# Patient Record
Sex: FEMALE | Race: WHITE | NOT HISPANIC OR LATINO | Employment: OTHER | URBAN - METROPOLITAN AREA
[De-identification: names, ages, dates, MRNs, and addresses within clinical notes are randomized per-mention and may not be internally consistent; named-entity substitution may affect disease eponyms.]

---

## 2017-01-04 ENCOUNTER — ANTI-COAG VISIT (OUTPATIENT)
Dept: CARDIOLOGY | Facility: CLINIC | Age: 82
End: 2017-01-04

## 2017-01-04 DIAGNOSIS — Z79.01 CHRONIC ANTICOAGULATION: ICD-10-CM

## 2017-01-04 DIAGNOSIS — I48.20 CHRONIC ATRIAL FIBRILLATION: ICD-10-CM

## 2017-01-04 LAB — INR PPP: 1.9

## 2017-01-04 NOTE — PROGRESS NOTES
lvm for pts daughter gave dosing and inr check date, pts daughter was asked to return call to clinic to confirm .

## 2017-01-10 RX ORDER — SUCRALFATE 1 G/10ML
1 SUSPENSION ORAL EVERY 6 HOURS
Qty: 1 BOTTLE | Refills: 0 | Status: SHIPPED | OUTPATIENT
Start: 2017-01-10 | End: 2017-02-18 | Stop reason: SDUPTHER

## 2017-01-11 ENCOUNTER — TELEPHONE (OUTPATIENT)
Dept: CARDIOLOGY | Facility: CLINIC | Age: 82
End: 2017-01-11

## 2017-01-11 ENCOUNTER — ANTI-COAG VISIT (OUTPATIENT)
Dept: CARDIOLOGY | Facility: CLINIC | Age: 82
End: 2017-01-11

## 2017-01-11 DIAGNOSIS — I48.20 CHRONIC ATRIAL FIBRILLATION: ICD-10-CM

## 2017-01-11 DIAGNOSIS — Z79.01 CHRONIC ANTICOAGULATION: ICD-10-CM

## 2017-01-11 LAB — INR PPP: 1.3

## 2017-01-11 NOTE — TELEPHONE ENCOUNTER
----- Message from Khushbu Mendez sent at 1/11/2017 10:34 AM CST -----  Contact: [facility] / [facility] [phone]  [facility] wants to speak with a nurse regarding moving up the patient's appointment. Please call back at [phone]

## 2017-01-12 ENCOUNTER — OFFICE VISIT (OUTPATIENT)
Dept: CARDIOLOGY | Facility: CLINIC | Age: 82
End: 2017-01-12
Payer: MEDICARE

## 2017-01-12 VITALS
HEIGHT: 63 IN | WEIGHT: 140 LBS | DIASTOLIC BLOOD PRESSURE: 69 MMHG | SYSTOLIC BLOOD PRESSURE: 132 MMHG | BODY MASS INDEX: 24.8 KG/M2 | HEART RATE: 78 BPM

## 2017-01-12 DIAGNOSIS — I48.0 PAF (PAROXYSMAL ATRIAL FIBRILLATION): ICD-10-CM

## 2017-01-12 DIAGNOSIS — I50.9 CONGESTIVE HEART FAILURE, UNSPECIFIED CONGESTIVE HEART FAILURE CHRONICITY, UNSPECIFIED CONGESTIVE HEART FAILURE TYPE: Primary | ICD-10-CM

## 2017-01-12 DIAGNOSIS — N39.0 URINARY TRACT INFECTION, SITE NOT SPECIFIED: ICD-10-CM

## 2017-01-12 DIAGNOSIS — I20.0 UNSTABLE ANGINA: ICD-10-CM

## 2017-01-12 PROCEDURE — 99999 PR PBB SHADOW E&M-EST. PATIENT-LVL III: CPT | Mod: PBBFAC,,, | Performed by: INTERNAL MEDICINE

## 2017-01-12 PROCEDURE — 99214 OFFICE O/P EST MOD 30 MIN: CPT | Mod: S$PBB,,, | Performed by: INTERNAL MEDICINE

## 2017-01-12 PROCEDURE — 99213 OFFICE O/P EST LOW 20 MIN: CPT | Mod: PBBFAC,PO | Performed by: INTERNAL MEDICINE

## 2017-01-12 NOTE — PROGRESS NOTES
"Subjective:    Patient ID:  Lillian Stokes is a 83 y.o. female who presents for evaluation of Dizziness (established pt of Dr. sanchez); Shortness of Breath; and Edema      HPI 83 yo WF with severe ICM with CKD complains of weight gain and SOB and CP that has been going on since last night without relief.    Cath 5-2016 Patent stents in LAD,Diagonal and OM with known occluded RCA with food L to R collaterals. No change from previous cath     CONCLUSIONS     1 - Biatrial enlargement.     2 - Moderate left ventricular enlargement.     3 - Eccentric hypertrophy.     4 - Left ventricular diastolic dysfunction.     5 - Low normal right ventricular systolic function .     6 - The estimated PA systolic pressure is 23 mmHg.     7 - Severe mitral regurgitation.     8 - Mild to moderate tricuspid regurgitation.     9 - Severely depressed left ventricular systolic function (EF 15-20%).             This document has been electronically    SIGNED BY: Cesar Freire MD On: 04/30/2016 18:35    Review of Systems   Cardiovascular: Negative for chest pain, dyspnea on exertion and paroxysmal nocturnal dyspnea.   Respiratory: Positive for shortness of breath.         Objective:    Physical Exam   Constitutional: She is oriented to person, place, and time. She appears well-developed and well-nourished.   /69  Pulse 78  Ht 5' 3" (1.6 m)  Wt 63.5 kg (139 lb 15.9 oz)  LMP  (LMP Unknown)  BMI 24.8 kg/m2     HENT:   Head: Normocephalic and atraumatic.   Right Ear: External ear normal.   Left Ear: External ear normal.   Nose: Nose normal.   Mouth/Throat: Oropharynx is clear and moist.   Eyes: Conjunctivae, EOM and lids are normal. Pupils are equal, round, and reactive to light. Right eye exhibits no discharge. Left eye exhibits no discharge. Right conjunctiva has no hemorrhage. No scleral icterus.   Neck: Normal range of motion. Neck supple. No JVD present. No tracheal deviation present. No thyromegaly present.   Cardiovascular: " Normal rate, regular rhythm, normal heart sounds and intact distal pulses.  Exam reveals no gallop and no friction rub.    No murmur heard.  Pulmonary/Chest: Effort normal and breath sounds normal. No respiratory distress. She has no wheezes. She has no rales. She exhibits no tenderness. Breasts are symmetrical.   Sternal scar   Abdominal: Soft. Bowel sounds are normal. She exhibits no distension and no mass. There is no hepatosplenomegaly or hepatomegaly. There is no tenderness. There is no rebound and no guarding.   Musculoskeletal: Normal range of motion. She exhibits no edema or tenderness.   Lymphadenopathy:     She has no cervical adenopathy.   Neurological: She is alert and oriented to person, place, and time. She displays normal reflexes. No cranial nerve deficit. Coordination normal.   Skin: Skin is warm and dry. No rash noted. No erythema. No pallor.   Psychiatric: She has a normal mood and affect. Her behavior is normal. Judgment and thought content normal.   Nursing note and vitals reviewed.        Assessment:       1. Congestive heart failure, unspecified congestive heart failure chronicity, unspecified congestive heart failure type    2. PAF (paroxysmal atrial fibrillation)    3. Unstable angina    4. Urinary tract infection, site not specified         Plan:     Unrelieved CP in elderly lady with ICM and multiple stents.  Will send to ER for further evaluation

## 2017-01-16 ENCOUNTER — TELEPHONE (OUTPATIENT)
Dept: CARDIOLOGY | Facility: CLINIC | Age: 82
End: 2017-01-16

## 2017-01-16 ENCOUNTER — ANTI-COAG VISIT (OUTPATIENT)
Dept: CARDIOLOGY | Facility: CLINIC | Age: 82
End: 2017-01-16

## 2017-01-16 ENCOUNTER — TELEPHONE (OUTPATIENT)
Dept: FAMILY MEDICINE | Facility: CLINIC | Age: 82
End: 2017-01-16

## 2017-01-16 ENCOUNTER — TELEPHONE (OUTPATIENT)
Dept: UROLOGY | Facility: CLINIC | Age: 82
End: 2017-01-16

## 2017-01-16 DIAGNOSIS — Z79.01 CHRONIC ANTICOAGULATION: ICD-10-CM

## 2017-01-16 DIAGNOSIS — I48.20 CHRONIC ATRIAL FIBRILLATION: ICD-10-CM

## 2017-01-16 LAB — INR PPP: 2.4

## 2017-01-16 RX ORDER — CITALOPRAM 20 MG/1
40 TABLET, FILM COATED ORAL DAILY
Qty: 60 TABLET | Refills: 11 | Status: SHIPPED | OUTPATIENT
Start: 2017-01-16 | End: 2017-01-16 | Stop reason: SDUPTHER

## 2017-01-16 NOTE — TELEPHONE ENCOUNTER
Pt's daughter sharan asking if celexa can be increased as pt is experiencing severe depression. Is appt needed? Please advise.

## 2017-01-16 NOTE — TELEPHONE ENCOUNTER
HH SN reports increased edema, SOB, Dr Lomax instructed SN to have pt  take an extra Bumex today and tomorrow, SN verbalizes understanding

## 2017-01-16 NOTE — TELEPHONE ENCOUNTER
Spoke with home health nurse, patient started AZO today, urine does not have a bad odor and she has no fever.  Explained that this has been ongoing issue and Dr. Sylvester recommends the Estrace Vaginal Cream twice weekly.  If no improvement by Wednesday on AZO can order a culture.

## 2017-01-16 NOTE — TELEPHONE ENCOUNTER
----- Message from Kerrie Landa sent at 1/16/2017 11:40 AM CST -----  Contact: Daughter Marlene Stokes [phone]  She is encountering a lot of depression, so Marlene would like to know if her medication can be changed?  Please call her. Thank you!

## 2017-01-16 NOTE — TELEPHONE ENCOUNTER
----- Message from Jhoana Rees sent at 1/16/2017 10:17 AM CST -----  Contact: [facility]  Burning with urination, no fever.  Please call back at

## 2017-01-17 NOTE — PROGRESS NOTES
Pts daughter states pts citalopram was increased to 2 tablets one time daily any changes to coumadin ?

## 2017-01-18 RX ORDER — CITALOPRAM 20 MG/1
TABLET, FILM COATED ORAL
Qty: 180 TABLET | Refills: 11 | Status: ON HOLD | OUTPATIENT
Start: 2017-01-18 | End: 2017-05-25

## 2017-01-19 ENCOUNTER — ANTI-COAG VISIT (OUTPATIENT)
Dept: CARDIOLOGY | Facility: CLINIC | Age: 82
End: 2017-01-19

## 2017-01-19 DIAGNOSIS — I48.20 CHRONIC ATRIAL FIBRILLATION: ICD-10-CM

## 2017-01-19 DIAGNOSIS — Z79.01 CHRONIC ANTICOAGULATION: ICD-10-CM

## 2017-01-19 LAB — INR PPP: 2.3

## 2017-01-19 NOTE — PROGRESS NOTES
Spoke with pts daughter gave dosing and next inr check date, pts daughter voiced understanding. hh faxed.

## 2017-01-25 ENCOUNTER — ANTI-COAG VISIT (OUTPATIENT)
Dept: CARDIOLOGY | Facility: CLINIC | Age: 82
End: 2017-01-25

## 2017-01-25 DIAGNOSIS — I48.20 CHRONIC ATRIAL FIBRILLATION: ICD-10-CM

## 2017-01-25 DIAGNOSIS — Z79.01 CHRONIC ANTICOAGULATION: ICD-10-CM

## 2017-01-25 LAB — INR PPP: 2.7

## 2017-01-31 ENCOUNTER — TELEPHONE (OUTPATIENT)
Dept: FAMILY MEDICINE | Facility: CLINIC | Age: 82
End: 2017-01-31

## 2017-01-31 NOTE — TELEPHONE ENCOUNTER
----- Message from Thuy Cabezas RT sent at 1/31/2017  9:15 AM CST -----  Contact: Marlene (daughter) [phone]   Marlene (daughter) [phone], requesting medication refill on the pt: pantoprazole 40 mg tablets, thanks.

## 2017-02-01 ENCOUNTER — OFFICE VISIT (OUTPATIENT)
Dept: OPTOMETRY | Facility: CLINIC | Age: 82
End: 2017-02-01
Payer: MEDICARE

## 2017-02-01 ENCOUNTER — ANTI-COAG VISIT (OUTPATIENT)
Dept: CARDIOLOGY | Facility: CLINIC | Age: 82
End: 2017-02-01

## 2017-02-01 DIAGNOSIS — Z79.01 CHRONIC ANTICOAGULATION: ICD-10-CM

## 2017-02-01 DIAGNOSIS — H35.3130 BILATERAL NONEXUDATIVE AGE-RELATED MACULAR DEGENERATION: Primary | ICD-10-CM

## 2017-02-01 DIAGNOSIS — Z96.1 BILATERAL PSEUDOPHAKIA: ICD-10-CM

## 2017-02-01 DIAGNOSIS — I48.20 CHRONIC ATRIAL FIBRILLATION: ICD-10-CM

## 2017-02-01 DIAGNOSIS — H52.203 MYOPIA WITH ASTIGMATISM, BILATERAL: ICD-10-CM

## 2017-02-01 DIAGNOSIS — H52.13 MYOPIA WITH ASTIGMATISM, BILATERAL: ICD-10-CM

## 2017-02-01 LAB — INR PPP: 2.7

## 2017-02-01 PROCEDURE — 92015 DETERMINE REFRACTIVE STATE: CPT | Mod: ,,, | Performed by: OPTOMETRIST

## 2017-02-01 PROCEDURE — 99999 PR PBB SHADOW E&M-EST. PATIENT-LVL II: CPT | Mod: PBBFAC,,, | Performed by: OPTOMETRIST

## 2017-02-01 PROCEDURE — 92014 COMPRE OPH EXAM EST PT 1/>: CPT | Mod: S$PBB,,, | Performed by: OPTOMETRIST

## 2017-02-01 PROCEDURE — 99212 OFFICE O/P EST SF 10 MIN: CPT | Mod: PBBFAC,PO | Performed by: OPTOMETRIST

## 2017-02-01 NOTE — PROGRESS NOTES
HPI     Routine eye exam-dle4/9/15 [place]   Blur ou at near x mos, no assoc pain or red, constant, no relief over   time.  Pt states blurred vision. Near>distance. No eye pain. No flashes or   floaters. No gtts.  CE OD- doesn't remember if OS done       Last edited by Marvin Corcoran, OD on 2/1/2017 10:41 AM.     ROS     Negative for: Constitutional, Gastrointestinal, Neurological, Skin,   Genitourinary, Musculoskeletal, HENT, Endocrine, Cardiovascular, Eyes,   Respiratory, Psychiatric, Allergic/Imm, Heme/Lymph    Last edited by Marvin Corcoran, OD on 2/1/2017 10:27 AM. (History)        Assessment /Plan     For exam results, see Encounter Report.    Bilateral nonexudative age-related macular degeneration    Bilateral pseudophakia    Myopia with astigmatism, bilateral      1. Dry and geographic changes. ED pt to importance of sunglass, no smoke and diet/vitamins. RTC yearly with DFE.  2. Monitor condition. Patient to report any changes. RTC 1 year recheck.  3. Spec Rx given. Different lens options discussed with patient. RTC 1 year full exam.

## 2017-02-02 ENCOUNTER — OFFICE VISIT (OUTPATIENT)
Dept: CARDIOLOGY | Facility: CLINIC | Age: 82
End: 2017-02-02
Payer: MEDICARE

## 2017-02-02 VITALS
HEIGHT: 63 IN | WEIGHT: 141.13 LBS | BODY MASS INDEX: 25.01 KG/M2 | HEART RATE: 80 BPM | SYSTOLIC BLOOD PRESSURE: 116 MMHG | DIASTOLIC BLOOD PRESSURE: 66 MMHG

## 2017-02-02 DIAGNOSIS — I10 ESSENTIAL HYPERTENSION: ICD-10-CM

## 2017-02-02 DIAGNOSIS — I50.22 SYSTOLIC CHF, CHRONIC: ICD-10-CM

## 2017-02-02 DIAGNOSIS — I48.20 CHRONIC ATRIAL FIBRILLATION: ICD-10-CM

## 2017-02-02 DIAGNOSIS — I25.5 CARDIOMYOPATHY, ISCHEMIC: Primary | ICD-10-CM

## 2017-02-02 DIAGNOSIS — Z95.810 BIVENTRICULAR ICD (IMPLANTABLE CARDIOVERTER-DEFIBRILLATOR) IN PLACE: ICD-10-CM

## 2017-02-02 DIAGNOSIS — I25.10 CORONARY ARTERY DISEASE INVOLVING NATIVE CORONARY ARTERY OF NATIVE HEART WITHOUT ANGINA PECTORIS: ICD-10-CM

## 2017-02-02 PROCEDURE — 99212 OFFICE O/P EST SF 10 MIN: CPT | Mod: PBBFAC,PO | Performed by: INTERNAL MEDICINE

## 2017-02-02 PROCEDURE — 99214 OFFICE O/P EST MOD 30 MIN: CPT | Mod: S$PBB,,, | Performed by: INTERNAL MEDICINE

## 2017-02-02 PROCEDURE — 99999 PR PBB SHADOW E&M-EST. PATIENT-LVL II: CPT | Mod: PBBFAC,,, | Performed by: INTERNAL MEDICINE

## 2017-02-03 RX ORDER — DIGOXIN 125 UG/1
TABLET ORAL
Qty: 36 TABLET | Refills: 0 | Status: SHIPPED | OUTPATIENT
Start: 2017-02-03 | End: 2017-05-04 | Stop reason: SDUPTHER

## 2017-02-05 NOTE — PROGRESS NOTES
Subjective:    Patient ID:  Lillian Stokes is a 83 y.o. female who presents for follow-up of CHF    HPI  She comes with no major complaints, no chest pain, no shortness of breath  FC II-III    Review of Systems   Constitution: Negative for decreased appetite, weakness, malaise/fatigue, weight gain and weight loss.   Cardiovascular: Negative for chest pain, dyspnea on exertion, leg swelling, palpitations and syncope.   Respiratory: Negative for cough and shortness of breath.    Gastrointestinal: Negative.    All other systems reviewed and are negative.       Objective:    Physical Exam   Constitutional: She is oriented to person, place, and time. She appears well-developed and well-nourished.   HENT:   Head: Normocephalic.   Eyes: Pupils are equal, round, and reactive to light.   Neck: Normal range of motion. Neck supple. No JVD present. Carotid bruit is not present. No thyromegaly present.   Cardiovascular: Normal rate, regular rhythm, normal heart sounds, intact distal pulses and normal pulses.  PMI is not displaced.  Exam reveals no gallop.    No murmur heard.  Pulmonary/Chest: Effort normal and breath sounds normal.   Abdominal: Soft. Normal appearance. She exhibits no mass. There is no hepatosplenomegaly. There is no tenderness.   Musculoskeletal: Normal range of motion. She exhibits no edema.   Neurological: She is alert and oriented to person, place, and time. She has normal strength and normal reflexes. No sensory deficit.   Skin: Skin is warm and intact.   Psychiatric: She has a normal mood and affect.   Nursing note and vitals reviewed.        Assessment:       1. Cardiomyopathy, ischemic    2. Chronic atrial fibrillation    3. Coronary artery disease involving native coronary artery of native heart without angina pectoris    4. Essential hypertension    5. Systolic CHF, chronic    6. Biventricular ICD (implantable cardioverter-defibrillator) in place         Plan:     Continue all cardiac  medications  Will set up for cardiomems, once it becomes availabe  4 weeks f/u

## 2017-02-08 ENCOUNTER — ANTI-COAG VISIT (OUTPATIENT)
Dept: CARDIOLOGY | Facility: CLINIC | Age: 82
End: 2017-02-08

## 2017-02-08 DIAGNOSIS — I48.20 CHRONIC ATRIAL FIBRILLATION: ICD-10-CM

## 2017-02-08 DIAGNOSIS — Z79.01 CHRONIC ANTICOAGULATION: ICD-10-CM

## 2017-02-08 LAB — INR PPP: 2.6

## 2017-02-08 NOTE — PROGRESS NOTES
lvm for pts daughter with dosing and next inr check date, pts daughter was asked to return call to clinic to confirm message. hh faxed.

## 2017-02-11 ENCOUNTER — OFFICE VISIT (OUTPATIENT)
Dept: OPTOMETRY | Facility: CLINIC | Age: 82
End: 2017-02-11
Payer: MEDICARE

## 2017-02-11 DIAGNOSIS — H52.203 MYOPIA WITH ASTIGMATISM, BILATERAL: Primary | ICD-10-CM

## 2017-02-11 DIAGNOSIS — H52.13 MYOPIA WITH ASTIGMATISM, BILATERAL: Primary | ICD-10-CM

## 2017-02-11 PROCEDURE — 99499 UNLISTED E&M SERVICE: CPT | Mod: S$PBB,,, | Performed by: OPTOMETRIST

## 2017-02-11 PROCEDURE — 99212 OFFICE O/P EST SF 10 MIN: CPT | Mod: PBBFAC,PO | Performed by: OPTOMETRIST

## 2017-02-11 PROCEDURE — 99999 PR PBB SHADOW E&M-EST. PATIENT-LVL II: CPT | Mod: PBBFAC,,, | Performed by: OPTOMETRIST

## 2017-02-11 NOTE — PROGRESS NOTES
HPI     Prob with lined bifocals  Had for 2 weeks.   Unable to read       Last edited by Marvin Corcoran, OD on 2/11/2017  9:21 AM.     ROS     Negative for: Constitutional, Gastrointestinal, Neurological, Skin,   Genitourinary, Musculoskeletal, HENT, Endocrine, Cardiovascular, Eyes,   Respiratory, Psychiatric, Allergic/Imm, Heme/Lymph    Last edited by Marvin Corcoran, OD on 2/11/2017  9:17 AM. (History)        Assessment /Plan     For exam results, see Encounter Report.    Myopia with astigmatism, bilateral      1. Remake glasses to reading only. Cut plus so she does not have to hold so close. Also gave info on paperweight magnifier.

## 2017-02-12 RX ORDER — SPIRONOLACTONE 25 MG/1
TABLET ORAL
Qty: 30 TABLET | Refills: 5 | Status: ON HOLD | OUTPATIENT
Start: 2017-02-12 | End: 2017-04-10 | Stop reason: HOSPADM

## 2017-02-13 ENCOUNTER — TELEPHONE (OUTPATIENT)
Dept: CARDIOLOGY | Facility: CLINIC | Age: 82
End: 2017-02-13

## 2017-02-13 NOTE — TELEPHONE ENCOUNTER
----- Message from Wolf Milian sent at 2/13/2017  8:26 AM CST -----  Contact: Patient's daughter  Placed call to pod. Mrs. Stokes was returning a call regarding the patient. Please call her back at [phone]. Thanks.

## 2017-02-13 NOTE — TELEPHONE ENCOUNTER
----- Message from Halina Mello sent at 2/13/2017  7:49 AM CST -----  Contact: daughter rell delacruz   Thinks that today visit at 4, (suprised at the 9:30 arrival time)  Is to discuss with , cardiac devices.   Please advise   Call back

## 2017-02-14 ENCOUNTER — OFFICE VISIT (OUTPATIENT)
Dept: FAMILY MEDICINE | Facility: CLINIC | Age: 82
End: 2017-02-14
Payer: MEDICARE

## 2017-02-14 VITALS
HEIGHT: 63 IN | SYSTOLIC BLOOD PRESSURE: 118 MMHG | BODY MASS INDEX: 24.96 KG/M2 | OXYGEN SATURATION: 99 % | DIASTOLIC BLOOD PRESSURE: 68 MMHG | WEIGHT: 140.88 LBS | HEART RATE: 84 BPM

## 2017-02-14 DIAGNOSIS — E03.4 HYPOTHYROIDISM DUE TO ACQUIRED ATROPHY OF THYROID: ICD-10-CM

## 2017-02-14 DIAGNOSIS — I10 ESSENTIAL HYPERTENSION: ICD-10-CM

## 2017-02-14 DIAGNOSIS — I50.9 CONGESTIVE HEART FAILURE, UNSPECIFIED CONGESTIVE HEART FAILURE CHRONICITY, UNSPECIFIED CONGESTIVE HEART FAILURE TYPE: Primary | ICD-10-CM

## 2017-02-14 DIAGNOSIS — F03.90 DEMENTIA WITHOUT BEHAVIORAL DISTURBANCE, UNSPECIFIED DEMENTIA TYPE: ICD-10-CM

## 2017-02-14 DIAGNOSIS — F32.A DEPRESSION, UNSPECIFIED DEPRESSION TYPE: ICD-10-CM

## 2017-02-14 DIAGNOSIS — R53.1 GENERALIZED WEAKNESS: ICD-10-CM

## 2017-02-14 DIAGNOSIS — I48.20 CHRONIC ATRIAL FIBRILLATION: ICD-10-CM

## 2017-02-14 PROCEDURE — 99999 PR PBB SHADOW E&M-EST. PATIENT-LVL III: CPT | Mod: PBBFAC,,, | Performed by: FAMILY MEDICINE

## 2017-02-14 PROCEDURE — 99214 OFFICE O/P EST MOD 30 MIN: CPT | Mod: S$PBB,,, | Performed by: FAMILY MEDICINE

## 2017-02-14 PROCEDURE — 99213 OFFICE O/P EST LOW 20 MIN: CPT | Mod: PBBFAC,PO | Performed by: FAMILY MEDICINE

## 2017-02-14 NOTE — MR AVS SNAPSHOT
[facility]  [address] [facility] [address]  [place] [address]  Phone: [phone]  Fax: [phone]                  Lillian Stokes   2017 9:00 AM   Office Visit    Description:  Female : 1933   Provider:  Alex Capellan MD   Department:  [facility]           Reason for Visit     Mole                To Do List           Future Appointments        Provider Department Dept Phone    2017 9:15 AM James Ortiz MD [facility] [phone]    3/14/2017 9:30 AM PACEMAKER [facility] [phone]    5/15/2017 8:40 AM Alex Capellan MD [facility] [phone]      Goals (5 Years of Data)     None      Follow-Up and Disposition     Return in about 3 months (around 2017).      [facility]     [facility] - 24/7 Assistance  Registered nurses in the [facility] provide clinical advisement, health education, appointment booking, and other advisory services.  Call for this free service at [phone].             Medications           Message regarding Medications     Verify the changes and/or additions to your medication regime listed below are the same as discussed with your clinician today.  If any of these changes or additions are incorrect, please notify your healthcare provider.        STOP taking these medications     alprazolam (XANAX) 0.25 MG tablet Take 1 tablet (0.25 mg total) by mouth 2 (two) times daily as needed for Anxiety.           Verify that the below list of medications is an accurate representation of the medications you are currently taking.  If none reported, the list may be blank. If incorrect, please contact your healthcare provider. Carry this list with you in case of emergency.           Current Medications     acetaminophen (TYLENOL) 325 MG tablet Take 2 tablets (650 mg total) by mouth every 4 (four) hours as needed.    amiodarone (PACERONE) 200  MG Tab Take 1 tablet (200 mg total) by mouth once daily.    aspirin (ECOTRIN) 81 MG EC tablet Take 1 tablet (81 mg total) by mouth every Mon, Wed, Fri.    bumetanide (BUMEX) 2 MG tablet Bumex 2mg PO, take 2 tablets daily.    carvedilol (COREG) 3.125 MG tablet Take 1 tablet (3.125 mg total) by mouth 2 (two) times daily.    citalopram (CELEXA) 20 MG tablet TAKE 2 TABLETS BY MOUTH ONCE DAILY    cyanocobalamin (VITAMIN B-12) 1000 MCG tablet Take 1 tablet (1,000 mcg total) by mouth once daily.    DIGOX 125 mcg tablet TAKE 1 TABLET BY MOUTH EVERY MONDAY, WEDNESDAY, AND FRIDAY AS DIRECTED    dronabinol (MARINOL) 2.5 MG capsule Take 1 capsule (2.5 mg total) by mouth 2 (two) times daily before meals.    fluticasone (FLONASE) 50 mcg/actuation nasal spray 2 sprays by Each Nare route once daily.    Lactobacillus acidoph-L.bulgar (FLORANEX) 1 million cell Tab Take 1 tablet by mouth 3 (three) times daily with meals.    levothyroxine (SYNTHROID) 88 MCG tablet TAKE 1 TABLET BY MOUTH EVERY DAY    magnesium oxide (MAGOX) 400 mg tablet Take 2 tablets (800 mg total) by mouth 2 (two) times daily.    mirtazapine (REMERON) 15 MG tablet Take 1 tablet (15 mg total) by mouth every evening.    nitroGLYCERIN (NITROSTAT) 0.4 MG SL tablet Place 1 tablet (0.4 mg total) under the tongue every 5 (five) minutes as needed for Chest pain.    pantoprazole (PROTONIX) 40 MG tablet TAKE 1 TABLET BY MOUTH TWICE DAILY    potassium chloride 20 mEq TbSR Take 20 mEq by mouth every morning.    ranolazine (RANEXA) 500 MG Tb12 Take 2 tablets (1,000 mg total) by mouth 2 (two) times daily.    rosuvastatin (CRESTOR) 10 MG tablet Take 1 tablet (10 mg total) by mouth every evening.    spironolactone (ALDACTONE) 25 MG tablet TAKE 1 TABLET BY MOUTH EVERY DAY    sucralfate (CARAFATE) 100 mg/mL suspension Take 10 mLs (1 g total) by mouth every 6 (six) hours.    tamsulosin (FLOMAX) 0.4 mg Cp24 TAKE ONE CAPSULE BY MOUTH DAILY AFTER DINNER    warfarin (COUMADIN) 1 MG tablet  "Take 1 tablet (1 mg total) by mouth every Tues, Thurs, Sat.           Clinical Reference Information           Your Vitals Were     BP Pulse Height Weight Last Period SpO2    118/68 84 5' 3" (1.6 m) 63.9 kg (140 lb 14 oz) (LMP Unknown) 99%    BMI                24.95 kg/m2          Blood Pressure          Most Recent Value    BP  118/68      Allergies as of 2/14/2017     Codeine    Azithromycin      Immunizations Administered on Date of Encounter - 2/14/2017     None      Language Assistance Services     ATTENTION: Language assistance services are available, free of charge. Please call [phone].      ATENCIÓN: Si habla español, tiene a huizar disposición servicios gratuitos de asistencia lingüística. Llame al [phone].     CHÚ Ý: N?u b?n nói Ti?ng Vi?t, có các d?ch v? h? tr? ngôn ng? mi?n phí dành cho b?n. G?i s? [phone].         [facility] complies with applicable Federal civil rights laws and does not discriminate on the basis of race, color, national origin, age, disability, or sex.        "

## 2017-02-14 NOTE — PROGRESS NOTES
Subjective:       Patient ID: Lillian Stokes is a 83 y.o. female.    Chief Complaint: Lesion (chest )    HPI     Here with daughter.     chf stable. Sees cardiology.  Planning to get a cardiomems.      Has generalized weakness. Uses a walker or cane.      htn stable.      Hypothyroid stable.      Depression stable while on celexa.     Has mild dementia. Able to perform adls but not iadls.  Daughter lives with patient.           Review of Systems      Review of Systems   Constitutional: Negative for fever and chills.   HENT: Negative for hearing loss and neck stiffness.    Eyes: Negative for redness and itching.   Respiratory: Negative for cough and choking.    Cardiovascular: Negative for chest pain and leg swelling.  Abdomen: Negative for abdominal pain and blood in stool.   Genitourinary: Negative for dysuria and flank pain.   Musculoskeletal: Negative for back pain and gait problem.   Neurological: Negative for light-headedness and headaches.   Hematological: Negative for adenopathy.   Psychiatric/Behavioral: Negative for behavioral problems.     Objective:      Physical Exam   Constitutional: She appears well-developed.   HENT:   Head: Normocephalic and atraumatic.   Eyes: Conjunctivae are normal. Pupils are equal, round, and reactive to light.   Neck: Normal range of motion.   Cardiovascular: Normal rate and regular rhythm.    No murmur heard.  Pulmonary/Chest: Effort normal and breath sounds normal. She has no wheezes.   Lymphadenopathy:     She has no cervical adenopathy.       Assessment:       1. Congestive heart failure, unspecified congestive heart failure chronicity, unspecified congestive heart failure type    2. Chronic atrial fibrillation    3. Hypothyroidism due to acquired atrophy of thyroid    4. Essential hypertension    5. Depression, unspecified depression type    6. Generalized weakness    7. Dementia without behavioral disturbance, unspecified dementia type        Plan:        Congestive heart failure, unspecified congestive heart failure chronicity, unspecified congestive heart failure type    Chronic atrial fibrillation    Hypothyroidism due to acquired atrophy of thyroid    Essential hypertension    Depression, unspecified depression type    Generalized weakness    Dementia without behavioral disturbance, unspecified dementia type        Plan:  Cont current meds      Medication List with Changes/Refills   Current Medications    ACETAMINOPHEN (TYLENOL) 325 MG TABLET    Take 2 tablets (650 mg total) by mouth every 4 (four) hours as needed.    AMIODARONE (PACERONE) 200 MG TAB    Take 1 tablet (200 mg total) by mouth once daily.    ASPIRIN (ECOTRIN) 81 MG EC TABLET    Take 1 tablet (81 mg total) by mouth every Mon, Wed, Fri.    BUMETANIDE (BUMEX) 2 MG TABLET    Bumex 2mg PO, take 2 tablets daily.    CARVEDILOL (COREG) 3.125 MG TABLET    Take 1 tablet (3.125 mg total) by mouth 2 (two) times daily.    CITALOPRAM (CELEXA) 20 MG TABLET    TAKE 2 TABLETS BY MOUTH ONCE DAILY    CYANOCOBALAMIN (VITAMIN B-12) 1000 MCG TABLET    Take 1 tablet (1,000 mcg total) by mouth once daily.    DIGOX 125 MCG TABLET    TAKE 1 TABLET BY MOUTH EVERY MONDAY, WEDNESDAY, AND FRIDAY AS DIRECTED    DRONABINOL (MARINOL) 2.5 MG CAPSULE    Take 1 capsule (2.5 mg total) by mouth 2 (two) times daily before meals.    FLUTICASONE (FLONASE) 50 MCG/ACTUATION NASAL SPRAY    2 sprays by Each Nare route once daily.    LACTOBACILLUS ACIDOPH-L.BULGAR (FLORANEX) 1 MILLION CELL TAB    Take 1 tablet by mouth 3 (three) times daily with meals.    LEVOTHYROXINE (SYNTHROID) 88 MCG TABLET    TAKE 1 TABLET BY MOUTH EVERY DAY    MAGNESIUM OXIDE (MAGOX) 400 MG TABLET    Take 2 tablets (800 mg total) by mouth 2 (two) times daily.    MIRTAZAPINE (REMERON) 15 MG TABLET    Take 1 tablet (15 mg total) by mouth every evening.    NITROGLYCERIN (NITROSTAT) 0.4 MG SL TABLET    Place 1 tablet (0.4 mg total) under the tongue every 5 (five) minutes as  needed for Chest pain.    PANTOPRAZOLE (PROTONIX) 40 MG TABLET    TAKE 1 TABLET BY MOUTH TWICE DAILY    POTASSIUM CHLORIDE 20 MEQ TBSR    Take 20 mEq by mouth every morning.    RANOLAZINE (RANEXA) 500 MG TB12    Take 2 tablets (1,000 mg total) by mouth 2 (two) times daily.    ROSUVASTATIN (CRESTOR) 10 MG TABLET    Take 1 tablet (10 mg total) by mouth every evening.    SPIRONOLACTONE (ALDACTONE) 25 MG TABLET    TAKE 1 TABLET BY MOUTH EVERY DAY    SUCRALFATE (CARAFATE) 100 MG/ML SUSPENSION    Take 10 mLs (1 g total) by mouth every 6 (six) hours.    TAMSULOSIN (FLOMAX) 0.4 MG CP24    TAKE ONE CAPSULE BY MOUTH DAILY AFTER DINNER    WARFARIN (COUMADIN) 1 MG TABLET    Take 1 tablet (1 mg total) by mouth every Tues, Thurs, Sat.   Discontinued Medications    ALPRAZOLAM (XANAX) 0.25 MG TABLET    Take 1 tablet (0.25 mg total) by mouth 2 (two) times daily as needed for Anxiety.    WARFARIN (COUMADIN) 2 MG TABLET    Take 1 tablet (2 mg total) by mouth every Mon, Wed, Fri.

## 2017-02-15 ENCOUNTER — ANTI-COAG VISIT (OUTPATIENT)
Dept: CARDIOLOGY | Facility: CLINIC | Age: 82
End: 2017-02-15

## 2017-02-15 DIAGNOSIS — Z79.01 CHRONIC ANTICOAGULATION: ICD-10-CM

## 2017-02-15 DIAGNOSIS — I48.20 CHRONIC ATRIAL FIBRILLATION: ICD-10-CM

## 2017-02-15 LAB — INR PPP: 2.2

## 2017-02-20 PROBLEM — I25.110 CORONARY ARTERY DISEASE INVOLVING NATIVE CORONARY ARTERY WITH UNSTABLE ANGINA PECTORIS: Status: ACTIVE | Noted: 2017-02-20

## 2017-02-20 RX ORDER — SUCRALFATE 1 G/10ML
SUSPENSION ORAL
Qty: 420 ML | Refills: 0 | Status: SHIPPED | OUTPATIENT
Start: 2017-02-20 | End: 2017-03-02 | Stop reason: SDUPTHER

## 2017-02-21 PROBLEM — N18.4 CKD (CHRONIC KIDNEY DISEASE) STAGE 4, GFR 15-29 ML/MIN: Status: ACTIVE | Noted: 2017-02-21

## 2017-02-27 ENCOUNTER — OFFICE VISIT (OUTPATIENT)
Dept: CARDIOLOGY | Facility: CLINIC | Age: 82
End: 2017-02-27
Payer: MEDICARE

## 2017-02-27 ENCOUNTER — TELEPHONE (OUTPATIENT)
Dept: CARDIOLOGY | Facility: CLINIC | Age: 82
End: 2017-02-27

## 2017-02-27 VITALS
SYSTOLIC BLOOD PRESSURE: 120 MMHG | WEIGHT: 140.19 LBS | HEART RATE: 84 BPM | BODY MASS INDEX: 24.84 KG/M2 | HEIGHT: 63 IN | DIASTOLIC BLOOD PRESSURE: 62 MMHG

## 2017-02-27 DIAGNOSIS — I48.0 PAF (PAROXYSMAL ATRIAL FIBRILLATION): ICD-10-CM

## 2017-02-27 DIAGNOSIS — I25.10 CORONARY ARTERY DISEASE INVOLVING NATIVE CORONARY ARTERY OF NATIVE HEART WITHOUT ANGINA PECTORIS: ICD-10-CM

## 2017-02-27 DIAGNOSIS — I10 ESSENTIAL HYPERTENSION: ICD-10-CM

## 2017-02-27 DIAGNOSIS — N18.4 CKD (CHRONIC KIDNEY DISEASE) STAGE 4, GFR 15-29 ML/MIN: ICD-10-CM

## 2017-02-27 DIAGNOSIS — I25.10 ATHEROSCLEROSIS OF NATIVE CORONARY ARTERY WITHOUT ANGINA PECTORIS, UNSPECIFIED WHETHER NATIVE OR TRANSPLANTED HEART: ICD-10-CM

## 2017-02-27 DIAGNOSIS — I50.43 ACUTE ON CHRONIC COMBINED SYSTOLIC AND DIASTOLIC HEART FAILURE: Primary | ICD-10-CM

## 2017-02-27 DIAGNOSIS — I25.5 CARDIOMYOPATHY, ISCHEMIC: ICD-10-CM

## 2017-02-27 PROCEDURE — 99212 OFFICE O/P EST SF 10 MIN: CPT | Mod: PBBFAC,PO | Performed by: INTERNAL MEDICINE

## 2017-02-27 PROCEDURE — 99999 PR PBB SHADOW E&M-EST. PATIENT-LVL II: CPT | Mod: PBBFAC,,, | Performed by: INTERNAL MEDICINE

## 2017-02-27 PROCEDURE — 99214 OFFICE O/P EST MOD 30 MIN: CPT | Mod: S$PBB,,, | Performed by: INTERNAL MEDICINE

## 2017-02-27 RX ORDER — CLOPIDOGREL 300 MG/1
300 TABLET, FILM COATED ORAL ONCE
Status: CANCELLED | OUTPATIENT
Start: 2017-02-27 | End: 2017-02-27

## 2017-02-27 RX ORDER — CLOPIDOGREL BISULFATE 75 MG/1
75 TABLET ORAL ONCE
Status: CANCELLED | OUTPATIENT
Start: 2017-02-27 | End: 2017-02-27

## 2017-02-27 RX ORDER — SODIUM CHLORIDE 450 MG/100ML
INJECTION, SOLUTION INTRAVENOUS CONTINUOUS
Status: CANCELLED | OUTPATIENT
Start: 2017-02-27

## 2017-03-02 ENCOUNTER — TELEPHONE (OUTPATIENT)
Dept: CARDIOLOGY | Facility: CLINIC | Age: 82
End: 2017-03-02

## 2017-03-02 NOTE — TELEPHONE ENCOUNTER
----- Message from Xiomara Quintero sent at 3/2/2017 12:18 PM CST -----  Daughter, Marlene, states patient has had 3lb weight gain please call [phone]

## 2017-03-03 ENCOUNTER — TELEPHONE (OUTPATIENT)
Dept: CARDIOLOGY | Facility: CLINIC | Age: 82
End: 2017-03-03

## 2017-03-03 PROBLEM — I50.43 ACUTE ON CHRONIC COMBINED SYSTOLIC AND DIASTOLIC CONGESTIVE HEART FAILURE: Status: ACTIVE | Noted: 2017-03-03

## 2017-03-03 PROBLEM — I50.9 ACUTE ON CHRONIC CONGESTIVE HEART FAILURE: Status: ACTIVE | Noted: 2017-03-03

## 2017-03-03 RX ORDER — SUCRALFATE 1 G/10ML
SUSPENSION ORAL
Qty: 420 ML | Refills: 0 | Status: SHIPPED | OUTPATIENT
Start: 2017-03-03 | End: 2017-04-12 | Stop reason: SDUPTHER

## 2017-03-03 NOTE — TELEPHONE ENCOUNTER
HH RN reports additional 2# weight gain overnight, and difficulty breathing, RN is sending pt to ER at this time, will recert pt for HH, did not DC pt today as planned.

## 2017-03-06 ENCOUNTER — TELEPHONE (OUTPATIENT)
Dept: CARDIOLOGY | Facility: CLINIC | Age: 82
End: 2017-03-06

## 2017-03-06 NOTE — TELEPHONE ENCOUNTER
----- Message from Yoly Hong sent at 3/6/2017  8:23 AM CST -----  [facility] is requesting a 2 week follow up appt/ call pt at [phone] (home)

## 2017-03-07 ENCOUNTER — ANTI-COAG VISIT (OUTPATIENT)
Dept: CARDIOLOGY | Facility: CLINIC | Age: 82
End: 2017-03-07

## 2017-03-07 ENCOUNTER — NURSE TRIAGE (OUTPATIENT)
Dept: ADMINISTRATIVE | Facility: CLINIC | Age: 82
End: 2017-03-07

## 2017-03-07 ENCOUNTER — TELEPHONE (OUTPATIENT)
Dept: CARDIOLOGY | Facility: CLINIC | Age: 82
End: 2017-03-07

## 2017-03-07 DIAGNOSIS — Z79.01 CHRONIC ANTICOAGULATION: ICD-10-CM

## 2017-03-07 DIAGNOSIS — I48.20 CHRONIC ATRIAL FIBRILLATION: ICD-10-CM

## 2017-03-07 LAB — INR PPP: 2.7

## 2017-03-07 NOTE — TELEPHONE ENCOUNTER
----- Message from Bel Gagnon sent at 3/7/2017  1:25 PM CST -----  Contact: Daughter  Marlene, Daughter [phone], Patient is having extreme weakness and lower back pain. Just released from [facility] released 3/5/17, fluid retention and heart failure. Please advise. Thanks. Transfered call to [facility].

## 2017-03-07 NOTE — TELEPHONE ENCOUNTER
Advised CG to try to see NP for PCP ASAP, Dr Lomax will not be back in clinic until next week, CG verbalizes understanding

## 2017-03-07 NOTE — TELEPHONE ENCOUNTER
"  Family called for patient- states that the patient has been complaining of weakness and back pain all day that has progressively gotten worse. Pt states that she "feels like she is going to pass out"    Advised to go to ER for further eval  Reason for Disposition   Patient sounds very sick or weak to the triager    Protocols used: ST WEAKNESS (GENERALIZED) AND FATIGUE-A-OH      "

## 2017-03-07 NOTE — PROGRESS NOTES
ST 3/3-5 Patient was admitted to the hospital medicine service with acute on chronic combined systolic and diastolic dysfunction.no new meds

## 2017-03-07 NOTE — PROGRESS NOTES
Spoke with pts daughter gave dosing and next inr check date, pts daughter voiced understanding states she is bringing pt back to er due to sever weakness

## 2017-03-09 NOTE — TELEPHONE ENCOUNTER
Reason for Disposition   Unable to complete triage due to phone connection issues    Protocols used: ST NO CONTACT OR DUPLICATE CONTACT CALL-A-AH

## 2017-03-10 ENCOUNTER — ANTI-COAG VISIT (OUTPATIENT)
Dept: CARDIOLOGY | Facility: CLINIC | Age: 82
End: 2017-03-10

## 2017-03-10 DIAGNOSIS — I48.20 CHRONIC ATRIAL FIBRILLATION: ICD-10-CM

## 2017-03-10 DIAGNOSIS — Z79.01 CHRONIC ANTICOAGULATION: ICD-10-CM

## 2017-03-10 LAB — INR PPP: 2.2

## 2017-03-14 ENCOUNTER — CLINICAL SUPPORT (OUTPATIENT)
Dept: CARDIOLOGY | Facility: CLINIC | Age: 82
End: 2017-03-14
Payer: MEDICARE

## 2017-03-14 DIAGNOSIS — I50.40: ICD-10-CM

## 2017-03-14 DIAGNOSIS — I49.01 VENTRICULAR FIBRILLATION: ICD-10-CM

## 2017-03-14 DIAGNOSIS — Z95.810 BIVENTRICULAR ICD (IMPLANTABLE CARDIOVERTER-DEFIBRILLATOR) IN PLACE: ICD-10-CM

## 2017-03-14 PROCEDURE — 93284 PRGRMG EVAL IMPLANTABLE DFB: CPT | Mod: PBBFAC,PO | Performed by: INTERNAL MEDICINE

## 2017-03-15 ENCOUNTER — ANTI-COAG VISIT (OUTPATIENT)
Dept: CARDIOLOGY | Facility: CLINIC | Age: 82
End: 2017-03-15

## 2017-03-15 DIAGNOSIS — Z79.01 CHRONIC ANTICOAGULATION: ICD-10-CM

## 2017-03-15 DIAGNOSIS — I48.20 CHRONIC ATRIAL FIBRILLATION: ICD-10-CM

## 2017-03-15 LAB — INR PPP: 2.5

## 2017-03-26 RX ORDER — DRONABINOL 2.5 MG/1
CAPSULE ORAL
Qty: 60 CAPSULE | Refills: 0 | Status: SHIPPED | OUTPATIENT
Start: 2017-03-26 | End: 2017-05-04 | Stop reason: CLARIF

## 2017-03-27 ENCOUNTER — ANTI-COAG VISIT (OUTPATIENT)
Dept: CARDIOLOGY | Facility: CLINIC | Age: 82
End: 2017-03-27

## 2017-03-27 DIAGNOSIS — Z79.01 CHRONIC ANTICOAGULATION: ICD-10-CM

## 2017-03-27 DIAGNOSIS — I48.20 CHRONIC ATRIAL FIBRILLATION: ICD-10-CM

## 2017-03-27 LAB — INR PPP: 2.4

## 2017-03-28 ENCOUNTER — LAB VISIT (OUTPATIENT)
Dept: LAB | Facility: HOSPITAL | Age: 82
End: 2017-03-28
Attending: FAMILY MEDICINE
Payer: MEDICARE

## 2017-03-28 ENCOUNTER — OFFICE VISIT (OUTPATIENT)
Dept: FAMILY MEDICINE | Facility: CLINIC | Age: 82
End: 2017-03-28
Payer: MEDICARE

## 2017-03-28 VITALS
DIASTOLIC BLOOD PRESSURE: 76 MMHG | HEART RATE: 90 BPM | SYSTOLIC BLOOD PRESSURE: 118 MMHG | BODY MASS INDEX: 25.12 KG/M2 | HEIGHT: 63 IN | WEIGHT: 141.75 LBS | OXYGEN SATURATION: 96 %

## 2017-03-28 DIAGNOSIS — N18.30 CHRONIC KIDNEY DISEASE, STAGE 3 (MODERATE): ICD-10-CM

## 2017-03-28 DIAGNOSIS — F32.A DEPRESSION, UNSPECIFIED DEPRESSION TYPE: ICD-10-CM

## 2017-03-28 DIAGNOSIS — I50.9 CONGESTIVE HEART FAILURE, UNSPECIFIED CONGESTIVE HEART FAILURE CHRONICITY, UNSPECIFIED CONGESTIVE HEART FAILURE TYPE: Primary | ICD-10-CM

## 2017-03-28 DIAGNOSIS — E03.9 HYPOTHYROIDISM, UNSPECIFIED TYPE: ICD-10-CM

## 2017-03-28 DIAGNOSIS — I48.20 CHRONIC ATRIAL FIBRILLATION: ICD-10-CM

## 2017-03-28 LAB — TSH SERPL DL<=0.005 MIU/L-ACNC: 2.19 UIU/ML

## 2017-03-28 PROCEDURE — 99214 OFFICE O/P EST MOD 30 MIN: CPT | Mod: S$PBB,,, | Performed by: FAMILY MEDICINE

## 2017-03-28 PROCEDURE — 36415 COLL VENOUS BLD VENIPUNCTURE: CPT | Mod: PO

## 2017-03-28 PROCEDURE — 99999 PR PBB SHADOW E&M-EST. PATIENT-LVL III: CPT | Mod: PBBFAC,,, | Performed by: FAMILY MEDICINE

## 2017-03-28 PROCEDURE — 84443 ASSAY THYROID STIM HORMONE: CPT

## 2017-03-28 RX ORDER — BUPROPION HYDROCHLORIDE 100 MG/1
100 TABLET, EXTENDED RELEASE ORAL 2 TIMES DAILY
Qty: 60 TABLET | Refills: 11 | Status: SHIPPED | OUTPATIENT
Start: 2017-03-28 | End: 2017-03-28 | Stop reason: SDUPTHER

## 2017-03-28 NOTE — MR AVS SNAPSHOT
[facility]  [address]  [place] [address]  Phone: [phone]  Fax: [phone]                  Lillian Stokes   3/28/2017 8:20 AM   Office Visit    Description:  Female : 1933   Provider:  Alex Capellan MD   Department:  [facility]           Reason for Visit     Fatigue     Depression     Bleeding/Bruising           Diagnoses this Visit        Comments    Hypothyroidism, unspecified type    -  Primary            To Do List           Future Appointments        Provider Department Dept Phone    3/28/2017 11:40 AM LAB, [place] [facility] [phone]    3/31/2017 8:30 AM James Ortiz MD [facility] [phone]    2017 8:40 AM Alex Capellan MD [facility] [phone]    5/15/2017 8:40 AM Alex Capellan MD [facility] [phone]    6/15/2017 9:30 AM PACEMAKER [facility] [phone]      Goals (5 Years of Data)     None      Follow-Up and Disposition     Return in about 6 weeks (around 2017).       These Medications        Disp Refills Start End    buPROPion (WELLBUTRIN SR) 100 MG TBSR 12 hr tablet 60 tablet 11 3/28/2017 3/28/2018    Take 1 tablet (100 mg total) by mouth 2 (two) times daily. - Oral    Pharmacy: [facility] [address] [place] [address] AT [facility] [address] Ph #: [phone]         [facility] On Call     [facility] -  Assistance  Registered nurses in the [facility] provide clinical advisement, health education, appointment booking, and other advisory services.  Call for this free service at [phone].             Medications           Message regarding Medications     Verify the changes and/or additions to your medication regime listed below are the same as discussed with your clinician today.  If any of these changes or additions are incorrect,  please notify your healthcare provider.        START taking these NEW medications        Refills    buPROPion (WELLBUTRIN SR) 100 MG TBSR 12 hr tablet 11    Sig: Take 1 tablet (100 mg total) by mouth 2 (two) times daily.    Class: Normal    Route: Oral      STOP taking these medications     mirtazapine (REMERON) 15 MG tablet Take 1 tablet (15 mg total) by mouth every evening.           Verify that the below list of medications is an accurate representation of the medications you are currently taking.  If none reported, the list may be blank. If incorrect, please contact your healthcare provider. Carry this list with you in case of emergency.           Current Medications     acetaminophen (TYLENOL) 325 MG tablet Take 2 tablets (650 mg total) by mouth every 4 (four) hours as needed.    alprazolam (XANAX) 0.25 MG tablet Take 1 tablet (0.25 mg total) by mouth 3 (three) times daily as needed for Anxiety.    amiodarone (PACERONE) 200 MG Tab Take 1 tablet (200 mg total) by mouth once daily.    aspirin (ECOTRIN) 81 MG EC tablet Take 1 tablet (81 mg total) by mouth every Mon, Wed, Fri.    bumetanide (BUMEX) 2 MG tablet Bumex 2mg PO, take 2 tablets daily.    CARAFATE 100 mg/mL suspension TAKE 10 ML BY MOUTH EVERY 6 HOURS    carvedilol (COREG) 3.125 MG tablet Take 1 tablet (3.125 mg total) by mouth 2 (two) times daily.    citalopram (CELEXA) 20 MG tablet TAKE 2 TABLETS BY MOUTH ONCE DAILY    cyanocobalamin (VITAMIN B-12) 1000 MCG tablet Take 1 tablet (1,000 mcg total) by mouth once daily.    DIGOX 125 mcg tablet TAKE 1 TABLET BY MOUTH EVERY MONDAY, WEDNESDAY, AND FRIDAY AS DIRECTED    dronabinol (MARINOL) 2.5 MG capsule TAKE 1 CAPSULE BY MOUTH TWICE DAILY BEFORE MEALS    fluticasone (FLONASE) 50 mcg/actuation nasal spray 2 sprays by Each Nare route daily as needed for Rhinitis.    Lactobacillus acidoph-L.bulgar (FLORANEX) 1 million cell Tab Take 1 tablet by mouth 3 (three) times daily with meals.    levothyroxine (SYNTHROID)  "88 MCG tablet TAKE 1 TABLET BY MOUTH EVERY DAY    magnesium oxide (MAGOX) 400 mg tablet Take 2 tablets (800 mg total) by mouth 2 (two) times daily.    nitroGLYCERIN (NITROSTAT) 0.4 MG SL tablet Place 1 tablet (0.4 mg total) under the tongue every 5 (five) minutes as needed for Chest pain.    pantoprazole (PROTONIX) 40 MG tablet TAKE 1 TABLET BY MOUTH TWICE DAILY    potassium chloride 20 mEq TbSR Take 20 mEq by mouth every morning.    ranolazine (RANEXA) 500 MG Tb12 Take 2 tablets (1,000 mg total) by mouth 2 (two) times daily.    rosuvastatin (CRESTOR) 10 MG tablet Take 1 tablet (10 mg total) by mouth every evening.    spironolactone (ALDACTONE) 25 MG tablet TAKE 1 TABLET BY MOUTH EVERY DAY    tamsulosin (FLOMAX) 0.4 mg Cp24 TAKE ONE CAPSULE BY MOUTH DAILY AFTER DINNER    warfarin (COUMADIN) 1 MG tablet Take 1 tablet (1 mg total) by mouth Daily.    buPROPion (WELLBUTRIN SR) 100 MG TBSR 12 hr tablet Take 1 tablet (100 mg total) by mouth 2 (two) times daily.           Clinical Reference Information           Your Vitals Were     BP Pulse Height Weight Last Period SpO2    118/76 90 5' 3" (1.6 m) 64.3 kg (141 lb 12.1 oz) (LMP Unknown) 96%    BMI                25.11 kg/m2          Blood Pressure          Most Recent Value    BP  118/76      Allergies as of 3/28/2017     Codeine    Azithromycin      Immunizations Administered on Date of Encounter - 3/28/2017     None      Orders Placed During Today's Visit     Future Labs/Procedures Expected by Expires    TSH  3/28/2017 6/26/2017      Language Assistance Services     ATTENTION: Language assistance services are available, free of charge. Please call [phone].      ATENCIÓN: Si habla español, tiene a huizar disposición servicios gratuitos de asistencia lingüística. Llame al [phone].     GREGORIO Ý: N?u b?n nói Ti?ng Vi?t, có các d?ch v? h? tr? ngôn ng? mi?n phí dành cho b?n. G?i s? [phone].         [facility] complies with applicable Federal " civil rights laws and does not discriminate on the basis of race, color, national origin, age, disability, or sex.

## 2017-03-28 NOTE — PROGRESS NOTES
Subjective:       Patient ID: Lillian Stokes is a 83 y.o. female.    Chief Complaint: Fatigue; Depression; and Bleeding/Bruising (arms & legs )    HPI     Recall patient was hospitalized earlier this month for chf exacerbation at [facility].  Due to weight gain over the past 2 days, pt has been doubling up on her bumex 4 mg po bid x 1 day.  Reports some fatigue.      Per daughter, pt c/o feeling more depressed. No homicidal or suicidal thoughts.     On coumadin for atrial fib.  Reports easy bruising on bilat arms and legs.          Review of Systems      Review of Systems   Constitutional: Negative for fever and chills.   HENT: Negative for hearing loss and neck stiffness.    Eyes: Negative for redness and itching.   Respiratory: Negative for cough and choking.    Cardiovascular: Negative for chest pain   Abdomen: Negative for abdominal pain and blood in stool.   Genitourinary: Negative for dysuria and flank pain.   Musculoskeletal: Negative for back pain and gait problem.   Neurological: Negative for light-headedness and headaches.   Hematological: Negative for adenopathy.       Objective:      Physical Exam   Constitutional: She appears well-developed.   HENT:   Head: Normocephalic and atraumatic.   Eyes: Conjunctivae are normal. Pupils are equal, round, and reactive to light.   Neck: Normal range of motion.   Cardiovascular: Normal rate and regular rhythm.    No murmur heard.  Pulmonary/Chest: Effort normal and breath sounds normal. She has no wheezes.   Musculoskeletal: She exhibits edema.   bilat lower ext: 1 plus pitting edema to mid shin   Lymphadenopathy:     She has no cervical adenopathy.       Assessment:       1. Congestive heart failure, unspecified congestive heart failure chronicity, unspecified congestive heart failure type    2. Hypothyroidism, unspecified type    3. Depression, unspecified depression type    4. Chronic kidney disease, stage 3 (moderate)    5. Chronic atrial fibrillation         Plan:       Congestive heart failure, unspecified congestive heart failure chronicity, unspecified congestive heart failure type    Hypothyroidism, unspecified type  -     TSH; Future; Expected date: 3/28/17    Depression, unspecified depression type    Chronic kidney disease, stage 3 (moderate)    Chronic atrial fibrillation    Other orders  -     buPROPion (WELLBUTRIN SR) 100 MG TBSR 12 hr tablet; Take 1 tablet (100 mg total) by mouth 2 (two) times daily.  Dispense: 60 tablet; Refill: 11              Plan:  D/c remeron, start wellbutrin. Cont with celexa.  Check tsh  Double up on bumex for the next 2 days with daily weights. F/u with cardiology later this week  Cont all other meds      Medication List with Changes/Refills   New Medications    BUPROPION (WELLBUTRIN SR) 100 MG TBSR 12 HR TABLET    Take 1 tablet (100 mg total) by mouth 2 (two) times daily.   Current Medications    ACETAMINOPHEN (TYLENOL) 325 MG TABLET    Take 2 tablets (650 mg total) by mouth every 4 (four) hours as needed.    ALPRAZOLAM (XANAX) 0.25 MG TABLET    Take 1 tablet (0.25 mg total) by mouth 3 (three) times daily as needed for Anxiety.    AMIODARONE (PACERONE) 200 MG TAB    Take 1 tablet (200 mg total) by mouth once daily.    ASPIRIN (ECOTRIN) 81 MG EC TABLET    Take 1 tablet (81 mg total) by mouth every Mon, Wed, Fri.    BUMETANIDE (BUMEX) 2 MG TABLET    Bumex 2mg PO, take 2 tablets daily.    CARAFATE 100 MG/ML SUSPENSION    TAKE 10 ML BY MOUTH EVERY 6 HOURS    CARVEDILOL (COREG) 3.125 MG TABLET    Take 1 tablet (3.125 mg total) by mouth 2 (two) times daily.    CITALOPRAM (CELEXA) 20 MG TABLET    TAKE 2 TABLETS BY MOUTH ONCE DAILY    CYANOCOBALAMIN (VITAMIN B-12) 1000 MCG TABLET    Take 1 tablet (1,000 mcg total) by mouth once daily.    DIGOX 125 MCG TABLET    TAKE 1 TABLET BY MOUTH EVERY MONDAY, WEDNESDAY, AND FRIDAY AS DIRECTED    DRONABINOL (MARINOL) 2.5 MG CAPSULE    TAKE 1 CAPSULE BY MOUTH TWICE DAILY BEFORE MEALS    FLUTICASONE  (FLONASE) 50 MCG/ACTUATION NASAL SPRAY    2 sprays by Each Nare route daily as needed for Rhinitis.    LACTOBACILLUS ACIDOPH-L.BULGAR (FLORANEX) 1 MILLION CELL TAB    Take 1 tablet by mouth 3 (three) times daily with meals.    LEVOTHYROXINE (SYNTHROID) 88 MCG TABLET    TAKE 1 TABLET BY MOUTH EVERY DAY    MAGNESIUM OXIDE (MAGOX) 400 MG TABLET    Take 2 tablets (800 mg total) by mouth 2 (two) times daily.    NITROGLYCERIN (NITROSTAT) 0.4 MG SL TABLET    Place 1 tablet (0.4 mg total) under the tongue every 5 (five) minutes as needed for Chest pain.    PANTOPRAZOLE (PROTONIX) 40 MG TABLET    TAKE 1 TABLET BY MOUTH TWICE DAILY    POTASSIUM CHLORIDE 20 MEQ TBSR    Take 20 mEq by mouth every morning.    RANOLAZINE (RANEXA) 500 MG TB12    Take 2 tablets (1,000 mg total) by mouth 2 (two) times daily.    ROSUVASTATIN (CRESTOR) 10 MG TABLET    Take 1 tablet (10 mg total) by mouth every evening.    SPIRONOLACTONE (ALDACTONE) 25 MG TABLET    TAKE 1 TABLET BY MOUTH EVERY DAY    TAMSULOSIN (FLOMAX) 0.4 MG CP24    TAKE ONE CAPSULE BY MOUTH DAILY AFTER DINNER    WARFARIN (COUMADIN) 1 MG TABLET    Take 1 tablet (1 mg total) by mouth Daily.   Discontinued Medications    MIRTAZAPINE (REMERON) 15 MG TABLET    Take 1 tablet (15 mg total) by mouth every evening.

## 2017-03-29 ENCOUNTER — PATIENT MESSAGE (OUTPATIENT)
Dept: FAMILY MEDICINE | Facility: CLINIC | Age: 82
End: 2017-03-29

## 2017-03-29 RX ORDER — BUPROPION HYDROCHLORIDE 100 MG/1
TABLET, EXTENDED RELEASE ORAL
Qty: 180 TABLET | Refills: 11 | Status: SHIPPED | OUTPATIENT
Start: 2017-03-29 | End: 2017-03-30

## 2017-03-30 ENCOUNTER — TELEPHONE (OUTPATIENT)
Dept: FAMILY MEDICINE | Facility: CLINIC | Age: 82
End: 2017-03-30

## 2017-03-30 RX ORDER — MIRTAZAPINE 15 MG/1
15 TABLET, FILM COATED ORAL NIGHTLY
Qty: 30 TABLET | Refills: 5 | Status: ON HOLD
Start: 2017-03-30 | End: 2017-04-10 | Stop reason: HOSPADM

## 2017-03-30 NOTE — TELEPHONE ENCOUNTER
Patient took wellbutrin 1 in the morning and then 1 at night. Patient stayed up all night unable to sleep and very anxious without rest. Please advise ty

## 2017-03-31 ENCOUNTER — OFFICE VISIT (OUTPATIENT)
Dept: CARDIOLOGY | Facility: CLINIC | Age: 82
End: 2017-03-31
Payer: MEDICARE

## 2017-03-31 VITALS
SYSTOLIC BLOOD PRESSURE: 121 MMHG | HEIGHT: 63 IN | BODY MASS INDEX: 25.16 KG/M2 | WEIGHT: 142 LBS | DIASTOLIC BLOOD PRESSURE: 66 MMHG | HEART RATE: 79 BPM

## 2017-03-31 DIAGNOSIS — I50.43 ACUTE ON CHRONIC COMBINED SYSTOLIC AND DIASTOLIC CONGESTIVE HEART FAILURE: Primary | ICD-10-CM

## 2017-03-31 DIAGNOSIS — I48.19 PERSISTENT ATRIAL FIBRILLATION: ICD-10-CM

## 2017-03-31 DIAGNOSIS — I10 ESSENTIAL HYPERTENSION: ICD-10-CM

## 2017-03-31 DIAGNOSIS — Z95.810 BIVENTRICULAR ICD (IMPLANTABLE CARDIOVERTER-DEFIBRILLATOR) IN PLACE: ICD-10-CM

## 2017-03-31 DIAGNOSIS — N18.4 CKD (CHRONIC KIDNEY DISEASE) STAGE 4, GFR 15-29 ML/MIN: ICD-10-CM

## 2017-03-31 DIAGNOSIS — I50.23 ACUTE ON CHRONIC SYSTOLIC CHF (CONGESTIVE HEART FAILURE), NYHA CLASS 3: ICD-10-CM

## 2017-03-31 DIAGNOSIS — I25.10 ATHEROSCLEROSIS OF NATIVE CORONARY ARTERY WITHOUT ANGINA PECTORIS, UNSPECIFIED WHETHER NATIVE OR TRANSPLANTED HEART: ICD-10-CM

## 2017-03-31 PROCEDURE — 99214 OFFICE O/P EST MOD 30 MIN: CPT | Mod: S$PBB,,, | Performed by: INTERNAL MEDICINE

## 2017-03-31 PROCEDURE — 99999 PR PBB SHADOW E&M-EST. PATIENT-LVL II: CPT | Mod: PBBFAC,,, | Performed by: INTERNAL MEDICINE

## 2017-03-31 PROCEDURE — 99212 OFFICE O/P EST SF 10 MIN: CPT | Mod: PBBFAC,PO | Performed by: INTERNAL MEDICINE

## 2017-03-31 RX ORDER — METOLAZONE 5 MG/1
5 TABLET ORAL
Qty: 20 TABLET | Refills: 3 | Status: SHIPPED | OUTPATIENT
Start: 2017-03-31 | End: 2017-03-31 | Stop reason: SDUPTHER

## 2017-03-31 RX ORDER — METOLAZONE 5 MG/1
TABLET ORAL
Qty: 90 TABLET | Refills: 3 | Status: SHIPPED | OUTPATIENT
Start: 2017-03-31 | End: 2017-05-28

## 2017-03-31 NOTE — PROGRESS NOTES
Subjective:    Patient ID:  Lillian Stokes is a 83 y.o. female who presents for follow-up of CHF    HPI  She comes with some swelling left leg for the last 5-7 days, not better after increasing bumex to 8 mg/d x 2 days    Review of Systems   Constitution: Negative for decreased appetite, weakness, malaise/fatigue, weight gain and weight loss.   Cardiovascular: Negative for chest pain, dyspnea on exertion, leg swelling, palpitations and syncope.   Respiratory: Negative for cough and shortness of breath.    Gastrointestinal: Negative.    All other systems reviewed and are negative.       Objective:    Physical Exam   Constitutional: She is oriented to person, place, and time. She appears well-developed and well-nourished.   HENT:   Head: Normocephalic.   Eyes: Pupils are equal, round, and reactive to light.   Neck: Normal range of motion. Neck supple. No JVD present. Carotid bruit is not present. No thyromegaly present.   Cardiovascular: Normal rate, regular rhythm, normal heart sounds, intact distal pulses and normal pulses.  PMI is not displaced.  Exam reveals no gallop.    No murmur heard.  Pulmonary/Chest: Effort normal and breath sounds normal.   Abdominal: Soft. Normal appearance. She exhibits no mass. There is no hepatosplenomegaly. There is no tenderness.   Musculoskeletal: Normal range of motion. She exhibits no edema.   Neurological: She is alert and oriented to person, place, and time. She has normal strength and normal reflexes. No sensory deficit.   Skin: Skin is warm and intact.   Psychiatric: She has a normal mood and affect.   Nursing note and vitals reviewed.        Assessment:       1. Acute on chronic combined systolic and diastolic congestive heart failure    2. Acute on chronic systolic CHF (congestive heart failure), NYHA class 3    3. Atherosclerosis of native coronary artery without angina pectoris, unspecified whether native or transplanted heart    4. Essential hypertension    5.  Persistent atrial fibrillation    6. CKD (chronic kidney disease) stage 4, GFR 15-29 ml/min    7. Biventricular ICD (implantable cardioverter-defibrillator) in place         Plan:   Metolazone 5 mg qd prn  Continue all other cardiac medications  4 weeks f/u  She is to think about cardiomems

## 2017-04-03 PROBLEM — R07.9 CHEST PAIN WITH MODERATE RISK FOR CARDIAC ETIOLOGY: Status: ACTIVE | Noted: 2017-04-03

## 2017-04-08 PROBLEM — R53.1 WEAKNESS: Status: ACTIVE | Noted: 2017-04-08

## 2017-04-08 PROBLEM — D64.9 SYMPTOMATIC ANEMIA: Status: ACTIVE | Noted: 2017-04-08

## 2017-04-10 PROBLEM — R19.5 OCCULT BLOOD IN STOOLS: Status: ACTIVE | Noted: 2017-04-10

## 2017-04-11 ENCOUNTER — ANTI-COAG VISIT (OUTPATIENT)
Dept: CARDIOLOGY | Facility: CLINIC | Age: 82
End: 2017-04-11

## 2017-04-11 DIAGNOSIS — Z79.01 CHRONIC ANTICOAGULATION: ICD-10-CM

## 2017-04-11 DIAGNOSIS — I48.20 CHRONIC ATRIAL FIBRILLATION: ICD-10-CM

## 2017-04-11 LAB — INR PPP: 1.6

## 2017-04-13 ENCOUNTER — LAB VISIT (OUTPATIENT)
Dept: LAB | Facility: HOSPITAL | Age: 82
End: 2017-04-13
Attending: FAMILY MEDICINE
Payer: MEDICARE

## 2017-04-13 DIAGNOSIS — D64.9 CHRONIC ANEMIA: ICD-10-CM

## 2017-04-13 DIAGNOSIS — E87.6 HYPOKALEMIA: ICD-10-CM

## 2017-04-13 DIAGNOSIS — I48.0 PAF (PAROXYSMAL ATRIAL FIBRILLATION): ICD-10-CM

## 2017-04-13 LAB
ANION GAP SERPL CALC-SCNC: 15 MMOL/L
BASOPHILS # BLD AUTO: 0.01 K/UL
BASOPHILS NFR BLD: 0.1 %
BUN SERPL-MCNC: 26 MG/DL
CALCIUM SERPL-MCNC: 9 MG/DL
CHLORIDE SERPL-SCNC: 97 MMOL/L
CO2 SERPL-SCNC: 25 MMOL/L
CREAT SERPL-MCNC: 1.7 MG/DL
DIFFERENTIAL METHOD: ABNORMAL
EOSINOPHIL # BLD AUTO: 0 K/UL
EOSINOPHIL NFR BLD: 0.3 %
ERYTHROCYTE [DISTWIDTH] IN BLOOD BY AUTOMATED COUNT: 19 %
EST. GFR  (AFRICAN AMERICAN): 31.7 ML/MIN/1.73 M^2
EST. GFR  (NON AFRICAN AMERICAN): 27.5 ML/MIN/1.73 M^2
GLUCOSE SERPL-MCNC: 116 MG/DL
HCT VFR BLD AUTO: 37.1 %
HGB BLD-MCNC: 11.9 G/DL
INR PPP: 1.5
LYMPHOCYTES # BLD AUTO: 0.7 K/UL
LYMPHOCYTES NFR BLD: 7.9 %
MCH RBC QN AUTO: 27.5 PG
MCHC RBC AUTO-ENTMCNC: 32.1 %
MCV RBC AUTO: 86 FL
MONOCYTES # BLD AUTO: 0.8 K/UL
MONOCYTES NFR BLD: 8 %
NEUTROPHILS # BLD AUTO: 7.8 K/UL
NEUTROPHILS NFR BLD: 83.3 %
PLATELET # BLD AUTO: 296 K/UL
PMV BLD AUTO: 9.9 FL
POTASSIUM SERPL-SCNC: 3.4 MMOL/L
PROTHROMBIN TIME: 15.7 SEC
RBC # BLD AUTO: 4.33 M/UL
SODIUM SERPL-SCNC: 137 MMOL/L
WBC # BLD AUTO: 9.39 K/UL

## 2017-04-13 PROCEDURE — 80048 BASIC METABOLIC PNL TOTAL CA: CPT

## 2017-04-13 PROCEDURE — 85025 COMPLETE CBC W/AUTO DIFF WBC: CPT

## 2017-04-13 PROCEDURE — 36415 COLL VENOUS BLD VENIPUNCTURE: CPT | Mod: PO

## 2017-04-13 PROCEDURE — 85610 PROTHROMBIN TIME: CPT | Mod: PO

## 2017-04-16 RX ORDER — SUCRALFATE 1 G/10ML
SUSPENSION ORAL
Qty: 420 ML | Refills: 0 | Status: ON HOLD | OUTPATIENT
Start: 2017-04-16 | End: 2017-07-19 | Stop reason: HOSPADM

## 2017-04-17 ENCOUNTER — NURSE TRIAGE (OUTPATIENT)
Dept: ADMINISTRATIVE | Facility: CLINIC | Age: 82
End: 2017-04-17

## 2017-04-17 ENCOUNTER — TELEPHONE (OUTPATIENT)
Dept: FAMILY MEDICINE | Facility: CLINIC | Age: 82
End: 2017-04-17

## 2017-04-17 PROBLEM — E86.1 INTRAVASCULAR VOLUME DEPLETION: Status: ACTIVE | Noted: 2017-04-17

## 2017-04-17 PROBLEM — N30.00 ACUTE CYSTITIS WITHOUT HEMATURIA: Status: ACTIVE | Noted: 2017-04-17

## 2017-04-17 NOTE — TELEPHONE ENCOUNTER
----- Message from Monserrat Haro sent at 4/17/2017  8:38 AM CDT -----  Patient has an appointment today at 1:00pm. She is so weak she can hardly walk. She is not eating at all. Should they bring her in for the appointment or should they bring her to this appointment.

## 2017-04-19 PROBLEM — R53.81 PHYSICAL DECONDITIONING: Status: ACTIVE | Noted: 2017-04-19

## 2017-04-21 ENCOUNTER — ANTI-COAG VISIT (OUTPATIENT)
Dept: CARDIOLOGY | Facility: CLINIC | Age: 82
End: 2017-04-21

## 2017-04-21 DIAGNOSIS — Z79.01 CHRONIC ANTICOAGULATION: ICD-10-CM

## 2017-04-21 DIAGNOSIS — I48.20 CHRONIC ATRIAL FIBRILLATION: ICD-10-CM

## 2017-04-21 LAB — INR PPP: 3.6

## 2017-04-25 ENCOUNTER — TELEPHONE (OUTPATIENT)
Dept: FAMILY MEDICINE | Facility: CLINIC | Age: 82
End: 2017-04-25

## 2017-04-25 ENCOUNTER — NURSE TRIAGE (OUTPATIENT)
Dept: ADMINISTRATIVE | Facility: CLINIC | Age: 82
End: 2017-04-25

## 2017-04-25 ENCOUNTER — ANTI-COAG VISIT (OUTPATIENT)
Dept: CARDIOLOGY | Facility: CLINIC | Age: 82
End: 2017-04-25

## 2017-04-25 DIAGNOSIS — Z79.01 CHRONIC ANTICOAGULATION: ICD-10-CM

## 2017-04-25 DIAGNOSIS — I48.20 CHRONIC ATRIAL FIBRILLATION: ICD-10-CM

## 2017-04-25 LAB — INR PPP: 2.3

## 2017-04-25 NOTE — TELEPHONE ENCOUNTER
----- Message from RT Marcela sent at 4/25/2017  8:35 AM CDT -----  Contact: [place],[phone] [facility]  [place], [phone] [facility], requesting a call back to discuss the pt's antidepressant medication not working, thanks.

## 2017-04-25 NOTE — TELEPHONE ENCOUNTER
Reason for Disposition   MILD weakness (i.e., does not interfere with ability to work, go to school, normal activities) and persists > 1 week    Protocols used: ST WEAKNESS (GENERALIZED) AND FATIGUE-A-OH    Marlene is calling to report Lillian has been very weak since getting out of hospital.  She is able to walk but just feels weak.  Marlene states she is also not eating much and does not have an appetite.  States Marinol was changed to Megace.  Marlene is requesting an appointment with Dr. Lomax as soon as possible.  Please contact Marlene at [phone] to advise.

## 2017-04-26 NOTE — PROGRESS NOTES
Spoke with pts daughter gave dosing and next inr check date ps daughter  voiced understanding.   hh faxed.

## 2017-04-27 ENCOUNTER — NURSE TRIAGE (OUTPATIENT)
Dept: ADMINISTRATIVE | Facility: CLINIC | Age: 82
End: 2017-04-27

## 2017-04-27 NOTE — TELEPHONE ENCOUNTER
Reason for Disposition   Dizziness or lightheadedness    Protocols used: ST CHEST PAIN-A-OH    Received phone call from Shravan Wei OT with [facility].  Reporting that Lillian had chest pain and nausea this AM at 7:30 AM.  Took NTG.  He arrived at 9:30 AM patient still with burning pain in chest, nausea, unsteady gait and weak.  /70, 85 O2 sat 98% on RA.  At time of call still with burning in chest and nausea.  Shravan reports decreased po intake and skin tenting on back of hand.  Advised patient should go to ER.  Advised to call 911 if daughter unable to transport

## 2017-04-28 NOTE — TELEPHONE ENCOUNTER
----- Message from David Valladares sent at 4/28/2017  2:54 PM CDT -----  Contact: Marlene Stokes/Dtr  Marlene called in regarding the attached patient (mom-Lillian).  Marlene stated that patient needs her Rx for her Ranexa 500 mg tablet.  Patient needs new Rx called in because dosage was increased.  Patient has run out since dosage was increased.      [facility] [address] [place] [address] AT [facility] [address] [place] [id number]  [address] [place] [id number]  Phone: [phone] Fax: [phone]    Marlene's call back number is [phone]

## 2017-04-29 RX ORDER — RANOLAZINE 1000 MG/1
1000 TABLET, EXTENDED RELEASE ORAL 2 TIMES DAILY
Qty: 60 TABLET | Refills: 11 | Status: SHIPPED | OUTPATIENT
Start: 2017-04-29 | End: 2017-05-28 | Stop reason: SDUPTHER

## 2017-05-03 ENCOUNTER — ANTI-COAG VISIT (OUTPATIENT)
Dept: CARDIOLOGY | Facility: CLINIC | Age: 82
End: 2017-05-03

## 2017-05-03 DIAGNOSIS — Z79.01 CHRONIC ANTICOAGULATION: ICD-10-CM

## 2017-05-03 DIAGNOSIS — I48.20 CHRONIC ATRIAL FIBRILLATION: ICD-10-CM

## 2017-05-03 LAB — INR PPP: 3.6

## 2017-05-03 NOTE — PROGRESS NOTES
Spoke with pts daughter gave dosing and next inr check date pts daughter voiced understanding. hh faxed.

## 2017-05-04 ENCOUNTER — LAB VISIT (OUTPATIENT)
Dept: LAB | Facility: HOSPITAL | Age: 82
End: 2017-05-04
Attending: FAMILY MEDICINE
Payer: MEDICARE

## 2017-05-04 ENCOUNTER — OFFICE VISIT (OUTPATIENT)
Dept: FAMILY MEDICINE | Facility: CLINIC | Age: 82
End: 2017-05-04
Payer: MEDICARE

## 2017-05-04 VITALS
DIASTOLIC BLOOD PRESSURE: 68 MMHG | HEIGHT: 64 IN | RESPIRATION RATE: 16 BRPM | OXYGEN SATURATION: 95 % | TEMPERATURE: 98 F | SYSTOLIC BLOOD PRESSURE: 122 MMHG | BODY MASS INDEX: 21.98 KG/M2 | WEIGHT: 128.75 LBS | HEART RATE: 86 BPM

## 2017-05-04 DIAGNOSIS — I48.0 PAF (PAROXYSMAL ATRIAL FIBRILLATION): ICD-10-CM

## 2017-05-04 DIAGNOSIS — F03.90 DEMENTIA WITHOUT BEHAVIORAL DISTURBANCE, UNSPECIFIED DEMENTIA TYPE: ICD-10-CM

## 2017-05-04 DIAGNOSIS — N30.00 ACUTE CYSTITIS: Primary | ICD-10-CM

## 2017-05-04 DIAGNOSIS — R53.83 FATIGUE, UNSPECIFIED TYPE: ICD-10-CM

## 2017-05-04 DIAGNOSIS — D64.9 ANEMIA, UNSPECIFIED TYPE: Primary | ICD-10-CM

## 2017-05-04 DIAGNOSIS — E87.6 HYPOKALEMIA: ICD-10-CM

## 2017-05-04 DIAGNOSIS — R41.0 CONFUSION: ICD-10-CM

## 2017-05-04 DIAGNOSIS — I50.22 CHRONIC SYSTOLIC CONGESTIVE HEART FAILURE: ICD-10-CM

## 2017-05-04 LAB
BILIRUB UR QL STRIP: NEGATIVE
CLARITY UR: CLEAR
COLOR UR: YELLOW
GLUCOSE UR QL STRIP: NEGATIVE
HGB UR QL STRIP: NEGATIVE
KETONES UR QL STRIP: NEGATIVE
LEUKOCYTE ESTERASE UR QL STRIP: NEGATIVE
NITRITE UR QL STRIP: NEGATIVE
PH UR STRIP: 7 [PH] (ref 5–8)
PROT UR QL STRIP: NEGATIVE
SP GR UR STRIP: 1.01 (ref 1–1.03)
URN SPEC COLLECT METH UR: NORMAL

## 2017-05-04 PROCEDURE — 87186 SC STD MICRODIL/AGAR DIL: CPT

## 2017-05-04 PROCEDURE — 87077 CULTURE AEROBIC IDENTIFY: CPT

## 2017-05-04 PROCEDURE — 87086 URINE CULTURE/COLONY COUNT: CPT

## 2017-05-04 PROCEDURE — 87088 URINE BACTERIA CULTURE: CPT

## 2017-05-04 PROCEDURE — 99999 PR PBB SHADOW E&M-EST. PATIENT-LVL III: CPT | Mod: PBBFAC,,, | Performed by: FAMILY MEDICINE

## 2017-05-04 PROCEDURE — 99214 OFFICE O/P EST MOD 30 MIN: CPT | Mod: S$PBB,,, | Performed by: FAMILY MEDICINE

## 2017-05-04 PROCEDURE — 99213 OFFICE O/P EST LOW 20 MIN: CPT | Mod: PBBFAC,PO | Performed by: FAMILY MEDICINE

## 2017-05-04 NOTE — PROGRESS NOTES
"Subjective:       Patient ID: Lillian Stokes is a 83 y.o. female.    Chief Complaint: Anorexia; Fatigue (extreme fatigue, sleeps/rest <90% day ); Anemia; Urinary Tract Infection (home health nurse obtained specimen yesterday 5/3/17); Dysuria (x2 days ); Back Pain (x 2days ); Spasms (x1mo "jerking" movements recently worsening); Blurred Vision (bilateral ); and Fall (x3 since Sunday 4/30/17)    HPI     Was in st ER on 4/27/17 for chest pain and found to have low potassium. Taking kdur 20 meq daily    Per daughter, pt's condition declining. Not eating. More confusion. Does have underlying dementia.      Patient alert to person only but not time or place.     Review of Systems      Review of Systems   Constitutional: Negative for fever and chills.   HENT: Negative for hearing loss and neck stiffness.    Eyes: Negative for redness and itching.   Respiratory: Negative for cough and choking.    Cardiovascular: Negative for chest pain and leg swelling.  Abdomen: Negative for abdominal pain and blood in stool.   Genitourinary: Negative for dysuria and flank pain.   Musculoskeletal: Negative for back pain and gait problem.   Neurological: Negative for light-headedness and headaches.   Hematological: Negative for adenopathy.   Psychiatric/Behavioral: Negative for behavioral problems.     Objective:      Physical Exam   Constitutional: She appears well-developed.   HENT:   Head: Normocephalic and atraumatic.   Eyes: Conjunctivae are normal. Pupils are equal, round, and reactive to light.   Neck: Normal range of motion.   Cardiovascular: Normal rate and regular rhythm.    No murmur heard.  Pulmonary/Chest: Effort normal and breath sounds normal. She has no wheezes.   Lymphadenopathy:     She has no cervical adenopathy.       Assessment:       1. Anemia, unspecified type    2. Hypokalemia    3. Fatigue, unspecified type    4. Dementia without behavioral disturbance, unspecified dementia type    5. Confusion    6. " Chronic systolic congestive heart failure    7. PAF (paroxysmal atrial fibrillation)        Plan:       Anemia, unspecified type  -     CBC auto differential; Future; Expected date: 5/4/17    Hypokalemia  -     Basic metabolic panel; Future; Expected date: 5/4/17    Fatigue, unspecified type  -     Urine culture; Future; Expected date: 5/4/17    Dementia without behavioral disturbance, unspecified dementia type    Confusion    Chronic systolic congestive heart failure    PAF (paroxysmal atrial fibrillation)            Plan:  Patient declining. Talked to daughter about hospice. See orders

## 2017-05-05 ENCOUNTER — TELEPHONE (OUTPATIENT)
Dept: FAMILY MEDICINE | Facility: CLINIC | Age: 82
End: 2017-05-05

## 2017-05-05 DIAGNOSIS — E87.6 HYPOKALEMIA: Primary | ICD-10-CM

## 2017-05-05 RX ORDER — POTASSIUM CHLORIDE 20 MEQ/1
20 TABLET, EXTENDED RELEASE ORAL 2 TIMES DAILY
Qty: 60 TABLET | Refills: 5 | Status: ON HOLD | OUTPATIENT
Start: 2017-05-05 | End: 2017-05-31

## 2017-05-05 RX ORDER — DIGOXIN 125 UG/1
TABLET ORAL
Qty: 36 TABLET | Refills: 0 | Status: SHIPPED | OUTPATIENT
Start: 2017-05-05 | End: 2017-05-28 | Stop reason: SDUPTHER

## 2017-05-05 NOTE — TELEPHONE ENCOUNTER
Pt has dementia.    inform pt's daughter via phone:    Your cbc, including your white and red blood cell count, were all reviewed.  All results within acceptable range.     Your basic Metabolic Panel which includes Sodium, Potassium, Chloride, Calcium, Kidney Test, and blood sugar was reviewed. Low potassium.    Recommend she take kdur 20 meq po bid.  i e sent in the rx to pt's pharmacy.    Recheck bmp in 1 week. Please schedule test/tests

## 2017-05-08 ENCOUNTER — TELEPHONE (OUTPATIENT)
Dept: FAMILY MEDICINE | Facility: CLINIC | Age: 82
End: 2017-05-08

## 2017-05-08 LAB — BACTERIA UR CULT: NORMAL

## 2017-05-08 RX ORDER — L. ACIDOPHILUS/L.BULGARICUS 1MM CELL
TABLET ORAL
Qty: 100 TABLET | Refills: 0 | Status: ON HOLD | OUTPATIENT
Start: 2017-05-08 | End: 2017-07-19 | Stop reason: HOSPADM

## 2017-05-08 RX ORDER — CIPROFLOXACIN 250 MG/1
250 TABLET, FILM COATED ORAL 2 TIMES DAILY
Qty: 14 TABLET | Refills: 0 | Status: ON HOLD | OUTPATIENT
Start: 2017-05-08 | End: 2017-05-18 | Stop reason: HOSPADM

## 2017-05-08 NOTE — TELEPHONE ENCOUNTER
Pt with dementia.     inform pt's daughter via phone that I reviewed the test results and note the following:    Urine cx positive for bacteria.     E sent in cipro.

## 2017-05-09 NOTE — PROGRESS NOTES
Pts daughter states pt is no longer taking the megace 40mg tablets.  She started taking cipro 250mg yesterday 2xdaily for 7 days any changes to coumadin?

## 2017-05-10 ENCOUNTER — ANTI-COAG VISIT (OUTPATIENT)
Dept: CARDIOLOGY | Facility: CLINIC | Age: 82
End: 2017-05-10

## 2017-05-10 ENCOUNTER — TELEPHONE (OUTPATIENT)
Dept: FAMILY MEDICINE | Facility: CLINIC | Age: 82
End: 2017-05-10

## 2017-05-10 DIAGNOSIS — Z79.01 CHRONIC ANTICOAGULATION: ICD-10-CM

## 2017-05-10 DIAGNOSIS — I48.20 CHRONIC ATRIAL FIBRILLATION: ICD-10-CM

## 2017-05-10 LAB — INR PPP: 4.2

## 2017-05-10 RX ORDER — TRIAMCINOLONE ACETONIDE 1 MG/G
PASTE DENTAL 2 TIMES DAILY
Qty: 5 G | Refills: 12 | Status: SHIPPED | OUTPATIENT
Start: 2017-05-10 | End: 2017-05-13

## 2017-05-10 NOTE — TELEPHONE ENCOUNTER
----- Message from Hoa Wang sent at 5/10/2017 10:24 AM CDT -----  Lydia with [employer] is calling to inform a level 2 drug interaction with Cipro  Coumadin and  Amiodarone. Patient also has  canker sores in her mouth,  is requesting a prescription. Contact Lydia at [phone]          [facility] [address] [place] [address] AT [facility] [address] [place] [id number]  [address] [place] [address]  Phone: [phone] Fax: [phone]

## 2017-05-10 NOTE — TELEPHONE ENCOUNTER
HH wanted to alert you about pt ABTX with coumadin which is just protocol and also she wanted to ask if she can take OTC canker mouth pain relief, due to sores in her mouth. She stated she forgot to tell you about this and wants to know if this is ok or do you recommend something else, the daughter read the OTC med can raise the blood pressure.  please advise .

## 2017-05-10 NOTE — PROGRESS NOTES
pts daughter states pt is starting to take triamcin/orabas 0.1% 5gm she will start tonight applying to teeth 2xdaily any changes to coumadin?

## 2017-05-10 NOTE — TELEPHONE ENCOUNTER
Recommend her complete course of cipro.  Pt's daughter or home health needs to call her cardiologist regarding inr check.    rx kenalog dental paste esent for canker sore.

## 2017-05-11 ENCOUNTER — LAB VISIT (OUTPATIENT)
Dept: LAB | Facility: HOSPITAL | Age: 82
End: 2017-05-11
Attending: FAMILY MEDICINE
Payer: MEDICARE

## 2017-05-11 DIAGNOSIS — E87.6 HYPOKALEMIA: ICD-10-CM

## 2017-05-11 LAB
ANION GAP SERPL CALC-SCNC: 14 MMOL/L
BUN SERPL-MCNC: 22 MG/DL
CALCIUM SERPL-MCNC: 8.7 MG/DL
CHLORIDE SERPL-SCNC: 100 MMOL/L
CO2 SERPL-SCNC: 21 MMOL/L
CREAT SERPL-MCNC: 1.7 MG/DL
EST. GFR  (AFRICAN AMERICAN): 31.7 ML/MIN/1.73 M^2
EST. GFR  (NON AFRICAN AMERICAN): 27.5 ML/MIN/1.73 M^2
GLUCOSE SERPL-MCNC: 122 MG/DL
POTASSIUM SERPL-SCNC: 3.9 MMOL/L
SODIUM SERPL-SCNC: 135 MMOL/L

## 2017-05-11 PROCEDURE — 80048 BASIC METABOLIC PNL TOTAL CA: CPT

## 2017-05-11 PROCEDURE — 36415 COLL VENOUS BLD VENIPUNCTURE: CPT | Mod: PO

## 2017-05-11 RX ORDER — LANOLIN ALCOHOL/MO/W.PET/CERES
CREAM (GRAM) TOPICAL
Qty: 120 TABLET | Refills: 0 | Status: SHIPPED | OUTPATIENT
Start: 2017-05-11

## 2017-05-14 ENCOUNTER — PATIENT MESSAGE (OUTPATIENT)
Dept: FAMILY MEDICINE | Facility: CLINIC | Age: 82
End: 2017-05-14

## 2017-05-14 PROBLEM — R55 SYNCOPE: Status: ACTIVE | Noted: 2017-05-14

## 2017-05-14 PROBLEM — I27.20 PULMONARY HTN: Status: ACTIVE | Noted: 2017-05-14

## 2017-05-14 PROBLEM — I47.20 V TACH: Status: ACTIVE | Noted: 2017-05-14

## 2017-05-15 PROBLEM — I25.10 CORONARY ARTERY DISEASE: Status: ACTIVE | Noted: 2017-02-20

## 2017-05-23 ENCOUNTER — ANTI-COAG VISIT (OUTPATIENT)
Dept: CARDIOLOGY | Facility: CLINIC | Age: 82
End: 2017-05-23

## 2017-05-23 DIAGNOSIS — I48.20 CHRONIC ATRIAL FIBRILLATION: ICD-10-CM

## 2017-05-23 DIAGNOSIS — Z79.01 CHRONIC ANTICOAGULATION: ICD-10-CM

## 2017-05-23 NOTE — PROGRESS NOTES
Pts daughter states pt is in [facility] and is unsure when she will be coming home

## 2017-05-24 PROBLEM — I60.9 SAH (SUBARACHNOID HEMORRHAGE): Status: ACTIVE | Noted: 2017-05-24

## 2017-05-24 PROBLEM — S06.6XAA SUBARACHNOID HEMORRHAGE FOLLOWING INJURY, CONCUSSION: Status: ACTIVE | Noted: 2017-05-24

## 2017-05-25 PROBLEM — S81.811A SKIN TEAR OF RIGHT LOWER LEG WITHOUT COMPLICATION: Status: ACTIVE | Noted: 2017-05-25

## 2017-05-25 PROBLEM — S61.412A SKIN TEAR OF LEFT HAND WITHOUT COMPLICATION: Status: ACTIVE | Noted: 2017-05-25

## 2017-05-28 PROBLEM — F07.81 POST-CONCUSSION SYNDROME: Status: ACTIVE | Noted: 2017-05-28

## 2017-05-29 PROBLEM — J32.0 MAXILLARY SINUSITIS: Status: ACTIVE | Noted: 2017-05-29

## 2017-06-23 ENCOUNTER — OFFICE VISIT (OUTPATIENT)
Dept: OPTOMETRY | Facility: CLINIC | Age: 82
End: 2017-06-23
Payer: MEDICARE

## 2017-06-23 DIAGNOSIS — H01.139 ATOPIC DERMATITIS OF EYELID, UNSPECIFIED LATERALITY: Primary | ICD-10-CM

## 2017-06-23 DIAGNOSIS — H04.123 DRY EYES, BILATERAL: ICD-10-CM

## 2017-06-23 PROCEDURE — 99999 PR PBB SHADOW E&M-EST. PATIENT-LVL II: CPT | Mod: PBBFAC,,, | Performed by: OPTOMETRIST

## 2017-06-23 PROCEDURE — 99212 OFFICE O/P EST SF 10 MIN: CPT | Mod: PBBFAC,PO | Performed by: OPTOMETRIST

## 2017-06-23 PROCEDURE — 92012 INTRM OPH EXAM EST PATIENT: CPT | Mod: GW,S$PBB,, | Performed by: OPTOMETRIST

## 2017-06-23 NOTE — PROGRESS NOTES
HPI     Eye Problem    Additional comments: OU red, itching, burning, discharge x 2 weeks --   being treated through [facility], treated w/ antibiotic gtts   x 1 week w/ no improvement -- no using Systane gtts tid, no relief //   states symptoms just getting worse           Itchy Eye    Additional comments: severe itching, pt constantly scratching OU --- no   relief w/ Visine A gtts           Comments   Agree above  Notes redness and d/c OU 2 weeks ago  Has tried abx drops and visine A and ATs         Last edited by ALLISON Mccann, OD on 6/23/2017 10:34 AM. (History)        ROS     Positive for: Eyes    Negative for: Constitutional, Gastrointestinal, Neurological, Skin,   Genitourinary, Musculoskeletal, HENT, Endocrine, Cardiovascular,   Respiratory, Psychiatric, Allergic/Imm, Heme/Lymph    Last edited by ALLISON Mccann, OD on 6/23/2017 10:34 AM. (History)        Assessment /Plan     For exam results, see Encounter Report.    Atopic dermatitis of eyelid, unspecified laterality  -     fluorometholone 0.1% (FML) 0.1 % Oint; Apply sparingly to eyelid skin / margin OU tid x 3 days, then bid x 3 days then qhs x 3 days  Dispense: 3.5 g; Refill: 1    Dry eyes, bilateral      ? Etiology  Cool pack, avoid scratching eyelid tissue  fml clare as directed--taper over 9-10 days  ATs add q2-3h OU  Call if not effective, prn

## 2017-06-30 DIAGNOSIS — H01.139 ATOPIC DERMATITIS OF EYELID, UNSPECIFIED LATERALITY: Primary | ICD-10-CM

## 2017-07-11 ENCOUNTER — HOSPITAL ENCOUNTER (INPATIENT)
Facility: HOSPITAL | Age: 82
LOS: 13 days | Discharge: NURSING, REHAB OR LONG-TERM CARE | DRG: 469 | End: 2017-07-24
Attending: ORTHOPAEDIC SURGERY | Admitting: ORTHOPAEDIC SURGERY
Payer: MEDICARE

## 2017-07-11 DIAGNOSIS — I47.20 VENTRICULAR TACHYCARDIA: ICD-10-CM

## 2017-07-11 DIAGNOSIS — Z95.810 BIVENTRICULAR ICD (IMPLANTABLE CARDIOVERTER-DEFIBRILLATOR) IN PLACE: ICD-10-CM

## 2017-07-11 DIAGNOSIS — S72.001A CLOSED DISPLACED FRACTURE OF RIGHT FEMORAL NECK: ICD-10-CM

## 2017-07-11 DIAGNOSIS — Z95.810 PRESENCE OF AUTOMATIC IMPLANTABLE CARDIOVERTER-DEFIBRILLATOR: ICD-10-CM

## 2017-07-11 DIAGNOSIS — S72.001D CLOSED DISPLACED FRACTURE OF NECK OF RIGHT FEMUR WITH ROUTINE HEALING: ICD-10-CM

## 2017-07-11 DIAGNOSIS — S72.001A CLOSED DISPLACED FRACTURE OF RIGHT FEMORAL NECK, INITIAL ENCOUNTER: ICD-10-CM

## 2017-07-11 DIAGNOSIS — Z71.89 GOALS OF CARE, COUNSELING/DISCUSSION: ICD-10-CM

## 2017-07-11 DIAGNOSIS — S42.201A CLOSED FRACTURE OF PROXIMAL END OF RIGHT HUMERUS, UNSPECIFIED FRACTURE MORPHOLOGY, INITIAL ENCOUNTER: ICD-10-CM

## 2017-07-11 DIAGNOSIS — S42.201D CLOSED FRACTURE OF PROXIMAL END OF RIGHT HUMERUS WITH ROUTINE HEALING, UNSPECIFIED FRACTURE MORPHOLOGY, SUBSEQUENT ENCOUNTER: Primary | ICD-10-CM

## 2017-07-11 DIAGNOSIS — I10 ESSENTIAL HYPERTENSION: ICD-10-CM

## 2017-07-11 DIAGNOSIS — E83.42 HYPOMAGNESEMIA: ICD-10-CM

## 2017-07-11 DIAGNOSIS — E87.6 HYPOKALEMIA: ICD-10-CM

## 2017-07-11 DIAGNOSIS — I50.42 CHRONIC COMBINED SYSTOLIC AND DIASTOLIC HEART FAILURE: ICD-10-CM

## 2017-07-11 DIAGNOSIS — S72.009A HIP FRACTURE: ICD-10-CM

## 2017-07-11 DIAGNOSIS — S72.001D ENCOUNTER FOR AFTERCARE FOR HEALING CLOSED TRAUMATIC FRACTURE OF RIGHT HIP: ICD-10-CM

## 2017-07-11 DIAGNOSIS — I48.0 PAF (PAROXYSMAL ATRIAL FIBRILLATION): ICD-10-CM

## 2017-07-11 DIAGNOSIS — F03.90 DEMENTIA WITHOUT BEHAVIORAL DISTURBANCE, UNSPECIFIED DEMENTIA TYPE: ICD-10-CM

## 2017-07-11 DIAGNOSIS — D62 ACUTE BLOOD LOSS ANEMIA: ICD-10-CM

## 2017-07-11 DIAGNOSIS — J96.11 CHRONIC RESPIRATORY FAILURE WITH HYPOXIA: ICD-10-CM

## 2017-07-11 DIAGNOSIS — I25.10 CORONARY ARTERY DISEASE INVOLVING NATIVE CORONARY ARTERY OF NATIVE HEART WITHOUT ANGINA PECTORIS: ICD-10-CM

## 2017-07-11 DIAGNOSIS — I25.5 CARDIOMYOPATHY, ISCHEMIC: ICD-10-CM

## 2017-07-11 PROBLEM — S42.301A FRACTURE OF RIGHT HUMERUS: Status: ACTIVE | Noted: 2017-07-11

## 2017-07-11 PROCEDURE — 11000001 HC ACUTE MED/SURG PRIVATE ROOM

## 2017-07-11 RX ORDER — MORPHINE SULFATE 4 MG/ML
4 INJECTION, SOLUTION INTRAMUSCULAR; INTRAVENOUS EVERY 4 HOURS PRN
Status: DISCONTINUED | OUTPATIENT
Start: 2017-07-12 | End: 2017-07-12

## 2017-07-11 RX ORDER — MUPIROCIN 20 MG/G
1 OINTMENT TOPICAL
Status: DISCONTINUED | OUTPATIENT
Start: 2017-07-11 | End: 2017-07-12 | Stop reason: HOSPADM

## 2017-07-11 RX ORDER — ONDANSETRON 2 MG/ML
4 INJECTION INTRAMUSCULAR; INTRAVENOUS EVERY 12 HOURS PRN
Status: DISCONTINUED | OUTPATIENT
Start: 2017-07-12 | End: 2017-07-12

## 2017-07-11 RX ORDER — DIPHENHYDRAMINE HYDROCHLORIDE 50 MG/ML
25 INJECTION INTRAMUSCULAR; INTRAVENOUS EVERY 6 HOURS PRN
Status: DISCONTINUED | OUTPATIENT
Start: 2017-07-12 | End: 2017-07-16

## 2017-07-11 RX ORDER — HYDROCODONE BITARTRATE AND ACETAMINOPHEN 5; 325 MG/1; MG/1
1 TABLET ORAL EVERY 4 HOURS PRN
Status: DISCONTINUED | OUTPATIENT
Start: 2017-07-12 | End: 2017-07-12

## 2017-07-11 RX ORDER — MORPHINE SULFATE 2 MG/ML
3 INJECTION, SOLUTION INTRAMUSCULAR; INTRAVENOUS ONCE
Status: COMPLETED | OUTPATIENT
Start: 2017-07-12 | End: 2017-07-11

## 2017-07-11 RX ORDER — HYDROCODONE BITARTRATE AND ACETAMINOPHEN 10; 325 MG/1; MG/1
1 TABLET ORAL EVERY 4 HOURS PRN
Status: DISCONTINUED | OUTPATIENT
Start: 2017-07-12 | End: 2017-07-12

## 2017-07-11 RX ORDER — AMOXICILLIN 250 MG
1 CAPSULE ORAL 2 TIMES DAILY
Status: DISCONTINUED | OUTPATIENT
Start: 2017-07-12 | End: 2017-07-12 | Stop reason: HOSPADM

## 2017-07-11 RX ADMIN — MORPHINE SULFATE 3 MG: 2 INJECTION, SOLUTION INTRAMUSCULAR; INTRAVENOUS at 11:07

## 2017-07-12 ENCOUNTER — SURGERY (OUTPATIENT)
Age: 82
End: 2017-07-12

## 2017-07-12 ENCOUNTER — ANESTHESIA EVENT (OUTPATIENT)
Dept: SURGERY | Facility: HOSPITAL | Age: 82
DRG: 469 | End: 2017-07-12
Payer: MEDICARE

## 2017-07-12 ENCOUNTER — ANESTHESIA (OUTPATIENT)
Dept: SURGERY | Facility: HOSPITAL | Age: 82
DRG: 469 | End: 2017-07-12
Payer: MEDICARE

## 2017-07-12 PROBLEM — N18.30 CKD (CHRONIC KIDNEY DISEASE), STAGE III: Status: ACTIVE | Noted: 2017-07-12

## 2017-07-12 PROBLEM — R53.81 DEBILITY: Status: ACTIVE | Noted: 2017-07-12

## 2017-07-12 PROBLEM — S72.009A HIP FRACTURE: Status: ACTIVE | Noted: 2017-07-12

## 2017-07-12 PROBLEM — J96.11 CHRONIC RESPIRATORY FAILURE WITH HYPOXIA: Status: ACTIVE | Noted: 2017-07-12

## 2017-07-12 LAB
ABO + RH BLD: NORMAL
ALBUMIN SERPL BCP-MCNC: 2.5 G/DL
ALBUMIN SERPL BCP-MCNC: 2.7 G/DL
ALP SERPL-CCNC: 65 U/L
ALP SERPL-CCNC: 72 U/L
ALT SERPL W/O P-5'-P-CCNC: 18 U/L
ALT SERPL W/O P-5'-P-CCNC: 19 U/L
ANION GAP SERPL CALC-SCNC: 11 MMOL/L
ANION GAP SERPL CALC-SCNC: 12 MMOL/L
ANION GAP SERPL CALC-SCNC: 13 MMOL/L
ANISOCYTOSIS BLD QL SMEAR: SLIGHT
AST SERPL-CCNC: 22 U/L
AST SERPL-CCNC: 27 U/L
BACTERIA #/AREA URNS AUTO: ABNORMAL /HPF
BASOPHILS # BLD AUTO: 0.01 K/UL
BASOPHILS # BLD AUTO: 0.02 K/UL
BASOPHILS NFR BLD: 0.1 %
BASOPHILS NFR BLD: 0.2 %
BILIRUB SERPL-MCNC: 0.7 MG/DL
BILIRUB SERPL-MCNC: 1.2 MG/DL
BILIRUB UR QL STRIP: NEGATIVE
BLD GP AB SCN CELLS X3 SERPL QL: NORMAL
BUN SERPL-MCNC: 18 MG/DL
BUN SERPL-MCNC: 18 MG/DL
BUN SERPL-MCNC: 19 MG/DL
CALCIUM SERPL-MCNC: 7.8 MG/DL
CALCIUM SERPL-MCNC: 8.2 MG/DL
CALCIUM SERPL-MCNC: 8.4 MG/DL
CHLORIDE SERPL-SCNC: 92 MMOL/L
CHLORIDE SERPL-SCNC: 94 MMOL/L
CHLORIDE SERPL-SCNC: 98 MMOL/L
CLARITY UR REFRACT.AUTO: ABNORMAL
CO2 SERPL-SCNC: 26 MMOL/L
CO2 SERPL-SCNC: 27 MMOL/L
CO2 SERPL-SCNC: 31 MMOL/L
COLOR UR AUTO: YELLOW
CREAT SERPL-MCNC: 1.3 MG/DL
CREAT SERPL-MCNC: 1.3 MG/DL
CREAT SERPL-MCNC: 1.4 MG/DL
DIFFERENTIAL METHOD: ABNORMAL
DIFFERENTIAL METHOD: ABNORMAL
EOSINOPHIL # BLD AUTO: 0.2 K/UL
EOSINOPHIL # BLD AUTO: 0.5 K/UL
EOSINOPHIL NFR BLD: 2 %
EOSINOPHIL NFR BLD: 4.5 %
ERYTHROCYTE [DISTWIDTH] IN BLOOD BY AUTOMATED COUNT: 17.6 %
ERYTHROCYTE [DISTWIDTH] IN BLOOD BY AUTOMATED COUNT: 17.8 %
EST. GFR  (AFRICAN AMERICAN): 40.1 ML/MIN/1.73 M^2
EST. GFR  (AFRICAN AMERICAN): 43.8 ML/MIN/1.73 M^2
EST. GFR  (AFRICAN AMERICAN): 43.8 ML/MIN/1.73 M^2
EST. GFR  (NON AFRICAN AMERICAN): 34.8 ML/MIN/1.73 M^2
EST. GFR  (NON AFRICAN AMERICAN): 38 ML/MIN/1.73 M^2
EST. GFR  (NON AFRICAN AMERICAN): 38 ML/MIN/1.73 M^2
GLUCOSE SERPL-MCNC: 118 MG/DL (ref 70–110)
GLUCOSE SERPL-MCNC: 121 MG/DL
GLUCOSE SERPL-MCNC: 128 MG/DL
GLUCOSE SERPL-MCNC: 154 MG/DL
GLUCOSE UR QL STRIP: NEGATIVE
HCO3 UR-SCNC: 29.7 MMOL/L (ref 24–28)
HCT VFR BLD AUTO: 28.5 %
HCT VFR BLD AUTO: 30.9 %
HCT VFR BLD CALC: 29 %PCV (ref 36–54)
HGB BLD-MCNC: 9.2 G/DL
HGB BLD-MCNC: 9.9 G/DL
HGB UR QL STRIP: ABNORMAL
HYALINE CASTS UR QL AUTO: 14 /LPF
HYPOCHROMIA BLD QL SMEAR: ABNORMAL
INR PPP: 1.2
KETONES UR QL STRIP: NEGATIVE
LACTATE SERPL-SCNC: 1.8 MMOL/L
LEUKOCYTE ESTERASE UR QL STRIP: ABNORMAL
LYMPHOCYTES # BLD AUTO: 0.5 K/UL
LYMPHOCYTES # BLD AUTO: 0.5 K/UL
LYMPHOCYTES NFR BLD: 4.1 %
LYMPHOCYTES NFR BLD: 4.1 %
MAGNESIUM SERPL-MCNC: 1.7 MG/DL
MAGNESIUM SERPL-MCNC: 1.8 MG/DL
MCH RBC QN AUTO: 30.1 PG
MCH RBC QN AUTO: 30.3 PG
MCHC RBC AUTO-ENTMCNC: 32 %
MCHC RBC AUTO-ENTMCNC: 32.3 %
MCV RBC AUTO: 94 FL
MCV RBC AUTO: 94 FL
MICROSCOPIC COMMENT: ABNORMAL
MONOCYTES # BLD AUTO: 1.1 K/UL
MONOCYTES # BLD AUTO: 1.1 K/UL
MONOCYTES NFR BLD: 8.8 %
MONOCYTES NFR BLD: 9.3 %
NEUTROPHILS # BLD AUTO: 10.2 K/UL
NEUTROPHILS # BLD AUTO: 9.9 K/UL
NEUTROPHILS NFR BLD: 82.2 %
NEUTROPHILS NFR BLD: 84.4 %
NITRITE UR QL STRIP: NEGATIVE
OVALOCYTES BLD QL SMEAR: ABNORMAL
PCO2 BLDA: 39.7 MMHG (ref 35–45)
PH SMN: 7.48 [PH] (ref 7.35–7.45)
PH UR STRIP: 5 [PH] (ref 5–8)
PHOSPHATE SERPL-MCNC: 3.2 MG/DL
PHOSPHATE SERPL-MCNC: 4 MG/DL
PLATELET # BLD AUTO: 222 K/UL
PLATELET # BLD AUTO: 239 K/UL
PLATELET BLD QL SMEAR: ABNORMAL
PMV BLD AUTO: 10.1 FL
PMV BLD AUTO: 9.8 FL
PO2 BLDA: 85 MMHG (ref 80–100)
POC BE: 6 MMOL/L
POC IONIZED CALCIUM: 1.06 MMOL/L (ref 1.06–1.42)
POC SATURATED O2: 97 % (ref 95–100)
POC TCO2: 31 MMOL/L (ref 23–27)
POIKILOCYTOSIS BLD QL SMEAR: SLIGHT
POLYCHROMASIA BLD QL SMEAR: ABNORMAL
POTASSIUM BLD-SCNC: 3.9 MMOL/L (ref 3.5–5.1)
POTASSIUM SERPL-SCNC: 2.5 MMOL/L
POTASSIUM SERPL-SCNC: 2.8 MMOL/L
POTASSIUM SERPL-SCNC: 3 MMOL/L
PREALB SERPL-MCNC: 14 MG/DL
PROT SERPL-MCNC: 5.7 G/DL
PROT SERPL-MCNC: 6.2 G/DL
PROT UR QL STRIP: NEGATIVE
PROTHROMBIN TIME: 12.9 SEC
RBC # BLD AUTO: 3.04 M/UL
RBC # BLD AUTO: 3.29 M/UL
RBC #/AREA URNS AUTO: 5 /HPF (ref 0–4)
SAMPLE: ABNORMAL
SODIUM BLD-SCNC: 134 MMOL/L (ref 136–145)
SODIUM SERPL-SCNC: 132 MMOL/L
SODIUM SERPL-SCNC: 136 MMOL/L
SODIUM SERPL-SCNC: 136 MMOL/L
SP GR UR STRIP: 1.01 (ref 1–1.03)
URN SPEC COLLECT METH UR: ABNORMAL
UROBILINOGEN UR STRIP-ACNC: NEGATIVE EU/DL
WBC # BLD AUTO: 11.99 K/UL
WBC # BLD AUTO: 12.09 K/UL
WBC #/AREA URNS AUTO: 58 /HPF (ref 0–5)
WBC CLUMPS UR QL AUTO: ABNORMAL

## 2017-07-12 PROCEDURE — 25000003 PHARM REV CODE 250: Performed by: NURSE ANESTHETIST, CERTIFIED REGISTERED

## 2017-07-12 PROCEDURE — 25000003 PHARM REV CODE 250: Performed by: STUDENT IN AN ORGANIZED HEALTH CARE EDUCATION/TRAINING PROGRAM

## 2017-07-12 PROCEDURE — 84100 ASSAY OF PHOSPHORUS: CPT | Mod: 91

## 2017-07-12 PROCEDURE — 88311 DECALCIFY TISSUE: CPT | Performed by: PATHOLOGY

## 2017-07-12 PROCEDURE — 63600175 PHARM REV CODE 636 W HCPCS: Performed by: ORTHOPAEDIC SURGERY

## 2017-07-12 PROCEDURE — 27800517 HC TRAY,EPIDURAL-CONTINUOUS: Performed by: ANESTHESIOLOGY

## 2017-07-12 PROCEDURE — 85025 COMPLETE CBC W/AUTO DIFF WBC: CPT

## 2017-07-12 PROCEDURE — 63600175 PHARM REV CODE 636 W HCPCS: Performed by: ANESTHESIOLOGY

## 2017-07-12 PROCEDURE — 27100025 HC TUBING, SET FLUID WARMER: Performed by: NURSE ANESTHETIST, CERTIFIED REGISTERED

## 2017-07-12 PROCEDURE — 84134 ASSAY OF PREALBUMIN: CPT

## 2017-07-12 PROCEDURE — 0SRR019 REPLACEMENT OF RIGHT HIP JOINT, FEMORAL SURFACE WITH METAL SYNTHETIC SUBSTITUTE, CEMENTED, OPEN APPROACH: ICD-10-PCS | Performed by: ORTHOPAEDIC SURGERY

## 2017-07-12 PROCEDURE — 36415 COLL VENOUS BLD VENIPUNCTURE: CPT

## 2017-07-12 PROCEDURE — 63600175 PHARM REV CODE 636 W HCPCS: Performed by: NURSE ANESTHETIST, CERTIFIED REGISTERED

## 2017-07-12 PROCEDURE — 80053 COMPREHEN METABOLIC PANEL: CPT

## 2017-07-12 PROCEDURE — 76942 ECHO GUIDE FOR BIOPSY: CPT | Performed by: ANESTHESIOLOGY

## 2017-07-12 PROCEDURE — 88304 TISSUE EXAM BY PATHOLOGIST: CPT | Performed by: PATHOLOGY

## 2017-07-12 PROCEDURE — 88304 TISSUE EXAM BY PATHOLOGIST: CPT | Mod: 26,,, | Performed by: PATHOLOGY

## 2017-07-12 PROCEDURE — 80053 COMPREHEN METABOLIC PANEL: CPT | Mod: 91

## 2017-07-12 PROCEDURE — 83735 ASSAY OF MAGNESIUM: CPT

## 2017-07-12 PROCEDURE — 86920 COMPATIBILITY TEST SPIN: CPT

## 2017-07-12 PROCEDURE — C1776 JOINT DEVICE (IMPLANTABLE): HCPCS | Performed by: ORTHOPAEDIC SURGERY

## 2017-07-12 PROCEDURE — 36000710: Performed by: ORTHOPAEDIC SURGERY

## 2017-07-12 PROCEDURE — 80048 BASIC METABOLIC PNL TOTAL CA: CPT

## 2017-07-12 PROCEDURE — D9220A PRA ANESTHESIA: Mod: CRNA,,, | Performed by: NURSE ANESTHETIST, CERTIFIED REGISTERED

## 2017-07-12 PROCEDURE — 64999 UNLISTED PX NERVOUS SYSTEM: CPT | Mod: 59,RT,, | Performed by: ANESTHESIOLOGY

## 2017-07-12 PROCEDURE — 27000221 HC OXYGEN, UP TO 24 HOURS

## 2017-07-12 PROCEDURE — 37000009 HC ANESTHESIA EA ADD 15 MINS: Performed by: ORTHOPAEDIC SURGERY

## 2017-07-12 PROCEDURE — 63600175 PHARM REV CODE 636 W HCPCS: Performed by: STUDENT IN AN ORGANIZED HEALTH CARE EDUCATION/TRAINING PROGRAM

## 2017-07-12 PROCEDURE — 63600175 PHARM REV CODE 636 W HCPCS: Performed by: HOSPITALIST

## 2017-07-12 PROCEDURE — 85610 PROTHROMBIN TIME: CPT

## 2017-07-12 PROCEDURE — 80048 BASIC METABOLIC PNL TOTAL CA: CPT | Mod: 91

## 2017-07-12 PROCEDURE — 37000008 HC ANESTHESIA 1ST 15 MINUTES: Performed by: ORTHOPAEDIC SURGERY

## 2017-07-12 PROCEDURE — 84100 ASSAY OF PHOSPHORUS: CPT

## 2017-07-12 PROCEDURE — 25000003 PHARM REV CODE 250: Performed by: ORTHOPAEDIC SURGERY

## 2017-07-12 PROCEDURE — 86850 RBC ANTIBODY SCREEN: CPT

## 2017-07-12 PROCEDURE — 81001 URINALYSIS AUTO W/SCOPE: CPT

## 2017-07-12 PROCEDURE — 25000003 PHARM REV CODE 250: Performed by: ANESTHESIOLOGY

## 2017-07-12 PROCEDURE — 25000003 PHARM REV CODE 250: Performed by: HOSPITALIST

## 2017-07-12 PROCEDURE — 63600175 PHARM REV CODE 636 W HCPCS

## 2017-07-12 PROCEDURE — 27236 TREAT THIGH FRACTURE: CPT | Mod: GW,RT,GC, | Performed by: ORTHOPAEDIC SURGERY

## 2017-07-12 PROCEDURE — D9220A PRA ANESTHESIA: Mod: ANES,,, | Performed by: ANESTHESIOLOGY

## 2017-07-12 PROCEDURE — 27800903 OPTIME MED/SURG SUP & DEVICES OTHER IMPLANTS: Performed by: ORTHOPAEDIC SURGERY

## 2017-07-12 PROCEDURE — 83605 ASSAY OF LACTIC ACID: CPT

## 2017-07-12 PROCEDURE — 99223 1ST HOSP IP/OBS HIGH 75: CPT | Mod: ,,, | Performed by: HOSPITALIST

## 2017-07-12 PROCEDURE — 83735 ASSAY OF MAGNESIUM: CPT | Mod: 91

## 2017-07-12 PROCEDURE — 99223 1ST HOSP IP/OBS HIGH 75: CPT | Mod: GW,57,GC, | Performed by: ORTHOPAEDIC SURGERY

## 2017-07-12 PROCEDURE — 27201037 HC PRESSURE MONITORING SET UP

## 2017-07-12 PROCEDURE — 27800505 HC CATH, RADIAL ARTERY KIT: Performed by: NURSE ANESTHETIST, CERTIFIED REGISTERED

## 2017-07-12 PROCEDURE — 20000000 HC ICU ROOM

## 2017-07-12 PROCEDURE — 36000711: Performed by: ORTHOPAEDIC SURGERY

## 2017-07-12 PROCEDURE — 27201423 OPTIME MED/SURG SUP & DEVICES STERILE SUPPLY: Performed by: ORTHOPAEDIC SURGERY

## 2017-07-12 PROCEDURE — C1713 ANCHOR/SCREW BN/BN,TIS/BN: HCPCS | Performed by: ORTHOPAEDIC SURGERY

## 2017-07-12 PROCEDURE — 86900 BLOOD TYPING SEROLOGIC ABO: CPT

## 2017-07-12 PROCEDURE — 88311 DECALCIFY TISSUE: CPT | Mod: 26,,, | Performed by: PATHOLOGY

## 2017-07-12 PROCEDURE — 27200677 HC TRANSDUCER MONITOR KIT SINGLE: Performed by: NURSE ANESTHETIST, CERTIFIED REGISTERED

## 2017-07-12 DEVICE — CEMENT BONE SIMPLE P INDIVID: Type: IMPLANTABLE DEVICE | Site: HIP | Status: FUNCTIONAL

## 2017-07-12 DEVICE — SPACER CEMENT DISTAL 10MM: Type: IMPLANTABLE DEVICE | Site: HIP | Status: FUNCTIONAL

## 2017-07-12 DEVICE — STEM FEM OMNI HFX 132DEG SZ 6: Type: IMPLANTABLE DEVICE | Site: HIP | Status: FUNCTIONAL

## 2017-07-12 DEVICE — IMPLANTABLE DEVICE: Type: IMPLANTABLE DEVICE | Site: HIP | Status: FUNCTIONAL

## 2017-07-12 DEVICE — PLUG BONE: Type: IMPLANTABLE DEVICE | Site: HIP | Status: FUNCTIONAL

## 2017-07-12 RX ORDER — SODIUM CHLORIDE 0.9 % (FLUSH) 0.9 %
3 SYRINGE (ML) INJECTION EVERY 8 HOURS
Status: DISCONTINUED | OUTPATIENT
Start: 2017-07-12 | End: 2017-07-24 | Stop reason: HOSPADM

## 2017-07-12 RX ORDER — IPRATROPIUM BROMIDE AND ALBUTEROL SULFATE 2.5; .5 MG/3ML; MG/3ML
3 SOLUTION RESPIRATORY (INHALATION) EVERY 4 HOURS PRN
Status: DISCONTINUED | OUTPATIENT
Start: 2017-07-12 | End: 2017-07-24 | Stop reason: HOSPADM

## 2017-07-12 RX ORDER — OXYCODONE HYDROCHLORIDE 5 MG/1
10 TABLET ORAL EVERY 4 HOURS PRN
Status: DISCONTINUED | OUTPATIENT
Start: 2017-07-12 | End: 2017-07-16

## 2017-07-12 RX ORDER — TAMSULOSIN HYDROCHLORIDE 0.4 MG/1
0.4 CAPSULE ORAL DAILY
Status: DISCONTINUED | OUTPATIENT
Start: 2017-07-12 | End: 2017-07-24 | Stop reason: HOSPADM

## 2017-07-12 RX ORDER — ONDANSETRON 2 MG/ML
4 INJECTION INTRAMUSCULAR; INTRAVENOUS EVERY 8 HOURS PRN
Status: DISCONTINUED | OUTPATIENT
Start: 2017-07-12 | End: 2017-07-24 | Stop reason: HOSPADM

## 2017-07-12 RX ORDER — POTASSIUM CHLORIDE 29.8 MG/ML
40 INJECTION INTRAVENOUS
Status: DISCONTINUED | OUTPATIENT
Start: 2017-07-12 | End: 2017-07-16

## 2017-07-12 RX ORDER — OXYCODONE HYDROCHLORIDE 5 MG/1
5 TABLET ORAL EVERY 4 HOURS PRN
Status: DISCONTINUED | OUTPATIENT
Start: 2017-07-12 | End: 2017-07-16

## 2017-07-12 RX ORDER — CEFAZOLIN SODIUM 1 G/50ML
1 SOLUTION INTRAVENOUS
Status: COMPLETED | OUTPATIENT
Start: 2017-07-12 | End: 2017-07-13

## 2017-07-12 RX ORDER — POTASSIUM CHLORIDE 20 MEQ/15ML
40 SOLUTION ORAL ONCE
Status: COMPLETED | OUTPATIENT
Start: 2017-07-12 | End: 2017-07-12

## 2017-07-12 RX ORDER — POTASSIUM CHLORIDE 14.9 MG/ML
20 INJECTION INTRAVENOUS ONCE
Status: COMPLETED | OUTPATIENT
Start: 2017-07-12 | End: 2017-07-12

## 2017-07-12 RX ORDER — HYDROMORPHONE HYDROCHLORIDE 1 MG/ML
0.5 INJECTION, SOLUTION INTRAMUSCULAR; INTRAVENOUS; SUBCUTANEOUS EVERY 4 HOURS PRN
Status: DISCONTINUED | OUTPATIENT
Start: 2017-07-12 | End: 2017-07-14

## 2017-07-12 RX ORDER — POTASSIUM CHLORIDE 7.45 MG/ML
10 INJECTION INTRAVENOUS
Status: ACTIVE | OUTPATIENT
Start: 2017-07-12 | End: 2017-07-12

## 2017-07-12 RX ORDER — ENOXAPARIN SODIUM 100 MG/ML
30 INJECTION SUBCUTANEOUS EVERY 24 HOURS
Status: DISCONTINUED | OUTPATIENT
Start: 2017-07-12 | End: 2017-07-18

## 2017-07-12 RX ORDER — DIGOXIN 125 MCG
125 TABLET ORAL
Status: DISCONTINUED | OUTPATIENT
Start: 2017-07-14 | End: 2017-07-24 | Stop reason: HOSPADM

## 2017-07-12 RX ORDER — MORPHINE SULFATE 2 MG/ML
2 INJECTION, SOLUTION INTRAMUSCULAR; INTRAVENOUS EVERY 4 HOURS PRN
Status: DISCONTINUED | OUTPATIENT
Start: 2017-07-12 | End: 2017-07-12

## 2017-07-12 RX ORDER — POTASSIUM CHLORIDE 7.45 MG/ML
10 INJECTION INTRAVENOUS ONCE
Status: DISCONTINUED | OUTPATIENT
Start: 2017-07-12 | End: 2017-07-12

## 2017-07-12 RX ORDER — FENTANYL CITRATE 50 UG/ML
100 INJECTION, SOLUTION INTRAMUSCULAR; INTRAVENOUS EVERY 5 MIN PRN
Status: DISCONTINUED | OUTPATIENT
Start: 2017-07-12 | End: 2017-07-12 | Stop reason: HOSPADM

## 2017-07-12 RX ORDER — POTASSIUM CHLORIDE 20 MEQ/1
TABLET, EXTENDED RELEASE ORAL
Status: DISCONTINUED
Start: 2017-07-12 | End: 2017-07-12 | Stop reason: WASHOUT

## 2017-07-12 RX ORDER — DOCUSATE SODIUM 100 MG/1
100 CAPSULE, LIQUID FILLED ORAL 3 TIMES DAILY
Status: DISCONTINUED | OUTPATIENT
Start: 2017-07-12 | End: 2017-07-24 | Stop reason: HOSPADM

## 2017-07-12 RX ORDER — POTASSIUM CHLORIDE 7.45 MG/ML
10 INJECTION INTRAVENOUS ONCE
Status: COMPLETED | OUTPATIENT
Start: 2017-07-12 | End: 2017-07-12

## 2017-07-12 RX ORDER — POTASSIUM CHLORIDE 29.8 MG/ML
80 INJECTION INTRAVENOUS
Status: DISCONTINUED | OUTPATIENT
Start: 2017-07-12 | End: 2017-07-16

## 2017-07-12 RX ORDER — ETOMIDATE 2 MG/ML
INJECTION INTRAVENOUS
Status: DISCONTINUED | OUTPATIENT
Start: 2017-07-12 | End: 2017-07-12

## 2017-07-12 RX ORDER — POLYETHYLENE GLYCOL 3350 17 G/17G
17 POWDER, FOR SOLUTION ORAL DAILY
Status: DISCONTINUED | OUTPATIENT
Start: 2017-07-13 | End: 2017-07-24 | Stop reason: HOSPADM

## 2017-07-12 RX ORDER — LEVETIRACETAM 500 MG/1
500 TABLET ORAL 2 TIMES DAILY
Status: DISCONTINUED | OUTPATIENT
Start: 2017-07-12 | End: 2017-07-24 | Stop reason: HOSPADM

## 2017-07-12 RX ORDER — LEVOTHYROXINE SODIUM 88 UG/1
88 TABLET ORAL
Status: DISCONTINUED | OUTPATIENT
Start: 2017-07-12 | End: 2017-07-24 | Stop reason: HOSPADM

## 2017-07-12 RX ORDER — SODIUM CHLORIDE 9 MG/ML
INJECTION, SOLUTION INTRAVENOUS CONTINUOUS PRN
Status: DISCONTINUED | OUTPATIENT
Start: 2017-07-12 | End: 2017-07-12

## 2017-07-12 RX ORDER — POTASSIUM CHLORIDE 7.45 MG/ML
INJECTION INTRAVENOUS
Status: COMPLETED
Start: 2017-07-12 | End: 2017-07-12

## 2017-07-12 RX ORDER — ISOSORBIDE MONONITRATE 30 MG/1
30 TABLET, EXTENDED RELEASE ORAL DAILY
Status: DISCONTINUED | OUTPATIENT
Start: 2017-07-13 | End: 2017-07-24 | Stop reason: HOSPADM

## 2017-07-12 RX ORDER — NEOSTIGMINE METHYLSULFATE 1 MG/ML
INJECTION, SOLUTION INTRAVENOUS
Status: DISCONTINUED | OUTPATIENT
Start: 2017-07-12 | End: 2017-07-12

## 2017-07-12 RX ORDER — AMIODARONE HYDROCHLORIDE 200 MG/1
200 TABLET ORAL 2 TIMES DAILY
Status: DISCONTINUED | OUTPATIENT
Start: 2017-07-12 | End: 2017-07-24 | Stop reason: HOSPADM

## 2017-07-12 RX ORDER — SODIUM CHLORIDE 0.9 % (FLUSH) 0.9 %
3 SYRINGE (ML) INJECTION EVERY 8 HOURS
Status: DISCONTINUED | OUTPATIENT
Start: 2017-07-12 | End: 2017-07-16

## 2017-07-12 RX ORDER — ENALAPRIL MALEATE 2.5 MG/1
2.5 TABLET ORAL DAILY
Status: DISCONTINUED | OUTPATIENT
Start: 2017-07-13 | End: 2017-07-16

## 2017-07-12 RX ORDER — POTASSIUM CHLORIDE 14.9 MG/ML
60 INJECTION INTRAVENOUS
Status: DISCONTINUED | OUTPATIENT
Start: 2017-07-12 | End: 2017-07-16

## 2017-07-12 RX ORDER — PANTOPRAZOLE SODIUM 40 MG/1
40 FOR SUSPENSION ORAL DAILY
Status: DISCONTINUED | OUTPATIENT
Start: 2017-07-12 | End: 2017-07-24 | Stop reason: HOSPADM

## 2017-07-12 RX ORDER — HYDROMORPHONE HYDROCHLORIDE 1 MG/ML
0.25 INJECTION, SOLUTION INTRAMUSCULAR; INTRAVENOUS; SUBCUTANEOUS EVERY 4 HOURS PRN
Status: DISCONTINUED | OUTPATIENT
Start: 2017-07-12 | End: 2017-07-14

## 2017-07-12 RX ORDER — FENTANYL CITRATE 50 UG/ML
INJECTION, SOLUTION INTRAMUSCULAR; INTRAVENOUS
Status: DISCONTINUED | OUTPATIENT
Start: 2017-07-12 | End: 2017-07-12

## 2017-07-12 RX ORDER — ROPIVACAINE HYDROCHLORIDE 5 MG/ML
INJECTION, SOLUTION EPIDURAL; INFILTRATION; PERINEURAL
Status: DISPENSED
Start: 2017-07-12 | End: 2017-07-12

## 2017-07-12 RX ORDER — GLYCOPYRROLATE 0.2 MG/ML
INJECTION INTRAMUSCULAR; INTRAVENOUS
Status: DISCONTINUED | OUTPATIENT
Start: 2017-07-12 | End: 2017-07-12

## 2017-07-12 RX ORDER — POTASSIUM CHLORIDE 14.9 MG/ML
INJECTION INTRAVENOUS
Status: COMPLETED
Start: 2017-07-12 | End: 2017-07-12

## 2017-07-12 RX ORDER — MAGNESIUM SULFATE HEPTAHYDRATE 40 MG/ML
4 INJECTION, SOLUTION INTRAVENOUS
Status: DISCONTINUED | OUTPATIENT
Start: 2017-07-12 | End: 2017-07-16

## 2017-07-12 RX ORDER — CARVEDILOL 3.12 MG/1
3.12 TABLET ORAL NIGHTLY
Status: DISCONTINUED | OUTPATIENT
Start: 2017-07-12 | End: 2017-07-24 | Stop reason: HOSPADM

## 2017-07-12 RX ORDER — HYDROMORPHONE HYDROCHLORIDE 1 MG/ML
1 INJECTION, SOLUTION INTRAMUSCULAR; INTRAVENOUS; SUBCUTANEOUS EVERY 4 HOURS PRN
Status: DISCONTINUED | OUTPATIENT
Start: 2017-07-12 | End: 2017-07-12

## 2017-07-12 RX ORDER — MAGNESIUM SULFATE HEPTAHYDRATE 40 MG/ML
2 INJECTION, SOLUTION INTRAVENOUS
Status: DISCONTINUED | OUTPATIENT
Start: 2017-07-12 | End: 2017-07-16

## 2017-07-12 RX ORDER — ONDANSETRON 2 MG/ML
4 INJECTION INTRAMUSCULAR; INTRAVENOUS EVERY 6 HOURS PRN
Status: DISCONTINUED | OUTPATIENT
Start: 2017-07-12 | End: 2017-07-12

## 2017-07-12 RX ORDER — CEFAZOLIN SODIUM 1 G/3ML
INJECTION, POWDER, FOR SOLUTION INTRAMUSCULAR; INTRAVENOUS
Status: DISCONTINUED | OUTPATIENT
Start: 2017-07-12 | End: 2017-07-12

## 2017-07-12 RX ORDER — ONDANSETRON 2 MG/ML
4 INJECTION INTRAMUSCULAR; INTRAVENOUS EVERY 12 HOURS PRN
Status: DISCONTINUED | OUTPATIENT
Start: 2017-07-12 | End: 2017-07-12

## 2017-07-12 RX ORDER — MAGNESIUM SULFATE HEPTAHYDRATE 40 MG/ML
2 INJECTION, SOLUTION INTRAVENOUS ONCE
Status: COMPLETED | OUTPATIENT
Start: 2017-07-12 | End: 2017-07-12

## 2017-07-12 RX ORDER — ACETAMINOPHEN 10 MG/ML
1000 INJECTION, SOLUTION INTRAVENOUS ONCE
Status: ACTIVE | OUTPATIENT
Start: 2017-07-12 | End: 2017-07-13

## 2017-07-12 RX ORDER — BUMETANIDE 1 MG/1
1 TABLET ORAL DAILY
Status: DISCONTINUED | OUTPATIENT
Start: 2017-07-12 | End: 2017-07-17

## 2017-07-12 RX ORDER — ACETAMINOPHEN 500 MG
1000 TABLET ORAL EVERY 8 HOURS
Status: COMPLETED | OUTPATIENT
Start: 2017-07-12 | End: 2017-07-13

## 2017-07-12 RX ORDER — ROCURONIUM BROMIDE 10 MG/ML
INJECTION, SOLUTION INTRAVENOUS
Status: DISCONTINUED | OUTPATIENT
Start: 2017-07-12 | End: 2017-07-12

## 2017-07-12 RX ORDER — NITROGLYCERIN 0.4 MG/1
0.4 TABLET SUBLINGUAL EVERY 5 MIN PRN
Status: DISCONTINUED | OUTPATIENT
Start: 2017-07-12 | End: 2017-07-24 | Stop reason: HOSPADM

## 2017-07-12 RX ORDER — POTASSIUM CHLORIDE 7.45 MG/ML
10 INJECTION INTRAVENOUS
Status: DISCONTINUED | OUTPATIENT
Start: 2017-07-12 | End: 2017-07-16

## 2017-07-12 RX ORDER — ROPIVACAINE HYDROCHLORIDE 2 MG/ML
8 INJECTION, SOLUTION EPIDURAL; INFILTRATION; PERINEURAL CONTINUOUS
Status: DISCONTINUED | OUTPATIENT
Start: 2017-07-12 | End: 2017-07-18

## 2017-07-12 RX ORDER — LEVOTHYROXINE SODIUM 88 UG/1
88 TABLET ORAL DAILY
Status: DISCONTINUED | OUTPATIENT
Start: 2017-07-13 | End: 2017-07-13

## 2017-07-12 RX ORDER — ZOLPIDEM TARTRATE 5 MG/1
5 TABLET ORAL NIGHTLY PRN
Status: DISCONTINUED | OUTPATIENT
Start: 2017-07-12 | End: 2017-07-12

## 2017-07-12 RX ORDER — ONDANSETRON 2 MG/ML
INJECTION INTRAMUSCULAR; INTRAVENOUS
Status: DISCONTINUED | OUTPATIENT
Start: 2017-07-12 | End: 2017-07-12

## 2017-07-12 RX ORDER — SODIUM CHLORIDE 9 MG/ML
INJECTION, SOLUTION INTRAVENOUS CONTINUOUS
Status: DISCONTINUED | OUTPATIENT
Start: 2017-07-12 | End: 2017-07-12

## 2017-07-12 RX ADMIN — HYDROMORPHONE HYDROCHLORIDE 0.5 MG: 1 INJECTION, SOLUTION INTRAMUSCULAR; INTRAVENOUS; SUBCUTANEOUS at 11:07

## 2017-07-12 RX ADMIN — ROCURONIUM BROMIDE 20 MG: 10 INJECTION, SOLUTION INTRAVENOUS at 04:07

## 2017-07-12 RX ADMIN — AMIODARONE HYDROCHLORIDE 200 MG: 200 TABLET ORAL at 08:07

## 2017-07-12 RX ADMIN — CEFAZOLIN 2 G: 1 INJECTION, POWDER, FOR SOLUTION INTRAVENOUS at 04:07

## 2017-07-12 RX ADMIN — VANCOMYCIN HYDROCHLORIDE 1000 MG: 1 INJECTION, POWDER, LYOPHILIZED, FOR SOLUTION INTRAVENOUS at 03:07

## 2017-07-12 RX ADMIN — CARVEDILOL 3.12 MG: 3.12 TABLET, FILM COATED ORAL at 11:07

## 2017-07-12 RX ADMIN — ONDANSETRON 4 MG: 2 INJECTION INTRAMUSCULAR; INTRAVENOUS at 05:07

## 2017-07-12 RX ADMIN — TRANEXAMIC ACID 3000 MG: 100 INJECTION, SOLUTION INTRAVENOUS at 05:07

## 2017-07-12 RX ADMIN — NEOSTIGMINE METHYLSULFATE 3 MG: 1 INJECTION INTRAVENOUS at 06:07

## 2017-07-12 RX ADMIN — POTASSIUM CHLORIDE 20 MEQ: 200 INJECTION, SOLUTION INTRAVENOUS at 02:07

## 2017-07-12 RX ADMIN — TAMSULOSIN HYDROCHLORIDE 0.4 MG: 0.4 CAPSULE ORAL at 08:07

## 2017-07-12 RX ADMIN — OXYCODONE HYDROCHLORIDE 10 MG: 5 TABLET ORAL at 11:07

## 2017-07-12 RX ADMIN — BUMETANIDE 1 MG: 1 TABLET ORAL at 08:07

## 2017-07-12 RX ADMIN — SPIRONOLACTONE 12.5 MG: 25 TABLET, FILM COATED ORAL at 08:07

## 2017-07-12 RX ADMIN — POTASSIUM CHLORIDE 20 MEQ: 200 INJECTION, SOLUTION INTRAVENOUS at 12:07

## 2017-07-12 RX ADMIN — FENTANYL CITRATE 25 MCG: 50 INJECTION, SOLUTION INTRAMUSCULAR; INTRAVENOUS at 04:07

## 2017-07-12 RX ADMIN — DOCUSATE SODIUM 100 MG: 50 CAPSULE, LIQUID FILLED ORAL at 11:07

## 2017-07-12 RX ADMIN — CITALOPRAM HYDROBROMIDE 30 MG: 20 TABLET ORAL at 08:07

## 2017-07-12 RX ADMIN — FENTANYL CITRATE 50 MCG: 50 INJECTION INTRAMUSCULAR; INTRAVENOUS at 10:07

## 2017-07-12 RX ADMIN — POTASSIUM CHLORIDE 10 MEQ: 10 INJECTION, SOLUTION INTRAVENOUS at 11:07

## 2017-07-12 RX ADMIN — Medication 3 ML: at 10:07

## 2017-07-12 RX ADMIN — POTASSIUM CHLORIDE 20 MEQ: 14.9 INJECTION INTRAVENOUS at 12:07

## 2017-07-12 RX ADMIN — MAGNESIUM SULFATE IN WATER 2 G: 40 INJECTION, SOLUTION INTRAVENOUS at 10:07

## 2017-07-12 RX ADMIN — GLYCOPYRROLATE 0.4 MG: 0.2 INJECTION, SOLUTION INTRAMUSCULAR; INTRAVENOUS at 06:07

## 2017-07-12 RX ADMIN — ACETAMINOPHEN 1000 MG: 500 TABLET ORAL at 11:07

## 2017-07-12 RX ADMIN — POTASSIUM CHLORIDE 10 MEQ: 10 INJECTION, SOLUTION INTRAVENOUS at 08:07

## 2017-07-12 RX ADMIN — ETOMIDATE 12 MG: 2 INJECTION, SOLUTION INTRAVENOUS at 04:07

## 2017-07-12 RX ADMIN — CEFAZOLIN SODIUM 1 G: 1 SOLUTION INTRAVENOUS at 08:07

## 2017-07-12 RX ADMIN — HYDROMORPHONE HYDROCHLORIDE 0.25 MG: 1 INJECTION, SOLUTION INTRAMUSCULAR; INTRAVENOUS; SUBCUTANEOUS at 08:07

## 2017-07-12 RX ADMIN — SODIUM CHLORIDE: 0.9 INJECTION, SOLUTION INTRAVENOUS at 04:07

## 2017-07-12 RX ADMIN — SODIUM CHLORIDE, SODIUM GLUCONATE, SODIUM ACETATE, POTASSIUM CHLORIDE, MAGNESIUM CHLORIDE, SODIUM PHOSPHATE, DIBASIC, AND POTASSIUM PHOSPHATE: .53; .5; .37; .037; .03; .012; .00082 INJECTION, SOLUTION INTRAVENOUS at 04:07

## 2017-07-12 RX ADMIN — SODIUM CHLORIDE: 0.9 INJECTION, SOLUTION INTRAVENOUS at 02:07

## 2017-07-12 RX ADMIN — LEVETIRACETAM 500 MG: 500 TABLET, FILM COATED ORAL at 11:07

## 2017-07-12 RX ADMIN — POTASSIUM CHLORIDE 40 MEQ: 20 SOLUTION ORAL at 08:07

## 2017-07-12 RX ADMIN — POTASSIUM CHLORIDE 40 MEQ: 400 INJECTION, SOLUTION INTRAVENOUS at 08:07

## 2017-07-12 NOTE — PROGRESS NOTES
[facility]  Orthopedics  Progress Note    Patient Name: Laurita Stokes  MRN: [id number]  Admission Date: 7/11/2017  Hospital Length of Stay: 1 days  Attending Provider: Anshul Guerrero MD  Primary Care Provider: Alex Capellan MD  Follow-up For: Procedure(s) (LRB):  ARTHROPLASTY-HIP-OLAYINKA-right (Right)    Post-Operative Day:    Subjective:     Principal Problem:Closed displaced fracture of right femoral neck    Principal Orthopedic Problem: same    Interval History: NAEON. AFVSS. Sleeping comfortably in bed this morning.     Review of patient's allergies indicates:   Allergen Reactions    Codeine Itching    Azithromycin      History of VT       Current Facility-Administered Medications   Medication    albuterol-ipratropium 2.5mg-0.5mg/3mL nebulizer solution 3 mL    amiodarone tablet 200 mg    bumetanide tablet 1 mg    CEFAZOLIN 2G/20 ML SYRINGE (PYXIS) IV syringe 2 g 20 mL    citalopram tablet 30 mg    diphenhydrAMINE injection 25 mg    hydrocodone-acetaminophen 10-325mg per tablet 1 tablet    hydrocodone-acetaminophen 5-325mg per tablet 1 tablet    levothyroxine tablet 88 mcg    morphine injection 4 mg    mupirocin 2 % ointment 1 g    nitroGLYCERIN SL tablet 0.4 mg    ondansetron injection 4 mg    pantoprazole suspension 40 mg    senna-docusate 8.6-50 mg per tablet 1 tablet    sodium chloride 0.9% flush 3 mL    spironolactone split tablet 12.5 mg    tamsulosin 24 hr capsule 0.4 mg    vancomycin 1 g in dextrose 5 % 250 mL IVPB (ready to mix system)    zolpidem tablet 5 mg     Objective:     Vital Signs (Most Recent):  Temp: 98.1 °F (36.7 °C) (07/12/17 0400)  Pulse: 84 (07/12/17 0400)  Resp: 18 (07/12/17 0400)  BP: (!) 110/53 (07/12/17 0400)  SpO2: (!) 92 % (07/12/17 0405) Vital Signs (24h Range):  Temp:  [98.1 °F (36.7 °C)-98.9 °F (37.2 °C)] 98.1 °F (36.7 °C)  Pulse:  [80-88] 84  Resp:  [16-20] 18  SpO2:  [77 %-96 %] 92 %  BP: (100-132)/(49-74) 110/53     Weight:  "57.8 kg (127 lb 6.8 oz)  Height: 5' 3" (160 cm)  Body mass index is 22.57 kg/m².      Intake/Output Summary (Last 24 hours) at 07/12/17 0554  Last data filed at 07/11/17 2300   Gross per 24 hour   Intake                0 ml   Output              350 ml   Net             -350 ml       Ortho/SPM Exam   HEENT: normocephalic, atraumatic  Resp: no increased work of breathing  CV: regular rate and rhythm  RUE: in sling, NVI  RLE: NVI      Significant Labs: All pertinent labs within the past 24 hours have been reviewed.    Significant Imaging: None    Assessment/Plan:     Closed fracture of proximal end of right humerus    - Non-operative treatment in sling  - NWB RUE        * Closed displaced fracture of right femoral neck    83 y.o. female with closed right femoral neck fracture, closed right humeral neck fx  - To OR today for hemiarthroplasty right hip  - Antibiotics: pre-op ancef, vanc  - Weight bearing status: NWB RLE, NWB RUE  - Labs: reviewed  - DVT Prophylaxis: mechanical, post-op lovenox  - Lines/Drains: 18g PIV  - Pain control: PO/IV pain meds                Anai Shell MD  Orthopedics  [facility]  "

## 2017-07-12 NOTE — TRANSFER OF CARE
"Anesthesia Transfer of Care Note    Patient: Laurita Stokes    Procedure(s) Performed: Procedure(s) (LRB):  ARTHROPLASTY-HIP-OLAYINKA-right (Right)    Patient location: ICU    Anesthesia Type: general    Transport from OR: Transported from OR on 6-10 L/min O2 by face mask with adequate spontaneous ventilation. Continuous ECG monitoring in transport. Continuous SpO2 monitoring in transport. Continuos invasive BP monitoring in transport    Post pain: adequate analgesia    Post assessment: no apparent anesthetic complications and tolerated procedure well    Post vital signs: stable    Level of consciousness: awake and alert    Nausea/Vomiting: no nausea/vomiting    Complications: none    Transfer of care protocol was followed      Last vitals:   Visit Vitals  BP (!) 104/52 (BP Location: Left arm, Patient Position: Lying, BP Method: Automatic)   Pulse 80   Temp 36.2 °C (97.1 °F) (Temporal)   Resp 16   Ht 5' 3" (1.6 m)   Wt 57.8 kg (127 lb 6.8 oz)   LMP  (LMP Unknown)   SpO2 100%   Breastfeeding? No   BMI 22.57 kg/m²     "

## 2017-07-12 NOTE — ASSESSMENT & PLAN NOTE
83 y.o. female with closed right femoral neck fracture, closed right humeral neck fx  - Antibiotics: pre-op ancef, vanc  - Weight bearing status: NWB RLE, NWDENA RUE  - Labs: reviewed  - DVT Prophylaxis: mechanical, post-op lovenox  - Lines/Drains: 18g PIV  - Pain control: PO/IV pain meds

## 2017-07-12 NOTE — HPI
Pt is an 84 yo female with right shoulder and right hip pain after she fell while ambulating with a walker in the middle of the night, attempting to go to the bathroom.  She had immediate right hip and shoulder pain.  She denies headstrike or LOC.  Of note, she was recently admitted on 5/24-5/26 for subarachnoid hemorrhage, at which time she was discharged on hospice with plans to stop her anticoagulation for her atrial fibrillation.  Patient lives with her daughter and ambulates with a walker. She was diagnosed with a proximal humerus fracture and right femoral neck fracture.    Transferred to [facility] for definitve treatment after evaluation by cardiology and orthopedics at [facility].

## 2017-07-12 NOTE — ANESTHESIA PREPROCEDURE EVALUATION
07/12/2017  Pre-operative evaluation for Procedure(s) (LRB):  ARTHROPLASTY-HIP-OLAYINKA-right (Right)    Laurita Stokes is a 83 y.o. female PMH of a fib ( coumadin recently stopped due to SAH and frequent falls ) , ICM , HFrEF (EF=20-25%, s/p CRT-D) c NYHA IV sx  s/p AICD placement, HTN/HLPD, s/p 3v-CABG, hx of ventricular tachycardia on amiodarone, CKD III, GERD, dementia, under home hospice ( [facility] ) and on home oxygen for comfort ( 2 L )  presents as a transfer from [facility] for evaluation by orthopedic surgery following mechanical fall where she was subsequently found to have R femoral neck fracture.  She is now scheduled for the above procedure.     LDA:       Pacemaker/Defibrillator Site     Pacemaker/Defibrillator Site Properties Placement Date/Time: (c) (c)           Wound 05/25/17 0010 Abrasion(s) forehead   Wound Properties Date First Assessed/Time First Assessed: 05/25/17 0010 Pre-existing: Yes Wound Type: Abrasion(s) Side: Right Location: forehead          Wound 05/25/17 0010 Abrasion(s) knee   Wound Properties Date First Assessed/Time First Assessed: 05/25/17 0010 Pre-existing: Yes Wound Type: Abrasion(s) Side: Right Location: knee          Wound 05/25/17 0010 Skin tear hand   Wound Properties Date First Assessed/Time First Assessed: 05/25/17 0010 Pre-existing: Yes Wound Type: Skin tear Side: Left Location: hand          Port A Cath Single Lumen 07/11/17 0127 right subclavian   Central Line Insertion/Assessment - Properties Group Placement Date/Time: 07/11/17 0127 Present Prior to Hospital Arrival?: No Hand Hygiene: Performed Barrier Precautions: Performed Skin Antisepsis: ChloraPrep Location: right subclavian Pressure Injectable Catheter: Yes Insertion attempts (enter...          Urethral Catheter 07/11/17 0337 Latex 16 Fr.   Urethral Catheter Properties Placement Date/Time:  07/11/17 0337 Hand Hygiene: Performed Inserted by: RN  Name: INDIO Ramon Insertion attempts (enter comment if more than 2 attempts): 1 Catheter Type: Latex Tube Size (Fr.): 16 Fr. Catheter Balloon Inflation Volume...         Prev airway: None documented    Patient Active Problem List   Diagnosis    Cardiomyopathy, ischemic    Biventricular ICD (implantable cardioverter-defibrillator) in place    PAF (paroxysmal atrial fibrillation)    Chronic atrial fibrillation    Dizziness    Hypothyroid    Chronic anticoagulation    Iron deficiency    V tach    Chronic combined systolic and diastolic heart failure    Symptomatic anemia    Hx of Ischemic colitis s/p colectomy    Epigastric pain    Gastric ulcer    Altered mental status    Elevated INR (international normalized ratio) due to prior anticoagulant medication ingestion    Dementia without behavioral disturbance    Depression    Essential hypertension    Generalized weakness    Hypokalemia    Benzodiazepine-induced psychosis    Polypharmacy    Acute on chronic combined systolic and diastolic heart failure    Sinusitis    Pneumonia    Bilateral nonexudative age-related macular degeneration    Urinary tract infection, site not specified    DDD (degenerative disc disease), lumbar    Spondylosis of lumbar region without myelopathy or radiculopathy    Spondylolisthesis    UTI (lower urinary tract infection)    CHF NYHA class IV    CRF (chronic renal failure)    Lumbar radiculopathy    Atherosclerosis of native coronary artery of native heart with angina pectoris    Chronic headache    Persistent atrial fibrillation    Small bowel obstruction    Abdominal pain    Malnutrition of mild degree    Cervical radiculopathy    Facet hypertrophy of lumbar region    Hypothyroidism    Atypical chest pain possibly secondary to GERD    Hypothyroidism due to acquired atrophy of thyroid    Closed fracture of metatarsal bone of right  foot    Renal insufficiency    Heme positive stool    GI bleed    Lower GI bleeding    SOB (shortness of breath)    Ventricular fibrillation    Hyperuricemia    Chronic renal insufficiency    Chest pain    Congestive heart failure    Unstable angina    Coronary artery disease    CKD (chronic kidney disease) stage 4, GFR 15-29 ml/min    Acute on chronic combined systolic and diastolic congestive heart failure    Chest pain with moderate risk for cardiac etiology    Weakness    Chronic anemia    Chronic occult blood in stools    Acute cystitis without hematuria    Intravascular volume depletion    Physical deconditioning    Syncope    Pulmonary HTN    SAH (subarachnoid hemorrhage)    Subarachnoid hemorrhage following injury, concussion    Skin tear of left hand without complication    Skin tear of right lower leg without complication    Post-concussion syndrome    Maxillary sinusitis    Fracture of right humerus    Closed displaced fracture of right femoral neck    Closed fracture of proximal end of right humerus    Debility    CKD (chronic kidney disease), stage III    Chronic respiratory failure with hypoxia       Review of patient's allergies indicates:   Allergen Reactions    Codeine Itching    Azithromycin      History of VT        No current facility-administered medications on file prior to encounter.      Current Outpatient Prescriptions on File Prior to Encounter   Medication Sig Dispense Refill    acetaminophen (TYLENOL) 325 MG tablet Take 325 mg by mouth every 4 (four) hours as needed for Pain or Temperature greater than.      acetaminophen (TYLENOL) 500 MG tablet Take 1 tablet (500 mg total) by mouth every 8 (eight) hours.  0    acetaminophen (TYLENOL) 650 MG Supp Place 650 mg rectally every 4 (four) hours as needed.      amiodarone (PACERONE) 200 MG Tab Take 1 tablet (200 mg total) by mouth 2 (two) times daily.      atropine 1% (ISOPTO ATROPINE) 1 % Drop Take 2  drops by mouth every 2 (two) hours as needed (excess secretions).      bumetanide (BUMEX) 1 MG tablet Take 1 tablet (1 mg total) by mouth once daily. 14 tablet 0    bumetanide (BUMEX) 2 MG tablet Take 4 mg by mouth once daily at 6am.      CARAFATE 100 mg/mL suspension TAKE 10 ML BY MOUTH EVERY 6 HOURS 420 mL 0    carvedilol (COREG) 3.125 MG tablet Take 1 tablet (3.125 mg total) by mouth nightly. 14 tablet 0    cefUROXime (CEFTIN) 250 MG tablet Take 1 tablet (250 mg total) by mouth every 12 (twelve) hours. 10 tablet 0    citalopram (CELEXA) 20 MG tablet Take 1.5 tablets (30 mg total) by mouth once daily. 28 tablet 0    cyanocobalamin, vitamin B-12, 1,000 mcg TbSR Take 1 tablet by mouth once daily.      dexamethasone (DECADRON) 4 MG Tab Take 4 mg by mouth once daily.      digoxin (DIGOX) 125 mcg tablet Take 125 mcg by mouth every Mon, Wed, Fri.      enalapril (VASOTEC) 2.5 MG tablet Take 2.5 mg by mouth once daily.      FLORANEX 1 million cell Tab TAKE 1 TABLET BY MOUTH THREE TIMES DAILY WITH MEALS 100 tablet 0    isosorbide mononitrate (IMDUR) 30 MG 24 hr tablet Take 30 mg by mouth once daily.      lactulose 10 gram/15 ml (CHRONULAC) 10 gram/15 mL (15 mL) solution Take 10 g by mouth daily as needed (CONSTIPATION).      levetiracetam (KEPPRA) 500 MG Tab Take 1 tablet (500 mg total) by mouth 2 (two) times daily. 60 tablet 1    levothyroxine (SYNTHROID) 88 MCG tablet Take 88 mcg by mouth once daily.      lorazepam 2 mg/ml oral conc (ATIVAN) 2 mg/mL Conc Take 0.5 mg by mouth every 2 (two) hours as needed (Anxiety).      magnesium oxide (MAG-OX) 400 mg tablet TAKE 2 TABLETS BY MOUTH TWICE DAILY 120 tablet 0    meclizine (ANTIVERT) 12.5 mg tablet Take 12.5 mg by mouth every 6 (six) hours as needed for Dizziness or Nausea.      metOLazone (ZAROXOLYN) 5 MG tablet Take 5 mg by mouth daily as needed (Fluid overload).      morphine 100 mg/5 mL (20 mg/mL) concentrated solution Take 5 mg by mouth every 2 (two)  hours as needed for Pain (or shortness of breath). (Patient taking differently: Take by mouth every 2 (two) hours as needed for Pain (or shortness of breath). Give 0.25 ml SL EVERY 2 HOURS PRN FOR SHORTNESS OF BREATH)      MV-MN/FA/COQ10/LYCOPENE/LUTEIN (THERAGRAN-M PREMIER 50 PLUS ORAL) Take 1 tablet by mouth once daily.      nitroGLYCERIN (NITROSTAT) 0.4 MG SL tablet Place 1 tablet (0.4 mg total) under the tongue every 5 (five) minutes as needed for Chest pain. 25 tablet 6    nystatin (MYCOSTATIN) 100,000 unit/mL suspension Take 5 mLs by mouth after meals and at bedtime as needed (Thrush/Mouth Soreness).      omeprazole (PRILOSEC) 20 MG capsule Take 1 capsule (20 mg total) by mouth 2 (two) times daily with meals. 28 capsule 0    ondansetron (ZOFRAN-ODT) 4 MG TbDL Take 4 mg by mouth every 6 (six) hours.      pantoprazole (PROTONIX) 20 MG tablet Take 40 mg by mouth 2 (two) times daily.      potassium chloride SA (K-DUR,KLOR-CON) 10 MEQ tablet Take 1 tablet (10 mEq total) by mouth every other day. 14 tablet 0    rosuvastatin (CRESTOR) 10 MG tablet Take 1 tablet (10 mg total) by mouth every evening. 90 tablet 3    spironolactone (ALDACTONE) 25 MG tablet Take 0.5 tablets (12.5 mg total) by mouth 3 (three) times a week. (Patient taking differently: Take 12.5 mg by mouth 3 (three) times a week. Monday, Wednesday, Friday)      tamsulosin (FLOMAX) 0.4 mg Cp24 TAKE ONE CAPSULE BY MOUTH DAILY AFTER DINNER (Patient taking differently: TAKE ONE CAPSULE BY MOUTH NIGHTLY) 90 capsule 11    temazepam (RESTORIL) 15 mg Cap Take 15 mg by mouth nightly as needed (SLEEP).      tobramycin-dexamethasone 0.3-0.1% (TOBRADEX) 0.3-0.1 % Oint Apply sparingly to eyelid skin / margin OU tid x 3 days, then bid x 3 days then qhs x 3 days--can then be used prn to control inflammation. 3.5 g 1    ZOLPIDEM TARTRATE (AMBIEN ORAL) Take by mouth.         Past Surgical History:   Procedure Laterality Date    CARDIAC CATHETERIZATION  8/13     [facility], Dr Wallace; LIMA and SVG occluded. Proximal LAD 40%, ostial Diagonal 60%, LCX 30-40% in-stent restenosis, RCA totally occluded proximally    CATARACT EXTRACTION W/  INTRAOCULAR LENS IMPLANT Right 3/19/96    COLON SURGERY      ischemic colitis    COLONOSCOPY      COLONOSCOPY N/A 6/21/2016    Procedure: COLONOSCOPY;  Surgeon: Rob Cartagena MD;  Location: [facility] ENDO;  Service: Endoscopy;  Laterality: N/A;    COLONOSCOPY N/A 7/18/2016    Procedure: COLONOSCOPY;  Surgeon: Rob Cartagena MD;  Location: [facility] ENDO;  Service: Endoscopy;  Laterality: N/A;    CORONARY ARTERY BYPASS GRAFT  3/2001    3 vessel    CORONARY STENT PLACEMENT  12/2015    Dr. Freire    EYE SURGERY      HYSTERECTOMY      total    INSERT / REPLACE / REMOVE PACEMAKER  12/2009    BS CRT-D generator changeout    ORIF WRIST FRACTURE Right     TONSILLECTOMY         Social History     Social History    Marital status:      Spouse name: N/A    Number of children: N/A    Years of education: N/A     Occupational History    Not on file.     Social History Main Topics    Smoking status: Former Smoker     Packs/day: 1.00     Years: 40.00     Types: Cigarettes     Start date: 1/1/1953     Quit date: 1/1/2013    Smokeless tobacco: Never Used    Alcohol use No    Drug use: No    Sexual activity: No     Other Topics Concern    Are You Pregnant Or Think You May Be? No    Breast-Feeding No     Social History Narrative    No narrative on file         Vital Signs Range (Last 24H):  Temp:  [36.7 °C (98.1 °F)-37.2 °C (98.9 °F)]   Pulse:  [80-88]   Resp:  [16-20]   BP: (100-132)/(49-74)   SpO2:  [77 %-96 %]       CBC:   Recent Labs      07/11/17   0122  07/11/17 0617   WBC  11.49  13.36*   RBC  3.05*  3.28*   HGB  9.5*  10.0*   HCT  29.4*  31.6*   PLT  207  230   MCV  96  96   MCH  31.1*  30.5   MCHC  32.3  31.6*       CMP:   Recent Labs      07/11/17   0122  07/11/17 0617   NA  132*  132*   K  3.0*   3.5   CL  96  95   CO2  29  30   BUN  18  18   CREATININE  1.16  1.19   GLU  110  138*   MG  2.0   --    CALCIUM  8.3*  8.5   ALBUMIN  3.3*  3.6   PROT  6.1  6.6   ALKPHOS  69  74   ALT  39  34   AST  23  27   BILITOT  1.3  1.3       INR  Recent Labs      07/11/17   0122   INR  1.3   APTT  34.6           Diagnostic Studies:      EKG:                           AV dual-paced rhythm  Abnormal ECG  Compared to ECG 05/18/2017 18:59:27  No significant changes      2D Echo:    CONCLUSIONS     1 - Severely depressed left ventricular systolic function (EF 20-25%).     2 - Impaired LV relaxation, normal LAP (grade 1 diastolic dysfunction).     3 - Eccentric hypertrophy.     4 - Severe left atrial enlargement.     5 - Moderate mitral regurgitation.       Anesthesia Evaluation    I have reviewed the Patient Summary Reports.     I have reviewed the Medications.     Review of Systems  Anesthesia Hx:  History of prior surgery of interest to airway management or planning: Denies Family Hx of Anesthesia complications.   Denies Personal Hx of Anesthesia complications.   Hematology/Oncology:  Hematology Normal   Oncology Normal     EENT/Dental:EENT/Dental Normal   Cardiovascular:   Pacemaker Hypertension Past MI CAD   Angina CHF    Renal/:   Chronic Renal Disease (CKD IV)    Hepatic/GI:   PUD, GERD    Musculoskeletal:   Arthritis  Radiculopathy, R femoral neck fracture Spine Disorders: cervical and lumbar    Neurological:   Headaches    Endocrine:   Hypothyroidism    Psych:   Psychiatric History depression          Physical Exam  General:  Well nourished Stable coronary stents    Airway/Jaw/Neck:  Airway Findings: Mouth Opening: Normal Tongue: Normal  Mallampati: II  TM Distance: Normal, at least 6 cm  Jaw/Neck Findings:  Neck ROM: Normal ROM  Neck Findings:     Eyes/Ears/Nose:  EYES/EARS/NOSE FINDINGS: Normal   Dental:  Dental Findings: upper partial dentures   Chest/Lungs:  Chest/Lungs Findings: Clear to auscultation, Normal  Respiratory Rate     Heart/Vascular:  Heart Findings: Rate: Normal        Mental Status:  Mental Status Findings:  Cooperative         Anesthesia Plan  Type of Anesthesia, risks & benefits discussed:  Anesthesia Type:  general  Patient's Preference:   Intra-op Monitoring Plan: arterial line and standard ASA monitors  Intra-op Monitoring Plan Comments:   Post Op Pain Control Plan: per primary service following discharge from PACU  Post Op Pain Control Plan Comments:   Induction:   IV  Beta Blocker:  Patient is not currently on a Beta-Blocker (No further documentation required).       Informed Consent: Patient representative understands risks and agrees with Anesthesia plan.  Questions answered. Anesthesia consent signed with patient representative.  ASA Score: 4  emergent   Day of Surgery Review of History & Physical: I have interviewed and examined the patient. I have reviewed the patient's H&P dated:    H&P update referred to the surgeon.         Ready For Surgery From Anesthesia Perspective.

## 2017-07-12 NOTE — ASSESSMENT & PLAN NOTE
83 y.o. female with closed right femoral neck fracture, closed right humeral neck fx  --Admit to medicine hip fracture service for pre-operative clearance and medical evaluation  --Cardiology consult for pre-op cardiac evaluation  --To OR tomorrow for operative fixation of left femoral neck fracture with jamil arthroplasty  --Pt marked and consented, case booked  --Pre-operative labs and imaging obtained (UA, Type and Cross, PT-INR, CBC, BMP, PreAlbumin, CXR, EKG)   --Bed rest, adkins, NPO at midnight     Risks and complications were discussed including but not limited to the risks of anesthetic complications, infection, wound healing complications, non-union, mal-union, hardware failure, pain, stiffness, DVT, pulmonary embolism, perioperative medical risks (cardiac, pulmonary, renal, neurologic), and death among others were discussed. No guarantees were made and the patient and family elect to proceed. They fully understand the reported 30% mortality risk within the first year of surgery.

## 2017-07-12 NOTE — H&P
[facility] Medicine  History & Physical    Patient Name: Laurita Stokes  MRN: [id number]  Admission Date: 7/11/2017  Attending Physician: Anshul Guerrero MD   Primary Care Provider: Alex Capellan MD    [facility] Medicine Team: [facility] Dallas Soliz DO     Patient information was obtained from patient, relative(s) and caregiver / friend.     Subjective:     Principal Problem:Closed displaced fracture of right femoral neck    Chief Complaint: No chief complaint on file.       HPI: Patient is 83 year old female with PMH of a fib ( coumadin recently stopped due to SAH and frequent falls ) , ICM , HFrEF (EF=20-25%, s/p CRT-D) c NYHA IV sx  s/p AICD placement, HTN/HLPD, s/p 3v-CABG, hx of ventricular tachycardia on amiodarone, CKD III, GERD, dementia, under home hospice ( [facility] ) and on home oxygen for comfort ( 2 L )  presents as a transfer from [facility] for surgical fixation of right femoral neck fracture by orthopedic surgery. Patient is a poor informant with h/o dementia and daughter Brittany Stokes who is the caregiver at bedside provided HPI.     Patient was taken to OSH ED before midnight 07/10 with right shoulder and right hip pain after she fell while ambulating with a walker in the middle of the night while going to the bathroom. She had immediate right hip and shoulder pain.  She denies hitting her head or LOC.  Of note, she was recently admitted on 5/24-5/26 for subarachnoid hemorrhage, at which time she was discharged on hospice with plans to stop her anticoagulation for her atrial fibrillation.  Patient lives with her daughter and ambulates short distance inside the house with a walker. Imaging at OSH showed  proximal right  humerus fracture and right femoral neck fracture. Patient was evaluated by orthopedic surgery and cardiology at [facility].     Currently patient awake, alert but confused. Oriented to self only ( baseline mental status per  daughter ). Daughter states her pain in currently controlled with morphine 3 mg IVP x 1 given about half an hour ago.     Daughter states most of her cardiac medications were discontinued recently by hospice. Currently she is taking the following meds : amiodarone, bumex, aldactone, celexa, synthroid, prilosec, flomax and ambien.         Past Medical History:   Diagnosis Date    *Atrial fibrillation     on warfarin    Allergy     seasonal    Anticoagulant long-term use     ASA 81 mg, Coumadin    Blood clotting tendency     Blood transfusion     Cataract     CHF (congestive heart failure)     CKD (chronic kidney disease) stage 4, GFR 15-29 ml/min 2/21/2017    Coronary artery disease     PET stress in 1/14: lesions not amenable to PCI.    DDD (degenerative disc disease), lumbar 8/20/2015    GERD (gastroesophageal reflux disease)     HEARING LOSS     wears Hearing aide    Hyperlipidemia     Hypertension     Hypothyroid     MCI (mild cognitive impairment)     Myocardial infarct 1999    Pacemaker 2000    S/P CABG x 3 2000    Ulcerative colitis     Ventricular tachycardia 1/14    VT storm 1/14, mexilitine added       Past Surgical History:   Procedure Laterality Date    CARDIAC CATHETERIZATION  8/13    Heart care center [facility], Dr Wallace; LIMA and SVG occluded. Proximal LAD 40%, ostial Diagonal 60%, LCX 30-40% in-stent restenosis, RCA totally occluded proximally    CATARACT EXTRACTION W/  INTRAOCULAR LENS IMPLANT Right 3/19/96    COLON SURGERY      ischemic colitis    COLONOSCOPY      COLONOSCOPY N/A 6/21/2016    Procedure: COLONOSCOPY;  Surgeon: Rob Cartagena MD;  Location: [facility];  Service: Endoscopy;  Laterality: N/A;    COLONOSCOPY N/A 7/18/2016    Procedure: COLONOSCOPY;  Surgeon: Rob Cartagena MD;  Location: [facility];  Service: Endoscopy;  Laterality: N/A;    CORONARY ARTERY BYPASS GRAFT  3/2001    3 vessel    CORONARY STENT PLACEMENT  12/2015    Dr. Freire    EYE  SURGERY      HYSTERECTOMY      total    INSERT / REPLACE / REMOVE PACEMAKER  12/2009    [facility] CRT-D generator changeout    ORIF WRIST FRACTURE Right     TONSILLECTOMY         Review of patient's allergies indicates:   Allergen Reactions    Codeine Itching    Azithromycin      History of VT       Current Facility-Administered Medications on File Prior to Encounter   Medication    [COMPLETED] furosemide injection 20 mg    [COMPLETED] potassium chloride 20 mEq in 100 mL IVPB (FOR CENTRAL LINE ADMINISTRATION ONLY)    [DISCONTINUED] acetaminophen tablet 650 mg    [DISCONTINUED] amiodarone tablet 200 mg    [DISCONTINUED] bumetanide tablet 1 mg    [DISCONTINUED] cefTRIAXone (ROCEPHIN) 1 g in dextrose 5 % 50 mL IVPB    [DISCONTINUED] citalopram tablet 30 mg    [DISCONTINUED] L.acidophil,parac-S.therm-Bif. (Risaquad) Cap 1 capsule    [DISCONTINUED] Lactobacillus acidoph-L.bulgar 1 million cell tablet 1 tablet    [DISCONTINUED] levothyroxine tablet 88 mcg    [DISCONTINUED] lorazepam 2 mg/ml oral conc concentrated solution 0.25 mg    [DISCONTINUED] morphine injection 2 mg    [DISCONTINUED] nitroGLYCERIN SL tablet 0.4 mg    [DISCONTINUED] ondansetron injection 4 mg    [DISCONTINUED] pantoprazole EC tablet 40 mg    [COMPLETED] sodium chloride 0.9% bolus 500 mL    [DISCONTINUED] spironolactone split tablet 12.5 mg    [DISCONTINUED] sucralfate 100 mg/mL suspension 1 g    [DISCONTINUED] tamsulosin 24 hr capsule 0.4 mg     Current Outpatient Prescriptions on File Prior to Encounter   Medication Sig    acetaminophen (TYLENOL) 325 MG tablet Take 325 mg by mouth every 4 (four) hours as needed for Pain or Temperature greater than.    acetaminophen (TYLENOL) 500 MG tablet Take 1 tablet (500 mg total) by mouth every 8 (eight) hours.    acetaminophen (TYLENOL) 650 MG Supp Place 650 mg rectally every 4 (four) hours as needed.    amiodarone (PACERONE) 200 MG Tab Take 1 tablet (200 mg total) by mouth 2 (two) times  daily.    atropine 1% (ISOPTO ATROPINE) 1 % Drop Take 2 drops by mouth every 2 (two) hours as needed (excess secretions).    bumetanide (BUMEX) 1 MG tablet Take 1 tablet (1 mg total) by mouth once daily.    bumetanide (BUMEX) 2 MG tablet Take 4 mg by mouth once daily at 6am.    CARAFATE 100 mg/mL suspension TAKE 10 ML BY MOUTH EVERY 6 HOURS    carvedilol (COREG) 3.125 MG tablet Take 1 tablet (3.125 mg total) by mouth nightly.    cefUROXime (CEFTIN) 250 MG tablet Take 1 tablet (250 mg total) by mouth every 12 (twelve) hours.    citalopram (CELEXA) 20 MG tablet Take 1.5 tablets (30 mg total) by mouth once daily.    cyanocobalamin, vitamin B-12, 1,000 mcg TbSR Take 1 tablet by mouth once daily.    dexamethasone (DECADRON) 4 MG Tab Take 4 mg by mouth once daily.    digoxin (DIGOX) 125 mcg tablet Take 125 mcg by mouth every Mon, Wed, Fri.    enalapril (VASOTEC) 2.5 MG tablet Take 2.5 mg by mouth once daily.    FLORANEX 1 million cell Tab TAKE 1 TABLET BY MOUTH THREE TIMES DAILY WITH MEALS    isosorbide mononitrate (IMDUR) 30 MG 24 hr tablet Take 30 mg by mouth once daily.    lactulose 10 gram/15 ml (CHRONULAC) 10 gram/15 mL (15 mL) solution Take 10 g by mouth daily as needed (CONSTIPATION).    levetiracetam (KEPPRA) 500 MG Tab Take 1 tablet (500 mg total) by mouth 2 (two) times daily.    levothyroxine (SYNTHROID) 88 MCG tablet Take 88 mcg by mouth once daily.    lorazepam 2 mg/ml oral conc (ATIVAN) 2 mg/mL Conc Take 0.5 mg by mouth every 2 (two) hours as needed (Anxiety).    magnesium oxide (MAG-OX) 400 mg tablet TAKE 2 TABLETS BY MOUTH TWICE DAILY    meclizine (ANTIVERT) 12.5 mg tablet Take 12.5 mg by mouth every 6 (six) hours as needed for Dizziness or Nausea.    metOLazone (ZAROXOLYN) 5 MG tablet Take 5 mg by mouth daily as needed (Fluid overload).    morphine 100 mg/5 mL (20 mg/mL) concentrated solution Take 5 mg by mouth every 2 (two) hours as needed for Pain (or shortness of breath). (Patient  taking differently: Take by mouth every 2 (two) hours as needed for Pain (or shortness of breath). Give 0.25 ml SL EVERY 2 HOURS PRN FOR SHORTNESS OF BREATH)    MV-MN/FA/COQ10/LYCOPENE/LUTEIN (THERAGRAN-M PREMIER 50 PLUS ORAL) Take 1 tablet by mouth once daily.    nitroGLYCERIN (NITROSTAT) 0.4 MG SL tablet Place 1 tablet (0.4 mg total) under the tongue every 5 (five) minutes as needed for Chest pain.    nystatin (MYCOSTATIN) 100,000 unit/mL suspension Take 5 mLs by mouth after meals and at bedtime as needed (Thrush/Mouth Soreness).    omeprazole (PRILOSEC) 20 MG capsule Take 1 capsule (20 mg total) by mouth 2 (two) times daily with meals.    ondansetron (ZOFRAN-ODT) 4 MG TbDL Take 4 mg by mouth every 6 (six) hours.    pantoprazole (PROTONIX) 20 MG tablet Take 40 mg by mouth 2 (two) times daily.    potassium chloride SA (K-DUR,KLOR-CON) 10 MEQ tablet Take 1 tablet (10 mEq total) by mouth every other day.    rosuvastatin (CRESTOR) 10 MG tablet Take 1 tablet (10 mg total) by mouth every evening.    spironolactone (ALDACTONE) 25 MG tablet Take 0.5 tablets (12.5 mg total) by mouth 3 (three) times a week. (Patient taking differently: Take 12.5 mg by mouth 3 (three) times a week. Monday, Wednesday, Friday)    tamsulosin (FLOMAX) 0.4 mg Cp24 TAKE ONE CAPSULE BY MOUTH DAILY AFTER DINNER (Patient taking differently: TAKE ONE CAPSULE BY MOUTH NIGHTLY)    temazepam (RESTORIL) 15 mg Cap Take 15 mg by mouth nightly as needed (SLEEP).    tobramycin-dexamethasone 0.3-0.1% (TOBRADEX) 0.3-0.1 % Oint Apply sparingly to eyelid skin / margin OU tid x 3 days, then bid x 3 days then qhs x 3 days--can then be used prn to control inflammation.    ZOLPIDEM TARTRATE (AMBIEN ORAL) Take by mouth.     Family History     Problem Relation (Age of Onset)    Cancer Sister    Heart disease Father (65), Sister (55)        Social History Main Topics    Smoking status: Former Smoker     Packs/day: 1.00     Years: 40.00     Types:  Cigarettes     Start date: 1/1/1953     Quit date: 1/1/2013    Smokeless tobacco: Never Used    Alcohol use No    Drug use: No    Sexual activity: No     Review of Systems   Reason unable to perform ROS: Due to patient factor : Dementia      Objective:     Vital Signs (Most Recent):  Temp: 98.9 °F (37.2 °C) (07/11/17 2300)  Pulse: 81 (07/11/17 2300)  Resp: 18 (07/11/17 2300)  BP: 127/60 (07/11/17 2300)  SpO2: (!) 92 % (07/12/17 0359) Vital Signs (24h Range):  Temp:  [98.1 °F (36.7 °C)-98.9 °F (37.2 °C)] 98.9 °F (37.2 °C)  Pulse:  [80-88] 81  Resp:  [16-20] 18  SpO2:  [77 %-96 %] 92 %  BP: (100-132)/(49-74) 127/60     Weight: 57.8 kg (127 lb 6.8 oz)  Body mass index is 22.57 kg/m².    Physical Exam   Constitutional: No distress.   Elderly frail female not in distress    HENT:   Head: Normocephalic and atraumatic.   Right Ear: External ear normal.   Left Ear: External ear normal.   Nose: Nose normal.   Mouth/Throat: Oropharynx is clear and moist. No oropharyngeal exudate.   Eyes: Conjunctivae and EOM are normal. Pupils are equal, round, and reactive to light. Right eye exhibits no discharge. Left eye exhibits no discharge. No scleral icterus.   Neck: Normal range of motion. Neck supple. No JVD present. No thyromegaly present.   Cardiovascular: Normal rate, regular rhythm, normal heart sounds and intact distal pulses.  Exam reveals no gallop and no friction rub.    No murmur heard.  Pulses:       Radial pulses are 2+ on the right side, and 2+ on the left side.        Dorsalis pedis pulses are 2+ on the right side, and 2+ on the left side.        Posterior tibial pulses are 2+ on the right side, and 2+ on the left side.   Pulmonary/Chest: Effort normal and breath sounds normal. No stridor. No respiratory distress. She has no wheezes. She has no rales. She exhibits no tenderness.   Abdominal: Soft. Bowel sounds are normal. She exhibits no distension and no mass. There is no tenderness. There is no rebound and no  guarding. No hernia.   Genitourinary: Rectal exam shows guaiac negative stool. No vaginal discharge found.   Musculoskeletal: She exhibits tenderness (Positive tenderness on palpation over right proximal arm and thigh, no obvious hematoma. ). She exhibits no edema or deformity.   Unable to move RUE and RLE due to pain from  humerus and hip fracture.    Lymphadenopathy:     She has no cervical adenopathy.   Neurological: She is alert. She has normal reflexes. She displays normal reflexes. No cranial nerve deficit. She exhibits normal muscle tone. Coordination normal.   Oriented to person only.   Limited neuro exam since patient unable to cooperate due to h/o dementia.    Skin: Skin is warm and dry. No rash noted. She is not diaphoretic. No erythema. No pallor.   Psychiatric: She has a normal mood and affect. Her behavior is normal.       Significant Labs:   No visits with results within 1 Day(s) from this visit.   Latest known visit with results is:   Admission on 07/11/2017, Discharged on 07/11/2017   Component Date Value Ref Range Status    WBC 07/11/2017 11.49  3.90 - 12.70 K/uL Final    RBC 07/11/2017 3.05* 4.00 - 5.40 M/uL Final    Hemoglobin 07/11/2017 9.5* 12.0 - 16.0 g/dL Final    Hematocrit 07/11/2017 29.4* 37.0 - 48.5 % Final    MCV 07/11/2017 96  82 - 98 fL Final    MCH 07/11/2017 31.1* 27.0 - 31.0 pg Final    MCHC 07/11/2017 32.3  32.0 - 36.0 % Final    RDW 07/11/2017 17.6* 11.5 - 14.5 % Final    Platelets 07/11/2017 207  150 - 350 K/uL Final    MPV 07/11/2017 10.5  9.2 - 12.9 fL Final    Gran # 07/11/2017 10.1* 1.8 - 7.7 K/uL Final    Lymph # 07/11/2017 0.6* 1.0 - 4.8 K/uL Final    Mono # 07/11/2017 0.7  0.3 - 1.0 K/uL Final    Eos # 07/11/2017 0.1  0.0 - 0.5 K/uL Final    Baso # 07/11/2017 0.03  0.00 - 0.20 K/uL Final    nRBC 07/11/2017 0  0 /100 WBC Final    Gran% 07/11/2017 88.0* 38.0 - 73.0 % Final    Lymph% 07/11/2017 4.8* 18.0 - 48.0 % Final    Mono% 07/11/2017 5.7  4.0 - 15.0 %  Final    Eosinophil% 07/11/2017 1.2  0.0 - 8.0 % Final    Basophil% 07/11/2017 0.3  0.0 - 1.9 % Final    Differential Method 07/11/2017 Automated   Final    Sodium 07/11/2017 132* 136 - 145 mmol/L Final    Potassium 07/11/2017 3.0* 3.5 - 5.1 mmol/L Final    Chloride 07/11/2017 96  95 - 110 mmol/L Final    CO2 07/11/2017 29  22 - 31 mmol/L Final    Glucose 07/11/2017 110  70 - 110 mg/dL Final    BUN, Bld 07/11/2017 18  7 - 18 mg/dL Final    Creatinine 07/11/2017 1.16  0.50 - 1.40 mg/dL Final    Calcium 07/11/2017 8.3* 8.4 - 10.2 mg/dL Final    Total Protein 07/11/2017 6.1  6.0 - 8.4 g/dL Final    Albumin 07/11/2017 3.3* 3.5 - 5.2 g/dL Final    Total Bilirubin 07/11/2017 1.3  0.2 - 1.3 mg/dL Final    Alkaline Phosphatase 07/11/2017 69  38 - 145 U/L Final    AST 07/11/2017 23  14 - 36 U/L Final    ALT 07/11/2017 39  10 - 44 U/L Final    Anion Gap 07/11/2017 7* 8 - 16 mmol/L Final    eGFR if  07/11/2017 50* >60 mL/min/1.73 m^2 Final    eGFR if non African American 07/11/2017 44* >60 mL/min/1.73 m^2 Final    aPTT 07/11/2017 34.6  24.6 - 36.7 sec Final    PT 07/11/2017 15.6* 11.8 - 14.7 sec Final    INR 07/11/2017 1.3   Final    Group & Rh 07/11/2017 A POS   Final    Indirect Andie GEL 07/11/2017 NEG   Final    TSH 07/11/2017 3.430  0.400 - 4.000 uIU/mL Final    Sodium 07/11/2017 132* 136 - 145 mmol/L Final    Potassium 07/11/2017 3.5  3.5 - 5.1 mmol/L Final    Chloride 07/11/2017 95  95 - 110 mmol/L Final    CO2 07/11/2017 30  22 - 31 mmol/L Final    Glucose 07/11/2017 138* 70 - 110 mg/dL Final    BUN, Bld 07/11/2017 18  7 - 18 mg/dL Final    Creatinine 07/11/2017 1.19  0.50 - 1.40 mg/dL Final    Calcium 07/11/2017 8.5  8.4 - 10.2 mg/dL Final    Total Protein 07/11/2017 6.6  6.0 - 8.4 g/dL Final    Albumin 07/11/2017 3.6  3.5 - 5.2 g/dL Final    Total Bilirubin 07/11/2017 1.3  0.2 - 1.3 mg/dL Final    Alkaline Phosphatase 07/11/2017 74  38 - 145 U/L Final    AST  07/11/2017 27  14 - 36 U/L Final    ALT 07/11/2017 34  10 - 44 U/L Final    Anion Gap 07/11/2017 7* 8 - 16 mmol/L Final    eGFR if  07/11/2017 49* >60 mL/min/1.73 m^2 Final    eGFR if non African American 07/11/2017 42* >60 mL/min/1.73 m^2 Final    WBC 07/11/2017 13.36* 3.90 - 12.70 K/uL Final    RBC 07/11/2017 3.28* 4.00 - 5.40 M/uL Final    Hemoglobin 07/11/2017 10.0* 12.0 - 16.0 g/dL Final    Hematocrit 07/11/2017 31.6* 37.0 - 48.5 % Final    MCV 07/11/2017 96  82 - 98 fL Final    MCH 07/11/2017 30.5  27.0 - 31.0 pg Final    MCHC 07/11/2017 31.6* 32.0 - 36.0 % Final    RDW 07/11/2017 17.3* 11.5 - 14.5 % Final    Platelets 07/11/2017 230  150 - 350 K/uL Final    MPV 07/11/2017 10.2  9.2 - 12.9 fL Final    Gran # 07/11/2017 11.9* 1.8 - 7.7 K/uL Final    Lymph # 07/11/2017 0.5* 1.0 - 4.8 K/uL Final    Mono # 07/11/2017 0.9  0.3 - 1.0 K/uL Final    Eos # 07/11/2017 0.0  0.0 - 0.5 K/uL Final    Baso # 07/11/2017 0.02  0.00 - 0.20 K/uL Final    nRBC 07/11/2017 0  0 /100 WBC Final    Gran% 07/11/2017 89.3* 38.0 - 73.0 % Final    Lymph% 07/11/2017 3.8* 18.0 - 48.0 % Final    Mono% 07/11/2017 6.6  4.0 - 15.0 % Final    Eosinophil% 07/11/2017 0.2  0.0 - 8.0 % Final    Basophil% 07/11/2017 0.1  0.0 - 1.9 % Final    Differential Method 07/11/2017 Automated   Final    Troponin I 07/11/2017 0.037* 0.012 - 0.034 ng/mL Final    Cholesterol 07/11/2017 133  120 - 199 mg/dL Final    Triglycerides 07/11/2017 73  30 - 150 mg/dL Final    HDL 07/11/2017 50  40 - 75 mg/dL Final    LDL Cholesterol 07/11/2017 68.4  63.0 - 159.0 mg/dL Final    HDL/Chol Ratio 07/11/2017 37.6  20.0 - 50.0 % Final    Total Cholesterol/HDL Ratio 07/11/2017 2.7  2.0 - 5.0 Final    Non-HDL Cholesterol 07/11/2017 83  mg/dL Final    Hemoglobin A1C 07/11/2017 5.2  0.0 - 5.6 % Final    Estimated Avg Glucose 07/11/2017 103  68 - 131 mg/dL Final    Magnesium 07/11/2017 2.0  1.6 - 2.6 mg/dL Final         Significant  Imaging: I have reviewed and interpreted all pertinent imaging results/findings within the past 24 hours.     XRAY PELVIS, R FEMUR : Right subcapital femoral neck fracture appears stable.     XRAY SHOULDER RIGHT : Grossly stable positioning of surgical neck humeral fracture with impaction.     CT HEAD ( 07/11/17 ) : No acute intracranial abnormality is identified.    CT C-SPINE ( 07/11/17 ) : No evidence of acute bone injury the cervical spine is visualized. Multilevel advanced cervical spondylosis is seen as detailed above. There is reversal of the normal cervical lordosis which may be related to muscular spasm and/or positioning.    2D ECHO ( 05/15/17 ) :     1 - Severely depressed left ventricular systolic function (EF 25-30%).     2 - Left ventricular diastolic dysfunction.     3 - Eccentric hypertrophy.     4 - Low normal right ventricular systolic function .     5 - Severe left atrial enlargement.     6 - Severe mitral regurgitation.     7 - Pulmonary hypertension. The estimated PA systolic pressure is 53 mmHg.     Stress Test 1/13/17:  1. The perfusion scan is free of evidence for myocardial ischemia.   2. There is severe intensity fixed defects in the apical, anteroapical, inferior and inferolateral walls of the left ventricle, consistent with myocardial injury.   3. There is abnormal wall motion at rest showing severe hypokinesis of the  wall of the left ventricle.   4. There is resting LV dysfunction with a reduced ejection fraction of 24 %.   5. The left ventricular volume is  increased at rest.  6. Impression: ABNORMAL MYOCARDIAL PERFUSION          Assessment/Plan:     Surgical Risk Assessment   Active cardiac issues:  Active decompensated heart failure? No   Unstable angina?  No   Significant uncontrolled arrhythmias? No   Severe valvular heart disease-Aortic or Mitral Stenosis? No   Recent MI or coronary revascularization < 30 days? No      Cardiac Risk Factors  History of CAD/ischemic heart disease?  Yes   History of cerebrovascular disease? Yes   History of compensated heart failure? Yes   Type 2 diabetes requiring insulin? No   Serum Creatinine > 2? No   Total cardiac risk factors 3      Functional mets <4 mets     < 4* METs -unable to walk > 2 blocks on level ground without stopping due to symptoms  - eating, dressing, toileting, walking indoors, light housework. POOR   > 4* METs -climbing > 1 flight of stairs without stopping  -walking up hill > 1-2 blocks  -scrubbing floors  -moving furniture  - golf, bowling, dancing or tennis  -running short distance MODERATE to EXCELLENT   * performance of any one of the activities      Assessment/Plan:   Cardiovascular Risk Assessment:  Non-emergent surgery.  No active cardiac problems (such as unstable angina, decompensated heart failure, significant uncontrolled arrhythmias or severe valvular disease).  Intermediate risk surgery.  Functional Status: Not able to climb a flight of stairs (> 4 METS)  Her revised cardiac risk index is 3.     1 pt Each: Ischemic Heart Disease, Cerebrovascular Disease,                     CHF, DM, Creatinine > 2       Active Diagnoses:    Diagnosis Date Noted POA    PRINCIPAL PROBLEM:  Closed displaced fracture of right femoral neck [S72.001A] 07/11/2017 Yes    Debility [R53.81] 07/12/2017 Yes    CKD (chronic kidney disease), stage III [N18.3] 07/12/2017 Yes    Chronic respiratory failure with hypoxia [J96.11] 07/12/2017 Yes    Closed fracture of proximal end of right humerus [S42.201A]  Yes    Coronary artery disease [I25.10] 02/20/2017 Yes    Hypothyroidism [E03.9] 04/29/2016 Yes    CHF NYHA class IV [I50.9] 12/03/2015 Yes    DDD (degenerative disc disease), lumbar [M51.36] 08/20/2015 Yes    Essential hypertension [I10] 04/21/2014 Yes    Depression [F32.9] 04/16/2014 Yes    Dementia without behavioral disturbance [F03.90] 03/18/2014 Yes    Chronic combined systolic and diastolic heart failure [I50.42] 01/08/2014 Yes     Biventricular ICD (implantable cardioverter-defibrillator) in place [Z95.810] 10/02/2013 Yes    Cardiomyopathy, ischemic [I25.5] 10/02/2013 Yes      Problems Resolved During this Admission:    Diagnosis Date Noted Date Resolved POA     # Closed displaced fracture of right femoral neck    - S/P mechanical fall at home    - No focal neuro vascular deficit on exam    - Orthopedic and cardiology consult noted and appreciated   - Scheduled for right hip hemiarthroplasty in morning by orthopedic surgery    - Patient with significant cardiac history but no active cardiac issue at current time    - No further cardiac work up warranted prior to surgery since it would not      Perioperative Risk Assessment: Patient is at high risk for perioperative cardiopulmonary complications due to underlying cardiopulmonary conditions. Daughter understands that patient is at high risk for perioperative MACE and elected to proceed with surgery.  The benefit of surgery outweighs the risk of surgery.  Proceed to surgery as planned.     Perioperative Management Recommendations : NPO, pain control prn                                                                               - Monitor volume status closely during perioperative period                                                                               - SCD for DVT PPX                                                                               - Post op incentive spirometry                                                                                        # Closed fracture of proximal end of right humerus     - Non-operative treatment with sling in place     - Non weight bearing status of RUE       # CVS : h/o CAD, CABG, ICM ( EF 25 -30 % ), s/p AICD, on amiodarone for h/o VT, chronic afib              - Recently stopped coumadin after SAH and frequent falls              - Most of her cardiac medications except diuretics and amiodarone were recently stopped  under hospice              - continue amiodarone 200 mg bid              - Continue bumex daily and aldactone ( MWF )    # CKD III   - Creatinine stable at baseline of 1.2   - Monitor renal function closely  - Strict I/Os     # Dementia, depression :    - On celexa 30 mg daily     # Hypothyroidism :    - On synthroid     # GERD : On PPI     # Insomnia : takes  ambien 5 mg nightly     Code status : Daughter at bedside states her sister ( TOYA ), Kinza Stanley will be here in morning to discuss code status         VTE Risk Mitigation         Ordered     Medium Risk of VTE  Once      07/11/17 2319     Place JEREMY hose  Until discontinued      07/11/17 2319     Place sequential compression device  Until discontinued      07/11/17 2319        Dallas Soliz DO  Department of Hospital Medicine   [facility]

## 2017-07-12 NOTE — OR NURSING
1609   To OR 7 - adkins cath in place with clear,    yellow urine draining to urimeter bag.      External pacer /defib pads placed by anesthesia post induction.

## 2017-07-12 NOTE — CONSULTS
History & Physical-Preoperative Risk Assessment  Cardiology Consult Service       Admission Date: 7/11/2017  Referring physician: Anshul Guerrero MD     Reason for consult:   Preoperative cardiovascular risk assessment for R hemiarthoplasty    HPI:   Patient information was obtained from patient, relative(s) and past medical records. Primary care Physician: Alex Capellan MD    83 y.o.female with significant past medical history of CAD (s/p CABG x3 in 2000), hx of VT (On Amiodarone), HFrEF (EF=20-25%, s/p CRT-D) c NYHA IV sx, pAF (Not on Coumadin due to recent SAH) that presents as a transfer from OSH for hemiarthoplasty.     Patient was getting up in the middle of the night without assistance, lost her balance and fell onto her R side and brought to OSH ER where she was found to have a right femoral neck fracture. She was transferred from OSH for a R hemiarthroplasty for pain relief. She was discharged from [facility] on May 26th after being admitted for a SAH. She sustained trauma to her head after getting up from a chair and hitting her head. She then fell again while walking and sustained head trauma again and presented to the OSH ER where she had a small SAH and received IV Vitamin K and FFP. Coumadin has been held since that time and patient was discharged to home hospice following this admission. In the evening of 7/10-11, she was walking with her walker and fell on her R side where she was found to have a R femoral neck fx. She was admitted to hospital and had her hospice status revoked at that time.     She had left heart catheterization in December 2015, that revealed an atretic LIMA to LAD, occluded saphenous vein graft to the obtuse marginal branch, and occluded saphenous vein graft to the right coronary artery.  The mid LAD stent was patent. The proximal LAD has a significant lesion that   was treated with a drug-eluting stent. The diagonal stent was patent. The left  circumflex stent and the obtuse  marginal stents were also patent. The right   coronary artery was totally occluded with left to right collaterals. A repeat   left heart catheterization in 2016 revealed no change in anatomy.      Cardiology consulted by primary service for preoperative cardiac risk assessment for surgery.     Review of Systems:   General: No fevers, chills, nausea, vomiting, change in appetite, or change in weight  Neuro: No headache, tremors, weakness, or numbness  HEENT: no change in vision, no change in hearing, no dry eyes, no dry mouth  Cardiac: No chest pain, palpitations, orthopnea, or PND  Pulmonary: No dyspnea, cough, or wheezing  GI: No abdominal pain, dysphagia, odynophagia, constipation, or diarrhea  : No dysuria or change in urinary frequency  Hematologic: No night sweats, easy bruising, or enlarged lymph nodes  MSK: No arthralgias or myalgias  Derm: No rash  Past Medical and Surgical History:     Past Medical History:   Diagnosis Date    *Atrial fibrillation     on warfarin    Allergy     seasonal    Anticoagulant long-term use     ASA 81 mg, Coumadin    Blood clotting tendency     Blood transfusion     Cataract     CHF (congestive heart failure)     CKD (chronic kidney disease) stage 4, GFR 15-29 ml/min 2/21/2017    Coronary artery disease     PET stress in 1/14: lesions not amenable to PCI.    DDD (degenerative disc disease), lumbar 8/20/2015    GERD (gastroesophageal reflux disease)     HEARING LOSS     wears Hearing aide    Hyperlipidemia     Hypertension     Hypothyroid     MCI (mild cognitive impairment)     Myocardial infarct 1999    Pacemaker 2000    S/P CABG x 3 2000    Ulcerative colitis     Ventricular tachycardia 1/14    VT storm 1/14, mexilitine added     Past Surgical History:   Procedure Laterality Date    CARDIAC CATHETERIZATION  8/13    [facility], Dr Wallace; LIMA and SVG occluded. Proximal LAD 40%, ostial Diagonal 60%, LCX 30-40% in-stent restenosis, RCA  totally occluded proximally    CATARACT EXTRACTION W/  INTRAOCULAR LENS IMPLANT Right 3/19/96    COLON SURGERY      ischemic colitis    COLONOSCOPY      COLONOSCOPY N/A 6/21/2016    Procedure: COLONOSCOPY;  Surgeon: Rob Cartagena MD;  Location: [facility] ENDO;  Service: Endoscopy;  Laterality: N/A;    COLONOSCOPY N/A 7/18/2016    Procedure: COLONOSCOPY;  Surgeon: Rob Cartagena MD;  Location: [facility] ENDO;  Service: Endoscopy;  Laterality: N/A;    CORONARY ARTERY BYPASS GRAFT  3/2001    3 vessel    CORONARY STENT PLACEMENT  12/2015    Dr. Freire    EYE SURGERY      HYSTERECTOMY      total    INSERT / REPLACE / REMOVE PACEMAKER  12[place]    [facility] [place]    ORIF WRIST FRACTURE Right     TONSILLECTOMY        Home Medications:     Prior to Admission medications    Medication Sig Start Date End Date Taking? Authorizing Provider   acetaminophen (TYLENOL) 325 MG tablet Take 325 mg by mouth every 4 (four) hours as needed for Pain or Temperature greater than. 5/26/17   Heather Molina MD   acetaminophen (TYLENOL) 500 MG tablet Take 1 tablet (500 mg total) by mouth every 8 (eight) hours. 5/31/17   Heather Molina MD   acetaminophen (TYLENOL) 650 MG Supp Place 650 mg rectally every 4 (four) hours as needed.    Historical Provider, MD   amiodarone (PACERONE) 200 MG Tab Take 1 tablet (200 mg total) by mouth 2 (two) times daily. 5/18/17 5/18/18  Schuyler Martino MD   atropine 1% (ISOPTO ATROPINE) 1 % Drop Take 2 drops by mouth every 2 (two) hours as needed (excess secretions). 5/19/17   Historical Provider, MD   bumetanide (BUMEX) 1 MG tablet Take 1 tablet (1 mg total) by mouth once daily. 5/31/17 5/31/18  Heather Molina MD   bumetanide (BUMEX) 2 MG tablet Take 4 mg by mouth once daily at 6am. 5/26/17   Heather Molina MD   CARAFATE 100 mg/mL suspension TAKE 10 ML BY MOUTH EVERY 6 HOURS 4/16/17   Alex Capellan MD   carvedilol (COREG) 3.125 MG tablet Take 1 tablet (3.125 mg total) by mouth  nightly. 5/31/17   Heather Molina MD   cefUROXime (CEFTIN) 250 MG tablet Take 1 tablet (250 mg total) by mouth every 12 (twelve) hours. 5/31/17   Heather Molina MD   citalopram (CELEXA) 20 MG tablet Take 1.5 tablets (30 mg total) by mouth once daily. 5/31/17   Heather Molina MD   cyanocobalamin, vitamin B-12, 1,000 mcg TbSR Take 1 tablet by mouth once daily. 5/26/17   Heather Molina MD   dexamethasone (DECADRON) 4 MG Tab Take 4 mg by mouth once daily. 5/27/17   Heather Molina MD   digoxin (DIGOX) 125 mcg tablet Take 125 mcg by mouth every Mon, Wed, Fri. 5/26/17   Heather Molina MD   enalapril (VASOTEC) 2.5 MG tablet Take 2.5 mg by mouth once daily. 5/26/17   Heather Molina MD   FLORANEX 1 million cell Tab TAKE 1 TABLET BY MOUTH THREE TIMES DAILY WITH MEALS 5/8/17   Alex Capellan MD   isosorbide mononitrate (IMDUR) 30 MG 24 hr tablet Take 30 mg by mouth once daily. 5/26/17   Heather Molina MD   lactulose 10 gram/15 ml (CHRONULAC) 10 gram/15 mL (15 mL) solution Take 10 g by mouth daily as needed (CONSTIPATION). 5/19/17   Marlene Mayo MD   levetiracetam (KEPPRA) 500 MG Tab Take 1 tablet (500 mg total) by mouth 2 (two) times daily. 5/26/17 5/26/18  Rosalina Colón MD   levothyroxine (SYNTHROID) 88 MCG tablet Take 88 mcg by mouth once daily. 5/26/17   Heather Molina MD   lorazepam 2 mg/ml oral conc (ATIVAN) 2 mg/mL Conc Take 0.5 mg by mouth every 2 (two) hours as needed (Anxiety). 5/26/17   Heather Molina MD   magnesium oxide (MAG-OX) 400 mg tablet TAKE 2 TABLETS BY MOUTH TWICE DAILY 5/11/17   James A. Orejarena, MD   meclizine (ANTIVERT) 12.5 mg tablet Take 12.5 mg by mouth every 6 (six) hours as needed for Dizziness or Nausea. 5/27/17   Heather Molina MD   metOLazone (ZAROXOLYN) 5 MG tablet Take 5 mg by mouth daily as needed (Fluid overload). 5/26/17   Heather Molina MD   morphine 100 mg/5 mL (20 mg/mL) concentrated solution Take 5 mg by mouth every 2 (two) hours as needed  for Pain (or shortness of breath).  Patient taking differently: Take by mouth every 2 (two) hours as needed for Pain (or shortness of breath). Give 0.25 ml SL EVERY 2 HOURS PRN FOR SHORTNESS OF BREATH    Historical Provider, MD TAMAYO-MN/FA/COQ10/LYCOPENE/LUTEIN (THERAGRAN-M PREMIER 50 PLUS ORAL) Take 1 tablet by mouth once daily. 5/26/17   Heather Molina MD   nitroGLYCERIN (NITROSTAT) 0.4 MG SL tablet Place 1 tablet (0.4 mg total) under the tongue every 5 (five) minutes as needed for Chest pain. 12/12/16 12/12/17  James Ortiz MD   nystatin (MYCOSTATIN) 100,000 unit/mL suspension Take 5 mLs by mouth after meals and at bedtime as needed (Thrush/Mouth Soreness). 5/26/17   Heather Molina MD   omeprazole (PRILOSEC) 20 MG capsule Take 1 capsule (20 mg total) by mouth 2 (two) times daily with meals. 5/31/17   Heather Molina MD   ondansetron (ZOFRAN-ODT) 4 MG TbDL Take 4 mg by mouth every 6 (six) hours. 5/27/17   Heather Molina MD   pantoprazole (PROTONIX) 20 MG tablet Take 40 mg by mouth 2 (two) times daily. 5/26/17   Heather Molina MD   potassium chloride SA (K-DUR,KLOR-CON) 10 MEQ tablet Take 1 tablet (10 mEq total) by mouth every other day. 5/31/17   Heather Molina MD   rosuvastatin (CRESTOR) 10 MG tablet Take 1 tablet (10 mg total) by mouth every evening. 9/6/16   James Ortiz MD   spironolactone (ALDACTONE) 25 MG tablet Take 0.5 tablets (12.5 mg total) by mouth 3 (three) times a week.  Patient taking differently: Take 12.5 mg by mouth 3 (three) times a week. Monday, Wednesday, Friday 5/19/17 5/19/18  Schuyler Martino MD   tamsulosin (FLOMAX) 0.4 mg Cp24 TAKE ONE CAPSULE BY MOUTH DAILY AFTER DINNER  Patient taking differently: TAKE ONE CAPSULE BY MOUTH NIGHTLY 8/28/14   Ezra Lee MD   temazepam (RESTORIL) 15 mg Cap Take 15 mg by mouth nightly as needed (SLEEP). 5/19/17   Historical Provider, MD   tobramycin-dexamethasone 0.3-0.1% (TOBRADEX) 0.3-0.1 % Oint Apply sparingly to eyelid skin  "/ margin OU tid x 3 days, then bid x 3 days then qhs x 3 days--can then be used prn to control inflammation. 6/30/17 6/30/18  ALLISON Mccann, OD   ZOLPIDEM TARTRATE (AMBIEN ORAL) Take by mouth.    Historical Provider, MD     Allergies:     Review of patient's allergies indicates:   Allergen Reactions    Codeine Itching    Azithromycin      History of VT        Social and Family History:     Social History     Social History    Marital status:      Spouse name: N/A    Number of children: N/A    Years of education: N/A     Social History Main Topics    Smoking status: Former Smoker     Packs/day: 1.00     Years: 40.00     Types: Cigarettes     Start date: 1/1/1953     Quit date: 1/1/2013    Smokeless tobacco: Never Used    Alcohol use No    Drug use: No    Sexual activity: No     Other Topics Concern    Are You Pregnant Or Think You May Be? No    Breast-Feeding No     Social History Narrative    No narrative on file     Family History   Problem Relation Age of Onset    Heart disease Father 65    Heart disease Sister 55    Cancer Sister      thyroid     Physical Exam:   /60 (BP Location: Left arm, Patient Position: Lying, BP Method: Automatic)   Pulse 81   Temp 98.9 °F (37.2 °C) (Temporal)   Ht 5' 3" (1.6 m)   Wt 57.8 kg (127 lb 6.8 oz)   LMP  (LMP Unknown)   SpO2 95%   Breastfeeding? No   BMI 22.57 kg/m² Body mass index is 22.57 kg/m².   General: NAD  Neuro: Grossly intact  HEENT: Visual fields and EOM intact, VITA, no conjunctival injection, no pallor  Neck: No anterior or posterior lymphadenopathy. No JVD. No carotid bruits.   Cardiac: S1, S2, 3/6 pansystolic murmur in apex. Pulses 2+, regular.   Pulmonary: Clear to auscultation bilaterally.   Abdominal: Soft, non-tender. No mass or hepatosplenomegaly. No rebound or rigidity. Bowel sounds present.   Extremities: No clubbing, cyanosis, or edema. R LE shortened and externally rotated.  Skin: No bruising or rash    Labs and " Diagnostic Results:     Recent Labs      07/11/17 0617   WBC  13.36*   RBC  3.28*   HGB  10.0*   HCT  31.6*   PLT  230   MCV  96   MCH  30.5   MCHC  31.6*      Recent Labs      07/11/17 0122  07/11/17 0617   GLU  110  138*   NA  132*  132*   K  3.0*  3.5   CL  96  95   CO2  29  30   BUN  18  18   CREATININE  1.16  1.19   ANIONGAP  7*  7*   CALCIUM  8.3*  8.5   BILITOT  1.3  1.3   AST  23  27   ALT  39  34   ALKPHOS  69  74   ALBUMIN  3.3*  3.6   PROT  6.1  6.6   MG  2.0   --        Recent Labs      07/11/17 0122   INR  1.3   APTT  34.6       EKG: I have personally reviewed and shows AV paced rhythm    EF   Date Value Ref Range Status   05/15/2017 20 (A) 55 - 65    02/21/2017 25 (A) 55 - 65    Echocardiogram 5/15/17:    1 - Severely depressed left ventricular systolic function (EF 20-25%).     2 - Impaired LV relaxation, normal LAP (grade 1 diastolic dysfunction).     3 - Eccentric hypertrophy.     4 - Severe left atrial enlargement.     5 - Moderate mitral regurgitation.      Echo 2/21/17:    1 - Severely depressed left ventricular systolic function (EF 25-30%).     2 - Left ventricular diastolic dysfunction.     3 - Eccentric hypertrophy.     4 - Low normal right ventricular systolic function .     5 - Severe left atrial enlargement.     6 - Severe mitral regurgitation.     7 - Pulmonary hypertension. The estimated PA systolic pressure is 53 mmHg.     Stress Test 1/13/17:  1. The perfusion scan is free of evidence for myocardial ischemia.   2. There is severe intensity fixed defects in the apical, anteroapical, inferior and inferolateral walls of the left ventricle, consistent with myocardial injury.   3. There is abnormal wall motion at rest showing severe hypokinesis of the  wall of the left ventricle.   4. There is resting LV dysfunction with a reduced ejection fraction of 24 %.   5. The left ventricular volume is  increased at rest.  6. Impression: ABNORMAL MYOCARDIAL PERFUSION     [facility] 5/16:  1 --  LM:  normal sized; mild calcific disease.  2 --  LAD: normal sized, wraps around the apex; patent stent seen in its proximal to mid portion; moderately calcified vessel; remaining LAD has mild calcific disease, all less than 30%.                                              D1: Long, branching, small; patent stent seen in its proximal portion                                              D2: Small  3 --  LCx: Nondominant; patent stent seen in proximal to mid circumflex.                                              OM1: Small                                              OM2: Long, branching; Patent stent                                              OM3: Small,    4 --  RCA: occluded throughout its proximal portion; extensive collaterals coming from the distal LAD and a large collateral coming off the second obtuse marginal filling the right PDA     Last Stress Test 8/15:  1. Nondiagnostic ECG secondary to ventricularly paced rhythm.  2. No dysrhythmias.  3. No chest pain.  4. Normal hemodynamic response to stress.  5. Large scar involving the inferior and lateral wall and inferoapical wall with trivial tiana-infarct ischemia involving the apical lateral, markedly reduced systolic function of 22%.  6. No significant ischemia noted     Surgical Risk Assessment   Active cardiac issues:  Active decompensated heart failure? No   Unstable angina?  No   Significant uncontrolled arrhythmias? No   Severe valvular heart disease-Aortic or Mitral Stenosis? No   Recent MI or coronary revascularization < 30 days? No     Cardiac Risk Factors  History of CAD/ischemic heart disease? Yes   History of cerebrovascular disease? Yes   History of compensated heart failure? Yes   Type 2 diabetes requiring insulin? No   Serum Creatinine > 2? No   Total cardiac risk factors 3     Functional mets <4 mets    < 4* METs -unable to walk > 2 blocks on level ground without stopping due to symptoms  - eating, dressing, toileting, walking indoors, light  housework. POOR   > 4* METs -climbing > 1 flight of stairs without stopping  -walking up hill > 1-2 blocks  -scrubbing floors  -moving furniture  - golf, bowling, dancing or tennis  -running short distance MODERATE to EXCELLENT   * performance of any one of the activities     Assessment/Plan:   Cardiovascular Risk Assessment:  Non-emergent surgery.  No active cardiac problems (such as unstable angina, decompensated heart failure, significant uncontrolled arrhythmias or severe valvular disease).  Intermediate risk surgery.  Functional Status: Not able to climb a flight of stairs (> 4 METS)  Her revised cardiac risk index is 3.     1 pt Each: Ischemic Heart Disease, Cerebrovascular Disease,                     CHF, DM, Creatinine > 2     83 y.o.female with significant past medical history of CAD (s/p CABG x3 in 2000), hx of VT (On Amiodarone), HFrEF (EF=20-25%, s/p CRT-D) c NYHA IV sx, pAF (Not on Coumadin due to recent SAH) that presents as a transfer from OSH for hemiarthoplasty. Known occluded LIMA-LAD, SVG-OM and SVG-RCA with L to R collaterals to RCA. She is not currently having active chest pain although she suffers with angina chronically that has been well controlled recently with MS contin and Imdur. On examination today, she does not appear to have decompensated HF, daughter reports she was recently started on home O2 for comfort. Had multiple episodes of VT/NSVT in mid-May with multiple ICD discharges, none since that time. Echo from May from OSH showed EF of 20-25% c moderate MR.     Recommendation:  1. Proceed with surgery. No further cardiac work-up required as would not . High risk for MACE in the tiana-operative period RCRI of 3, Class IV risk, 11% chance of MACE in tiana-operative period. Recommend cardiac anesthesia due to significant cardiac hx. Monitor I/Os. Avoid liberal use of IVFs.     Kt Ruffin MD, MPH  PGY-V  Cardiovascular Disease Fellow

## 2017-07-12 NOTE — PROGRESS NOTES
Patients monitor was alarming, upon entering the room patients heart rhythm was in V Tach. Patients pacemaker fired and converted patients heart rhythm back to SR in the 80's. Patient was confused for approximately 30-45 seconds. Pt was placed on a non rebreather at 5L.  Dr Loyola and Dr Guerrero notified. Potassium orders were placed and labs to be repeated once potassium is finished.

## 2017-07-12 NOTE — PLAN OF CARE
10:39 AM  SW received notification from CM that met with pt and family to discuss SNF preferences. Printed SNF list for pt, CM dropped off. Pt and family's preferences are as follows: 1. [facility] 2. [facility] 3. [facility]. Faxed referrals to facilities. Will f/u as needed.     Lulú Ricardo LMSW   [facility]  Ext [phone]

## 2017-07-12 NOTE — PLAN OF CARE
Operative day for right hip jamil (hip fracture). Patient currently lives with her daughter Marlene Stokes. Patient's two daughters are present at the bedside. Patient has very good family support. CM completed discharge assessment and planning with patient and patient's family. Patient and family verbalized understanding. All questions and concerns addressed. SW and CM will continue to follow for any additional needs. Plan A to discharge to SNF as soon as medically stable. Plan B to discharge home with home health. Patient's daughters gave the follwing SNF choices: [facility], [facility], [facility], and [facility].    PCP: Alex Capellan MD    Pharmacy:   [facility] [address] [place] [address] AT [facility] [address] [facility] [address]  [address] [address] [address]  Phone: [phone] Fax: [phone]    [facility] [place] [address] [facility]  [address] [facility] [address]  Phone: [phone] Fax: [phone]    Payor: MEDICARE / Plan: MEDICARE PART A & B / Product Type: [facility] /      07/12/17 0805   Discharge Assessment   Assessment Type Discharge Planning Assessment   Confirmed/corrected address and phone number on facesheet? Yes   Assessment information obtained from? Patient;Caregiver;Medical Record   Expected Length of Stay (days) 6   Communicated expected length of stay with patient/caregiver yes   Prior to hospitilization cognitive status: Alert/Oriented   Prior to hospitalization functional status: Assistive Equipment   Current cognitive status: Alert/Oriented   Current Functional Status: Assistive Equipment   Arrived From home or self-care   Lives With child(mela), adult  (daughter)   Able to Return to Prior Arrangements unable to determine at this time (comments)   Is patient able to care for self after discharge? Unable to determine at this time (comments)   How many people do you have in your home that can help with your care after discharge? 1   Who are your  caregiver(s) and their phone number(s)? daughter- Marlene Stokes [phone]; daughter- Kinza Stanley [phone]   Patient's perception of discharge disposition skilled nursing facility   Readmission Within The Last 30 Days no previous admission in last 30 days   Patient currently being followed by outpatient case management? No   Patient currently receives home health services? No   Does the patient currently use HME? Yes   Patient currently receives private duty nursing? No   Patient currently receives any other outside agency services? No   Equipment Currently Used at Home oxygen;walker, rolling;bedside commode;shower chair;hospital bed;wheelchair;grab bar;raised toilet   Do you have any problems affording any of your prescribed medications? No   Is the patient taking medications as prescribed? yes   Do you have any financial concerns preventing you from receiving the healthcare you need? No   Does the patient have transportation to healthcare appointments? Yes   Transportation Available family or friend will provide   On Dialysis? No   Does the patient receive services at the Coumadin Clinic? No   Are there any open cases? No   Discharge Plan A Skilled Nursing Facility   Discharge Plan B Home Health;Home with family   Patient/Family In Agreement With Plan yes

## 2017-07-12 NOTE — OP NOTE
OP NOTE    Date of Sx: 07/12/2017    Preop Dx: Right femoral neck fracture    Postop Dx: Right femoral neck fracture    Procedure: Right hip hemiarthroplasty for femoral neck fracture - 13386    Surgeon: Anshul Guerrero M.D.    Asst:  Erasmo Baker M.D    Anesthesia: GETA    EBL:  150cc    IVF:  500cc crystalloid    Implants: Emily Omnifit HFX cement 6 stem, regular offset, 44mm Unitrax head, +10    Specimens: Femoral head    Findings: Stable ROM, good clinical leg lengths    Dispo:  To SICU extubated/stable      Indications for Procedure:    The patient is an 84 yo who sustained ground level fall resulting in right femoral neck fracture and right proximal humerus fracture.  She has significant cardiac issues with CHF, defibrillator.  She was hypokalemic upon arrival at 2.6.  The defibrillator fired this morning and she had several runs of V tach.  Her K+ was repleted and she's had a stable rhythm.  At that point, we proceeded to the OR for hemiarthoplasty.  R/B/A to surgery were discussed at length with her 2 daughters, who are aware of her high risk status, but wished to proceed given the non operative alternative would leave her in great pain and immobility.  The risks, benefits and alternatives to surgery were discussed with the patient and family at great length prior to going to the operating room.  All questions were answered and informed consent was obtained.    Procedure in Detail:    Patient was marked in the preoperative holding area and brought to the operating room.  General endotracheal anesthesia was inducted on the patient's hospital bed.  Preoperative antibiotics were administered.  The patient was moved to the operating table the placed in the lateral decubitus position with the right hip up and all bony prominences well padded.  The right hip and lower extremity were prepped and draped in sterile fashion.  A timeout was undertaken to confirm patient, side, site, procedure, and the administration of  preoperative antibiotics.  All agreed and we proceeded.    A standard posterior approach was made.  The gluteal fascia and iliotibial band were incised in line with the skin incision.  The gluteus jensen was split bluntly and the Charnley retractor placed.  The short external rotators were identified and the piriformis and conjoint tendons incised at their insertions and tagged with number 2 Ethibond suture.  A posterior capsulotomy was performed and the corners tagged with number 1 vicryl suture.  The femoral head was removed with a power corkscrew and measured.  A 44mm head was trialled and found to be the appropriate size.  Attention was then turned to the femur.    The femoral neck cut was made at the appropriate height.  The box osteotome was used, followed by the canal finder, then the lateralizer, then sequential canal reaming.  The femur was broached to size 6, then trial implants were placed and ranged with good leg lengths and stability.    The cement plug was placed, the canal brushed and irrigated and the final stem cemented in place.  Trialling was again undertaken and a 44mm head with +10 sleeve were placed.  The hip had good clinical leg lengths and stability with the final implants.      A 17/500cc betadine soak was undertaken.  The hip was again irrigated with normal saline via pulse lavage.  The posterior capsule and short external rotators were repaired to the greater trochanter through drill holes.  The gluteal fascia and iliotibial band were repaired with number 1 vicryl in interrupted fashion.  The next layer of fascia repaired with 0 vicryl suture, the subcutaneous tissue with 2-0 vicryl suture, and the skin with Stratafix and Dermabond.   An Aquacel dressing and knee immobilizer were placed.      Instrument and sponge counts were reported correct at the end of the case.  There were no complications.  The patient was returned to the supine position on the hospital bed, extubated and taken to  the SICU in stable condition.      Plan for the patient:  Immediate PT and OT with weight bearing as tolerated and posterior hip precautions.  Her proximal humerus fracture will make it difficult to mobilize her with a walker, but we certainly want to get her OOB to chair often.    Anshul Guerrero MD

## 2017-07-12 NOTE — ANESTHESIA PROCEDURE NOTES
Suprainguinal Fascia Iliaca Catheter    Patient location during procedure: pre-op   Block not for primary anesthetic.  Reason for block: at surgeon's request and post-op pain management   Post-op Pain Location: Right hip pain  Start time: 7/12/2017 10:14 AM  Timeout: 7/12/2017 10:13 AM   End time: 7/12/2017 10:32 AM  Staffing  Anesthesiologist: FUAD MICHAEL  Performed: anesthesiologist   Preanesthetic Checklist  Completed: patient identified, site marked, surgical consent, pre-op evaluation, timeout performed, IV checked, risks and benefits discussed and monitors and equipment checked  Peripheral Block  Patient position: supine  Prep: ChloraPrep and site prepped and draped  Patient monitoring: heart rate, cardiac monitor, continuous pulse ox, continuous capnometry and frequent blood pressure checks  Block type: fascia iliaca (Suprainguinal fascia iliaca)  Laterality: right  Injection technique: continuous  Needle  Needle type: Tuohy   Needle gauge: 17 G  Needle length: 3.5 in  Needle localization: anatomical landmarks and ultrasound guidance  Needle insertion depth: 15 cm  Catheter type: spring wound  Catheter size: 19 G  Test dose: lidocaine 1.5% with Epi 1-to-200,000 and negative   -ultrasound image captured on disc.  Assessment  Injection assessment: negative aspiration, negative parasthesia and local visualized surrounding nerve  Paresthesia pain: none  Heart rate change: no  Slow fractionated injection: yes  Medications:  Bolus administered: 40 mL of 0.25 ropivacaine  Epinephrine added: 3.75 mcg/mL (1/300,000)  Additional Notes  VSS.  [facility] RN monitoring vitals throughout procedure.  Patient tolerated procedure well.

## 2017-07-12 NOTE — SUBJECTIVE & OBJECTIVE
Past Medical History:   Diagnosis Date    *Atrial fibrillation     on warfarin    Allergy     seasonal    Anticoagulant long-term use     ASA 81 mg, Coumadin    Blood clotting tendency     Blood transfusion     Cataract     CHF (congestive heart failure)     CKD (chronic kidney disease) stage 4, GFR 15-29 ml/min 2/21/2017    Coronary artery disease     PET stress in 1/14: lesions not amenable to PCI.    DDD (degenerative disc disease), lumbar 8/20/2015    GERD (gastroesophageal reflux disease)     HEARING LOSS     wears Hearing aide    Hyperlipidemia     Hypertension     Hypothyroid     MCI (mild cognitive impairment)     Myocardial infarct 1999    Pacemaker 2000    S/P CABG x 3 2000    Ulcerative colitis     Ventricular tachycardia 1/14    VT storm 1/14, mexilitine added       Past Surgical History:   Procedure Laterality Date    CARDIAC CATHETERIZATION  8/13    [facility] care center [facility], Dr Wallace; LIMA and SVG occluded. Proximal LAD 40%, ostial Diagonal 60%, LCX 30-40% in-stent restenosis, RCA totally occluded proximally    CATARACT EXTRACTION W/  INTRAOCULAR LENS IMPLANT Right 3/19/96    COLON SURGERY      ischemic colitis    COLONOSCOPY      COLONOSCOPY N/A 6/21/2016    Procedure: COLONOSCOPY;  Surgeon: Rob Cartagena MD;  Location: [facility] ENDO;  Service: Endoscopy;  Laterality: N/A;    COLONOSCOPY N/A 7/18/2016    Procedure: COLONOSCOPY;  Surgeon: Rob Cartagena MD;  Location: [facility] ENDO;  Service: Endoscopy;  Laterality: N/A;    CORONARY ARTERY BYPASS GRAFT  3/2001    3 vessel    CORONARY STENT PLACEMENT  12/2015    Dr. Freire    EYE SURGERY      HYSTERECTOMY      total    [place] / REPLACE / REM[place]    [facility] [place]    ORIF WRIST FRACTURE Right     TONSILLECTOMY         Review of patient's allergies indicates:   Allergen Reactions    Codeine Itching    Azithromycin      History of VT       Current Facility-Administered Medications  "  Medication    CEFAZOLIN 2G/20 ML SYRINGE (PYXIS) IV syringe 2 g 20 mL    diphenhydrAMINE injection 25 mg    hydrocodone-acetaminophen 10-325mg per tablet 1 tablet    hydrocodone-acetaminophen 5-325mg per tablet 1 tablet    morphine injection 4 mg    mupirocin 2 % ointment 1 g    ondansetron injection 4 mg    senna-docusate 8.6-50 mg per tablet 1 tablet    vancomycin 1 g in dextrose 5 % 250 mL IVPB (ready to mix system)     Family History     Problem Relation (Age of Onset)    Cancer Sister    Heart disease Father (65), Sister (55)        Social History Main Topics    Smoking status: Former Smoker     Packs/day: 1.00     Years: 40.00     Types: Cigarettes     Start date: 1/1/1953     Quit date: 1/1/2013    Smokeless tobacco: Never Used    Alcohol use No    Drug use: No    Sexual activity: No     ROS   Constitution: Negative for chills and fever.   HENT: Negative for congestion and headaches.    Eyes: Negative for photophobia and redness.   Respiratory: Negative for cough and shortness of breath.    Skin: Negative for color change and rash.   Musculoskeletal: Positive for falls and joint pain.   Gastrointestinal: Negative for abdominal pain, nausea and vomiting.   Genitourinary: Negative for flank pain and frequency.   Neurological: Negative for dizziness and light-headedness.   Psychiatric/Behavioral: Negative for altered mental status and depression.     Objective:     Vital Signs (Most Recent):  Temp: 98.9 °F (37.2 °C) (07/11/17 2300)  Pulse: 81 (07/11/17 2300)  Resp: 18 (07/11/17 2300)  BP: 127/60 (07/11/17 2300)  SpO2: 95 % (07/11/17 2300) Vital Signs (24h Range):  Temp:  [98.1 °F (36.7 °C)-98.9 °F (37.2 °C)] 98.9 °F (37.2 °C)  Pulse:  [80-88] 81  Resp:  [16-20] 18  SpO2:  [77 %-96 %] 95 %  BP: (100-132)/(45-74) 127/60     Weight: 57.8 kg (127 lb 6.8 oz)  Height: 5' 3" (160 cm)  Body mass index is 22.57 kg/m².      Intake/Output Summary (Last 24 hours) at 07/12/17 0113  Last data filed at 07/11/17 " 2300   Gross per 24 hour   Intake                0 ml   Output              350 ml   Net             -350 ml       Ortho/SPM Exam   HEENT: normocephalic, atraumatic  Resp: no increased work of breathing  CV: regular rate and rhythm    RUE:  - Skin intact, arm in sling  - TTP around shoulder  - SILT M/U/R nerves  - Motor intact AIN/PIN/U/M nerves  - 2+ DR pulse    RLE:   - Skin intact, no swelling or ecchymoses  - TTP around hip  - SILT distally  - Motor intact EHL/FHL/gastroc/TA  - Palpable DP/PT pulses    Significant Labs: All pertinent labs within the past 24 hours have been reviewed.    Significant Imaging: I have reviewed all pertinent imaging results/findings. Displaced right femoral neck fracture. Displaced right humeral neck fracture.

## 2017-07-12 NOTE — NURSING
Received critical lab value of potassium 2.5 from lab. Notified Dr. Mccord. Verbalized order for 100 mEq of IVPB potassium chloride and 40 mEq potassium chloride oral solution. Administered medications before patient brought to tiana-op.

## 2017-07-12 NOTE — PROGRESS NOTES
ICD Clinic-  [employer] [facility], routinely followed at [facility].  Request to reprogram ICD for orthopedic surgery this morning.  Presenting rhythm c/w Sinus with appropriate BiV tracking.  Underlying NSR, no pacer dependency noted.  Interrogation reveals 2 appropriate ICD SHOCKS this morning for ventricular tachycardias.  Also noted are multiple device discharges for VT in May 2017.  Findings reported to anesthesia team,  Magnet will be used for surgery so that therapies can be resumed quickly if needed.  Confirmed with Pepito MATAMOROS CFE with [employer] that detections will resume immediately once magnet is removed.  NO REPROGRAMMING performed.

## 2017-07-12 NOTE — TRANSFER OF CARE
"Anesthesia Transfer of Care Note    Patient: Laurita Stokes    Procedure(s) Performed: Procedure(s) (LRB):  ARTHROPLASTY-HIP-OLAYINKA-right (Right)    Patient location: PACU    Anesthesia Type: general    Transport from OR: Transported from OR on 6-10 L/min O2 by face mask with adequate spontaneous ventilation    Post pain: adequate analgesia    Post assessment: no apparent anesthetic complications    Post vital signs: stable    Level of consciousness: sedated    Nausea/Vomiting: no nausea/vomiting    Complications: none    Transfer of care protocol was followed      Last vitals:   Visit Vitals  BP (!) 119/50 (BP Location: Left arm, Patient Position: Lying, BP Method: Automatic)   Pulse 80   Temp 36.2 °C (97.1 °F) (Temporal)   Resp 19   Ht 5' 3" (1.6 m)   Wt 57.8 kg (127 lb 6.8 oz)   LMP  (LMP Unknown)   SpO2 98%   Breastfeeding? No   BMI 22.57 kg/m²     "

## 2017-07-12 NOTE — SUBJECTIVE & OBJECTIVE
"Principal Problem:Closed displaced fracture of right femoral neck    Principal Orthopedic Problem: same    Interval History: NAEON. AFVSS. Sleeping comfortably in bed this morning.     Review of patient's allergies indicates:   Allergen Reactions    Codeine Itching    Azithromycin      History of VT       Current Facility-Administered Medications   Medication    albuterol-ipratropium 2.5mg-0.5mg/3mL nebulizer solution 3 mL    amiodarone tablet 200 mg    bumetanide tablet 1 mg    CEFAZOLIN 2G/20 ML SYRINGE (PYXIS) IV syringe 2 g 20 mL    citalopram tablet 30 mg    diphenhydrAMINE injection 25 mg    hydrocodone-acetaminophen 10-325mg per tablet 1 tablet    hydrocodone-acetaminophen 5-325mg per tablet 1 tablet    levothyroxine tablet 88 mcg    morphine injection 4 mg    mupirocin 2 % ointment 1 g    nitroGLYCERIN SL tablet 0.4 mg    ondansetron injection 4 mg    pantoprazole suspension 40 mg    senna-docusate 8.6-50 mg per tablet 1 tablet    sodium chloride 0.9% flush 3 mL    spironolactone split tablet 12.5 mg    tamsulosin 24 hr capsule 0.4 mg    vancomycin 1 g in dextrose 5 % 250 mL IVPB (ready to mix system)    zolpidem tablet 5 mg     Objective:     Vital Signs (Most Recent):  Temp: 98.1 °F (36.7 °C) (07/12/17 0400)  Pulse: 84 (07/12/17 0400)  Resp: 18 (07/12/17 0400)  BP: (!) 110/53 (07/12/17 0400)  SpO2: (!) 92 % (07/12/17 0405) Vital Signs (24h Range):  Temp:  [98.1 °F (36.7 °C)-98.9 °F (37.2 °C)] 98.1 °F (36.7 °C)  Pulse:  [80-88] 84  Resp:  [16-20] 18  SpO2:  [77 %-96 %] 92 %  BP: (100-132)/(49-74) 110/53     Weight: 57.8 kg (127 lb 6.8 oz)  Height: 5' 3" (160 cm)  Body mass index is 22.57 kg/m².      Intake/Output Summary (Last 24 hours) at 07/12/17 0557  Last data filed at 07/11/17 2300   Gross per 24 hour   Intake                0 ml   Output              350 ml   Net             -350 ml       Ortho/SPM Exam   HEENT: normocephalic, atraumatic  Resp: no increased work of breathing  CV: " regular rate and rhythm  RUE: in sling, NVI  RLE: NVI      Significant Labs: All pertinent labs within the past 24 hours have been reviewed.    Significant Imaging: None

## 2017-07-12 NOTE — H&P
History & Physical  Surgical Intensive Care    SUBJECTIVE:     Chief Complaint/Reason for Admission: closed displaced fracture of right femoral neck    History of Present Illness:  Patient is a 83 y.o. female with HFrEF (EF 20-25%) with NYHA IV sx s/p AICD placement, Afib (currently off coumadin 2/2 to frequent falls and SAH), ICM, HTN, s/p 3x CABG, hx of v tach on Amiodoarone, CKD III, and dementia, currently on home hospice on the [facility]. She had a fall at home and had immediate pain to her right shoulder and hip. She was transferred to [facility] for repair of right femoral neck fracture. Preoperatively she was found to be hypokalemic to 2.5 which became symptomatic as V-tach and 2 runs of v-fib. It was corrected and she was then stable to be taken to the OR for reduction of the fracture.     She presented to the SICU mildly sedated postop with facemask on 8L O2, not requiring pressors. VSS.        PTA Medications   Medication Sig    acetaminophen (TYLENOL) 325 MG tablet Take 325 mg by mouth every 4 (four) hours as needed for Pain or Temperature greater than.    acetaminophen (TYLENOL) 500 MG tablet Take 1 tablet (500 mg total) by mouth every 8 (eight) hours.    acetaminophen (TYLENOL) 650 MG Supp Place 650 mg rectally every 4 (four) hours as needed.    amiodarone (PACERONE) 200 MG Tab Take 1 tablet (200 mg total) by mouth 2 (two) times daily.    atropine 1% (ISOPTO ATROPINE) 1 % Drop Take 2 drops by mouth every 2 (two) hours as needed (excess secretions).    bumetanide (BUMEX) 1 MG tablet Take 1 tablet (1 mg total) by mouth once daily.    bumetanide (BUMEX) 2 MG tablet Take 4 mg by mouth once daily at 6am.    CARAFATE 100 mg/mL suspension TAKE 10 ML BY MOUTH EVERY 6 HOURS    carvedilol (COREG) 3.125 MG tablet Take 1 tablet (3.125 mg total) by mouth nightly.    cefUROXime (CEFTIN) 250 MG tablet Take 1 tablet (250 mg total) by mouth every 12 (twelve) hours.    citalopram (CELEXA) 20 MG tablet Take 1.5  tablets (30 mg total) by mouth once daily.    cyanocobalamin, vitamin B-12, 1,000 mcg TbSR Take 1 tablet by mouth once daily.    dexamethasone (DECADRON) 4 MG Tab Take 4 mg by mouth once daily.    digoxin (DIGOX) 125 mcg tablet Take 125 mcg by mouth every Mon, Wed, Fri.    enalapril (VASOTEC) 2.5 MG tablet Take 2.5 mg by mouth once daily.    FLORANEX 1 million cell Tab TAKE 1 TABLET BY MOUTH THREE TIMES DAILY WITH MEALS    isosorbide mononitrate (IMDUR) 30 MG 24 hr tablet Take 30 mg by mouth once daily.    lactulose 10 gram/15 ml (CHRONULAC) 10 gram/15 mL (15 mL) solution Take 10 g by mouth daily as needed (CONSTIPATION).    levetiracetam (KEPPRA) 500 MG Tab Take 1 tablet (500 mg total) by mouth 2 (two) times daily.    levothyroxine (SYNTHROID) 88 MCG tablet Take 88 mcg by mouth once daily.    lorazepam 2 mg/ml oral conc (ATIVAN) 2 mg/mL Conc Take 0.5 mg by mouth every 2 (two) hours as needed (Anxiety).    magnesium oxide (MAG-OX) 400 mg tablet TAKE 2 TABLETS BY MOUTH TWICE DAILY    meclizine (ANTIVERT) 12.5 mg tablet Take 12.5 mg by mouth every 6 (six) hours as needed for Dizziness or Nausea.    metOLazone (ZAROXOLYN) 5 MG tablet Take 5 mg by mouth daily as needed (Fluid overload).    morphine 100 mg/5 mL (20 mg/mL) concentrated solution Take 5 mg by mouth every 2 (two) hours as needed for Pain (or shortness of breath). (Patient taking differently: Take by mouth every 2 (two) hours as needed for Pain (or shortness of breath). Give 0.25 ml SL EVERY 2 HOURS PRN FOR SHORTNESS OF BREATH)    MV-MN/FA/COQ10/LYCOPENE/LUTEIN (THERAGRAN-M PREMIER 50 PLUS ORAL) Take 1 tablet by mouth once daily.    nitroGLYCERIN (NITROSTAT) 0.4 MG SL tablet Place 1 tablet (0.4 mg total) under the tongue every 5 (five) minutes as needed for Chest pain.    nystatin (MYCOSTATIN) 100,000 unit/mL suspension Take 5 mLs by mouth after meals and at bedtime as needed (Thrush/Mouth Soreness).    omeprazole (PRILOSEC) 20 MG capsule  Take 1 capsule (20 mg total) by mouth 2 (two) times daily with meals.    ondansetron (ZOFRAN-ODT) 4 MG TbDL Take 4 mg by mouth every 6 (six) hours.    pantoprazole (PROTONIX) 20 MG tablet Take 40 mg by mouth 2 (two) times daily.    potassium chloride SA (K-DUR,KLOR-CON) 10 MEQ tablet Take 1 tablet (10 mEq total) by mouth every other day.    rosuvastatin (CRESTOR) 10 MG tablet Take 1 tablet (10 mg total) by mouth every evening.    spironolactone (ALDACTONE) 25 MG tablet Take 0.5 tablets (12.5 mg total) by mouth 3 (three) times a week. (Patient taking differently: Take 12.5 mg by mouth 3 (three) times a week. Monday, Wednesday, Friday)    tamsulosin (FLOMAX) 0.4 mg Cp24 TAKE ONE CAPSULE BY MOUTH DAILY AFTER DINNER (Patient taking differently: TAKE ONE CAPSULE BY MOUTH NIGHTLY)    temazepam (RESTORIL) 15 mg Cap Take 15 mg by mouth nightly as needed (SLEEP).    tobramycin-dexamethasone 0.3-0.1% (TOBRADEX) 0.3-0.1 % Oint Apply sparingly to eyelid skin / margin OU tid x 3 days, then bid x 3 days then qhs x 3 days--can then be used prn to control inflammation.    ZOLPIDEM TARTRATE (AMBIEN ORAL) Take by mouth.       Review of patient's allergies indicates:   Allergen Reactions    Codeine Itching    Azithromycin      History of VT       Past Medical History:   Diagnosis Date    *Atrial fibrillation     on warfarin    Allergy     seasonal    Anticoagulant long-term use     ASA 81 mg, Coumadin    Blood clotting tendency     Blood transfusion     Cataract     CHF (congestive heart failure)     CKD (chronic kidney disease) stage 4, GFR 15-29 ml/min 2/21/2017    Coronary artery disease     PET stress in 1/14: lesions not amenable to PCI.    DDD (degenerative disc disease), lumbar 8/20/2015    GERD (gastroesophageal reflux disease)     HEARING LOSS     wears Hearing aide    Hyperlipidemia     Hypertension     Hypothyroid     MCI (mild cognitive impairment)     Myocardial infarct 1999    Pacemaker 2000     S/P CABG x 3 2000    Ulcerative colitis     Ventricular tachycardia 1/14    VT storm 1/14, mexilitine added     Past Surgical History:   Procedure Laterality Date    CARDIAC CATHETERIZATION  8/13    [facility], Dr Wallace; LIMA and SVG occluded. Proximal LAD 40%, ostial Diagonal 60%, LCX 30-40% in-stent restenosis, RCA totally occluded proximally    CATARACT EXTRACTION W/  INTRAOCULAR LENS IMPLANT Right 3/19/96    COLON SURGERY      ischemic colitis    COLONOSCOPY      COLONOSCOPY N/A 6/21/2016    Procedure: COLONOSCOPY;  Surgeon: Rob Cartagena MD;  [facility];  Service: Endoscopy;  Laterality: N/A;    COLONOSCOPY N/A 7/18/2016    Procedure: COLONOSCOPY;  Surgeon: Rob Cartagena MD;  Location[facility] END[facility];  Service: Endoscopy;  Laterality: N/A;    CORONARY ARTERY BYPASS GRAFT  3/2001    3 vessel    CORONARY STENT PLACEMENT  12/2015    Dr. Freire    EYE SURGERY      HYSTERECTOMY      total    INSERT / REPLACE / REMOVE PACEMAKER  12/2009    c CRT-D generator changeout    ORIF WRIST FRACTURE Right     TONSILLECTOMY       Family History   Problem Relation Age of Onset    Heart disease Father 65    Heart disease Sister 55    Cancer Sister      thyroid     Social History   Substance Use Topics    Smoking status: Former Smoker     Packs/day: 1.00     Years: 40.00     Types: Cigarettes     Start date: 1/1/1953     Quit date: 1/1/2013    Smokeless tobacco: Never Used    Alcohol use No        Review of Systems:  Review of systems not obtained due to patient factors sedated postop.    OBJECTIVE:     Vital Signs (Most Recent)  Temp: 97.1 °F (36.2 °C) (07/12/17 0725)  Pulse: 80 (07/12/17 1530)  Resp: 16 (07/12/17 1530)  BP: (!) 104/52 (07/12/17 1530)  SpO2: 100 % (07/12/17 1530)  Ventilator Data (Last 24H):        Physical Exam:  General: No acute distress, mildly sedated  HENT: Normocephalic, Atraumatic  Neck: supple, symmetrical, trachea midline  Lungs: normal  "respiratory effort, clear to auscultation bilaterally  Heart: regular rate rhythm, no murmurs appreciated  Abdomen: soft, non-tender, non-distended  Neurologic: no focal deficits, sensation intact    Lines/Drains:       Port A Cath Single Lumen 07/11/17 0127 right subclavian (Active)   Accessed by: INDIO Ramon 7/11/2017  1:27 AM   Dressing Type Transparent 7/12/2017  7:25 AM   Dressing Status Biopatch in place;Clean;Dry;Intact 7/12/2017  3:00 PM   Dressing Intervention New dressing 7/11/2017  1:27 AM   Dressing Change Due 07/17/17 7/12/2017  7:25 AM   Line Status Infusing 7/12/2017  3:00 PM   Flush Performed Yes 7/12/2017  7:25 AM   Daily Line Review Performed 7/12/2017  7:25 AM   Type of Needle Power Inject Delvalle 7/11/2017  1:27 AM   Gauge 20 7/11/2017  1:27 AM   Needle Length 1 in 7/11/2017  1:27 AM   Needle Insertion Date 07/11/17 7/11/2017  1:27 AM   Needle Insertion Time 0128 7/11/2017  1:27 AM   Number of days: 1            Arterial Line 07/12/17 1021 Left Radial (Active)   Site Assessment Clean;Dry;Intact 7/12/2017  3:00 PM   Line Status Pulsatile blood flow 7/12/2017  3:00 PM   Number of days: 0            Urethral Catheter 07/11/17 0337 Latex 16 Fr. (Active)   Site Assessment Intact;Clean 7/12/2017  3:00 PM   Collection Container Standard drainage bag 7/12/2017  3:00 PM   Securement Method secured to top of thigh w/ adhesive device 7/12/2017  3:00 PM   Catheter Care Performed yes 7/12/2017  3:00 PM   Reason for Continuing Urinary Catheterization Required immobilization 7/12/2017  7:25 AM   CAUTI Prevention Bundle StatLock in place w 1" slack;Intact seal between catheter & drainage tubing;Drainage bag off the floor;Green sheeting clip in use;No dependent loops or kinks;Drainage bag not overfilled (<2/3 full);Drainage bag below bladder 7/12/2017  7:25 AM   Output (mL) 500 mL 7/12/2017 12:59 PM   Number of days: 1       Laboratory  CBC:   Recent Labs  Lab 07/12/17  0453  07/12/17  1858   WBC 11.99  --   --  "   RBC 3.29*  --  3.04*   HGB 9.9*  --  9.2*   HCT 30.9*  < > 28.5*     --  222   MCV 94  --  94   MCH 30.1  --  30.3   MCHC 32.0  --  32.3   < > = values in this interval not displayed.  CMP:   Recent Labs  Lab 07/12/17  1858   *   CALCIUM 7.8*   ALBUMIN 2.5*   PROT 5.7*      K 3.0*   CO2 26   CL 98   BUN 18   CREATININE 1.3   ALKPHOS 65   ALT 19   AST 27   BILITOT 0.7     Coagulation:   Recent Labs  Lab 07/11/17  0122 07/12/17  0453   LABPROT  --  12.9*   INR 1.3 1.2   APTT 34.6  --        ASSESSMENT/PLAN:     Plan:    Neuro:   -dilaudid PRN for pain  -not on sedation    Pulmonary:   -extubated  -8L O2 facemask    Cardiac:  -PO Amio 200mg  -begin home meds once tolerating PO  -HDS not requiring pressors    Renal:   -Merritt in place  -Bun/Cr 18/1.3 up from 1.16 preop. Continue to monitor closely.  -UOP 1.4cc/kg/hr over the last shift    Fluids/Electrolytes/Nutrition/GI:   -Nutritional status: CLD, ADAT  -symptomatic hypokalemic with runs of V tach  -continue to replace PRN  -Recheck BMP at 2300    Hematology/Oncology:  -H/H 10.0/31.6 preop -> 9.2/28.5 stable  -INR/Plts 1.2/222  -DVT ppx: lovenox 30mg    Infectious Disease:   -Afebrile  -WBC 12.09 continue to trend    Endocrine:  -Glucose 110-154  -stable    Dispo:  -Continue care in the ICU setting  -PT in the AM, may stepdown tomorrow.    Tramaine Sosa PGY-1  [phone]  07/12/2017

## 2017-07-12 NOTE — ASSESSMENT & PLAN NOTE
83 y.o. female with closed right femoral neck fracture, closed right humeral neck fx     Patient with run of JANICE joseph

## 2017-07-12 NOTE — PROGRESS NOTES
[facility]  Orthopedics  Progress Note    Patient Name: Laurita Stokes  MRN: [id number]  Admission Date: 7/11/2017  Hospital Length of Stay: 1 days  Attending Provider: Niki Mccord MD  Primary Care Provider: Alex Capellan MD  Follow-up For: Procedure(s) (LRB):  ARTHROPLASTY-HIP-OLAYINKA-right (Right)    Post-Operative Day: Day of Surgery  Subjective:       Assessment/Plan:     Closed fracture of proximal end of right humerus    - Non-operative treatment in sling  - NWB RUE        * Closed displaced fracture of right femoral neck    83 y.o. female with closed right femoral neck fracture, closed right humeral neck fx     Patient with run of V tach earlier with hypokalemia to 2.6.  K+ has been repleted to 3.9.  Good rhythm.  She is a very high risk surgery, but not operating is higher risk.  No other optimization to do now.  Will proceed to OR for hemiarthroplaty with plan for SICU post op.  I've discussed this with the patient's daughters at length.  The understand and all questions answered.               Anshul Guerrero MD  Orthopedics  [facility]

## 2017-07-13 LAB
ALBUMIN SERPL BCP-MCNC: 2.3 G/DL
ALP SERPL-CCNC: 66 U/L
ALT SERPL W/O P-5'-P-CCNC: 17 U/L
ANION GAP SERPL CALC-SCNC: 10 MMOL/L
ANION GAP SERPL CALC-SCNC: 10 MMOL/L
ANION GAP SERPL CALC-SCNC: 11 MMOL/L
ANION GAP SERPL CALC-SCNC: 13 MMOL/L
ANISOCYTOSIS BLD QL SMEAR: SLIGHT
AST SERPL-CCNC: 26 U/L
BASOPHILS # BLD AUTO: 0.01 K/UL
BASOPHILS # BLD AUTO: 0.01 K/UL
BASOPHILS # BLD AUTO: 0.03 K/UL
BASOPHILS NFR BLD: 0.1 %
BASOPHILS NFR BLD: 0.1 %
BASOPHILS NFR BLD: 0.2 %
BILIRUB SERPL-MCNC: 0.7 MG/DL
BUN SERPL-MCNC: 19 MG/DL
BUN SERPL-MCNC: 20 MG/DL
CALCIUM SERPL-MCNC: 7.9 MG/DL
CALCIUM SERPL-MCNC: 7.9 MG/DL
CALCIUM SERPL-MCNC: 8 MG/DL
CALCIUM SERPL-MCNC: 8 MG/DL
CHLORIDE SERPL-SCNC: 96 MMOL/L
CHLORIDE SERPL-SCNC: 99 MMOL/L
CO2 SERPL-SCNC: 24 MMOL/L
CO2 SERPL-SCNC: 25 MMOL/L
CO2 SERPL-SCNC: 28 MMOL/L
CO2 SERPL-SCNC: 28 MMOL/L
CREAT SERPL-MCNC: 1.1 MG/DL
CREAT SERPL-MCNC: 1.2 MG/DL
CREAT SERPL-MCNC: 1.2 MG/DL
CREAT SERPL-MCNC: 1.3 MG/DL
DIFFERENTIAL METHOD: ABNORMAL
EOSINOPHIL # BLD AUTO: 0.5 K/UL
EOSINOPHIL # BLD AUTO: 0.5 K/UL
EOSINOPHIL # BLD AUTO: 0.7 K/UL
EOSINOPHIL NFR BLD: 4.6 %
EOSINOPHIL NFR BLD: 4.6 %
EOSINOPHIL NFR BLD: 5 %
ERYTHROCYTE [DISTWIDTH] IN BLOOD BY AUTOMATED COUNT: 17.7 %
EST. GFR  (AFRICAN AMERICAN): 43.8 ML/MIN/1.73 M^2
EST. GFR  (AFRICAN AMERICAN): 48.3 ML/MIN/1.73 M^2
EST. GFR  (AFRICAN AMERICAN): 48.3 ML/MIN/1.73 M^2
EST. GFR  (AFRICAN AMERICAN): 53.7 ML/MIN/1.73 M^2
EST. GFR  (NON AFRICAN AMERICAN): 38 ML/MIN/1.73 M^2
EST. GFR  (NON AFRICAN AMERICAN): 41.9 ML/MIN/1.73 M^2
EST. GFR  (NON AFRICAN AMERICAN): 41.9 ML/MIN/1.73 M^2
EST. GFR  (NON AFRICAN AMERICAN): 46.5 ML/MIN/1.73 M^2
GIANT PLATELETS BLD QL SMEAR: ABNORMAL
GLUCOSE SERPL-MCNC: 108 MG/DL
GLUCOSE SERPL-MCNC: 121 MG/DL
GLUCOSE SERPL-MCNC: 129 MG/DL (ref 70–110)
HCO3 UR-SCNC: 30.6 MMOL/L (ref 24–28)
HCT VFR BLD AUTO: 25.9 %
HCT VFR BLD AUTO: 27.3 %
HCT VFR BLD AUTO: 27.3 %
HCT VFR BLD CALC: 29 %PCV (ref 36–54)
HGB BLD-MCNC: 8.2 G/DL
HGB BLD-MCNC: 8.6 G/DL
HGB BLD-MCNC: 8.6 G/DL
HYPOCHROMIA BLD QL SMEAR: ABNORMAL
LYMPHOCYTES # BLD AUTO: 0.5 K/UL
LYMPHOCYTES NFR BLD: 3.8 %
LYMPHOCYTES NFR BLD: 4 %
LYMPHOCYTES NFR BLD: 4 %
MAGNESIUM SERPL-MCNC: 1.5 MG/DL
MAGNESIUM SERPL-MCNC: 1.9 MG/DL
MAGNESIUM SERPL-MCNC: 1.9 MG/DL
MCH RBC QN AUTO: 29.8 PG
MCH RBC QN AUTO: 29.9 PG
MCH RBC QN AUTO: 29.9 PG
MCHC RBC AUTO-ENTMCNC: 31.5 %
MCHC RBC AUTO-ENTMCNC: 31.5 %
MCHC RBC AUTO-ENTMCNC: 31.7 %
MCV RBC AUTO: 94 FL
MCV RBC AUTO: 95 FL
MCV RBC AUTO: 95 FL
MONOCYTES # BLD AUTO: 1.2 K/UL
MONOCYTES # BLD AUTO: 1.2 K/UL
MONOCYTES # BLD AUTO: 1.3 K/UL
MONOCYTES NFR BLD: 9.2 %
MONOCYTES NFR BLD: 9.9 %
MONOCYTES NFR BLD: 9.9 %
NEUTROPHILS # BLD AUTO: 11.3 K/UL
NEUTROPHILS # BLD AUTO: 9.4 K/UL
NEUTROPHILS # BLD AUTO: 9.4 K/UL
NEUTROPHILS NFR BLD: 81 %
NEUTROPHILS NFR BLD: 81 %
NEUTROPHILS NFR BLD: 81.5 %
OVALOCYTES BLD QL SMEAR: ABNORMAL
PCO2 BLDA: 42.5 MMHG (ref 35–45)
PH SMN: 7.46 [PH] (ref 7.35–7.45)
PHOSPHATE SERPL-MCNC: 2.6 MG/DL
PHOSPHATE SERPL-MCNC: 2.6 MG/DL
PHOSPHATE SERPL-MCNC: 3.5 MG/DL
PLATELET # BLD AUTO: 200 K/UL
PLATELET # BLD AUTO: 200 K/UL
PLATELET # BLD AUTO: 204 K/UL
PLATELET BLD QL SMEAR: ABNORMAL
PMV BLD AUTO: 10 FL
PMV BLD AUTO: 10 FL
PMV BLD AUTO: 10.1 FL
PO2 BLDA: 52 MMHG (ref 80–100)
POC BE: 7 MMOL/L
POC IONIZED CALCIUM: 1.06 MMOL/L (ref 1.06–1.42)
POC SATURATED O2: 88 % (ref 95–100)
POC TCO2: 32 MMOL/L (ref 23–27)
POIKILOCYTOSIS BLD QL SMEAR: SLIGHT
POLYCHROMASIA BLD QL SMEAR: ABNORMAL
POTASSIUM BLD-SCNC: 2.8 MMOL/L (ref 3.5–5.1)
POTASSIUM SERPL-SCNC: 3.2 MMOL/L
POTASSIUM SERPL-SCNC: 3.5 MMOL/L
POTASSIUM SERPL-SCNC: 3.5 MMOL/L
POTASSIUM SERPL-SCNC: 4.1 MMOL/L
PROT SERPL-MCNC: 5.5 G/DL
RBC # BLD AUTO: 2.75 M/UL
RBC # BLD AUTO: 2.88 M/UL
RBC # BLD AUTO: 2.88 M/UL
SAMPLE: ABNORMAL
SODIUM BLD-SCNC: 136 MMOL/L (ref 136–145)
SODIUM SERPL-SCNC: 132 MMOL/L
SODIUM SERPL-SCNC: 136 MMOL/L
SODIUM SERPL-SCNC: 137 MMOL/L
SODIUM SERPL-SCNC: 137 MMOL/L
WBC # BLD AUTO: 11.64 K/UL
WBC # BLD AUTO: 11.64 K/UL
WBC # BLD AUTO: 13.85 K/UL

## 2017-07-13 PROCEDURE — 84100 ASSAY OF PHOSPHORUS: CPT

## 2017-07-13 PROCEDURE — 25000003 PHARM REV CODE 250: Performed by: STUDENT IN AN ORGANIZED HEALTH CARE EDUCATION/TRAINING PROGRAM

## 2017-07-13 PROCEDURE — 25000003 PHARM REV CODE 250: Performed by: HOSPITALIST

## 2017-07-13 PROCEDURE — 83735 ASSAY OF MAGNESIUM: CPT

## 2017-07-13 PROCEDURE — 27000221 HC OXYGEN, UP TO 24 HOURS

## 2017-07-13 PROCEDURE — 63600175 PHARM REV CODE 636 W HCPCS: Performed by: STUDENT IN AN ORGANIZED HEALTH CARE EDUCATION/TRAINING PROGRAM

## 2017-07-13 PROCEDURE — 85025 COMPLETE CBC W/AUTO DIFF WBC: CPT | Mod: 91

## 2017-07-13 PROCEDURE — 63600175 PHARM REV CODE 636 W HCPCS: Performed by: ANESTHESIOLOGY

## 2017-07-13 PROCEDURE — 80053 COMPREHEN METABOLIC PANEL: CPT

## 2017-07-13 PROCEDURE — 84100 ASSAY OF PHOSPHORUS: CPT | Mod: 91

## 2017-07-13 PROCEDURE — 25000003 PHARM REV CODE 250: Performed by: ANESTHESIOLOGY

## 2017-07-13 PROCEDURE — 80048 BASIC METABOLIC PNL TOTAL CA: CPT

## 2017-07-13 PROCEDURE — 83735 ASSAY OF MAGNESIUM: CPT | Mod: 91

## 2017-07-13 PROCEDURE — 97162 PT EVAL MOD COMPLEX 30 MIN: CPT

## 2017-07-13 PROCEDURE — 99231 SBSQ HOSP IP/OBS SF/LOW 25: CPT | Mod: ,,, | Performed by: ANESTHESIOLOGY

## 2017-07-13 PROCEDURE — 20000000 HC ICU ROOM

## 2017-07-13 PROCEDURE — 97166 OT EVAL MOD COMPLEX 45 MIN: CPT

## 2017-07-13 PROCEDURE — 94761 N-INVAS EAR/PLS OXIMETRY MLT: CPT

## 2017-07-13 RX ADMIN — DOCUSATE SODIUM 100 MG: 50 CAPSULE, LIQUID FILLED ORAL at 02:07

## 2017-07-13 RX ADMIN — PANTOPRAZOLE SODIUM 40 MG: 40 GRANULE, DELAYED RELEASE ORAL at 09:07

## 2017-07-13 RX ADMIN — LEVETIRACETAM 500 MG: 500 TABLET, FILM COATED ORAL at 09:07

## 2017-07-13 RX ADMIN — Medication 3 ML: at 06:07

## 2017-07-13 RX ADMIN — TAMSULOSIN HYDROCHLORIDE 0.4 MG: 0.4 CAPSULE ORAL at 09:07

## 2017-07-13 RX ADMIN — POLYETHYLENE GLYCOL 3350 17 G: 17 POWDER, FOR SOLUTION ORAL at 09:07

## 2017-07-13 RX ADMIN — LEVOTHYROXINE SODIUM 88 MCG: 88 TABLET ORAL at 06:07

## 2017-07-13 RX ADMIN — ACETAMINOPHEN 1000 MG: 500 TABLET ORAL at 06:07

## 2017-07-13 RX ADMIN — Medication 3 ML: at 02:07

## 2017-07-13 RX ADMIN — CEFAZOLIN SODIUM 1 G: 1 SOLUTION INTRAVENOUS at 04:07

## 2017-07-13 RX ADMIN — SODIUM CHLORIDE, SODIUM LACTATE, POTASSIUM CHLORIDE, AND CALCIUM CHLORIDE 250 ML: .6; .31; .03; .02 INJECTION, SOLUTION INTRAVENOUS at 07:07

## 2017-07-13 RX ADMIN — ROPIVACAINE HYDROCHLORIDE 8 ML/HR: 2 INJECTION, SOLUTION EPIDURAL; INFILTRATION at 08:07

## 2017-07-13 RX ADMIN — CARVEDILOL 3.12 MG: 3.12 TABLET, FILM COATED ORAL at 09:07

## 2017-07-13 RX ADMIN — CITALOPRAM HYDROBROMIDE 30 MG: 20 TABLET ORAL at 09:07

## 2017-07-13 RX ADMIN — POTASSIUM CHLORIDE 10 MEQ: 10 INJECTION, SOLUTION INTRAVENOUS at 06:07

## 2017-07-13 RX ADMIN — BUMETANIDE 1 MG: 1 TABLET ORAL at 12:07

## 2017-07-13 RX ADMIN — ACETAMINOPHEN 1000 MG: 500 TABLET ORAL at 09:07

## 2017-07-13 RX ADMIN — SODIUM PHOSPHATE, MONOBASIC, MONOHYDRATE 15 MMOL: 276; 142 INJECTION, SOLUTION INTRAVENOUS at 07:07

## 2017-07-13 RX ADMIN — AMIODARONE HYDROCHLORIDE 200 MG: 200 TABLET ORAL at 09:07

## 2017-07-13 RX ADMIN — ACETAMINOPHEN 1000 MG: 500 TABLET ORAL at 02:07

## 2017-07-13 RX ADMIN — DOCUSATE SODIUM 100 MG: 50 CAPSULE, LIQUID FILLED ORAL at 06:07

## 2017-07-13 RX ADMIN — MAGNESIUM SULFATE IN WATER 2 G: 40 INJECTION, SOLUTION INTRAVENOUS at 04:07

## 2017-07-13 RX ADMIN — SODIUM CHLORIDE, SODIUM LACTATE, POTASSIUM CHLORIDE, AND CALCIUM CHLORIDE 250 ML: .6; .31; .03; .02 INJECTION, SOLUTION INTRAVENOUS at 02:07

## 2017-07-13 RX ADMIN — POTASSIUM CHLORIDE 60 MEQ: 200 INJECTION, SOLUTION INTRAVENOUS at 04:07

## 2017-07-13 RX ADMIN — HYDROMORPHONE HYDROCHLORIDE 0.25 MG: 1 INJECTION, SOLUTION INTRAMUSCULAR; INTRAVENOUS; SUBCUTANEOUS at 01:07

## 2017-07-13 RX ADMIN — ENOXAPARIN SODIUM 30 MG: 100 INJECTION SUBCUTANEOUS at 04:07

## 2017-07-13 NOTE — PLAN OF CARE
Problem: Physical Therapy Goal  Goal: Physical Therapy Goal  Goals to be met by: 17     Patient will increase functional independence with mobility by performin. Supine to sit with Moderate Assistance  2. Sit to supine with Moderate Assistance  3. Sit to stand transfer with Moderate Assistance  4. Bed to chair transfer with Maximum Assistance  5. Lower extremity exercise program x15 reps, with assistance as needed, in order to increase LE strength and (I) with functional mobility.     Further goals pending pt progress.    Outcome: Ongoing (interventions implemented as appropriate)  PT evaluation complete and appropriate goals established.    Eufemia Murillo, PT, DPT   2017  [phone]

## 2017-07-13 NOTE — PROGRESS NOTES
Progress Note  Surgical Intensive Care    Admit Date: 7/11/2017  Post-operative Day: 1 Day Post-Op  Hospital Day: 3    SUBJECTIVE:     Follow-up For:  Procedure(s) (LRB):  ARTHROPLASTY-HIP-OLAYINKA-right (Right)    HPI: Patient is a 83 y.o. female with HFrEF (EF 20-25%) with NYHA IV sx s/p AICD placement, Afib (currently off coumadin 2/2 to frequent falls and SAH), ICM, HTN, s/p 3x CABG, hx of v tach on Amiodoarone, CKD III, and dementia, currently on home hospice on the [facility]. She had a fall at home and had immediate pain to her right shoulder and hip. She was transferred to [facility] for repair of right femoral neck fracture. Preoperatively she was found to be hypokalemic to 2.5 which became symptomatic as V-tach and 2 runs of v-fib. It was corrected and she was then stable to be taken to the OR for reduction of the fracture.     Interval history: Pt was stable overnight. Refused PO meds but then agreed to take them. Mild confusion but more stable this AM. 250 LR bolus for MAP 50-60s this AM responded appropriately.    Continuous Infusions:   ropivacaine (PF) 2 mg/ml (0.2%)       Scheduled Meds:   acetaminophen  1,000 mg Intravenous Once    Followed by    acetaminophen  1,000 mg Oral Q8H    amiodarone  200 mg Oral BID    bumetanide  1 mg Oral Daily    carvedilol  3.125 mg Oral Nightly    citalopram  30 mg Oral Daily    [START ON 7/14/2017] digoxin  125 mcg Oral Every Mon, Wed, Fri    docusate sodium  100 mg Oral TID    enalapril  2.5 mg Oral Daily    enoxaparin  30 mg Subcutaneous Daily    isosorbide mononitrate  30 mg Oral Daily    lactated ringers  250 mL Intravenous Once    levetiracetam  500 mg Oral BID    levothyroxine  88 mcg Oral Before breakfast    levothyroxine  88 mcg Oral Daily    pantoprazole  40 mg Oral Daily    polyethylene glycol  17 g Oral Daily    sodium chloride 0.9%  3 mL Intravenous Q8H    sodium chloride 0.9%  3 mL Intravenous Q8H    spironolactone  12.5 mg Oral Every Mon, Wed,  Fri    tamsulosin  0.4 mg Oral Daily    tranexamic acid (CYKLOKAPRON) 3,000 mg in sodium chloride 0.9% 100 mL  3,000 mg Irrigation Once    tuberculin  5 Units Intradermal Once     PRN Meds:albuterol-ipratropium 2.5mg-0.5mg/3mL, calcium gluconate IVPB, calcium gluconate IVPB, calcium gluconate IVPB, diphenhydrAMINE, oxycodone **AND** oxycodone **AND** HYDROmorphone **AND** HYDROmorphone, magnesium sulfate IVPB, magnesium sulfate IVPB, magnesium sulfate IVPB, nitroGLYCERIN, ondansetron, potassium chloride, potassium chloride **AND** potassium chloride **AND** potassium chloride, sodium phosphate IVPB, sodium phosphate IVPB, sodium phosphate IVPB, sodium phosphate IVPB, sodium phosphate IVPB, sodium phosphate IVPB    Review of patient's allergies indicates:   Allergen Reactions    Codeine Itching    Azithromycin      History of VT       OBJECTIVE:     Vital Signs (Most Recent)  Temp: 98.7 °F (37.1 °C) (07/13/17 0700)  Pulse: 80 (07/13/17 0800)  Resp: 15 (07/13/17 0800)  BP: (!) 95/54 (07/13/17 0800)  SpO2: 95 % (07/13/17 0800)    Vital Signs Range (Last 24H):  Temp:  [97.8 °F (36.6 °C)-98.7 °F (37.1 °C)]   Pulse:  [80-82]   Resp:  [12-28]   BP: ()/(41-93)   SpO2:  [91 %-100 %]   Arterial Line BP: ()/(41-70)     I & O (Last 24H):  Intake/Output Summary (Last 24 hours) at 07/13/17 0827  Last data filed at 07/13/17 0700   Gross per 24 hour   Intake             1238 ml   Output             1730 ml   Net             -492 ml        Physical Exam:  General: No acute distress, mildly sedated  HENT: Normocephalic, Atraumatic  Neck: supple, symmetrical, trachea midline  Lungs: normal respiratory effort, clear to auscultation bilaterally  Heart: regular rate rhythm, no murmurs appreciated  Abdomen: soft, non-tender, non-distended  Neurologic: no focal deficits, sensation intact    Laboratory (Last 24H):  CBC:    Recent Labs  Lab 07/13/17  0317   WBC 11.64  11.64   HGB 8.6*  8.6*   HCT 27.3*  27.3*      200     CMP:    Recent Labs  Lab 07/13/17  0317   CALCIUM 8.0*  8.0*   ALBUMIN 2.3*   PROT 5.5*     137   K 3.5  3.5   CO2 28  28   CL 99  99   BUN 19  19   CREATININE 1.2  1.2   ALKPHOS 66   ALT 17   AST 26   BILITOT 0.7       ASSESSMENT/PLAN:       Plan:     Neuro:   -dilaudid PRN for pain  -Epidural catheter for pain   -not on sedation    Pulmonary:   -extubated  -stable on NC     Cardiac:  -PO Amio 200mg  -begin home meds once tolerating PO  -MAP in high 50s/low 60s this AM, 250 LR bolus, monitor pressures     Renal:   -Merritt in place  -Bun/Cr 18/1.3 up from 1.16 preop. Continue to monitor closely.  -UOP 1.4cc/kg/hr over the last shift     Fluids/Electrolytes/Nutrition/GI:   -Nutritional status: CLD, ADAT  -symptomatic hypokalemic with runs of V tach  -continue to replace PRN  -Recheck BMP at 2300     Hematology/Oncology:  -H/H  9.2/28.5 -> 8.6/27.3 stable  -INR/Plts 1.2/222  -DVT ppx: lovenox 30mg     Infectious Disease:   -Afebrile  -WBC 11.64 continue to trend     Endocrine:  -Glucose 110-154  -stable     Dispo:  -Continue care in the ICU setting  -PT in the AM, may stepdown today.    Tramaine Sosa PGY-1  [phone]  07/13/2017

## 2017-07-13 NOTE — ANESTHESIA POST-OP PAIN MANAGEMENT
Acute Pain Service Progress Note    Laurita Stokes is a 83 y.o., female, [id number].    Surgery:  ARTHROPLASTY-HIP-OLAYINKA-right (Right)    Post Op Day #: 1    Catheter type: perineural  SIFI    Infusion type: Ropivacaine 0.2%  8 basal    Problem List:    Active Hospital Problems    Diagnosis  POA    *Closed displaced fracture of right femoral neck [S72.001A]  Yes    Debility [R53.81]  Yes    CKD (chronic kidney disease), stage III [N18.3]  Yes    Chronic respiratory failure with hypoxia [J96.11]  Yes    Hip fracture [S72.009A]  Yes    Closed fracture of proximal end of right humerus [S42.201A]  Yes    Coronary artery disease [I25.10]  Yes    Hypothyroidism [E03.9]  Yes    CHF NYHA class IV [I50.9]  Yes    DDD (degenerative disc disease), lumbar [M51.36]  Yes    Essential hypertension [I10]  Yes    Depression [F32.9]  Yes    Dementia without behavioral disturbance [F03.90]  Yes    Chronic combined systolic and diastolic heart failure [I50.42]  Yes    Biventricular ICD (implantable cardioverter-defibrillator) in place [Z95.810]  Yes     [facility] CRT-D      Cardiomyopathy, ischemic [I25.5]  Yes      Resolved Hospital Problems    Diagnosis Date Resolved POA   No resolved problems to display.       Subjective:     General appearance of awake   Pain with rest: 2    Faces   Pain with movement: 4    Faces   Side Effects    1. Pruritis No    2. Nausea No    3. Motor Blockade No, 0=Ability to raise lower extremities off bed    4. Sedation No, 1=awake and alert    Objective:       Catheter site clean, dry, intact     Vitals   Vitals:    07/13/17 0800   BP: (!) 95/54   Pulse: 80   Resp: 15   Temp:         Labs    Admission on 07/11/2017   No results displayed because visit has over 200 results.           Meds   Current Facility-Administered Medications   Medication Dose Route Frequency Provider Last Rate Last Dose    acetaminophen (10 mg/mL) injection 1,000 mg  1,000 mg Intravenous Once Mohinder Azevedo MD         Followed by    acetaminophen tablet 1,000 mg  1,000 mg Oral Q8H Mohinder Azevedo MD   1,000 mg at 07/13/17 0633    albuterol-ipratropium 2.5mg-0.5mg/3mL nebulizer solution 3 mL  3 mL Nebulization Q4H PRN Dallas Soliz, DO        amiodarone tablet 200 mg  200 mg Oral BID Dallas Soliz DO   Stopped at 07/12/17 2100    bumetanide tablet 1 mg  1 mg Oral Daily Dallas Soliz DO   1 mg at 07/12/17 0826    calcium gluconate 1 g in dextrose 5 % 100 mL IVPB (premix)  1 g Intravenous PRN Tramaine Sosa MD        calcium gluconate 1 g in dextrose 5 % 100 mL IVPB (premix)  1 g Intravenous PRN Tramaine Sosa MD        calcium gluconate 1 g in dextrose 5 % 100 mL IVPB (premix)  1 g Intravenous PRN Tramaine Sosa MD        carvedilol tablet 3.125 mg  3.125 mg Oral Nightly Tramaine Sosa MD   3.125 mg at 07/12/17 2308    citalopram tablet 30 mg  30 mg Oral Daily Dallas Soliz DO   30 mg at 07/12/17 0826    [START ON 7/14/2017] digoxin tablet 125 mcg  125 mcg Oral Every Mon, Wed, Fri Tramaine Sosa MD        diphenhydrAMINE injection 25 mg  25 mg Intravenous Q6H PRN Anai Shell MD        docusate sodium capsule 100 mg  100 mg Oral TID Erasmo Koenig MD   100 mg at 07/13/17 0633    enalapril tablet 2.5 mg  2.5 mg Oral Daily Tramaine Sosa MD        enoxaparin injection 30 mg  30 mg Subcutaneous Daily Erasmo Koenig MD   Stopped at 07/12/17 1945    oxycodone immediate release tablet 5 mg  5 mg Oral Q4H PRN Mohinder Azevedo MD        And    oxycodone immediate release tablet 10 mg  10 mg Oral Q4H PRN Mohinder Azevedo MD   10 mg at 07/12/17 2307    And    HYDROmorphone injection 0.25 mg  0.25 mg Intravenous Q4H PRN Mohinder Azevedo MD   0.25 mg at 07/13/17 0100    And    HYDROmorphone injection 0.5 mg  0.5 mg Intravenous Q4H PRN Mohinder Azevedo MD   0.5 mg at 07/12/17 7213    isosorbide mononitrate 24 hr tablet 30 mg  30 mg Oral Daily Tramaine Sosa MD        lactated  ringers bolus 250 mL  250 mL Intravenous Once Tramaine Sosa  mL/hr at 07/13/17 0751 250 mL at 07/13/17 0751    levetiracetam tablet 500 mg  500 mg Oral BID Tramaine Sosa MD   500 mg at 07/12/17 2308    levothyroxine tablet 88 mcg  88 mcg Oral Before breakfast Dallas Soliz, DO   88 mcg at 07/13/17 0634    levothyroxine tablet 88 mcg  88 mcg Oral Daily Tramaine Sosa MD        magnesium sulfate 2g in water 50mL IVPB (premix)  4 g Intravenous PRN Tramaine Sosa MD        magnesium sulfate 2g in water 50mL IVPB (premix)  2 g Intravenous PRN Tramaine Sosa MD        magnesium sulfate 2g in water 50mL IVPB (premix)  4 g Intravenous PRN Tramaine Sosa MD        nitroGLYCERIN SL tablet 0.4 mg  0.4 mg Sublingual Q5 Min PRN Dallas Soliz, DO        ondansetron injection 4 mg  4 mg Intravenous Q8H PRN Sharon Zamudio MD        pantoprazole suspension 40 mg  40 mg Oral Daily Arup SCHUYLER Soliz, DO        polyethylene glycol packet 17 g  17 g Oral Daily Erasmo Keesha Koenig MD        potassium chloride 10 mEq in 100 mL IVPB  10 mEq Intravenous PRN Tramaine Sosa  mL/hr at 07/13/17 0601 10 mEq at 07/13/17 0601    potassium chloride 40 mEq in 100 mL IVPB (FOR CENTRAL LINE ADMINISTRATION ONLY)  40 mEq Intravenous PRN Tramaine Sosa MD 25 mL/hr at 07/12/17 2008 40 mEq at 07/12/17 2008    And    potassium chloride 20 mEq in 100 mL IVPB (FOR CENTRAL LINE ADMINISTRATION ONLY)  60 mEq Intravenous PRN Tramaine Sosa MD        And    potassium chloride 40 mEq in 100 mL IVPB (FOR CENTRAL LINE ADMINISTRATION ONLY)  80 mEq Intravenous PRN Tramaine Sosa MD        ropivacaine (PF) 2 mg/ml (0.2%) infusion  8 mL/hr Perineural Continuous Mohinder Azevedo MD        sodium chloride 0.9% flush 3 mL  3 mL Intravenous Q8H Dallas Soliz, DO   3 mL at 07/13/17 0600    sodium chloride 0.9% flush 3 mL  3 mL Intravenous Q8H Tramaine Sosa MD   3 mL at 07/13/17 0600    sodium phosphate 15 mmol in dextrose 5 % 250 mL IVPB  15 mmol  Intravenous PRN Tramaine Sosa MD 83.3 mL/hr at 07/13/17 0724 15 mmol at 07/13/17 0724    sodium phosphate 15 mmol in dextrose 5 % 250 mL IVPB  15 mmol Intravenous PRN Tramaine Sosa MD        sodium phosphate 20.01 mmol in dextrose 5 % 250 mL IVPB  20.01 mmol Intravenous PRN Tramaine Sosa MD        sodium phosphate 20.01 mmol in dextrose 5 % 250 mL IVPB  20.01 mmol Intravenous PRN Tramaine Sosa MD        sodium phosphate 30 mmol in dextrose 5 % 250 mL IVPB  30 mmol Intravenous PRN Tramaine Sosa MD        sodium phosphate 30 mmol in dextrose 5 % 250 mL IVPB  30 mmol Intravenous PRN Tramaine Sosa MD        spironolactone split tablet 12.5 mg  12.5 mg Oral Every Mon, Wed, Fri Arjarrell Soliz DO   12.5 mg at 07/12/17 0826    tamsulosin 24 hr capsule 0.4 mg  0.4 mg Oral Daily Dallas Soliz DO   0.4 mg at 07/12/17 0827    tranexamic acid (CYKLOKAPRON) 3,000 mg in sodium chloride 0.9% 100 mL  3,000 mg Irrigation Once Anshul Guerrero MD        tuberculin injection 5 Units  5 Units Intradermal Once Niki Mccord MD             Assessment:     Pain control adequate    Plan:     Patient doing well, continue present treatment. SIFI catheter in place with multimodal pain regimen.     Evaluator Sharon Zamudio    Pt seen and examined with Dr. Zamudio.  Dr. Zamudio's note reviewed.  Agree with assessment and plan.

## 2017-07-13 NOTE — PROGRESS NOTES
Patient transferred to unit from OR. Cardiac monitoring maintained. Patient still sedated, 's/60's. 96% on 8L SFM. LE pulses +2. Will continue to monitor.

## 2017-07-13 NOTE — ASSESSMENT & PLAN NOTE
83 y.o. female POD1 s/p R hip hemiarthroplasty    Pain control  PT/OT: WBAT RLE, posterior hip precautions  DVT PPx: lovenox daily, FCDs at all times when not ambulating  Abx: postop Ancef complete    Dispo: f/u PT recs; will discuss with staff

## 2017-07-13 NOTE — PLAN OF CARE
2:27 PM  SW called daughter to indicate if pt had been in a nursing home in May. Daughter stated pt has been in [facility] in [facility], however pt was discharged home with hospice. Daughter stated pt was current with hospice up until hospital admission.     Daughter stated she discussed with additional family members and they want pt to go to SNF. Called Shmuel with [facility]'s to indicate why they have denied pt. Nikos Baez and [facility] are reviewing referral.    Lulú Ricardo LMSW   [facility]  Ext [phone]

## 2017-07-13 NOTE — PLAN OF CARE
Problem: Occupational Therapy Goal  Goal: Occupational Therapy Goal  Patient will increase functional independence with ADLs by performing:    UE Dressing with Supervision.  LE Dressing with Moderate Assistance.  Grooming while seated with Supervision.  Toileting from bedside commode with Supervision for hygiene and clothing management.   Sitting at edge of bed x20 minutes with Supervision.  Supine to sit with Minimal Assistance.  Stand pivot transfers with Minimal Assistance.  Toilet transfer to bedside commode with Minimal Assistance.  Pt will verbalize hip precautions and incorporate them during ADL performance with Minimal verbal cues  Outcome: Ongoing (interventions implemented as appropriate)  OT evaluation and Plan of care established 7/13/17

## 2017-07-13 NOTE — PROGRESS NOTES
Pt was working with PT. Upon sitting up on side of bed patients BP dropped to 60/40's. Patient felt dizzy and weak. BP normalized once patient was laid down. Spoke with SICU team. Will give another 250 ml of LR. Will continue to monitor.

## 2017-07-13 NOTE — PT/OT/SLP EVAL
Physical Therapy  Evaluation    Laurita Stokes   MRN: [id number]   Admitting Diagnosis: Closed displaced fracture of right femoral neck    PT Received On: 07/13/17  PT Start Time: 0944     PT Stop Time: 1009    PT Total Time (min): 25 min       Billable Minutes:  Evaluation 25 (co-eval with OT)    Diagnosis: Closed displaced fracture of right femoral neck  R humeral fracture and R femoral neck fracture following fall; R jamil hip arthroplasty 7/12 - NWB RUE, WBAT RLE, posterior hip precautions RLE  H/o dementia  On home hospice PTA     Past Medical History:   Diagnosis Date    *Atrial fibrillation     on warfarin    Allergy     seasonal    Anticoagulant long-term use     ASA 81 mg, Coumadin    Blood clotting tendency     Blood transfusion     Cataract     CHF (congestive heart failure)     CKD (chronic kidney disease) stage 4, GFR 15-29 ml/min 2/21/2017    Coronary artery disease     PET stress in 1/14: lesions not amenable to PCI.    DDD (degenerative disc disease), lumbar 8/20/2015    GERD (gastroesophageal reflux disease)     HEARING LOSS     wears Hearing aide    Hyperlipidemia     Hypertension     Hypothyroid     MCI (mild cognitive impairment)     Myocardial infarct 1999    Pacemaker 2000    S/P CABG x 3 2000    Ulcerative colitis     Ventricular tachycardia 1/14    VT storm 1/14, mexilitine added      Past Surgical History:   Procedure Laterality Date    CARDIAC CATHETERIZATION  8/13    [facility], Dr Wallace; LIMA and SVG occluded. Proximal LAD 40%, ostial Diagonal 60%, LCX 30-40% in-stent restenosis, RCA totally occluded proximally    CATARACT EXTRACTION W/  INTRAOCULAR LENS IMPLANT Right 3/19/96    COLON SURGERY      ischemic colitis    COLONOSCOPY      COLONOSCOPY N/A 6/21/2016    Procedure: COLONOSCOPY;  Surgeon: Rob Cartagena MD;  Location: [facility];  Service: Endoscopy;  Laterality: N/A;    COLONOSCOPY N/A 7/18/2016    Procedure: COLONOSCOPY;   "Surgeon: Rob Cartagena MD;  Location[facility];  Service: Endoscopy;  Laterality: N/A;    CORONARY ARTERY BYPASS GRAFT  3/2001    3 vessel    CORONARY STENT PLACEMENT  12/2015    Dr. Freire    EYE SURGERY      HYSTERECTOMY      total    INSERT / REPLACE / REMOVE PACEMAKER  12/2009    [facility] CRT-D generator changeout    ORIF WRIST FRACTURE Right     TONSILLECTOMY         Referring physician: Erasmo Koenig MD  Date referred to PT: 7/12/17    General Precautions: Standard, fall  Orthopedic Precautions: RUE non weight bearing, RLE weight bearing as tolerated, RLE posterior precautions   Braces: UE Sling, Knee immobilizer (RUE; RLE)       Do you have any cultural, spiritual, Caodaism conflicts, given your current situation?: none noted     Patient History:  Lives With: child(mela), adult  Living Arrangements: house  Home Layout: Able to live on 1st floor  Stair Railings at Home: none  Transportation Available: family or friend will provide  Living Environment Comment: Pt lives with her daughter in [facility]SH with 0 MATTHEW. Pt was on home hospice PTA. Per pt's daughter pt was ambulating household distances with RW and was using transport w/c for community mobility. Pt's daughter reports that pt occasionally required assist with ADLs depending on the day. Pt's daughter reports that pt has fallen several times recently.   Equipment Currently Used at Home: bedside commode, wheelchair, walker, rolling, oxygen, hospital bed, shower chair, grab bar  DME owned (not currently used): none    Previous Level of Function:  Ambulation Skills: needs device  Transfer Skills: needs device  ADL Skills: needs assist    Subjective:  Communicated with RN prior to session.  "Oww!" re: hip pain with movement   Chief Complaint: hip pain with movement   Patient goals: return home     Pain/Comfort  Pain Rating 1: 6/10  Location - Side 1: Right  Location - Orientation 1: generalized  Location 1: hip  Pain Addressed 1: Reposition, Distraction, " Nurse notified      Objective:   Patient found with: knee immobilizer, telemetry, peripheral IV, SCD, oxygen, pulse ox (continuous), adkins catheter, arterial line, perineural catheter, blood pressure cuff, central line     Cognitive Exam:  Oriented to: Person    Follows Commands/attention: Inattentive, Easily distracted and Follows one-step commands  Communication: clear/fluent  Safety awareness/insight to disability: impaired    Physical Exam:  Postural examination/scapula alignment: Rounded shoulder    Skin integrity: Visible skin intact  Edema: None noted     Sensation:   Intact    Lower Extremity Range of Motion:  Right Lower Extremity: WFL at knee and ankle; not fully assessed at hip 2* orthopedic precautions  Left Lower Extremity: grossly WFL throughout     Lower Extremity Strength:  Right Lower Extremity: WFL at knee and ankle; NT at hip 2* orthopedic px  Left Lower Extremity: grossly 3+/5 throughout based on functional mobility    Functional Mobility:  Bed Mobility:  Scooting/Bridging: Total Assistance, With assist of 2 (drawsheet to HOB)  Supine to Sit: Total Assistance, With assist of 2  Sit to Supine: Total Assistance, With assist of  2    Transfers:  Sit <> Stand Assistance: Activity did not occur (2* decrease in BP )    Gait:   Gait Distance: did not occur    Balance:   Static Sit: modA  Dynamic Sit: maxA    Therapeutic Activities and Exercises:  Pt educated on role of PT and PT POC. Pt verbalized understanding.   Pt and pt's daughter educated on orthopedic precautions. Both verbalized understanding but pt needs further reinforcement.   Static sitting at EOB x~7 minutes with mod-maxA for sitting balance. Pt then with onset of dizziness and decrease in BP to 60s/40s. RN to bedside. Pt returned to supine. BP steadily increased and pt with improved symptoms.     AM-PAC 6 CLICK MOBILITY  How much help from another person does this patient currently need?   1 = Unable, Total/Dependent Assistance  2 = A lot,  Maximum/Moderate Assistance  3 = A little, Minimum/Contact Guard/Supervision  4 = None, Modified [place]/Independent    Turning over in bed (including adjusting bedclothes, sheets and blankets)?: 2  Sitting down on and standing up from a chair with arms (e.g., wheelchair, bedside commode, etc.): 1  Moving from lying on back to sitting on the side of the bed?: 2  Moving to and from a bed to a chair (including a wheelchair)?: 1  Need to walk in hospital room?: 1  Climbing 3-5 steps with a railing?: 1  Total Score: 8     AM-PAC Raw Score CMS G-Code Modifier Level of Impairment Assistance   6 % Total / Unable   7 - 9 CM 80 - 100% Maximal Assist   10 - 14 CL 60 - 80% Moderate Assist   15 - 19 CK 40 - 60% Moderate Assist   20 - 22 CJ 20 - 40% Minimal Assist   23 CI 1-20% SBA / CGA   24 CH 0% Independent/ Mod I     Patient left supine with all lines intact, call button in reach, RN notified and pt's daughter present.    Assessment:   Laurita Stokes is a 83 y.o. female with a medical diagnosis of Closed displaced fracture of right femoral neck, R humeral fracture, and s/p R jamil-arthroplasty - NWB RUE, WBAT RLE, R posterior hip precaution. Pt required increased assist with all functional mobility and maintaining sitting balance. Pt with increased RLE pain with movement and resistant to sitting EOB. Pt then with decrease in BP while sitting EOB, requiring return to supine. Pt would benefit from skilled acute PT in order to address current deficits and progress functional mobility.     Rehab identified problem list/impairments: Rehab identified problem list/impairments: weakness, impaired functional mobilty, impaired balance, impaired endurance, impaired self care skills, gait instability, decreased lower extremity function, decreased upper extremity function, decreased safety awareness, pain, impaired cognition, decreased coordination, impaired cardiopulmonary response to activity, orthopedic  precautions, impaired coordination    Rehab potential is good.    Activity tolerance: Fair    Discharge recommendations: Discharge Facility/Level Of Care Needs: home with hospice, nursing facility, skilled (SNF vs. return home with hospice. Pt functionally appropriate for SNF, but pending hospital course and family wishes)     Barriers to discharge: Barriers to Discharge: None    Equipment recommendations: Equipment Needed After Discharge: none (DME needs in place)     GOALS:    Physical Therapy Goals        Problem: Physical Therapy Goal    Goal Priority Disciplines Outcome Goal Variances Interventions   Physical Therapy Goal     PT/OT, PT Ongoing (interventions implemented as appropriate)     Description:  Goals to be met by: 17     Patient will increase functional independence with mobility by performin. Supine to sit with Moderate Assistance  2. Sit to supine with Moderate Assistance  3. Sit to stand transfer with Moderate Assistance  4. Bed to chair transfer with Maximum Assistance  5. Lower extremity exercise program x15 reps, with assistance as needed, in order to increase LE strength and (I) with functional mobility.     Further goals pending pt progress.                      PLAN:    Patient to be seen 5 x/week to address the above listed problems via gait training, therapeutic activities, therapeutic exercises  Plan of Care expires: 17  Plan of Care reviewed with: patient, daughter          Eufemia Murillo, PT, DPT   2017  [phone]

## 2017-07-13 NOTE — ADDENDUM NOTE
Addendum  created 07/13/17 0834 by Sharon Zamudio MD    Order list changed, Sign clinical note

## 2017-07-13 NOTE — SUBJECTIVE & OBJECTIVE
Principal Problem:Closed displaced fracture of right femoral neck    Principal Orthopedic Problem: s/p R hip hemiarthroplasty    Interval History: Patient seen and examined at bedside.  No acute events overnight.  Pain controlled.    Review of patient's allergies indicates:   Allergen Reactions    Codeine Itching    Azithromycin      History of VT       Current Facility-Administered Medications   Medication    acetaminophen (10 mg/mL) injection 1,000 mg    Followed by    acetaminophen tablet 1,000 mg    albuterol-ipratropium 2.5mg-0.5mg/3mL nebulizer solution 3 mL    amiodarone tablet 200 mg    bumetanide tablet 1 mg    calcium gluconate 1 g in dextrose 5 % 100 mL IVPB (premix)    calcium gluconate 1 g in dextrose 5 % 100 mL IVPB (premix)    calcium gluconate 1 g in dextrose 5 % 100 mL IVPB (premix)    carvedilol tablet 3.125 mg    citalopram tablet 30 mg    [START ON 7/14/2017] digoxin tablet 125 mcg    diphenhydrAMINE injection 25 mg    docusate sodium capsule 100 mg    enalapril tablet 2.5 mg    enoxaparin injection 30 mg    oxycodone immediate release tablet 5 mg    And    oxycodone immediate release tablet 10 mg    And    HYDROmorphone injection 0.25 mg    And    HYDROmorphone injection 0.5 mg    isosorbide mononitrate 24 hr tablet 30 mg    levetiracetam tablet 500 mg    levothyroxine tablet 88 mcg    levothyroxine tablet 88 mcg    magnesium sulfate 2g in water 50mL IVPB (premix)    magnesium sulfate 2g in water 50mL IVPB (premix)    magnesium sulfate 2g in water 50mL IVPB (premix)    nitroGLYCERIN SL tablet 0.4 mg    ondansetron injection 4 mg    pantoprazole suspension 40 mg    polyethylene glycol packet 17 g    potassium chloride 10 mEq in 100 mL IVPB    potassium chloride 40 mEq in 100 mL IVPB (FOR CENTRAL LINE ADMINISTRATION ONLY)    And    potassium chloride 20 mEq in 100 mL IVPB (FOR CENTRAL LINE ADMINISTRATION ONLY)    And    potassium chloride 40 mEq in 100 mL IVPB  "(FOR CENTRAL LINE ADMINISTRATION ONLY)    ropivacaine (PF) 2 mg/ml (0.2%) infusion    sodium chloride 0.9% flush 3 mL    sodium chloride 0.9% flush 3 mL    sodium phosphate 15 mmol in dextrose 5 % 250 mL IVPB    sodium phosphate 15 mmol in dextrose 5 % 250 mL IVPB    sodium phosphate 20.01 mmol in dextrose 5 % 250 mL IVPB    sodium phosphate 20.01 mmol in dextrose 5 % 250 mL IVPB    sodium phosphate 30 mmol in dextrose 5 % 250 mL IVPB    sodium phosphate 30 mmol in dextrose 5 % 250 mL IVPB    spironolactone split tablet 12.5 mg    tamsulosin 24 hr capsule 0.4 mg    tranexamic acid (CYKLOKAPRON) 3,000 mg in sodium chloride 0.9% 100 mL    tuberculin injection 5 Units     Objective:     Vital Signs (Most Recent):  Temp: 98.1 °F (36.7 °C) (07/13/17 0300)  Pulse: 80 (07/13/17 0500)  Resp: 14 (07/13/17 0500)  BP: (!) 102/54 (07/13/17 0500)  SpO2: 100 % (07/13/17 0500) Vital Signs (24h Range):  Temp:  [97.1 °F (36.2 °C)-98.1 °F (36.7 °C)] 98.1 °F (36.7 °C)  Pulse:  [80-82] 80  Resp:  [12-28] 14  SpO2:  [91 %-100 %] 100 %  BP: ()/(41-93) 102/54  Arterial Line BP: (108-129)/(51-65) 129/64     Weight: 57.8 kg (127 lb 6.8 oz)  Height: 5' 3" (160 cm)  Body mass index is 22.57 kg/m².      Intake/Output Summary (Last 24 hours) at 07/13/17 0544  Last data filed at 07/13/17 0500   Gross per 24 hour   Intake             1238 ml   Output             1690 ml   Net             -452 ml       Ortho/SPM Exam   AAOx4  NAD  RRR  No increased WOB  Aquacel c/d/i  KI in place  SILT and motor intact T/SP/DP  WWP extremities  FCDs in place and functioning    Significant Labs: All pertinent labs within the past 24 hours have been reviewed.    Significant Imaging: I have reviewed all pertinent imaging results/findings.  "

## 2017-07-13 NOTE — PLAN OF CARE
10:35 AM  SW called in LOCET to state. Faxed [facility] to [facility]. Will await 142.   Received notification from [facility]'s home that they have denied pt. Will call to f/u with [facility], [facility] and [facility].   12:39 PM  SW received notification from [facility] stating pt has recently been in a nursing facility from 5/19/17-5/31/17. Called daughter and left voicemail regarding this break in care.     Lulú Ricardo, CLAUDE   [facility]  Ext [phone]

## 2017-07-13 NOTE — ADDENDUM NOTE
Addendum  created 07/13/17 1406 by Lino Burch MD    Charge Capture section accepted, Sign clinical note

## 2017-07-13 NOTE — PROGRESS NOTES
[facility]  Orthopedics  Progress Note    Patient Name: Laurita Stokes  MRN: [id number]  Admission Date: 7/11/2017  Hospital Length of Stay: 2 days  Attending Provider: Niki Mccord MD  Primary Care Provider: Alex Capellan MD  Follow-up For: Procedure(s) (LRB):  ARTHROPLASTY-HIP-OLAYINKA-right (Right)    Post-Operative Day: 1 Day Post-Op  Subjective:     Principal Problem:Closed displaced fracture of right femoral neck    Principal Orthopedic Problem: s/p R hip hemiarthroplasty    Interval History: Patient seen and examined at bedside.  No acute events overnight.  Pain controlled.    Review of patient's allergies indicates:   Allergen Reactions    Codeine Itching    Azithromycin      History of VT       Current Facility-Administered Medications   Medication    acetaminophen (10 mg/mL) injection 1,000 mg    Followed by    acetaminophen tablet 1,000 mg    albuterol-ipratropium 2.5mg-0.5mg/3mL nebulizer solution 3 mL    amiodarone tablet 200 mg    bumetanide tablet 1 mg    calcium gluconate 1 g in dextrose 5 % 100 mL IVPB (premix)    calcium gluconate 1 g in dextrose 5 % 100 mL IVPB (premix)    calcium gluconate 1 g in dextrose 5 % 100 mL IVPB (premix)    carvedilol tablet 3.125 mg    citalopram tablet 30 mg    [START ON 7/14/2017] digoxin tablet 125 mcg    diphenhydrAMINE injection 25 mg    docusate sodium capsule 100 mg    enalapril tablet 2.5 mg    enoxaparin injection 30 mg    oxycodone immediate release tablet 5 mg    And    oxycodone immediate release tablet 10 mg    And    HYDROmorphone injection 0.25 mg    And    HYDROmorphone injection 0.5 mg    isosorbide mononitrate 24 hr tablet 30 mg    levetiracetam tablet 500 mg    levothyroxine tablet 88 mcg    levothyroxine tablet 88 mcg    magnesium sulfate 2g in water 50mL IVPB (premix)    magnesium sulfate 2g in water 50mL IVPB (premix)    magnesium sulfate 2g in water 50mL IVPB (premix)    nitroGLYCERIN SL  "tablet 0.4 mg    ondansetron injection 4 mg    pantoprazole suspension 40 mg    polyethylene glycol packet 17 g    potassium chloride 10 mEq in 100 mL IVPB    potassium chloride 40 mEq in 100 mL IVPB (FOR CENTRAL LINE ADMINISTRATION ONLY)    And    potassium chloride 20 mEq in 100 mL IVPB (FOR CENTRAL LINE ADMINISTRATION ONLY)    And    potassium chloride 40 mEq in 100 mL IVPB (FOR CENTRAL LINE ADMINISTRATION ONLY)    ropivacaine (PF) 2 mg/ml (0.2%) infusion    sodium chloride 0.9% flush 3 mL    sodium chloride 0.9% flush 3 mL    sodium phosphate 15 mmol in dextrose 5 % 250 mL IVPB    sodium phosphate 15 mmol in dextrose 5 % 250 mL IVPB    sodium phosphate 20.01 mmol in dextrose 5 % 250 mL IVPB    sodium phosphate 20.01 mmol in dextrose 5 % 250 mL IVPB    sodium phosphate 30 mmol in dextrose 5 % 250 mL IVPB    sodium phosphate 30 mmol in dextrose 5 % 250 mL IVPB    spironolactone split tablet 12.5 mg    tamsulosin 24 hr capsule 0.4 mg    tranexamic acid (CYKLOKAPRON) 3,000 mg in sodium chloride 0.9% 100 mL    tuberculin injection 5 Units     Objective:     Vital Signs (Most Recent):  Temp: 98.1 °F (36.7 °C) (07/13/17 0300)  Pulse: 80 (07/13/17 0500)  Resp: 14 (07/13/17 0500)  BP: (!) 102/54 (07/13/17 0500)  SpO2: 100 % (07/13/17 0500) Vital Signs (24h Range):  Temp:  [97.1 °F (36.2 °C)-98.1 °F (36.7 °C)] 98.1 °F (36.7 °C)  Pulse:  [80-82] 80  Resp:  [12-28] 14  SpO2:  [91 %-100 %] 100 %  BP: ()/(41-93) 102/54  Arterial Line BP: (108-129)/(51-65) 129/64     Weight: 57.8 kg (127 lb 6.8 oz)  Height: 5' 3" (160 cm)  Body mass index is 22.57 kg/m².      Intake/Output Summary (Last 24 hours) at 07/13/17 0544  Last data filed at 07/13/17 0500   Gross per 24 hour   Intake             1238 ml   Output             1690 ml   Net             -452 ml       Ortho/SPM Exam   AAOx4  NAD  RRR  No increased WOB  Aquacel c/d/i  KI in place  SILT and motor intact T/SP/DP  WWP extremities  FCDs in place and " functioning    Significant Labs: All pertinent labs within the past 24 hours have been reviewed.    Significant Imaging: I have reviewed all pertinent imaging results/findings.    Assessment/Plan:     Closed fracture of proximal end of right humerus    - Non-operative treatment in sling  - NWB RUE        * Closed displaced fracture of right femoral neck    83 y.o. female POD1 s/p R hip hemiarthroplasty    Pain control  PT/OT: WBAT RLE, posterior hip precautions  DVT PPx: lovenox daily, FCDs at all times when not ambulating  Abx: postop Ancef complete    Dispo: f/u PT recs; will discuss with staff              Erasmo Koenig MD  Orthopedics  [facility]    Attg Note:  I agree with the above assessment and plan.  Some mild left calf pain, likely due to siobhan hose, but will check DVT US to be sure.  Doing very well.    Anshul Guerrero MD

## 2017-07-13 NOTE — PT/OT/SLP EVAL
Occupational Therapy  Evaluation    Laurita Stokes   MRN: [id number]   Admitting Diagnosis: Closed displaced fracture of right femoral neck    OT Date of Treatment: 07/13/17   OT Start Time: 0948  OT Stop Time: 1010  OT Total Time (min): 22 min    Billable Minutes:  Evaluation 22    Diagnosis:   83 y.o. female with closed right femoral neck fracture, closed right humeral neck fx. S/p right hip hemiarthroplasty on 7/12       Past Medical History:   Diagnosis Date    *Atrial fibrillation     on warfarin    Allergy     seasonal    Anticoagulant long-term use     ASA 81 mg, Coumadin    Blood clotting tendency     Blood transfusion     Cataract     CHF (congestive heart failure)     CKD (chronic kidney disease) stage 4, GFR 15-29 ml/min 2/21/2017    Coronary artery disease     PET stress in 1/14: lesions not amenable to PCI.    DDD (degenerative disc disease), lumbar 8/20/2015    GERD (gastroesophageal reflux disease)     HEARING LOSS     wears Hearing aide    Hyperlipidemia     Hypertension     Hypothyroid     MCI (mild cognitive impairment)     Myocardial infarct 1999    Pacemaker 2000    S/P CABG x 3 2000    Ulcerative colitis     Ventricular tachycardia 1/14    VT storm 1/14, mexilitine added      Past Surgical History:   Procedure Laterality Date    CARDIAC CATHETERIZATION  8/13    [facility] care center [facility], Dr Wallace; LIMA and SVG occluded. Proximal LAD 40%, ostial Diagonal 60%, LCX 30-40% in-stent restenosis, RCA totally occluded proximally    CATARACT EXTRACTION W/  INTRAOCULAR LENS IMPLANT Right 3/19/96    COLON SURGERY      ischemic colitis    COLONOSCOPY      COLONOSCOPY N/A 6/21/2016    Procedure: COLONOSCOPY;  Surgeon: Rob Cartagena MD;  Location: [facility];  Service: Endoscopy;  Laterality: N/A;    COLONOSCOPY N/A 7/18/2016    Procedure: COLONOSCOPY;  Surgeon: Rob Cartagena MD;  Location: [facility];  Service: Endoscopy;  Laterality: N/A;    CORONARY ARTERY  "BYPASS GRAFT  3/2001    3 vessel    CORONARY STENT PLACEMENT  12/2015    Dr. Freire    EYE SURGERY      HYSTERECTOMY      total    INSERT / REPLACE / REMOVE PACEMAKER  12/2009    [facility] CRT-D generator changeout    ORIF WRIST FRACTURE Right     TONSILLECTOMY         Referring physician: Arya MIN)  Date referred to OT: 7/12/17    General Precautions: Standard, fall (RLE posterior hip precautions)  Orthopedic Precautions: RLE weight bearing as tolerated, RUE non weight bearing (RUE sling donned )  Braces: Knee immobilizer (Left knee immobilizer)    Patient History:  Living Environment  Lives With: child(mela), adult (daughter )  Living Arrangements: house  Home Layout: Able to live on 1st floor  Transportation Available: family or friend will provide  Living Environment Comment: Pt lives with daughter in a single story home, Pt is home on hospice care. Pt has the following DME: hospital bed, RW, transport W/C, shower chair, grab bars by toilet, bedside commode. As per daughter patient had several falls within the last month     Prior level of function: As per daughter patient's level of assistance for ADL/ Self care varies day to day. Pt's daughter reports that some days she is weaker than others, but she is mostly independent  Bed Mobility/Transfers: independent  Grooming: independent  Bathing: needs assist  Upper Body Dressing: independent  Lower Body Dressing: independent  Toileting: independent  Homemaking Responsibilities: No  Driving License: No     Dominant hand: right    Subjective:  Communicated with RN prior to session, post session handoff discussed progress and plan of care   Chief Complaint: " I hurt"- Pt agreeable to OT evaluation   Patient/Family stated goals: To get stronger and go home     Pain/Comfort  Pain Rating 1: 6/10  Location - Side 1: Right  Location 1: hip  Pain Addressed 1: Reposition, Nurse notified    Objective:  Patient found reclined in bed, daughter at bedside, RUE sling donned with " arterial line, blood pressure cuff, adkins catheter, perineural catheter, knee immobilizer, peripheral IV, pulse ox (continuous), SCD, telemetry (knee immobilizer, oxygen, SCD on LLE)    Cognitive Exam: Pt has dementia at baseline   Oriented to: Person  Follows Commands/attention: Follows one-step commands  Communication: clear/fluent  Memory:  Impaired STM and Impaired LTM  Safety awareness/insight to disability: impaired  Coping skills/emotional control: Appropriate to situation    Visual/perceptual:  Intact    Physical Exam:  Postural examination/scapula alignment: thoracic kyphosis increased  Skin integrity: Visible skin intact  Edema: RLE edema noted     Sensation:   Intact  light/touch with localization    Upper Extremity Range of Motion:  Right Upper Extremity: not assessed  Left Upper Extremity: grossly WFL throughout     Upper Extremity Strength:  Right Upper Extremity: not assessed   Left Upper Extremity: 3+/5 throughout   Strength: Left  3+/5    Fine motor coordination:   Intact  Left hand, finger to nose, Left hand thumb/finger opposition skills and Left hand, manipulation of objects  RUE currently in sling with RUE NWB restrictions   Gross motor coordination: WFL    Functional Mobility:  Bed Mobility:  Supine to Sit: Total Assistance, With assist of 2  Sit to Supine: Total Assistance, With assist of 2    Activities of Daily Living:    Balance:   Static Sit: FAIR: Maintains without assist, but unable to take any challenges   Dynamic Sit: FAIR: Cannot move trunk without losing balance  Standing not assessed this visit     Therapeutic Activities and Exercises:  Pt tolerated supported sitting at edge of bed for 7 minutes. Pt then with c/o dizziness. Pt and symptomatic. BP dropped to 60's/40's. Pt returned supine in bed. BP went back up to 104/76.     AM-PAC 6 CLICK ADL  How much help from another person does this patient currently need?  1 = Unable, Total/Dependent Assistance  2 = A lot,  "Maximum/Moderate Assistance  3 = A little, Minimum/Contact Guard/Supervision  4 = None, Modified [place]/Independent    Putting on and taking off regular lower body clothing? : 1  Bathing (including washing, rinsing, drying)?: 1  Toileting, which includes using toilet, bedpan, or urinal? : 1  Putting on and taking off regular upper body clothing?: 1  Taking care of personal grooming such as brushing teeth?: 1  Eating meals?: 3  Total Score: 8    AM-PAC Raw Score CMS "G-Code Modifier Level of Impairment Assistance   6 % Total / Unable   7 - 9 CM 80 - 100% Maximal Assist   10-14 CL 60 - 80% Moderate Assist   15 - 19 CK 40 - 60% Moderate Assist   20 - 22 CJ 20 - 40% Minimal Assist   23 CI 1-20% SBA / CGA   24 CH 0% Independent/ Mod I       Patient left supine with all lines intact, call button in reach, RN  notified and daughrer present    Assessment:  Laurita Stokes is a 83 y.o. female with a medical diagnosis of Closed displaced fracture of right femoral neck and presents with decreased activity tolerance, and impaired functional mobility. Pt able to tolerate sitting at EOB for 7 minutes, she became symptomatic with c/o dizziness and drop in Bp. Pt returned supine with increase in Bp. Pt will benefit from skilled OT services to maximize independence and safety with ADL/Self care and functional mobility. Recommend Home with hospice and  HH OT/PT vs SNF pending medical progress.    Rehab identified problem list/impairments: Rehab identified problem list/impairments: impaired endurance, weakness, impaired self care skills, impaired functional mobilty, impaired balance, impaired cognition, decreased upper extremity function, decreased safety awareness, orthopedic precautions    Rehab potential is good.    Activity tolerance: Fair    Discharge recommendations: Discharge Facility/Level Of Care Needs:  (home with resumption of hospice care, HH OT and PT vs SNF pending medical progress )     Barriers to " discharge: none identified at this time   Equipment recommendations: none identified at this time. Will continue to assess closer to D/c date     GOALS:    Occupational Therapy Goals        Problem: Occupational Therapy Goal    Goal Priority Disciplines Outcome Interventions   Occupational Therapy Goal     OT, PT/OT Ongoing (interventions implemented as appropriate)    Description:  Patient will increase functional independence with ADLs by performing:    UE Dressing with Supervision.  LE Dressing with Moderate Assistance.  Grooming while seated with Supervision.  Toileting from bedside commode with Supervision for hygiene and clothing management.   Sitting at edge of bed x20 minutes with Supervision.  Supine to sit with Minimal Assistance.  Stand pivot transfers with Minimal Assistance.  Toilet transfer to bedside commode with Minimal Assistance.  Pt will verbalize hip precautions and incorporate them during ADL performance with Minimal verbal cues                    PLAN:  Patient to be seen 5 x/week to address the above listed problems via self-care/home management, therapeutic activities, therapeutic exercises  Plan of Care expires: 07/27/17  Plan of Care reviewed with: patient, daughter         Malika BURGER Kye OT  07/13/2017

## 2017-07-13 NOTE — PLAN OF CARE
Problem: Occupational Therapy Goal  Goal: Occupational Therapy Goal  Patient will increase functional independence with ADLs by performing:    UE Dressing with Supervision.  LE Dressing with Moderate Assistance.  Grooming while seated with Supervision.  Toileting from bedside commode with Supervision for hygiene and clothing management.   Sitting at edge of bed x20 minutes with Supervision.  Supine to sit with Minimal Assistance.  Stand pivot transfers with Minimal Assistance.  Toilet transfer to bedside commode with Minimal Assistance.    Outcome: Ongoing (interventions implemented as appropriate)  OT evaluation and Plan of care established 7/13/17

## 2017-07-13 NOTE — ANESTHESIA POSTPROCEDURE EVALUATION
"Anesthesia Post Evaluation    Patient: Laurita Stokes    Procedure(s) Performed: Procedure(s) (LRB):  ARTHROPLASTY-HIP-OLAYINKA-right (Right)    Final Anesthesia Type: general  Patient location during evaluation: ICU  Patient participation: Yes- Able to Participate  Level of consciousness: awake and alert  Post-procedure vital signs: reviewed and stable  Pain management: adequate  Airway patency: patent  PONV status at discharge: No PONV  Anesthetic complications: no      Cardiovascular status: blood pressure returned to baseline  Respiratory status: unassisted, room air and spontaneous ventilation  Hydration status: euvolemic  Follow-up not needed.        Visit Vitals  BP (!) 107/52   Pulse 80   Temp 36.7 °C (98.1 °F) (Oral)   Resp 18   Ht 5' 3" (1.6 m)   Wt 57.8 kg (127 lb 6.8 oz)   LMP  (LMP Unknown)   SpO2 98%   Breastfeeding? No   BMI 22.57 kg/m²       Pain/Sukhi Score: Pain Assessment Performed: Yes (7/12/2017 11:00 PM)  Presence of Pain: non-verbal indicators absent (7/13/2017  3:00 AM)  Pain Rating Prior to Med Admin: 7 (7/13/2017  6:33 AM)      "

## 2017-07-14 LAB
ALBUMIN SERPL BCP-MCNC: 2.1 G/DL
ALP SERPL-CCNC: 69 U/L
ALT SERPL W/O P-5'-P-CCNC: 8 U/L
ANION GAP SERPL CALC-SCNC: 10 MMOL/L
ANION GAP SERPL CALC-SCNC: 11 MMOL/L
AST SERPL-CCNC: 25 U/L
BASOPHILS # BLD AUTO: 0.01 K/UL
BASOPHILS NFR BLD: 0.1 %
BILIRUB SERPL-MCNC: 1 MG/DL
BUN SERPL-MCNC: 20 MG/DL
BUN SERPL-MCNC: 21 MG/DL
CALCIUM SERPL-MCNC: 8 MG/DL
CALCIUM SERPL-MCNC: 8.1 MG/DL
CHLORIDE SERPL-SCNC: 97 MMOL/L
CHLORIDE SERPL-SCNC: 98 MMOL/L
CO2 SERPL-SCNC: 24 MMOL/L
CO2 SERPL-SCNC: 25 MMOL/L
CREAT SERPL-MCNC: 1.1 MG/DL
CREAT SERPL-MCNC: 1.1 MG/DL
DIFFERENTIAL METHOD: ABNORMAL
EOSINOPHIL # BLD AUTO: 0.3 K/UL
EOSINOPHIL NFR BLD: 2.3 %
ERYTHROCYTE [DISTWIDTH] IN BLOOD BY AUTOMATED COUNT: 17.6 %
EST. GFR  (AFRICAN AMERICAN): 53.7 ML/MIN/1.73 M^2
EST. GFR  (AFRICAN AMERICAN): 53.7 ML/MIN/1.73 M^2
EST. GFR  (NON AFRICAN AMERICAN): 46.5 ML/MIN/1.73 M^2
EST. GFR  (NON AFRICAN AMERICAN): 46.5 ML/MIN/1.73 M^2
GLUCOSE SERPL-MCNC: 102 MG/DL
GLUCOSE SERPL-MCNC: 103 MG/DL
HCT VFR BLD AUTO: 25.6 %
HGB BLD-MCNC: 8 G/DL
LYMPHOCYTES # BLD AUTO: 0.5 K/UL
LYMPHOCYTES NFR BLD: 4.6 %
MAGNESIUM SERPL-MCNC: 2.1 MG/DL
MAGNESIUM SERPL-MCNC: 2.1 MG/DL
MCH RBC QN AUTO: 29.7 PG
MCHC RBC AUTO-ENTMCNC: 31.3 %
MCV RBC AUTO: 95 FL
MONOCYTES # BLD AUTO: 1.1 K/UL
MONOCYTES NFR BLD: 9.4 %
NEUTROPHILS # BLD AUTO: 9.6 K/UL
NEUTROPHILS NFR BLD: 83.3 %
PHOSPHATE SERPL-MCNC: 2.9 MG/DL
PHOSPHATE SERPL-MCNC: 3 MG/DL
PLATELET # BLD AUTO: 193 K/UL
PMV BLD AUTO: 10.1 FL
POTASSIUM SERPL-SCNC: 3.6 MMOL/L
POTASSIUM SERPL-SCNC: 3.9 MMOL/L
PROT SERPL-MCNC: 5.3 G/DL
RBC # BLD AUTO: 2.69 M/UL
SODIUM SERPL-SCNC: 132 MMOL/L
SODIUM SERPL-SCNC: 133 MMOL/L
WBC # BLD AUTO: 11.5 K/UL

## 2017-07-14 PROCEDURE — 25000003 PHARM REV CODE 250: Performed by: STUDENT IN AN ORGANIZED HEALTH CARE EDUCATION/TRAINING PROGRAM

## 2017-07-14 PROCEDURE — 83735 ASSAY OF MAGNESIUM: CPT

## 2017-07-14 PROCEDURE — 20000000 HC ICU ROOM

## 2017-07-14 PROCEDURE — 25000003 PHARM REV CODE 250: Performed by: HOSPITALIST

## 2017-07-14 PROCEDURE — 99231 SBSQ HOSP IP/OBS SF/LOW 25: CPT | Mod: ,,, | Performed by: ANESTHESIOLOGY

## 2017-07-14 PROCEDURE — 94799 UNLISTED PULMONARY SVC/PX: CPT

## 2017-07-14 PROCEDURE — 63600175 PHARM REV CODE 636 W HCPCS: Performed by: ANESTHESIOLOGY

## 2017-07-14 PROCEDURE — 80048 BASIC METABOLIC PNL TOTAL CA: CPT

## 2017-07-14 PROCEDURE — 63600175 PHARM REV CODE 636 W HCPCS: Performed by: STUDENT IN AN ORGANIZED HEALTH CARE EDUCATION/TRAINING PROGRAM

## 2017-07-14 PROCEDURE — 83735 ASSAY OF MAGNESIUM: CPT | Mod: 91

## 2017-07-14 PROCEDURE — 25000003 PHARM REV CODE 250: Performed by: ANESTHESIOLOGY

## 2017-07-14 PROCEDURE — 80053 COMPREHEN METABOLIC PANEL: CPT

## 2017-07-14 PROCEDURE — 94761 N-INVAS EAR/PLS OXIMETRY MLT: CPT

## 2017-07-14 PROCEDURE — 27000221 HC OXYGEN, UP TO 24 HOURS

## 2017-07-14 PROCEDURE — 36415 COLL VENOUS BLD VENIPUNCTURE: CPT

## 2017-07-14 PROCEDURE — 84100 ASSAY OF PHOSPHORUS: CPT

## 2017-07-14 PROCEDURE — 85025 COMPLETE CBC W/AUTO DIFF WBC: CPT

## 2017-07-14 PROCEDURE — 84100 ASSAY OF PHOSPHORUS: CPT | Mod: 91

## 2017-07-14 RX ORDER — ACETAMINOPHEN 500 MG
1000 TABLET ORAL EVERY 8 HOURS
Status: COMPLETED | OUTPATIENT
Start: 2017-07-14 | End: 2017-07-16

## 2017-07-14 RX ORDER — AMOXICILLIN AND CLAVULANATE POTASSIUM 500; 125 MG/1; MG/1
1 TABLET, FILM COATED ORAL 2 TIMES DAILY
Status: DISCONTINUED | OUTPATIENT
Start: 2017-07-14 | End: 2017-07-19

## 2017-07-14 RX ORDER — ACETAMINOPHEN 10 MG/ML
1000 INJECTION, SOLUTION INTRAVENOUS ONCE
Status: COMPLETED | OUTPATIENT
Start: 2017-07-14 | End: 2017-07-14

## 2017-07-14 RX ORDER — LIDOCAINE 50 MG/G
1 PATCH TOPICAL
Status: DISCONTINUED | OUTPATIENT
Start: 2017-07-14 | End: 2017-07-24 | Stop reason: HOSPADM

## 2017-07-14 RX ADMIN — OXYCODONE HYDROCHLORIDE 10 MG: 5 TABLET ORAL at 07:07

## 2017-07-14 RX ADMIN — AMIODARONE HYDROCHLORIDE 200 MG: 200 TABLET ORAL at 09:07

## 2017-07-14 RX ADMIN — ENOXAPARIN SODIUM 30 MG: 100 INJECTION SUBCUTANEOUS at 04:07

## 2017-07-14 RX ADMIN — DOCUSATE SODIUM 100 MG: 50 CAPSULE, LIQUID FILLED ORAL at 05:07

## 2017-07-14 RX ADMIN — AMOXICILLIN AND CLAVULANATE POTASSIUM 500 MG: 500; 125 TABLET, FILM COATED ORAL at 01:07

## 2017-07-14 RX ADMIN — DIGOXIN 125 MCG: 125 TABLET ORAL at 04:07

## 2017-07-14 RX ADMIN — ACETAMINOPHEN 1000 MG: 500 TABLET ORAL at 04:07

## 2017-07-14 RX ADMIN — TAMSULOSIN HYDROCHLORIDE 0.4 MG: 0.4 CAPSULE ORAL at 09:07

## 2017-07-14 RX ADMIN — POTASSIUM CHLORIDE 40 MEQ: 400 INJECTION, SOLUTION INTRAVENOUS at 05:07

## 2017-07-14 RX ADMIN — ACETAMINOPHEN 1000 MG: 10 INJECTION, SOLUTION INTRAVENOUS at 08:07

## 2017-07-14 RX ADMIN — LEVETIRACETAM 500 MG: 500 TABLET, FILM COATED ORAL at 09:07

## 2017-07-14 RX ADMIN — Medication 3 ML: at 01:07

## 2017-07-14 RX ADMIN — HYDROMORPHONE HYDROCHLORIDE 0.25 MG: 1 INJECTION, SOLUTION INTRAMUSCULAR; INTRAVENOUS; SUBCUTANEOUS at 05:07

## 2017-07-14 RX ADMIN — PANTOPRAZOLE SODIUM 40 MG: 40 GRANULE, DELAYED RELEASE ORAL at 09:07

## 2017-07-14 RX ADMIN — LIDOCAINE 1 PATCH: 50 PATCH CUTANEOUS at 09:07

## 2017-07-14 RX ADMIN — AMOXICILLIN AND CLAVULANATE POTASSIUM 500 MG: 500; 125 TABLET, FILM COATED ORAL at 09:07

## 2017-07-14 RX ADMIN — LEVOTHYROXINE SODIUM 88 MCG: 88 TABLET ORAL at 05:07

## 2017-07-14 RX ADMIN — ROPIVACAINE HYDROCHLORIDE 8 ML/HR: 2 INJECTION, SOLUTION EPIDURAL; INFILTRATION at 10:07

## 2017-07-14 RX ADMIN — CITALOPRAM HYDROBROMIDE 30 MG: 20 TABLET ORAL at 09:07

## 2017-07-14 RX ADMIN — DOCUSATE SODIUM 100 MG: 50 CAPSULE, LIQUID FILLED ORAL at 01:07

## 2017-07-14 RX ADMIN — ACETAMINOPHEN 1000 MG: 500 TABLET ORAL at 09:07

## 2017-07-14 RX ADMIN — OXYCODONE HYDROCHLORIDE 5 MG: 5 TABLET ORAL at 01:07

## 2017-07-14 RX ADMIN — Medication 3 ML: at 09:07

## 2017-07-14 RX ADMIN — POLYETHYLENE GLYCOL 3350 17 G: 17 POWDER, FOR SOLUTION ORAL at 09:07

## 2017-07-14 RX ADMIN — DOCUSATE SODIUM 100 MG: 50 CAPSULE, LIQUID FILLED ORAL at 09:07

## 2017-07-14 RX ADMIN — SPIRONOLACTONE 12.5 MG: 25 TABLET, FILM COATED ORAL at 09:07

## 2017-07-14 RX ADMIN — BUMETANIDE 1 MG: 1 TABLET ORAL at 09:07

## 2017-07-14 NOTE — ASSESSMENT & PLAN NOTE
83 y.o. female POD2 s/p R hip hemiarthroplasty    Pain control  PT/OT: WBAT RLE, posterior hip precautions  DVT PPx: lovenox daily, FCDs at all times when not ambulating  Abx: postop Ancef complete  DVT US negative    Dispo: step down likely today

## 2017-07-14 NOTE — ANESTHESIA POST-OP PAIN MANAGEMENT
Acute Pain Service Progress Note    Laurita Stokes is a 83 y.o., female, [id number].    Surgery:  ARTHROPLASTY-HIP-OLAYINKA-right (Right)    Post Op Day #: 2    Catheter type: perineural  SIFI    Infusion type: Ropivacaine 0.2%  8 basal    Problem List:    Active Hospital Problems    Diagnosis  POA    *Closed displaced fracture of right femoral neck [S72.001A]  Yes    Debility [R53.81]  Yes    CKD (chronic kidney disease), stage III [N18.3]  Yes    Chronic respiratory failure with hypoxia [J96.11]  Yes    Hip fracture [S72.009A]  Yes    Closed fracture of proximal end of right humerus [S42.201A]  Yes    Coronary artery disease [I25.10]  Yes    Hypothyroidism [E03.9]  Yes    CHF NYHA class IV [I50.9]  Yes    DDD (degenerative disc disease), lumbar [M51.36]  Yes    Essential hypertension [I10]  Yes    Depression [F32.9]  Yes    Dementia without behavioral disturbance [F03.90]  Yes    Chronic combined systolic and diastolic heart failure [I50.42]  Yes    Biventricular ICD (implantable cardioverter-defibrillator) in place [Z95.810]  Yes     [facility] CRT-D      Cardiomyopathy, ischemic [I25.5]  Yes      Resolved Hospital Problems    Diagnosis Date Resolved POA   No resolved problems to display.       Subjective:     General appearance of awake   Pain with rest: 2    Faces   Pain with movement: 4    Faces   Side Effects    1. Pruritis No    2. Nausea No    3. Motor Blockade No, 0=Ability to raise lower extremities off bed    4. Sedation No, 1=awake and alert    Objective:       Catheter site clean, dry, intact     Vitals   Vitals:    07/14/17 0700   BP: (!) 103/51   Pulse: 80   Resp: 20   Temp: 37.7 °C (99.8 °F)        Labs    Admission on 07/11/2017   No results displayed because visit has over 200 results.           Meds   Current Facility-Administered Medications   Medication Dose Route Frequency Provider Last Rate Last Dose    acetaminophen (10 mg/mL) injection 1,000 mg  1,000 mg Intravenous Once Tramaine C  MD Ian        albuterol-ipratropium 2.5mg-0.5mg/3mL nebulizer solution 3 mL  3 mL Nebulization Q4H PRN Dallas Soliz, DO        amiodarone tablet 200 mg  200 mg Oral BID Dallas Soliz DO   200 mg at 07/13/17 2113    bumetanide tablet 1 mg  1 mg Oral Daily Dallas Soliz, DO   1 mg at 07/13/17 1251    calcium gluconate 1 g in dextrose 5 % 100 mL IVPB (premix)  1 g Intravenous PRN Tramaine Sosa MD        calcium gluconate 1 g in dextrose 5 % 100 mL IVPB (premix)  1 g Intravenous PRN Tramaine Sosa MD        calcium gluconate 1 g in dextrose 5 % 100 mL IVPB (premix)  1 g Intravenous PRN Tramaine Sosa MD        carvedilol tablet 3.125 mg  3.125 mg Oral Nightly Tramaine Sosa MD   3.125 mg at 07/13/17 2113    citalopram tablet 30 mg  30 mg Oral Daily Dallas Soliz DO   30 mg at 07/13/17 0916    digoxin tablet 125 mcg  125 mcg Oral Every Mon, Wed, Fri Tramaine Sosa MD        diphenhydrAMINE injection 25 mg  25 mg Intravenous Q6H PRN Anai Shell MD        docusate sodium capsule 100 mg  100 mg Oral TID Erasmo Koenig MD   100 mg at 07/14/17 0519    enalapril tablet 2.5 mg  2.5 mg Oral Daily Tramaine Sosa MD   Stopped at 07/13/17 0900    enoxaparin injection 30 mg  30 mg Subcutaneous Daily Erasmo Koenig MD   30 mg at 07/13/17 1655    isosorbide mononitrate 24 hr tablet 30 mg  30 mg Oral Daily Tramaine Sosa MD   Stopped at 07/13/17 0900    levetiracetam tablet 500 mg  500 mg Oral BID Tramaine Sosa MD   500 mg at 07/13/17 2113    levothyroxine tablet 88 mcg  88 mcg Oral Before breakfast Dallas Soliz, DO   88 mcg at 07/14/17 0517    magnesium sulfate 2g in water 50mL IVPB (premix)  4 g Intravenous PRN Tramaine Sosa MD        magnesium sulfate 2g in water 50mL IVPB (premix)  2 g Intravenous PRN Tramaine Sosa MD   2 g at 07/13/17 1655    magnesium sulfate 2g in water 50mL IVPB (premix)  4 g Intravenous PRN Tramaine Sosa MD        nitroGLYCERIN SL tablet 0.4 mg  0.4 mg  Sublingual Q5 Min PRN Dallas Soliz,         ondansetron injection 4 mg  4 mg Intravenous Q8H PRN Sharon Zamudio MD        oxycodone immediate release tablet 5 mg  5 mg Oral Q4H PRN Mohinder Azevedo MD        And    oxycodone immediate release tablet 10 mg  10 mg Oral Q4H PRN Mohinder Azevedo MD   10 mg at 07/12/17 2307    pantoprazole suspension 40 mg  40 mg Oral Daily Dallas Soliz, DO   40 mg at 07/13/17 0917    polyethylene glycol packet 17 g  17 g Oral Daily Erasmo Koenig MD   17 g at 07/13/17 0918    potassium chloride 10 mEq in 100 mL IVPB  10 mEq Intravenous PRN Tramaine Sosa  mL/hr at 07/13/17 0601 10 mEq at 07/13/17 0601    potassium chloride 40 mEq in 100 mL IVPB (FOR CENTRAL LINE ADMINISTRATION ONLY)  40 mEq Intravenous PRN Tramaine Sosa MD 25 mL/hr at 07/14/17 0513 40 mEq at 07/14/17 0513    And    potassium chloride 20 mEq in 100 mL IVPB (FOR CENTRAL LINE ADMINISTRATION ONLY)  60 mEq Intravenous PRN Tramaine Sosa MD 50 mL/hr at 07/13/17 1653 60 mEq at 07/13/17 1653    And    potassium chloride 40 mEq in 100 mL IVPB (FOR CENTRAL LINE ADMINISTRATION ONLY)  80 mEq Intravenous PRN Tramaine Sosa MD        ropivacaine (PF) 2 mg/ml (0.2%) infusion  8 mL/hr Perineural Continuous Mohinder Azevedo MD 8 mL/hr at 07/13/17 0855 8 mL/hr at 07/13/17 0855    sodium chloride 0.9% flush 3 mL  3 mL Intravenous Q8H Dallas Soliz,    3 mL at 07/13/17 1440    sodium chloride 0.9% flush 3 mL  3 mL Intravenous Q8H Tramaine Sosa MD   3 mL at 07/13/17 1440    sodium phosphate 15 mmol in dextrose 5 % 250 mL IVPB  15 mmol Intravenous PRN Tramaine Sosa MD 83.3 mL/hr at 07/13/17 0724 15 mmol at 07/13/17 0724    sodium phosphate 15 mmol in dextrose 5 % 250 mL IVPB  15 mmol Intravenous PRN Tramaine Sosa MD        sodium phosphate 20.01 mmol in dextrose 5 % 250 mL IVPB  20.01 mmol Intravenous PRN Tramaine Sosa MD        sodium phosphate 20.01 mmol in dextrose 5 % 250 mL IVPB  20.01 mmol  Intravenous PRN Tramaine Sosa MD        sodium phosphate 30 mmol in dextrose 5 % 250 mL IVPB  30 mmol Intravenous PRN Tramaine Sosa MD        sodium phosphate 30 mmol in dextrose 5 % 250 mL IVPB  30 mmol Intravenous PRN Tramaine Sosa MD        spironolactone split tablet 12.5 mg  12.5 mg Oral Every Mon, Wed, Fri Arup K. Heydi, DO   12.5 mg at 07/12/17 0826    tamsulosin 24 hr capsule 0.4 mg  0.4 mg Oral Daily Arup KMalathi Heydi, DO   0.4 mg at 07/13/17 0916    tranexamic acid (CYKLOKAPRON) 3,000 mg in sodium chloride 0.9% 100 mL  3,000 mg Irrigation Once Anshul Guerrero MD        tuberculin injection 5 Units  5 Units Intradermal Once Niki Mccord MD             Assessment:     Pain control adequate    Plan:     Patient doing well, continue present treatment. SIFI catheter in place with multimodal pain regimen. Will discharge SIFI before discharge.     Evaluator Sharon Zamudio      Pt seen and examined.  Dr. Zamudio's note reviewed.  Agree with assessment and plan.

## 2017-07-14 NOTE — SUBJECTIVE & OBJECTIVE
Principal Problem:Closed displaced fracture of right femoral neck    Principal Orthopedic Problem: s/p R hip hemiarthroplasty    Interval History: Patient seen and examined at bedside.  No acute events overnight.  Pain controlled. Worked with PT yesterday.    Review of patient's allergies indicates:   Allergen Reactions    Codeine Itching    Azithromycin      History of VT       Current Facility-Administered Medications   Medication    albuterol-ipratropium 2.5mg-0.5mg/3mL nebulizer solution 3 mL    amiodarone tablet 200 mg    bumetanide tablet 1 mg    calcium gluconate 1 g in dextrose 5 % 100 mL IVPB (premix)    calcium gluconate 1 g in dextrose 5 % 100 mL IVPB (premix)    calcium gluconate 1 g in dextrose 5 % 100 mL IVPB (premix)    carvedilol tablet 3.125 mg    citalopram tablet 30 mg    digoxin tablet 125 mcg    diphenhydrAMINE injection 25 mg    docusate sodium capsule 100 mg    enalapril tablet 2.5 mg    enoxaparin injection 30 mg    oxycodone immediate release tablet 5 mg    And    oxycodone immediate release tablet 10 mg    And    HYDROmorphone injection 0.25 mg    And    HYDROmorphone injection 0.5 mg    isosorbide mononitrate 24 hr tablet 30 mg    levetiracetam tablet 500 mg    levothyroxine tablet 88 mcg    magnesium sulfate 2g in water 50mL IVPB (premix)    magnesium sulfate 2g in water 50mL IVPB (premix)    magnesium sulfate 2g in water 50mL IVPB (premix)    nitroGLYCERIN SL tablet 0.4 mg    ondansetron injection 4 mg    pantoprazole suspension 40 mg    polyethylene glycol packet 17 g    potassium chloride 10 mEq in 100 mL IVPB    potassium chloride 40 mEq in 100 mL IVPB (FOR CENTRAL LINE ADMINISTRATION ONLY)    And    potassium chloride 20 mEq in 100 mL IVPB (FOR CENTRAL LINE ADMINISTRATION ONLY)    And    potassium chloride 40 mEq in 100 mL IVPB (FOR CENTRAL LINE ADMINISTRATION ONLY)    ropivacaine (PF) 2 mg/ml (0.2%) infusion    sodium chloride 0.9% flush 3 mL     "sodium chloride 0.9% flush 3 mL    sodium phosphate 15 mmol in dextrose 5 % 250 mL IVPB    sodium phosphate 15 mmol in dextrose 5 % 250 mL IVPB    sodium phosphate 20.01 mmol in dextrose 5 % 250 mL IVPB    sodium phosphate 20.01 mmol in dextrose 5 % 250 mL IVPB    sodium phosphate 30 mmol in dextrose 5 % 250 mL IVPB    sodium phosphate 30 mmol in dextrose 5 % 250 mL IVPB    spironolactone split tablet 12.5 mg    tamsulosin 24 hr capsule 0.4 mg    tranexamic acid (CYKLOKAPRON) 3,000 mg in sodium chloride 0.9% 100 mL    tuberculin injection 5 Units     Objective:     Vital Signs (Most Recent):  Temp: 98.1 °F (36.7 °C) (07/13/17 2300)  Pulse: 80 (07/14/17 0215)  Resp: (!) 21 (07/14/17 0215)  BP: (!) 94/56 (07/14/17 0200)  SpO2: 100 % (07/14/17 0215) Vital Signs (24h Range):  Temp:  [97.6 °F (36.4 °C)-98.7 °F (37.1 °C)] 98.1 °F (36.7 °C)  Pulse:  [80-87] 80  Resp:  [12-25] 21  SpO2:  [74 %-100 %] 100 %  BP: ()/(48-60) 94/56  Arterial Line BP: ()/(40-70) 107/43     Weight: 57.8 kg (127 lb 6.8 oz)  Height: 5' 3" (160 cm)  Body mass index is 22.57 kg/m².      Intake/Output Summary (Last 24 hours) at 07/14/17 0557  Last data filed at 07/14/17 0300   Gross per 24 hour   Intake             2005 ml   Output              940 ml   Net             1065 ml       Ortho/SPM Exam     AAOx4  NAD  RRR  No increased WOB  Aquacel c/d/i  KI in place  SILT and motor intact T/SP/DP  WWP extremities  FCDs in place and functioning    Significant Labs: All pertinent labs within the past 24 hours have been reviewed.    Significant Imaging: I have reviewed all pertinent imaging results/findings.  "

## 2017-07-14 NOTE — PLAN OF CARE
Problem: Patient Care Overview  Goal: Plan of Care Review  Outcome: Ongoing (interventions implemented as appropriate)  No acute events overnight. VSS. Pt frequently taking oxygen off and needing to be reoriented. Pt remains on 4L nasal cannula satting %. Pt oriented to self only. UO adequate. Pain managed by perineural catheter. Electrolytes replaced. Labs trended. UO adequate. Plan of care reviewed with patient. Will continue to monitor.

## 2017-07-14 NOTE — PROGRESS NOTES
[facility]  Orthopedics  Progress Note    Patient Name: Laurita Stokes  MRN: [id number]  Admission Date: 7/11/2017  Hospital Length of Stay: 3 days  Attending Provider: Niki Mccord MD  Primary Care Provider: Alex Capellan MD  Follow-up For: Procedure(s) (LRB):  ARTHROPLASTY-HIP-OLAYINKA-right (Right)    Post-Operative Day: 2 Days Post-Op  Subjective:     Principal Problem:Closed displaced fracture of right femoral neck    Principal Orthopedic Problem: s/p R hip hemiarthroplasty    Interval History: Patient seen and examined at bedside.  No acute events overnight.  Pain controlled. Worked with PT yesterday.    Review of patient's allergies indicates:   Allergen Reactions    Codeine Itching    Azithromycin      History of VT       Current Facility-Administered Medications   Medication    albuterol-ipratropium 2.5mg-0.5mg/3mL nebulizer solution 3 mL    amiodarone tablet 200 mg    bumetanide tablet 1 mg    calcium gluconate 1 g in dextrose 5 % 100 mL IVPB (premix)    calcium gluconate 1 g in dextrose 5 % 100 mL IVPB (premix)    calcium gluconate 1 g in dextrose 5 % 100 mL IVPB (premix)    carvedilol tablet 3.125 mg    citalopram tablet 30 mg    digoxin tablet 125 mcg    diphenhydrAMINE injection 25 mg    docusate sodium capsule 100 mg    enalapril tablet 2.5 mg    enoxaparin injection 30 mg    oxycodone immediate release tablet 5 mg    And    oxycodone immediate release tablet 10 mg    And    HYDROmorphone injection 0.25 mg    And    HYDROmorphone injection 0.5 mg    isosorbide mononitrate 24 hr tablet 30 mg    levetiracetam tablet 500 mg    levothyroxine tablet 88 mcg    magnesium sulfate 2g in water 50mL IVPB (premix)    magnesium sulfate 2g in water 50mL IVPB (premix)    magnesium sulfate 2g in water 50mL IVPB (premix)    nitroGLYCERIN SL tablet 0.4 mg    ondansetron injection 4 mg    pantoprazole suspension 40 mg    polyethylene glycol packet 17 g     "potassium chloride 10 mEq in 100 mL IVPB    potassium chloride 40 mEq in 100 mL IVPB (FOR CENTRAL LINE ADMINISTRATION ONLY)    And    potassium chloride 20 mEq in 100 mL IVPB (FOR CENTRAL LINE ADMINISTRATION ONLY)    And    potassium chloride 40 mEq in 100 mL IVPB (FOR CENTRAL LINE ADMINISTRATION ONLY)    ropivacaine (PF) 2 mg/ml (0.2%) infusion    sodium chloride 0.9% flush 3 mL    sodium chloride 0.9% flush 3 mL    sodium phosphate 15 mmol in dextrose 5 % 250 mL IVPB    sodium phosphate 15 mmol in dextrose 5 % 250 mL IVPB    sodium phosphate 20.01 mmol in dextrose 5 % 250 mL IVPB    sodium phosphate 20.01 mmol in dextrose 5 % 250 mL IVPB    sodium phosphate 30 mmol in dextrose 5 % 250 mL IVPB    sodium phosphate 30 mmol in dextrose 5 % 250 mL IVPB    spironolactone split tablet 12.5 mg    tamsulosin 24 hr capsule 0.4 mg    tranexamic acid (CYKLOKAPRON) 3,000 mg in sodium chloride 0.9% 100 mL    tuberculin injection 5 Units     Objective:     Vital Signs (Most Recent):  Temp: 98.1 °F (36.7 °C) (07/13/17 2300)  Pulse: 80 (07/14/17 0215)  Resp: (!) 21 (07/14/17 0215)  BP: (!) 94/56 (07/14/17 0200)  SpO2: 100 % (07/14/17 0215) Vital Signs (24h Range):  Temp:  [97.6 °F (36.4 °C)-98.7 °F (37.1 °C)] 98.1 °F (36.7 °C)  Pulse:  [80-87] 80  Resp:  [12-25] 21  SpO2:  [74 %-100 %] 100 %  BP: ()/(48-60) 94/56  Arterial Line BP: ()/(40-70) 107/43     Weight: 57.8 kg (127 lb 6.8 oz)  Height: 5' 3" (160 cm)  Body mass index is 22.57 kg/m².      Intake/Output Summary (Last 24 hours) at 07/14/17 0557  Last data filed at 07/14/17 0300   Gross per 24 hour   Intake             2005 ml   Output              940 ml   Net             1065 ml       Ortho/SPM Exam     AAOx4  NAD  RRR  No increased WOB  Aquacel c/d/i  KI in place  SILT and motor intact T/SP/DP  WWP extremities  FCDs in place and functioning    Significant Labs: All pertinent labs within the past 24 hours have been reviewed.    Significant " Imaging: I have reviewed all pertinent imaging results/findings.    Assessment/Plan:     Closed fracture of proximal end of right humerus    - Non-operative treatment in sling  - NWB RUE        * Closed displaced fracture of right femoral neck    83 y.o. female POD2 s/p R hip hemiarthroplasty    Pain control  PT/OT: WBAT RLE, posterior hip precautions  DVT PPx: lovenox daily, FCDs at all times when not ambulating  Abx: postop Ancef complete  DVT US negative    Dispo: step down likely today              Erasmo Koenig MD  Orthopedics  [facility]    Attg Note:  I agree with the above assessment and plan.    Anshul Guerrero MD

## 2017-07-14 NOTE — ADDENDUM NOTE
Addendum  created 07/14/17 0712 by Sharon Zamudio MD    Actions taken from a BestPractice Advisory

## 2017-07-14 NOTE — ADDENDUM NOTE
Addendum  created 07/14/17 1334 by Lino Burch MD    Charge Capture section accepted, Sign clinical note

## 2017-07-14 NOTE — PROGRESS NOTES
Progress Note  Surgical Intensive Care    Admit Date: 7/11/2017  Post-operative Day: 2 Days Post-Op  Hospital Day: 4    SUBJECTIVE:     Follow-up For:  Procedure(s) (LRB):  ARTHROPLASTY-HIP-OLAYINKA-right (Right)    HPI: Patient is a 83 y.o. female with HFrEF (EF 20-25%) with NYHA IV sx s/p AICD placement, Afib (currently off coumadin 2/2 to frequent falls and SAH), ICM, HTN, s/p 3x CABG, hx of v tach on Amiodoarone, CKD III, and dementia, currently on home hospice on the [facility]. She had a fall at home and had immediate pain to her right shoulder and hip. She was transferred to [facility] for repair of right femoral neck fracture. Preoperatively she was found to be hypokalemic to 2.5 which became symptomatic as V-tach and 2 runs of v-fib. It was corrected and she was then stable to be taken to the OR for reduction of the fracture.     Interval history: Pt was stable overnight. Runs of V-tach again last night resolved with potassium replacement. Hip pain controlled better with epidural catheter with ropivacaine, but right shoulder pain moderate to severe this AM. Relieved with IV acetaminophen.     Continuous Infusions:   ropivacaine (PF) 2 mg/ml (0.2%) 8 mL/hr (07/13/17 0855)     Scheduled Meds:   acetaminophen  1,000 mg Oral Q8H    amiodarone  200 mg Oral BID    bumetanide  1 mg Oral Daily    carvedilol  3.125 mg Oral Nightly    citalopram  30 mg Oral Daily    digoxin  125 mcg Oral Every Mon, Wed, Fri    docusate sodium  100 mg Oral TID    enalapril  2.5 mg Oral Daily    enoxaparin  30 mg Subcutaneous Daily    isosorbide mononitrate  30 mg Oral Daily    levetiracetam  500 mg Oral BID    levothyroxine  88 mcg Oral Before breakfast    lidocaine  1 patch Transdermal Q24H    pantoprazole  40 mg Oral Daily    polyethylene glycol  17 g Oral Daily    sodium chloride 0.9%  3 mL Intravenous Q8H    sodium chloride 0.9%  3 mL Intravenous Q8H    spironolactone  12.5 mg Oral Every Mon, Wed, Fri    tamsulosin  0.4  mg Oral Daily    tranexamic acid (CYKLOKAPRON) 3,000 mg in sodium chloride 0.9% 100 mL  3,000 mg Irrigation Once    tuberculin  5 Units Intradermal Once     PRN Meds:albuterol-ipratropium 2.5mg-0.5mg/3mL, calcium gluconate IVPB, calcium gluconate IVPB, calcium gluconate IVPB, diphenhydrAMINE, magnesium sulfate IVPB, magnesium sulfate IVPB, magnesium sulfate IVPB, nitroGLYCERIN, ondansetron, oxycodone **AND** oxycodone **AND** [DISCONTINUED] HYDROmorphone **AND** [DISCONTINUED] HYDROmorphone, potassium chloride, potassium chloride **AND** potassium chloride **AND** potassium chloride, sodium phosphate IVPB, sodium phosphate IVPB, sodium phosphate IVPB, sodium phosphate IVPB, sodium phosphate IVPB, sodium phosphate IVPB    Review of patient's allergies indicates:   Allergen Reactions    Codeine Itching    Azithromycin      History of VT       OBJECTIVE:     Vital Signs (Most Recent)  Temp: 99.8 °F (37.7 °C) (07/14/17 0700)  Pulse: 80 (07/14/17 1000)  Resp: (!) 30 (07/14/17 1000)  BP: (!) 97/52 (07/14/17 1000)  SpO2: 100 % (07/14/17 1000)    Vital Signs Range (Last 24H):  Temp:  [97.6 °F (36.4 °C)-99.8 °F (37.7 °C)]   Pulse:  []   Resp:  [0-30]   BP: ()/(48-60)   SpO2:  [74 %-100 %]   Arterial Line BP: ()/(43-60)     I & O (Last 24H):    Intake/Output Summary (Last 24 hours) at 07/14/17 1048  Last data filed at 07/14/17 1000   Gross per 24 hour   Intake           2172.6 ml   Output             1110 ml   Net           1062.6 ml   Oxygen Concentration (%):  [40] 40    Physical Exam:  General: No acute distress, mildly sedated  HENT: Normocephalic, Atraumatic  Neck: supple, symmetrical, trachea midline  Lungs: normal respiratory effort  Heart: regular rate  Abdomen: soft, non-tender, non-distended  Neurologic: no focal deficits, sensation intact  Ext: right shoulder sling, tender, NVI distally    Laboratory (Last 24H):  CBC:    Recent Labs  Lab 07/14/17  0300   WBC 11.50   HGB 8.0*   HCT 25.6*   PLT  193     CMP:    Recent Labs  Lab 07/14/17  0300   CALCIUM 8.0*   ALBUMIN 2.1*   PROT 5.3*   *   K 3.6   CO2 25   CL 98   BUN 21   CREATININE 1.1   ALKPHOS 69   ALT 8*   AST 25   BILITOT 1.0       ASSESSMENT/PLAN:       Plan:     Neuro:   -dilaudid PRN for pain  -Epidural ropivacaine for pain   -Lidocaine patch for right shoulder pain  -not sedated    Pulmonary:   -Stable on RA     Cardiac:  -PO Amio 200mg  -now tolerated home meds PO  -MAPs have been  >65     Renal:   -Merritt in place  -Bun/Cr 21/1.1 stable  -UOP 1cc/kg/hr overnight shift     Fluids/Electrolytes/Nutrition/GI:   - regular diet  -symptomatic hypokalemic with runs of V tach  -continue to replace PRN     Hematology/Oncology:  -H/H  8.0/25.6, 10.0/31.6 preop, monitor CBCs closely  -INR 1.2  -DVT ppx: lovenox 30mg     Infectious Disease:   -Afebrile  -WBC 11.50 stable     Endocrine:  -Glucose 100s  -stable     Dispo:  -Stepdown today.    Tramaine Sosa PGY-1  [phone]  07/14/2017

## 2017-07-14 NOTE — PHYSICIAN QUERY
"PT Name: Laurita Stokes  MR #: [id number]     Physician Query Form - Documentation Clarification      CDS: Breana Reddy RN, CCDS         Contact information :[phone]     This form is a permanent document in the medical record.     Query Date: July 14, 2017    By submitting this query, we are merely seeking further clarification of documentation. Please utilize your independent clinical judgment when addressing the question(s) below.    The Medical record reflects the following:    Supporting Clinical Findings Location in Medical Record      "Preoperatively she was found to be hypokalemic to 2.5 which became symptomatic as V-tach and 2 runs of v-fib. It was corrected and she was then stable to be taken to the OR for reduction of the fracture."    "hx of ventricular tachycardia on amiodarone"  "Biventricular ICD (implantable cardioverter-defibrillator) in place"      "Patients monitor was alarming, upon entering the room patients heart rhythm was in V Tach. Patients pacemaker fired and converted patients heart rhythm back to SR in the 80's"     Critical Care H&P 7/12/17          Hospital med H&P 7/12/17        RN note 7/12/17                                                                                Doctor, Please specify diagnosis or diagnoses associated with above clinical findings.  Please clarify diagnoses:    Provider Use Only        ____V-tach      __xx__V-tack and V-fib      ____Other , please specify____                                                                                                               [  ] Clinically undetermined            "

## 2017-07-15 LAB
ANION GAP SERPL CALC-SCNC: 8 MMOL/L
BUN SERPL-MCNC: 27 MG/DL
CALCIUM SERPL-MCNC: 8.1 MG/DL
CHLORIDE SERPL-SCNC: 97 MMOL/L
CO2 SERPL-SCNC: 29 MMOL/L
CREAT SERPL-MCNC: 1.1 MG/DL
EST. GFR  (AFRICAN AMERICAN): 53.7 ML/MIN/1.73 M^2
EST. GFR  (NON AFRICAN AMERICAN): 46.5 ML/MIN/1.73 M^2
GLUCOSE SERPL-MCNC: 74 MG/DL
POTASSIUM SERPL-SCNC: 3.1 MMOL/L
SODIUM SERPL-SCNC: 134 MMOL/L

## 2017-07-15 PROCEDURE — 25000003 PHARM REV CODE 250: Performed by: STUDENT IN AN ORGANIZED HEALTH CARE EDUCATION/TRAINING PROGRAM

## 2017-07-15 PROCEDURE — 25000003 PHARM REV CODE 250: Performed by: ANESTHESIOLOGY

## 2017-07-15 PROCEDURE — 63600175 PHARM REV CODE 636 W HCPCS: Performed by: STUDENT IN AN ORGANIZED HEALTH CARE EDUCATION/TRAINING PROGRAM

## 2017-07-15 PROCEDURE — 99231 SBSQ HOSP IP/OBS SF/LOW 25: CPT | Mod: ,,, | Performed by: ANESTHESIOLOGY

## 2017-07-15 PROCEDURE — 25000003 PHARM REV CODE 250: Performed by: HOSPITALIST

## 2017-07-15 PROCEDURE — 99232 SBSQ HOSP IP/OBS MODERATE 35: CPT | Mod: GC,,, | Performed by: ANESTHESIOLOGY

## 2017-07-15 PROCEDURE — 11000001 HC ACUTE MED/SURG PRIVATE ROOM

## 2017-07-15 PROCEDURE — 80048 BASIC METABOLIC PNL TOTAL CA: CPT

## 2017-07-15 PROCEDURE — 63600175 PHARM REV CODE 636 W HCPCS: Performed by: ANESTHESIOLOGY

## 2017-07-15 PROCEDURE — 94799 UNLISTED PULMONARY SVC/PX: CPT

## 2017-07-15 PROCEDURE — 27000221 HC OXYGEN, UP TO 24 HOURS

## 2017-07-15 RX ADMIN — LEVETIRACETAM 500 MG: 500 TABLET, FILM COATED ORAL at 08:07

## 2017-07-15 RX ADMIN — ENOXAPARIN SODIUM 30 MG: 100 INJECTION SUBCUTANEOUS at 05:07

## 2017-07-15 RX ADMIN — Medication 3 ML: at 05:07

## 2017-07-15 RX ADMIN — Medication 3 ML: at 02:07

## 2017-07-15 RX ADMIN — LEVOTHYROXINE SODIUM 88 MCG: 88 TABLET ORAL at 05:07

## 2017-07-15 RX ADMIN — LIDOCAINE 1 PATCH: 50 PATCH CUTANEOUS at 09:07

## 2017-07-15 RX ADMIN — CITALOPRAM HYDROBROMIDE 30 MG: 20 TABLET ORAL at 09:07

## 2017-07-15 RX ADMIN — POLYETHYLENE GLYCOL 3350 17 G: 17 POWDER, FOR SOLUTION ORAL at 09:07

## 2017-07-15 RX ADMIN — ACETAMINOPHEN 1000 MG: 500 TABLET ORAL at 05:07

## 2017-07-15 RX ADMIN — AMIODARONE HYDROCHLORIDE 200 MG: 200 TABLET ORAL at 09:07

## 2017-07-15 RX ADMIN — POTASSIUM CHLORIDE 60 MEQ: 200 INJECTION, SOLUTION INTRAVENOUS at 11:07

## 2017-07-15 RX ADMIN — OXYCODONE HYDROCHLORIDE 10 MG: 5 TABLET ORAL at 05:07

## 2017-07-15 RX ADMIN — AMOXICILLIN AND CLAVULANATE POTASSIUM 500 MG: 500; 125 TABLET, FILM COATED ORAL at 08:07

## 2017-07-15 RX ADMIN — ACETAMINOPHEN 1000 MG: 500 TABLET ORAL at 01:07

## 2017-07-15 RX ADMIN — DOCUSATE SODIUM 100 MG: 50 CAPSULE, LIQUID FILLED ORAL at 09:07

## 2017-07-15 RX ADMIN — ACETAMINOPHEN 1000 MG: 500 TABLET ORAL at 09:07

## 2017-07-15 RX ADMIN — Medication 3 ML: at 09:07

## 2017-07-15 RX ADMIN — AMOXICILLIN AND CLAVULANATE POTASSIUM 500 MG: 500; 125 TABLET, FILM COATED ORAL at 09:07

## 2017-07-15 RX ADMIN — LEVETIRACETAM 500 MG: 500 TABLET, FILM COATED ORAL at 09:07

## 2017-07-15 RX ADMIN — PANTOPRAZOLE SODIUM 40 MG: 40 GRANULE, DELAYED RELEASE ORAL at 09:07

## 2017-07-15 RX ADMIN — BUMETANIDE 1 MG: 1 TABLET ORAL at 02:07

## 2017-07-15 RX ADMIN — OXYCODONE HYDROCHLORIDE 5 MG: 5 TABLET ORAL at 12:07

## 2017-07-15 RX ADMIN — DOCUSATE SODIUM 100 MG: 50 CAPSULE, LIQUID FILLED ORAL at 05:07

## 2017-07-15 RX ADMIN — AMIODARONE HYDROCHLORIDE 200 MG: 200 TABLET ORAL at 08:07

## 2017-07-15 RX ADMIN — Medication 3 ML: at 01:07

## 2017-07-15 RX ADMIN — ROPIVACAINE HYDROCHLORIDE 8 ML/HR: 2 INJECTION, SOLUTION EPIDURAL; INFILTRATION at 01:07

## 2017-07-15 RX ADMIN — TAMSULOSIN HYDROCHLORIDE 0.4 MG: 0.4 CAPSULE ORAL at 09:07

## 2017-07-15 RX ADMIN — CARVEDILOL 3.12 MG: 3.12 TABLET, FILM COATED ORAL at 08:07

## 2017-07-15 NOTE — NURSING TRANSFER
Nursing Transfer Note      7/15/2017     Transfer To: 511    Transfer via bed    Transfer with 2L to O2, cardiac monitoring    Transported by INDIO Mayorga/Taz RN    Medicines sent: yes    Chart send with patient: Yes    Notified: daughter    Patient reassessed at: 07/15/2017 1400   (date, time)    Upon arrival to floor: cardiac monitor applied, patient oriented to room, call bell in reach and bed in lowest position    Report given to INDIO Vyas prior to transfer.

## 2017-07-15 NOTE — PROGRESS NOTES
MD Sosa notified of low uop, 10 mL. Orders received to administer Bumex that was held this AM. MD notified of pt transferring to 511. MD stated pt could still transfer. Will continue to closely monitor.

## 2017-07-15 NOTE — SUBJECTIVE & OBJECTIVE
Principal Problem:Closed displaced fracture of right femoral neck    Principal Orthopedic Problem: s/p R hip hemiarthroplasty    Interval History: Patient seen and examined at bedside.  No acute events overnight.  Pain controlled. Stepping down to floor today    Review of patient's allergies indicates:   Allergen Reactions    Codeine Itching    Azithromycin      History of VT       Current Facility-Administered Medications   Medication    acetaminophen tablet 1,000 mg    albuterol-ipratropium 2.5mg-0.5mg/3mL nebulizer solution 3 mL    amiodarone tablet 200 mg    amoxicillin-clavulanate 500-125mg per tablet 500 mg    bumetanide tablet 1 mg    calcium gluconate 1 g in dextrose 5 % 100 mL IVPB (premix)    calcium gluconate 1 g in dextrose 5 % 100 mL IVPB (premix)    calcium gluconate 1 g in dextrose 5 % 100 mL IVPB (premix)    carvedilol tablet 3.125 mg    citalopram tablet 30 mg    digoxin tablet 125 mcg    diphenhydrAMINE injection 25 mg    docusate sodium capsule 100 mg    enalapril tablet 2.5 mg    enoxaparin injection 30 mg    isosorbide mononitrate 24 hr tablet 30 mg    levetiracetam tablet 500 mg    levothyroxine tablet 88 mcg    lidocaine 5 % patch 1 patch    magnesium sulfate 2g in water 50mL IVPB (premix)    magnesium sulfate 2g in water 50mL IVPB (premix)    magnesium sulfate 2g in water 50mL IVPB (premix)    nitroGLYCERIN SL tablet 0.4 mg    ondansetron injection 4 mg    oxycodone immediate release tablet 5 mg    And    oxycodone immediate release tablet 10 mg    pantoprazole suspension 40 mg    polyethylene glycol packet 17 g    potassium chloride 10 mEq in 100 mL IVPB    potassium chloride 40 mEq in 100 mL IVPB (FOR CENTRAL LINE ADMINISTRATION ONLY)    And    potassium chloride 20 mEq in 100 mL IVPB (FOR CENTRAL LINE ADMINISTRATION ONLY)    And    potassium chloride 40 mEq in 100 mL IVPB (FOR CENTRAL LINE ADMINISTRATION ONLY)    ropivacaine (PF) 2 mg/ml (0.2%) infusion  "   sodium chloride 0.9% flush 3 mL    sodium chloride 0.9% flush 3 mL    sodium phosphate 15 mmol in dextrose 5 % 250 mL IVPB    sodium phosphate 15 mmol in dextrose 5 % 250 mL IVPB    sodium phosphate 20.01 mmol in dextrose 5 % 250 mL IVPB    sodium phosphate 20.01 mmol in dextrose 5 % 250 mL IVPB    sodium phosphate 30 mmol in dextrose 5 % 250 mL IVPB    sodium phosphate 30 mmol in dextrose 5 % 250 mL IVPB    spironolactone split tablet 12.5 mg    tamsulosin 24 hr capsule 0.4 mg    tranexamic acid (CYKLOKAPRON) 3,000 mg in sodium chloride 0.9% 100 mL    tuberculin injection 5 Units     Objective:     Vital Signs (Most Recent):  Temp: 97.9 °F (36.6 °C) (07/15/17 0700)  Pulse: 80 (07/15/17 0745)  Resp: (!) 24 (07/15/17 0745)  BP: (!) 96/51 (07/15/17 0730)  SpO2: (!) 93 % (07/15/17 0745) Vital Signs (24h Range):  Temp:  [97.5 °F (36.4 °C)-98.8 °F (37.1 °C)] 97.9 °F (36.6 °C)  Pulse:  [79-80] 80  Resp:  [11-31] 24  SpO2:  [90 %-100 %] 93 %  BP: ()/(48-59) 96/51     Weight: 57.8 kg (127 lb 6.8 oz)  Height: 5' 3" (160 cm)  Body mass index is 22.57 kg/m².      Intake/Output Summary (Last 24 hours) at 07/15/17 0805  Last data filed at 07/15/17 0700   Gross per 24 hour   Intake             1334 ml   Output             1225 ml   Net              109 ml       Ortho/SPM Exam     AAOx4  NAD  RRR  No increased WOB  Aquacel c/d/i  KI in place  SILT and motor intact T/SP/DP  WWP extremities  FCDs in place and functioning    Significant Labs: All pertinent labs within the past 24 hours have been reviewed.    Significant Imaging: I have reviewed all pertinent imaging results/findings.  "

## 2017-07-15 NOTE — NURSING
Pt arrived to room via stretcher. Pt rates pain as tolerable. Pt denies nausea. Pt denies numbness and tingling. PNC intact. Pt oriented to self. Pt dressing clean dry and intact. Pt oriented to room and call system. Will continue to monitor.

## 2017-07-15 NOTE — PROGRESS NOTES
Progress Note  Surgical Intensive Care    Admit Date: 7/11/2017  Post-operative Day: 3 Days Post-Op  Hospital Day: 5    SUBJECTIVE:     Follow-up For:  Procedure(s) (LRB):  ARTHROPLASTY-HIP-OLAYINKA-right (Right)    HPI: Patient is a 83 y.o. female with HFrEF (EF 20-25%) with NYHA IV sx s/p AICD placement, Afib (currently off coumadin 2/2 to frequent falls and SAH), ICM, HTN, s/p 3x CABG, hx of v tach on Amiodoarone, CKD III, and dementia, currently on home hospice on the [facility]. She had a fall at home and had immediate pain to her right shoulder and hip. [place] [facility] [place]. Preoperatively she was found to be hypokalemic to 2.5 which became symptomatic as V-tach and 2 runs of v-fib. It was corrected and she was then stable to be taken to the OR for reduction of the fracture.     Interval history: Pt was stable overnight. Pain is more controlled this AM. Rhythms stable overnight. Stepping down today.      Continuous Infusions:   ropivacaine (PF) 2 mg/ml (0.2%) 8 mL/hr (07/15/17 0700)     Scheduled Meds:   acetaminophen  1,000 mg Oral Q8H    amiodarone  200 mg Oral BID    amoxicillin-clavulanate 500-125mg  1 tablet Oral BID    bumetanide  1 mg Oral Daily    carvedilol  3.125 mg Oral Nightly    citalopram  30 mg Oral Daily    digoxin  125 mcg Oral Every Mon, Wed, Fri    docusate sodium  100 mg Oral TID    enalapril  2.5 mg Oral Daily    enoxaparin  30 mg Subcutaneous Daily    isosorbide mononitrate  30 mg Oral Daily    levetiracetam  500 mg Oral BID    levothyroxine  88 mcg Oral Before breakfast    lidocaine  1 patch Transdermal Q24H    pantoprazole  40 mg Oral Daily    polyethylene glycol  17 g Oral Daily    sodium chloride 0.9%  3 mL Intravenous Q8H    sodium chloride 0.9%  3 mL Intravenous Q8H    spironolactone  12.5 mg Oral Every Mon, Wed, Fri    tamsulosin  0.4 mg Oral Daily    tranexamic acid (CYKLOKAPRON) 3,000 mg in sodium chloride 0.9% 100  mL  3,000 mg Irrigation Once    tuberculin  5 Units Intradermal Once     PRN Meds:albuterol-ipratropium 2.5mg-0.5mg/3mL, calcium gluconate IVPB, calcium gluconate IVPB, calcium gluconate IVPB, diphenhydrAMINE, magnesium sulfate IVPB, magnesium sulfate IVPB, magnesium sulfate IVPB, nitroGLYCERIN, ondansetron, oxycodone **AND** oxycodone **AND** [DISCONTINUED] HYDROmorphone **AND** [DISCONTINUED] HYDROmorphone, potassium chloride, potassium chloride **AND** potassium chloride **AND** potassium chloride, sodium phosphate IVPB, sodium phosphate IVPB, sodium phosphate IVPB, sodium phosphate IVPB, sodium phosphate IVPB, sodium phosphate IVPB    Review of patient's allergies indicates:   Allergen Reactions    Codeine Itching    Azithromycin      History of VT       OBJECTIVE:     Vital Signs (Most Recent)  Temp: 97.9 °F (36.6 °C) (07/15/17 0700)  Pulse: 80 (07/15/17 0800)  Resp: 14 (07/15/17 0800)  BP: (!) 96/51 (07/15/17 0730)  SpO2: 100 % (07/15/17 0800)    Vital Signs Range (Last 24H):  Temp:  [97.5 °F (36.4 °C)-98.8 °F (37.1 °C)]   Pulse:  [80]   Resp:  [11-31]   BP: ()/(50-57)   SpO2:  [90 %-100 %]     I & O (Last 24H):    Intake/Output Summary (Last 24 hours) at 07/15/17 0905  Last data filed at 07/15/17 0700   Gross per 24 hour   Intake             1334 ml   Output             1210 ml   Net              124 ml   Oxygen Concentration (%):  [36] 36    Physical Exam:  General: No acute distress, mildly sedated  HENT: Normocephalic, Atraumatic  Neck: supple, symmetrical, trachea midline  Lungs: normal respiratory effort  Heart: regular rate  Abdomen: soft, non-tender, non-distended  Neurologic: no focal deficits, sensation intact  Ext: right shoulder sling, tender, NVI distally    Laboratory (Last 24H):  CBC:  No results for input(s): WBC, HGB, HCT, PLT in the last 24 hours.  CMP:  No results for input(s): GLUCOSE, CALCIUM, ALBUMIN, PROT, NA, K, CO2, CL, BUN, CREATININE, ALKPHOS, ALT, AST, BILITOT in the last  24 hours.    ASSESSMENT/PLAN:       Plan:     Neuro:   -dilaudid PRN for pain  -Epidural ropivacaine for pain   -Lidocaine patch for right shoulder pain    Pulmonary:   -Stable on 4L NC      Cardiac:  -PO Amio 200mg  -now tolerated home meds PO  -MAPs have been  >65  -held bumex, imdur, and enalapril this am for MAP of 66.      Renal:   -Merritt in place  -Bun/Cr 21/1.1 stable  -UOP 1cc/kg/hr overnight shift     Fluids/Electrolytes/Nutrition/GI:   -regular diet  -symptomatic hypokalemic with runs of V tach  -monitor BMPs closely for low potassium  -continue to replace PRN     Hematology/Oncology:  -H/H  8.0/25.6, 10.0/31.6 preop, monitor CBCs closely  -INR 1.2  -DVT ppx: lovenox 30mg     Infectious Disease:   -Afebrile  -WBC 11.50 stable     Endocrine:  -Glucose 100s  -stable     Dispo:  -Stepdown today.    Tramaine Sosa PGY-1  [phone]  07/15/2017

## 2017-07-15 NOTE — ANESTHESIA POST-OP PAIN MANAGEMENT
Acute Pain Service Progress Note    Laurita Stokes is a 83 y.o., female, [id number].       Surgery:  ARTHROPLASTY-HIP-OLAYINKA-right (Right)    Post Op Day #: 4    Catheter type: perineural  SIFI     Infusion type: Ropivacaine 0.2%  8 basal    Problem List:    Active Hospital Problems    Diagnosis  POA    *Closed displaced fracture of right femoral neck [S72.001A]  Yes    Debility [R53.81]  Yes    CKD (chronic kidney disease), stage III [N18.3]  Yes    Chronic respiratory failure with hypoxia [J96.11]  Yes    Hip fracture [S72.009A]  Yes    Closed fracture of proximal end of right humerus [S42.201A]  Yes    Coronary artery disease [I25.10]  Yes    Hypothyroidism [E03.9]  Yes    CHF NYHA class IV [I50.9]  Yes    DDD (degenerative disc disease), lumbar [M51.36]  Yes    Essential hypertension [I10]  Yes    Depression [F32.9]  Yes    Dementia without behavioral disturbance [F03.90]  Yes    Chronic combined systolic and diastolic heart failure [I50.42]  Yes    Biventricular ICD (implantable cardioverter-defibrillator) in place [Z95.810]  Yes     [facility] [place]      Cardiomyopathy, ischemic [I25.5]  Yes      Resolved Hospital Problems    Diagnosis Date Resolved POA   No resolved problems to display.       Subjective:                           General appearance of awake                        Pain with rest: 1    Faces                        Pain with movement: Unable to assess. Patient has yet to participate in PT.                         Side Effects                                              1. Pruritis No                                              2. Nausea No                                              3. Motor Blockade No, 0=Ability to raise lower extremities off bed                                              4. Sedation No, 1=awake and alert     Objective:                              Catheter site clean, dry, intact         Vitals   Vitals:    07/16/17 1044   BP:    Pulse: 80   Resp:    Temp:          Labs    Admission on 07/11/2017   No results displayed because visit has over 200 results.           Meds   Current Facility-Administered Medications   Medication Dose Route Frequency Provider Last Rate Last Dose    acetaminophen tablet 1,000 mg  1,000 mg Oral Q8H Ancelmo Warren MD        acetaminophen tablet 650 mg  650 mg Oral Q6H PRN Serafin Zepeda NP   650 mg at 07/16/17 1005    albuterol-ipratropium 2.5mg-0.5mg/3mL nebulizer solution 3 mL  3 mL Nebulization Q4H PRN Arjarrell Soliz, DO        amiodarone tablet 200 mg  200 mg Oral BID Dallas Soliz, DO   200 mg at 07/16/17 0821    amoxicillin-clavulanate 500-125mg per tablet 500 mg  1 tablet Oral BID Tramaine Sosa MD   500 mg at 07/16/17 0822    bumetanide tablet 1 mg  1 mg Oral Daily Dallas Soliz, DO   1 mg at 07/16/17 0821    carvedilol tablet 3.125 mg  3.125 mg Oral Nightly Tramaine Sosa MD   3.125 mg at 07/15/17 2002    citalopram tablet 30 mg  30 mg Oral Daily Arjarrell Soliz, DO   30 mg at 07/16/17 0820    digoxin tablet 125 mcg  125 mcg Oral Every Mon, Wed, Fri Tramaine Sosa MD   125 mcg at 07/14/17 1627    docusate sodium capsule 100 mg  100 mg Oral TID Erasmo Koenig MD   100 mg at 07/16/17 0522    enoxaparin injection 30 mg  30 mg Subcutaneous Daily Erasmo Koenig MD   30 mg at 07/15/17 1735    isosorbide mononitrate 24 hr tablet 30 mg  30 mg Oral Daily Tramaine Sosa MD   Stopped at 07/13/17 0900    levetiracetam tablet 500 mg  500 mg Oral BID Tramaine Sosa MD   500 mg at 07/16/17 0822    levothyroxine tablet 88 mcg  88 mcg Oral Before breakfast Arjarrell Soliz, DO   88 mcg at 07/16/17 0521    lidocaine 5 % patch 1 patch  1 patch Transdermal Q24H Janay Parry MD   1 patch at 07/16/17 0935    magnesium sulfate 2g in water 50mL IVPB (premix)  2 g Intravenous Once Niki Mccord MD   2 g at 07/16/17 0973    nitroGLYCERIN SL tablet 0.4 mg  0.4 mg Sublingual Q5 Min PRN Dallas Soliz DO        ondansetron injection 4 mg   4 mg Intravenous Q8H PRN Sharon Zamudio MD        pantoprazole suspension 40 mg  40 mg Oral Daily Arup K. Heydi, DO   40 mg at 07/16/17 0821    polyethylene glycol packet 17 g  17 g Oral Daily Erasmo Koenig MD   Stopped at 07/16/17 0822    potassium chloride 10% solution 40 mEq  40 mEq Oral Q4H Niki Mccord MD   40 mEq at 07/16/17 0935    ropivacaine (PF) 2 mg/ml (0.2%) infusion  8 mL/hr Perineural Continuous Mohinder Azevedo MD 8 mL/hr at 07/16/17 1045 8 mL/hr at 07/16/17 1045    sodium chloride 0.9% flush 3 mL  3 mL Intravenous Q8H Arup K. Heydi, DO   3 mL at 07/16/17 0521    spironolactone split tablet 12.5 mg  12.5 mg Oral Every Mon, Wed, Fri Arup K. Heydi, DO   12.5 mg at 07/14/17 0941    tamsulosin 24 hr capsule 0.4 mg  0.4 mg Oral Daily Arup K. Heydi, DO   0.4 mg at 07/16/17 0821    tranexamic acid (CYKLOKAPRON) 3,000 mg in sodium chloride 0.9% 100 mL  3,000 mg Irrigation Once Anshul Guerrero MD        tuberculin injection 5 Units  5 Units Intradermal Once Niki Mccord MD           Assessment:                           Pain control adequate. Somnolent this AM.      Plan:                           Stop PRN Opiods. Continue Q8 Acetaminophen for additional day and SIFI catheter with infusion. Will plan to d/c SIFI at time of discharge from hospital.     Evaluator Ancelmo Warren      Patient is POD #4 following hemiarthroplasty.  Patient seen with resident and I agree with the plan as noted above.    Patient reportedly had rough night last night with increased confusion.  Sleeping today but opened eyes to voice.  Received oral oxy once yesterday, will take off MAR given somnolence.  Continue scheduled APAP, ok for single lidocaine patch to shoulder.    Lisa Cordoba MD

## 2017-07-15 NOTE — PROGRESS NOTES
MD Dawson notified of BP 89/51 MAP 66, orders received to hold Imdur, Enalapril and Bumex. Will continue to closely monitor.

## 2017-07-15 NOTE — ADDENDUM NOTE
Addendum  created 07/15/17 1053 by Lisa Cordoba MD    Charge Capture section accepted, Sign clinical note

## 2017-07-15 NOTE — ASSESSMENT & PLAN NOTE
83 y.o. female POD3 s/p R hip hemiarthroplasty    Pain control  PT/OT: WBAT RLE, posterior hip precautions  DVT PPx: lovenox daily, FCDs at all times when not ambulating  Abx: postop Ancef complete      Dispo: step down to medicine today

## 2017-07-15 NOTE — PLAN OF CARE
Problem: Patient Care Overview  Goal: Plan of Care Review  Outcome: Ongoing (interventions implemented as appropriate)  Plan of care reviewed and updated. Pt AA+O. Pt's pain is managed with the medication ordered at this time. Pt's VS are as charted.  No falls this shift. Pt is oriented to room and call system. Will continue to [facility].

## 2017-07-15 NOTE — PROGRESS NOTES
[facility]  Orthopedics  Progress Note    Patient Name: Laurita Stokes  MRN: [id number]  Admission Date: 7/11/2017  Hospital Length of Stay: 4 days  Attending Provider: Niki Mccord MD  Primary Care Provider: Alex Capellan MD  Follow-up For: Procedure(s) (LRB):  ARTHROPLASTY-HIP-OLAYINKA-right (Right)    Post-Operative Day: 3 Days Post-Op  Subjective:     Principal Problem:Closed displaced fracture of right femoral neck    Principal Orthopedic Problem: s/p R hip hemiarthroplasty    Interval History: Patient seen and examined at bedside.  No acute events overnight.  Pain controlled. Stepping down to floor today    Review of patient's allergies indicates:   Allergen Reactions    Codeine Itching    Azithromycin      History of VT       Current Facility-Administered Medications   Medication    acetaminophen tablet 1,000 mg    albuterol-ipratropium 2.5mg-0.5mg/3mL nebulizer solution 3 mL    amiodarone tablet 200 mg    amoxicillin-clavulanate 500-125mg per tablet 500 mg    bumetanide tablet 1 mg    calcium gluconate 1 g in dextrose 5 % 100 mL IVPB (premix)    calcium gluconate 1 g in dextrose 5 % 100 mL IVPB (premix)    calcium gluconate 1 g in dextrose 5 % 100 mL IVPB (premix)    carvedilol tablet 3.125 mg    citalopram tablet 30 mg    digoxin tablet 125 mcg    diphenhydrAMINE injection 25 mg    docusate sodium capsule 100 mg    enalapril tablet 2.5 mg    enoxaparin injection 30 mg    isosorbide mononitrate 24 hr tablet 30 mg    levetiracetam tablet 500 mg    levothyroxine tablet 88 mcg    lidocaine 5 % patch 1 patch    magnesium sulfate 2g in water 50mL IVPB (premix)    magnesium sulfate 2g in water 50mL IVPB (premix)    magnesium sulfate 2g in water 50mL IVPB (premix)    nitroGLYCERIN SL tablet 0.4 mg    ondansetron injection 4 mg    oxycodone immediate release tablet 5 mg    And    oxycodone immediate release tablet 10 mg    pantoprazole suspension 40 mg     "polyethylene glycol packet 17 g    potassium chloride 10 mEq in 100 mL IVPB    potassium chloride 40 mEq in 100 mL IVPB (FOR CENTRAL LINE ADMINISTRATION ONLY)    And    potassium chloride 20 mEq in 100 mL IVPB (FOR CENTRAL LINE ADMINISTRATION ONLY)    And    potassium chloride 40 mEq in 100 mL IVPB (FOR CENTRAL LINE ADMINISTRATION ONLY)    ropivacaine (PF) 2 mg/ml (0.2%) infusion    sodium chloride 0.9% flush 3 mL    sodium chloride 0.9% flush 3 mL    sodium phosphate 15 mmol in dextrose 5 % 250 mL IVPB    sodium phosphate 15 mmol in dextrose 5 % 250 mL IVPB    sodium phosphate 20.01 mmol in dextrose 5 % 250 mL IVPB    sodium phosphate 20.01 mmol in dextrose 5 % 250 mL IVPB    sodium phosphate 30 mmol in dextrose 5 % 250 mL IVPB    sodium phosphate 30 mmol in dextrose 5 % 250 mL IVPB    spironolactone split tablet 12.5 mg    tamsulosin 24 hr capsule 0.4 mg    tranexamic acid (CYKLOKAPRON) 3,000 mg in sodium chloride 0.9% 100 mL    tuberculin injection 5 Units     Objective:     Vital Signs (Most Recent):  Temp: 97.9 °F (36.6 °C) (07/15/17 0700)  Pulse: 80 (07/15/17 0745)  Resp: (!) 24 (07/15/17 0745)  BP: (!) 96/51 (07/15/17 0730)  SpO2: (!) 93 % (07/15/17 0745) Vital Signs (24h Range):  Temp:  [97.5 °F (36.4 °C)-98.8 °F (37.1 °C)] 97.9 °F (36.6 °C)  Pulse:  [79-80] 80  Resp:  [11-31] 24  SpO2:  [90 %-100 %] 93 %  BP: ()/(48-59) 96/51     Weight: 57.8 kg (127 lb 6.8 oz)  Height: 5' 3" (160 cm)  Body mass index is 22.57 kg/m².      Intake/Output Summary (Last 24 hours) at 07/15/17 0805  Last data filed at 07/15/17 0700   Gross per 24 hour   Intake             1334 ml   Output             1225 ml   Net              109 ml       Ortho/SPM Exam     AAOx4  NAD  RRR  No increased WOB  Aquacel c/d/i  KI in place  SILT and motor intact T/SP/DP  WWP extremities  FCDs in place and functioning    Significant Labs: All pertinent labs within the past 24 hours have been reviewed.    Significant Imaging: I " have reviewed all pertinent imaging results/findings.    Assessment/Plan:     Closed fracture of proximal end of right humerus    - Non-operative treatment in sling  - NWB RUE        * Closed displaced fracture of right femoral neck    83 y.o. female POD3 s/p R hip hemiarthroplasty    Pain control  PT/OT: WBAT RLE, posterior hip precautions  DVT PPx: lovenox daily, FCDs at all times when not ambulating  Abx: postop Ancef complete      Dispo: step down to medicine today              Marino Nuñez MD  Orthopedics  [facility]

## 2017-07-16 LAB
ANION GAP SERPL CALC-SCNC: 9 MMOL/L
BLD PROD TYP BPU: NORMAL
BLD PROD TYP BPU: NORMAL
BLOOD UNIT EXPIRATION DATE: NORMAL
BLOOD UNIT EXPIRATION DATE: NORMAL
BLOOD UNIT TYPE CODE: 6200
BLOOD UNIT TYPE CODE: 6200
BLOOD UNIT TYPE: NORMAL
BLOOD UNIT TYPE: NORMAL
BUN SERPL-MCNC: 27 MG/DL
CALCIUM SERPL-MCNC: 8 MG/DL
CHLORIDE SERPL-SCNC: 97 MMOL/L
CO2 SERPL-SCNC: 27 MMOL/L
CODING SYSTEM: NORMAL
CODING SYSTEM: NORMAL
CREAT SERPL-MCNC: 0.9 MG/DL
DISPENSE STATUS: NORMAL
DISPENSE STATUS: NORMAL
EST. GFR  (AFRICAN AMERICAN): >60 ML/MIN/1.73 M^2
EST. GFR  (NON AFRICAN AMERICAN): 59.3 ML/MIN/1.73 M^2
GLUCOSE SERPL-MCNC: 68 MG/DL
POTASSIUM SERPL-SCNC: 3.2 MMOL/L
SODIUM SERPL-SCNC: 133 MMOL/L
TRANS ERYTHROCYTES VOL PATIENT: NORMAL ML
TRANS ERYTHROCYTES VOL PATIENT: NORMAL ML

## 2017-07-16 PROCEDURE — 25000003 PHARM REV CODE 250: Performed by: HOSPITALIST

## 2017-07-16 PROCEDURE — 25000003 PHARM REV CODE 250: Performed by: NURSE PRACTITIONER

## 2017-07-16 PROCEDURE — 36415 COLL VENOUS BLD VENIPUNCTURE: CPT

## 2017-07-16 PROCEDURE — 80048 BASIC METABOLIC PNL TOTAL CA: CPT

## 2017-07-16 PROCEDURE — 11000001 HC ACUTE MED/SURG PRIVATE ROOM

## 2017-07-16 PROCEDURE — 99231 SBSQ HOSP IP/OBS SF/LOW 25: CPT | Mod: ,,, | Performed by: ANESTHESIOLOGY

## 2017-07-16 PROCEDURE — 63600175 PHARM REV CODE 636 W HCPCS: Performed by: STUDENT IN AN ORGANIZED HEALTH CARE EDUCATION/TRAINING PROGRAM

## 2017-07-16 PROCEDURE — 25000003 PHARM REV CODE 250: Performed by: STUDENT IN AN ORGANIZED HEALTH CARE EDUCATION/TRAINING PROGRAM

## 2017-07-16 PROCEDURE — 25000003 PHARM REV CODE 250: Performed by: ANESTHESIOLOGY

## 2017-07-16 PROCEDURE — 97535 SELF CARE MNGMENT TRAINING: CPT

## 2017-07-16 PROCEDURE — 97530 THERAPEUTIC ACTIVITIES: CPT

## 2017-07-16 PROCEDURE — 63600175 PHARM REV CODE 636 W HCPCS: Performed by: HOSPITALIST

## 2017-07-16 PROCEDURE — 99232 SBSQ HOSP IP/OBS MODERATE 35: CPT | Mod: ,,, | Performed by: HOSPITALIST

## 2017-07-16 PROCEDURE — 63600175 PHARM REV CODE 636 W HCPCS: Performed by: ANESTHESIOLOGY

## 2017-07-16 RX ORDER — POTASSIUM CHLORIDE 20 MEQ/15ML
40 SOLUTION ORAL EVERY 4 HOURS
Status: COMPLETED | OUTPATIENT
Start: 2017-07-16 | End: 2017-07-16

## 2017-07-16 RX ORDER — MAGNESIUM SULFATE HEPTAHYDRATE 40 MG/ML
2 INJECTION, SOLUTION INTRAVENOUS ONCE
Status: COMPLETED | OUTPATIENT
Start: 2017-07-16 | End: 2017-07-16

## 2017-07-16 RX ORDER — ACETAMINOPHEN 500 MG
1000 TABLET ORAL EVERY 8 HOURS
Status: DISPENSED | OUTPATIENT
Start: 2017-07-16 | End: 2017-07-21

## 2017-07-16 RX ORDER — ACETAMINOPHEN 325 MG/1
650 TABLET ORAL EVERY 6 HOURS PRN
Status: DISCONTINUED | OUTPATIENT
Start: 2017-07-16 | End: 2017-07-16

## 2017-07-16 RX ADMIN — AMOXICILLIN AND CLAVULANATE POTASSIUM 500 MG: 500; 125 TABLET, FILM COATED ORAL at 08:07

## 2017-07-16 RX ADMIN — POTASSIUM CHLORIDE 40 MEQ: 20 SOLUTION ORAL at 09:07

## 2017-07-16 RX ADMIN — LEVETIRACETAM 500 MG: 500 TABLET, FILM COATED ORAL at 08:07

## 2017-07-16 RX ADMIN — CARVEDILOL 3.12 MG: 3.12 TABLET, FILM COATED ORAL at 08:07

## 2017-07-16 RX ADMIN — AMIODARONE HYDROCHLORIDE 200 MG: 200 TABLET ORAL at 08:07

## 2017-07-16 RX ADMIN — TAMSULOSIN HYDROCHLORIDE 0.4 MG: 0.4 CAPSULE ORAL at 08:07

## 2017-07-16 RX ADMIN — LEVOTHYROXINE SODIUM 88 MCG: 88 TABLET ORAL at 05:07

## 2017-07-16 RX ADMIN — ACETAMINOPHEN 1000 MG: 500 TABLET ORAL at 04:07

## 2017-07-16 RX ADMIN — CITALOPRAM HYDROBROMIDE 30 MG: 20 TABLET ORAL at 08:07

## 2017-07-16 RX ADMIN — ENOXAPARIN SODIUM 30 MG: 100 INJECTION SUBCUTANEOUS at 04:07

## 2017-07-16 RX ADMIN — DOCUSATE SODIUM 100 MG: 50 CAPSULE, LIQUID FILLED ORAL at 05:07

## 2017-07-16 RX ADMIN — Medication 3 ML: at 09:07

## 2017-07-16 RX ADMIN — BUMETANIDE 1 MG: 1 TABLET ORAL at 08:07

## 2017-07-16 RX ADMIN — ACETAMINOPHEN 650 MG: 325 TABLET ORAL at 10:07

## 2017-07-16 RX ADMIN — Medication 3 ML: at 05:07

## 2017-07-16 RX ADMIN — PANTOPRAZOLE SODIUM 40 MG: 40 GRANULE, DELAYED RELEASE ORAL at 08:07

## 2017-07-16 RX ADMIN — Medication 3 ML: at 02:07

## 2017-07-16 RX ADMIN — POTASSIUM CHLORIDE 40 MEQ: 20 SOLUTION ORAL at 01:07

## 2017-07-16 RX ADMIN — LIDOCAINE 1 PATCH: 50 PATCH CUTANEOUS at 09:07

## 2017-07-16 RX ADMIN — ROPIVACAINE HYDROCHLORIDE 8 ML/HR: 2 INJECTION, SOLUTION EPIDURAL; INFILTRATION at 10:07

## 2017-07-16 RX ADMIN — DOCUSATE SODIUM 100 MG: 50 CAPSULE, LIQUID FILLED ORAL at 09:07

## 2017-07-16 RX ADMIN — ACETAMINOPHEN 1000 MG: 500 TABLET ORAL at 05:07

## 2017-07-16 RX ADMIN — MAGNESIUM SULFATE IN WATER 2 G: 40 INJECTION, SOLUTION INTRAVENOUS at 09:07

## 2017-07-16 NOTE — PROGRESS NOTES
[facility]  Orthopedics  Progress Note    Patient Name: Laurita Stokes  MRN: [id number]  Admission Date: 7/11/2017  Hospital Length of Stay: 5 days  Attending Provider: Niki Mccord MD  Primary Care Provider: Alex Capellan MD  Follow-up For: Procedure(s) (LRB):  ARTHROPLASTY-HIP-OLAYINKA-right (Right)    Post-Operative Day: 4 Days Post-Op  Subjective:     Principal Problem:Closed displaced fracture of right femoral neck    Principal Orthopedic Problem: s/p R hip hemiarthroplasty    Interval History: Patient seen and examined at bedside.  No acute events overnight.  Pain controlled. Stepped down to floor yesterday. Some hypotension noted. Hypokalemia noted.    Review of patient's allergies indicates:   Allergen Reactions    Codeine Itching    Azithromycin      History of VT       Current Facility-Administered Medications   Medication    acetaminophen tablet 650 mg    albuterol-ipratropium 2.5mg-0.5mg/3mL nebulizer solution 3 mL    amiodarone tablet 200 mg    amoxicillin-clavulanate 500-125mg per tablet 500 mg    bumetanide tablet 1 mg    calcium gluconate 1 g in dextrose 5 % 100 mL IVPB (premix)    calcium gluconate 1 g in dextrose 5 % 100 mL IVPB (premix)    calcium gluconate 1 g in dextrose 5 % 100 mL IVPB (premix)    carvedilol tablet 3.125 mg    citalopram tablet 30 mg    digoxin tablet 125 mcg    diphenhydrAMINE injection 25 mg    docusate sodium capsule 100 mg    enalapril tablet 2.5 mg    enoxaparin injection 30 mg    isosorbide mononitrate 24 hr tablet 30 mg    levetiracetam tablet 500 mg    levothyroxine tablet 88 mcg    lidocaine 5 % patch 1 patch    magnesium sulfate 2g in water 50mL IVPB (premix)    magnesium sulfate 2g in water 50mL IVPB (premix)    magnesium sulfate 2g in water 50mL IVPB (premix)    nitroGLYCERIN SL tablet 0.4 mg    ondansetron injection 4 mg    oxycodone immediate release tablet 5 mg    And    oxycodone immediate release tablet  "10 mg    pantoprazole suspension 40 mg    polyethylene glycol packet 17 g    potassium chloride 10 mEq in 100 mL IVPB    potassium chloride 40 mEq in 100 mL IVPB (FOR CENTRAL LINE ADMINISTRATION ONLY)    And    potassium chloride 20 mEq in 100 mL IVPB (FOR CENTRAL LINE ADMINISTRATION ONLY)    And    potassium chloride 40 mEq in 100 mL IVPB (FOR CENTRAL LINE ADMINISTRATION ONLY)    ropivacaine (PF) 2 mg/ml (0.2%) infusion    sodium chloride 0.9% flush 3 mL    sodium chloride 0.9% flush 3 mL    sodium phosphate 15 mmol in dextrose 5 % 250 mL IVPB    sodium phosphate 15 mmol in dextrose 5 % 250 mL IVPB    sodium phosphate 20.01 mmol in dextrose 5 % 250 mL IVPB    sodium phosphate 20.01 mmol in dextrose 5 % 250 mL IVPB    sodium phosphate 30 mmol in dextrose 5 % 250 mL IVPB    sodium phosphate 30 mmol in dextrose 5 % 250 mL IVPB    spironolactone split tablet 12.5 mg    tamsulosin 24 hr capsule 0.4 mg    tranexamic acid (CYKLOKAPRON) 3,000 mg in sodium chloride 0.9% 100 mL    tuberculin injection 5 Units     Objective:     Vital Signs (Most Recent):  Temp: 97.6 °F (36.4 °C) (07/16/17 0447)  Pulse: 80 (07/16/17 0700)  Resp: 18 (07/16/17 0447)  BP: (!) 100/55 (07/16/17 0448)  SpO2: 97 % (07/16/17 0447) Vital Signs (24h Range):  Temp:  [97.2 °F (36.2 °C)-98.1 °F (36.7 °C)] 97.6 °F (36.4 °C)  Pulse:  [79-85] 80  Resp:  [12-34] 18  SpO2:  [90 %-100 %] 97 %  BP: ()/(50-58) 100/55     Weight: 57.8 kg (127 lb 6.8 oz)  Height: 5' 3" (160 cm)  Body mass index is 22.57 kg/m².      Intake/Output Summary (Last 24 hours) at 07/16/17 0722  Last data filed at 07/16/17 0411   Gross per 24 hour   Intake          2487.46 ml   Output             1040 ml   Net          1447.46 ml       Ortho/SPM Exam     AAOx4  NAD  RRR  No increased WOB  Aquacel c/d/i  SILT and motor intact T/SP/DP  WWP extremities  FCDs in place and functioning    Significant Labs: All pertinent labs within the past 24 hours have been " reviewed.    Significant Imaging: I have reviewed all pertinent imaging results/findings.    Assessment/Plan:     Closed fracture of proximal end of right humerus    - Non-operative treatment in sling  - NWDENA RUJAIMIE        * Closed displaced fracture of right femoral neck    83 y.o. female POD4 s/p R hip hemiarthroplasty    Pain control  PT/OT: WBAT RLE, posterior hip precautions  DVT PPx: lovenox daily, FCDs at all times when not ambulating  Abx: postop Ancef complete      Dispo: continue to treat              Marino Nuñez MD  Orthopedics  [facility]

## 2017-07-16 NOTE — HOSPITAL COURSE
Pt was admitted to Hospital Medicine Team H. Preoperatively she was found to be hypokalemic to 2.5 which became symptomatic as V-tach and 2 runs of V-fib. It was corrected and she was then stable to be taken to the OR for reduction of the fracture. On 7/12, patient underwent right hip hemiarthroplasty for femoral neck fracture by Dr. Anshul Guerrero with no complications. Post op, patient went to SICU mildly sedated postop with facemask on 8L O2, not requiring pressors. VSS.  In ICU, pt had bouts of hypotension (responsive to fluids) and hypokalemia, but continued to improve and stepped back down to hospital medicine on 7/16. She is WBAT to RLE with posterior hip precautions. Patient is on Lovenox 30 mg daily for DVT prophylaxis. PT/OT consulted and recommending SNF versus home hospice pending her progress and family wishes. Referrals sent out to several SNF facilities. Patient turned down by [facility], [facility] and [facility] SN[facility] unit. Patient with good pain control on Tramadol for pain. Perineural pain cathter removed by Anesthesia pain service on 7/18. Non-operative management of right humerus fracture as per Ortho recs.  working on continued placement at SNF level but family also considering [facility] care at [facility] if turned down by SNFs but prefer SNF care.   Patient accepted at [facility] for admit and plan to discharge to [facility] on 7/24. [facility] SNF orders done and faxed to facility. Patient medically stable for discharge. Pain controlled to right hip and arm with Tramadol and discharged on Tramadol as needed for pain. Surgical bandage to remain in place to right hip until clinic follow-up on 7/26. Patient's right arm in sling for comfort but no operative repair needed for right humerus fracture and NWB to right arm on discharge.

## 2017-07-16 NOTE — PT/OT/SLP PROGRESS
Occupational Therapy  Treatment    Laurita Stokes   MRN: [id number]   Admitting Diagnosis: Closed displaced fracture of right femoral neck    OT Date of Treatment: 07/16/17   OT Start Time: 1048  OT Stop Time: 1111  OT Total Time (min): 23 min    Billable Minutes:  Self Care/Home Management 23    General Precautions: Standard, fall  Orthopedic Precautions: RUE non weight bearing, RLE posterior precautions, RLE weight bearing as tolerated  Braces: UE Sling    Subjective:  Communicated with RN prior to session.    Pt agreeable to treatment    Pain/Comfort  Pain Rating 1:  (did not rate)  Location - Side 1: Right  Location 1: hip  Pain Addressed 1: Reposition, Distraction, Cessation of Activity    Objective:  Patient found with: perineural catheter, SCD, FCD, oxygen     Functional Mobility:  Bed Mobility:  Rolling/Turning Right: Maximum assistance  Scooting/Bridging: Maximum Assistance    Transfers:   Bed <> Chair Technique: Squat Pivot  Bed <> Chair Transfer Assistance: Maximum Assistance    Activities of Daily Living:  UE Dressing Level of Assistance: Maximum assistance   Sascha gown around back and adjust UE sling    LE Dressing Level of Assistance: Total assistance   Pt educated on need for AD with LE dressing. Pt unable to recall any posterior hip precautions, handout provided    Grooming Position: Seated, EOB  Grooming Level of Assistance: Contact guard assistance    AM-PAC 6 CLICK ADL   How much help from another person does this patient currently need?   1 = Unable, Total/Dependent Assistance  2 = A lot, Maximum/Moderate Assistance  3 = A little, Minimum/Contact Guard/Supervision  4 = None, Modified [place]/Independent    Putting on and taking off regular lower body clothing? : 1  Bathing (including washing, rinsing, drying)?: 1  Toileting, which includes using toilet, bedpan, or urinal? : 1  Putting on and taking off regular upper body clothing?: 2  Taking care of personal grooming such as brushing  "teeth?: 3  Eating meals?: 3  Total Score: 11     AM-PAC Raw Score CMS "G-Code Modifier Level of Impairment Assistance   6 % Total / Unable   7 - 8 CM 80 - 100% Maximal Assist   9-13 CL 60 - 80% Moderate Assist   14 - 19 CK 40 - 60% Moderate Assist   20 - 22 CJ 20 - 40% Minimal Assist   23 CI 1-20% SBA / CGA   24 CH 0% Independent/ Mod I       Patient left up in chair with all lines intact, call button in reach and RN notified    ASSESSMENT:  Laurita Stokes is a 83 y.o. female with a medical diagnosis of Closed displaced fracture of right femoral neck and tolerated session well with good participation and would continue to benefit from OT services to maximize functional (I) and safety.    Rehab identified problem list/impairments: Rehab identified problem list/impairments: weakness, impaired endurance, impaired self care skills, impaired functional mobilty, gait instability, impaired balance, decreased lower extremity function, decreased upper extremity function, impaired cognition, orthopedic precautions, decreased ROM, impaired skin, edema    Rehab potential is good.    Activity tolerance: Good    Discharge recommendations: Discharge Facility/Level Of Care Needs: nursing facility, skilled     Barriers to discharge: Barriers to Discharge: None    Equipment recommendations: none     GOALS:    Occupational Therapy Goals        Problem: Occupational Therapy Goal    Goal Priority Disciplines Outcome Interventions   Occupational Therapy Goal     OT, PT/OT Ongoing (interventions implemented as appropriate)    Description:  Patient will increase functional independence with ADLs by performing:    UE Dressing with Supervision.  LE Dressing with Moderate Assistance.  Grooming while seated with Supervision.  Toileting from bedside commode with Supervision for hygiene and clothing management.   Sitting at edge of bed x20 minutes with Supervision.  Supine to sit with Minimal Assistance.  Stand pivot transfers " with Minimal Assistance.  Toilet transfer to bedside commode with Minimal Assistance.  Pt will verbalize hip precautions and incorporate them during ADL performance with Minimal verbal cues                    Plan:  Patient to be seen 6 x/week to address the above listed problems via self-care/home management, therapeutic activities, therapeutic exercises  Plan of Care expires: 07/27/17  Plan of Care reviewed with: patient, daughter         Areli CookSRAVANI vazquez  07/16/2017

## 2017-07-16 NOTE — ASSESSMENT & PLAN NOTE
Pt was admitted to Hospital medicine Team H. Preoperatively she was found to be hypokalemic to 2.5 which became symptomatic as V-tach and 2 runs of v-fib. It was corrected and she was then stable to be taken to the OR for reduction of the fracture. On 7/12, pt underwent Right hip hemiarthroplasty for femoral neck fracture by Dr Anshul Guerrero with no complications.  Post op, pt went to SICU mildly sedated postop with facemask on 8L O2, not requiring pressors. VSS.  In ICU, pt had bouts of hypotension (responsive to fluids) and hypokalemia, but continued to improve and stepped back down to hospital medicine on 7/16. She is WBAT to LLE. She is on Lovenox 30 mg daily for DVT ppx. PT/OT consulted and recommending SNF versus home hospice pending her progress and family wishes.

## 2017-07-16 NOTE — PROGRESS NOTES
[facility] Hospital Medicine  Progress Note    Patient Name: Laurita Stokes  MRN: [id number]  Patient Class: IP- Inpatient   Admission Date: 7/11/2017  Length of Stay: 5 days  Attending Physician: Niki Mccord MD  Primary Care Provider: Alex aCpellan MD    [place] [facility] Medicine Team: [facility] Niki Mccord MD    Subjective:     Principal Problem:Closed displaced fracture of right femoral neck    HPI:  83 year old female with PMH of a fib ( coumadin recently stopped due to SAH and frequent falls ) , ICM , HFrEF (EF=20-25%, s/p CRT-D) c NYHA IV sx  s/p AICD placement, HTN/HLPD, s/p 3v-CABG, hx of ventricular tachycardia on amiodarone, CKD III, GERD, dementia, under home hospice ( [facility] ) and on home oxygen for comfort ( 2 L )  presents as a transfer from [facility] for surgical fixation of right femoral neck fracture by orthopedic surgery. Patient is a poor informant with h/o dementia and daughter Brittany Stokes who is the caregiver at bedside provided HPI.      Patient was taken to OSH ED before midnight 07/10 with right shoulder and right hip pain after she fell while ambulating with a walker in the middle of the night while going to the bathroom. She had immediate right hip and shoulder pain.  She denies hitting her head or LOC.  Of note, she was recently admitted on 5/24-5/26 for subarachnoid hemorrhage, at which time she was discharged on hospice with plans to stop her anticoagulation for her atrial fibrillation.  Patient lives with her daughter and ambulates short distance inside the house with a walker. Imaging at OSH showed  proximal right  humerus fracture and right femoral neck fracture. Patient was evaluated by orthopedic surgery and cardiology at [facility].     Daughter states most of her cardiac medications were discontinued recently by hospice. Currently she is taking the following meds : amiodarone, bumex, aldactone, celexa, synthroid, prilosec,  flomax and ambien.     Hospital Course:  Pt was admitted to Hospital medicine Team H. Preoperatively she was found to be hypokalemic to 2.5 which became symptomatic as V-tach and 2 runs of v-fib. It was corrected and she was then stable to be taken to the OR for reduction of the fracture. On 7/12, pt underwent Right hip hemiarthroplasty for femoral neck fracture by Dr Anshul Guerrero with no complications.  Post op, pt went to SICU mildly sedated postop with facemask on 8L O2, not requiring pressors. VSS.  In ICU, pt had bouts of hypotension (responsive to fluids) and hypokalemia, but continued to improve and stepped back down to hospital medicine on 7/16. She is WBAT to LLE. She is on Lovenox 30 mg daily for DVT ppx. PT/OT consulted and recommending SNF versus home hospice pending her progress and family wishes.     Interval History: Pt complaining of right arm and right hip pain. Otherwise says her breathing is ok.  Denies chest pain.  Says she is eating well.     Review of Systems   Constitutional: Negative for chills and fever.   Respiratory: Negative for cough, shortness of breath and wheezing.    Cardiovascular: Negative for chest pain and palpitations.   Gastrointestinal: Negative for abdominal pain, constipation, diarrhea and nausea.   Genitourinary: Negative for difficulty urinating, dysuria and frequency.   Musculoskeletal: Positive for myalgias (to righht arm and right hip).     Objective:     Vital Signs (Most Recent):  Temp: 97.6 °F (36.4 °C) (07/16/17 0816)  Pulse: 80 (07/16/17 1044)  Resp: 18 (07/16/17 0816)  BP: (!) 102/53 (07/16/17 0816)  SpO2: 96 % (07/16/17 0816) Vital Signs (24h Range):  Temp:  [97.2 °F (36.2 °C)-98.1 °F (36.7 °C)] 97.6 °F (36.4 °C)  Pulse:  [79-95] 80  Resp:  [16-27] 18  SpO2:  [91 %-97 %] 96 %  BP: ()/(52-58) 102/53     Weight: 57.8 kg (127 lb 6.8 oz)  Body mass index is 22.57 kg/m².    Intake/Output Summary (Last 24 hours) at 07/16/17 1120  Last data filed at 07/16/17  0800   Gross per 24 hour   Intake          2487.46 ml   Output             1385 ml   Net          1102.46 ml      Physical Exam   Constitutional: She appears well-developed and well-nourished. No distress.   HENT:   Head: Normocephalic and atraumatic.   Cardiovascular: Normal rate and regular rhythm.    No murmur heard.  Pulmonary/Chest: Effort normal and breath sounds normal. She has no wheezes.   Abdominal: Soft. Bowel sounds are normal. She exhibits no distension. There is no tenderness.   Musculoskeletal: She exhibits tenderness (right hip).   Right arm in sling   Neurological: She is alert.   Skin: Skin is warm and dry. No rash noted.         Assessment/Plan:      * Closed displaced fracture of right femoral neck    Pt was admitted to Hospital medicine Team H. Preoperatively she was found to be hypokalemic to 2.5 which became symptomatic as V-tach and 2 runs of v-fib. It was corrected and she was then stable to be taken to the OR for reduction of the fracture. On 7/12, pt underwent Right hip hemiarthroplasty for femoral neck fracture by Dr Anshul Guerrero with no complications.  Post op, pt went to SICU mildly sedated postop with facemask on 8L O2, not requiring pressors. VSS.  In ICU, pt had bouts of hypotension (responsive to fluids) and hypokalemia, but continued to improve and stepped back down to hospital medicine on 7/16. She is WBAT to LLE. She is on Lovenox 30 mg daily for DVT ppx. PT/OT consulted and recommending SNF versus home hospice pending her progress and family wishes.           Closed fracture of proximal end of right humerus      - Non-operative treatment with sling in place     - Non weight bearing status of RUE           Chronic combined systolic and diastolic heart failure     - continue amiodarone 200 mg bid    - Continue bumex daily and aldactone ( MWF )          Ventricular tachycardia    And Ventricular Fibrillation  - due to hypokalemia, now improved          Chronic respiratory failure  with hypoxia    Continue oxygen          Essential hypertension    - Continue bumex daily and aldactone ( MWF )          PAF (paroxysmal atrial fibrillation)    - Recently stopped coumadin after SAH and frequent falls   - Most of her cardiac medications except diuretics and amiodarone were recently stopped under hospice - continue amiodarone 200 mg bid           Coronary artery disease    Continue Imdur           Hypokalemia    replaced          Dementia without behavioral disturbance    At baseline            VTE Risk Mitigation         Ordered     enoxaparin injection 30 mg  Daily     Route:  Subcutaneous        07/12/17 1832     Medium Risk of VTE  Once      07/11/17 2319     Place JEREMY hose  Until discontinued      07/11/17 2319     Place sequential compression device  Until discontinued      07/11/17 2319          Niki Mccord MD  Department of Hospital Medicine   [facility]

## 2017-07-16 NOTE — ADDENDUM NOTE
Addendum  created 07/16/17 1114 by Ancelmo Warren MD    Order list changed, Sign clinical note

## 2017-07-16 NOTE — ADDENDUM NOTE
Addendum  created 07/16/17 130 by Lisa Cordoba MD    Charge Capture section accepted, Order list changed, Sign clinical note

## 2017-07-16 NOTE — SUBJECTIVE & OBJECTIVE
Interval History: Pt complaining of right arm and right hip pain. Otherwise says her breathing is ok.  Denies chest pain.  Says she is eating well.     Review of Systems   Constitutional: Negative for chills and fever.   Respiratory: Negative for cough, shortness of breath and wheezing.    Cardiovascular: Negative for chest pain and palpitations.   Gastrointestinal: Negative for abdominal pain, constipation, diarrhea and nausea.   Genitourinary: Negative for difficulty urinating, dysuria and frequency.   Musculoskeletal: Positive for myalgias (to righht arm and right hip).     Objective:     Vital Signs (Most Recent):  Temp: 97.6 °F (36.4 °C) (07/16/17 0816)  Pulse: 80 (07/16/17 1044)  Resp: 18 (07/16/17 0816)  BP: (!) 102/53 (07/16/17 0816)  SpO2: 96 % (07/16/17 0816) Vital Signs (24h Range):  Temp:  [97.2 °F (36.2 °C)-98.1 °F (36.7 °C)] 97.6 °F (36.4 °C)  Pulse:  [79-95] 80  Resp:  [16-27] 18  SpO2:  [91 %-97 %] 96 %  BP: ()/(52-58) 102/53     Weight: 57.8 kg (127 lb 6.8 oz)  Body mass index is 22.57 kg/m².    Intake/Output Summary (Last 24 hours) at 07/16/17 1120  Last data filed at 07/16/17 0800   Gross per 24 hour   Intake          2487.46 ml   Output             1385 ml   Net          1102.46 ml      Physical Exam   Constitutional: She appears well-developed and well-nourished. No distress.   HENT:   Head: Normocephalic and atraumatic.   Cardiovascular: Normal rate and regular rhythm.    No murmur heard.  Pulmonary/Chest: Effort normal and breath sounds normal. She has no wheezes.   Abdominal: Soft. Bowel sounds are normal. She exhibits no distension. There is no tenderness.   Musculoskeletal: She exhibits tenderness (right hip).   Right arm in sling   Neurological: She is alert.   Skin: Skin is warm and dry. No rash noted.

## 2017-07-16 NOTE — PLAN OF CARE
Problem: Patient Care Overview  Goal: Plan of Care Review  Plan of care reviewed and updated. Pt AA+O. Pt's pain is managed with the medication ordered at this time. Pt's VS are as charted.  No falls this shift. Pt is oriented to room and call system. Will continue to [facility].

## 2017-07-16 NOTE — PLAN OF CARE
Problem: Patient Care Overview    Pt alert, disoriented to situation. VSS. Surgical site CDI. family at bedside. Pt is resting quietly now. Weight shift assistance provided as needed. Pain managed with PRN medications. IS education complete. SCD's on and functioning. Fall precautions maintained. Will resume care.     -Telemetry monitor on

## 2017-07-16 NOTE — ASSESSMENT & PLAN NOTE
- Recently stopped coumadin after SAH and frequent falls   - Most of her cardiac medications except diuretics and amiodarone were recently stopped under hospice - continue amiodarone 200 mg bid

## 2017-07-16 NOTE — PT/OT/SLP PROGRESS
"Physical Therapy  Treatment    Laurita Stokes   MRN: [id number]   Admitting Diagnosis: Closed displaced fracture of right femoral neck    PT Received On: 07/16/17  PT Start Time: 1048     PT Stop Time: 1111    PT Total Time (min): 23 min       Billable Minutes:  Therapeutic Activity  23 minutes    Treatment Type: Treatment  PT/PTA: PT     PTA Visit Number: 0       General Precautions: Standard, fall  Orthopedic Precautions: RUE non weight bearing, RLE weight bearing as tolerated, RLE posterior precautions   Braces: UE Sling (RUE sling;)    Do you have any cultural, spiritual, [employer]ism conflicts, given your current situation?: none noted     Subjective:  Communicated with RN prior to session.  Pt states "I don't need therapy. Leave me alone." PT/OT and pt's family providing encouragement for therapy participation     Pain/Comfort  Pain Rating 1:  (Did not provide numerical value )  Location - Side 1: Right  Location - Orientation 1: lower  Location 1: hip  Pain Addressed 1: Reposition, Distraction, Cessation of Activity  Pain Rating Post-Intervention 1:  (See comment above)    Objective:   Patient found with: perineural catheter, SCD, oxygen    Functional Mobility:  Bed Mobility:   Scooting/Bridging: Total Assistance (anterior scoot towards EOB)  Supine to Sit: Total Assistance  Sit to Supine: Total Assistance    Transfers:  Bed <> Chair Technique: Squat Pivot  Bed <> Chair Assistance: Maximum Assistance  Bed <> Chair Assistive Device:  (support of therapist)    Gait:   Gait Distance: Did not occur     Balance:   Static Sit: CGA  Dynamic Sit: CGA/Min A    Therapeutic Activities and Exercises:  · Therapeutic activities aimed to increase pt's independence, safety, and efficiency with bed mobility and functional transfers. See above for assistance levels.   · RN notified to order BS commode for patient   · Pt education on posterior hip precautions     AM-PAC 6 CLICK MOBILITY  How much help from another person does " this patient currently need?   1 = Unable, Total/Dependent Assistance  2 = A lot, Maximum/Moderate Assistance  3 = A little, Minimum/Contact Guard/Supervision  4 = None, Modified [place]/Independent    Turning over in bed (including adjusting bedclothes, sheets and blankets)?: 2  Sitting down on and standing up from a chair with arms (e.g., wheelchair, bedside commode, etc.): 2  Moving from lying on back to sitting on the side of the bed?: 2  Moving to and from a bed to a chair (including a wheelchair)?: 2  Need to walk in hospital room?: 1  Climbing 3-5 steps with a railing?: 1  Total Score: 10    AM-PAC Raw Score CMS G-Code Modifier Level of Impairment Assistance   6 % Total / Unable   7 - 9 CM 80 - 100% Maximal Assist   10 - 14 CL 60 - 80% Moderate Assist   15 - 19 CK 40 - 60% Moderate Assist   20 - 22 CJ 20 - 40% Minimal Assist   23 CI 1-20% SBA / CGA   24 CH 0% Independent/ Mod I     Patient left up in chair with all lines intact, call button in reach, RN notified and family present.    Assessment:  Laurita Stokes is a 83 y.o. female with a medical diagnosis of Closed displaced fracture of right femoral neck and presents with problems listed. Pt progressing towards goals, but not at PLOF. Pt tolerated session fair. Pt is improving with therapy evidenced by completing bed-> chair with max A via squat pivot. Pt would benefit from continued PT services to improve overall functional mobility. Recommend d/c to Skilled nursing facility to maximize functional independence.    Rehab identified problem list/impairments: Rehab identified problem list/impairments: weakness, impaired endurance, impaired self care skills, impaired functional mobilty, gait instability, impaired balance, impaired cognition, decreased coordination, decreased lower extremity function, decreased upper extremity function, decreased safety awareness, pain, decreased ROM, impaired cardiopulmonary response to activity,  orthopedic precautions    Rehab potential is fair.    Activity tolerance: Fair    Discharge recommendations: Discharge Facility/Level Of Care Needs: nursing facility, skilled     Barriers to discharge: Barriers to Discharge: None    Equipment recommendations: Equipment Needed After Discharge: none (Pt owns all necessary equipment )     GOALS:    Physical Therapy Goals        Problem: Physical Therapy Goal    Goal Priority Disciplines Outcome Goal Variances Interventions   Physical Therapy Goal     PT/OT, PT Ongoing (interventions implemented as appropriate)     Description:  Goals to be met by: 17     Patient will increase functional independence with mobility by performin. Supine to sit with Moderate Assistance  2. Sit to supine with Moderate Assistance  3. Sit to stand transfer with Moderate Assistance  4. Bed to chair transfer with Maximum Assistance- Met  Revised: mod A  5. Lower extremity exercise program x15 reps, with assistance as needed, in order to increase LE strength and (I) with functional mobility.   6. Pt ambulated 10' with max A using jamil walker    Further goals pending pt progress.                       PLAN:    Patient to be seen daily  to address the above listed problems via gait training, therapeutic activities, therapeutic exercises, neuromuscular re-education, wheelchair management/training  Plan of Care expires: 17  Plan of Care reviewed with: patient, daughter    Rafia Anderson, PT  2017

## 2017-07-16 NOTE — SUBJECTIVE & OBJECTIVE
Principal Problem:Closed displaced fracture of right femoral neck    Principal Orthopedic Problem: s/p R hip hemiarthroplasty    Interval History: Patient seen and examined at bedside.  No acute events overnight.  Pain controlled. Stepped down to floor yesterday. Some hypotension noted. Hypokalemia noted.    Review of patient's allergies indicates:   Allergen Reactions    Codeine Itching    Azithromycin      History of VT       Current Facility-Administered Medications   Medication    acetaminophen tablet 650 mg    albuterol-ipratropium 2.5mg-0.5mg/3mL nebulizer solution 3 mL    amiodarone tablet 200 mg    amoxicillin-clavulanate 500-125mg per tablet 500 mg    bumetanide tablet 1 mg    calcium gluconate 1 g in dextrose 5 % 100 mL IVPB (premix)    calcium gluconate 1 g in dextrose 5 % 100 mL IVPB (premix)    calcium gluconate 1 g in dextrose 5 % 100 mL IVPB (premix)    carvedilol tablet 3.125 mg    citalopram tablet 30 mg    digoxin tablet 125 mcg    diphenhydrAMINE injection 25 mg    docusate sodium capsule 100 mg    enalapril tablet 2.5 mg    enoxaparin injection 30 mg    isosorbide mononitrate 24 hr tablet 30 mg    levetiracetam tablet 500 mg    levothyroxine tablet 88 mcg    lidocaine 5 % patch 1 patch    magnesium sulfate 2g in water 50mL IVPB (premix)    magnesium sulfate 2g in water 50mL IVPB (premix)    magnesium sulfate 2g in water 50mL IVPB (premix)    nitroGLYCERIN SL tablet 0.4 mg    ondansetron injection 4 mg    oxycodone immediate release tablet 5 mg    And    oxycodone immediate release tablet 10 mg    pantoprazole suspension 40 mg    polyethylene glycol packet 17 g    potassium chloride 10 mEq in 100 mL IVPB    potassium chloride 40 mEq in 100 mL IVPB (FOR CENTRAL LINE ADMINISTRATION ONLY)    And    potassium chloride 20 mEq in 100 mL IVPB (FOR CENTRAL LINE ADMINISTRATION ONLY)    And    potassium chloride 40 mEq in 100 mL IVPB (FOR CENTRAL LINE ADMINISTRATION ONLY)  "   ropivacaine (PF) 2 mg/ml (0.2%) infusion    sodium chloride 0.9% flush 3 mL    sodium chloride 0.9% flush 3 mL    sodium phosphate 15 mmol in dextrose 5 % 250 mL IVPB    sodium phosphate 15 mmol in dextrose 5 % 250 mL IVPB    sodium phosphate 20.01 mmol in dextrose 5 % 250 mL IVPB    sodium phosphate 20.01 mmol in dextrose 5 % 250 mL IVPB    sodium phosphate 30 mmol in dextrose 5 % 250 mL IVPB    sodium phosphate 30 mmol in dextrose 5 % 250 mL IVPB    spironolactone split tablet 12.5 mg    tamsulosin 24 hr capsule 0.4 mg    tranexamic acid (CYKLOKAPRON) 3,000 mg in sodium chloride 0.9% 100 mL    tuberculin injection 5 Units     Objective:     Vital Signs (Most Recent):  Temp: 97.6 °F (36.4 °C) (07/16/17 0447)  Pulse: 80 (07/16/17 0700)  Resp: 18 (07/16/17 0447)  BP: (!) 100/55 (07/16/17 0448)  SpO2: 97 % (07/16/17 0447) Vital Signs (24h Range):  Temp:  [97.2 °F (36.2 °C)-98.1 °F (36.7 °C)] 97.6 °F (36.4 °C)  Pulse:  [79-85] 80  Resp:  [12-34] 18  SpO2:  [90 %-100 %] 97 %  BP: ()/(50-58) 100/55     Weight: 57.8 kg (127 lb 6.8 oz)  Height: 5' 3" (160 cm)  Body mass index is 22.57 kg/m².      Intake/Output Summary (Last 24 hours) at 07/16/17 0722  Last data filed at 07/16/17 0411   Gross per 24 hour   Intake          2487.46 ml   Output             1040 ml   Net          1447.46 ml       Ortho/SPM Exam     AAOx4  NAD  RRR  No increased WOB  Aquacel c/d/i  SILT and motor intact T/SP/DP  WWP extremities  FCDs in place and functioning    Significant Labs: All pertinent labs within the past 24 hours have been reviewed.    Significant Imaging: I have reviewed all pertinent imaging results/findings.  "

## 2017-07-16 NOTE — ASSESSMENT & PLAN NOTE
83 y.o. female POD4 s/p R hip hemiarthroplasty    Pain control  PT/OT: WBAT RLE, posterior hip precautions  DVT PPx: lovenox daily, FCDs at all times when not ambulating  Abx: postop Ancef complete      Dispo: continue to treat

## 2017-07-16 NOTE — HPI
83 year old female with PMH of atrial fibrillation ( Coumadin recently stopped due to SAH and frequent falls ) , ICM , HFrEF (EF=20-25%, s/p CRT-D) c NYHA IV sx  s/p AICD placement, HTN/HLPD, s/p 3v-CABG, hx of ventricular tachycardia on amiodarone, CKD III, GERD, dementia, under home hospice ( [facility] ) and on home oxygen for comfort ( 2 L )  presents as a transfer from [facility] for surgical fixation of right femoral neck fracture by orthopedic surgery. Patient is a poor informant with h/o dementia and daughter Brittany Stokes who is the caregiver at bedside provided HPI.      Patient was taken to OSH ED before midnight 07/10 with right shoulder and right hip pain after she fell while ambulating with a walker in the middle of the night while going to the bathroom. She had immediate right hip and shoulder pain.  She denies hitting her head or LOC.  Of note, she was recently admitted on 5/24-5/26 for subarachnoid hemorrhage, at which time she was discharged on hospice with plans to stop her anticoagulation for her atrial fibrillation.  Patient lives with her daughter and ambulates short distance inside the house with a walker. Imaging at OSH showed  proximal right  humerus fracture and right femoral neck fracture. Patient was evaluated by orthopedic surgery and cardiology at [facility].     Daughter states most of her cardiac medications were discontinued recently by hospice. Currently she is taking the following meds : amiodarone, bumex, aldactone, celexa, synthroid, prilosec, flomax and ambien.

## 2017-07-16 NOTE — PLAN OF CARE
Problem: Physical Therapy Goal  Goal: Physical Therapy Goal  Goals to be met by: 17     Patient will increase functional independence with mobility by performin. Supine to sit with Moderate Assistance  2. Sit to supine with Moderate Assistance  3. Sit to stand transfer with Moderate Assistance  4. Bed to chair transfer with Maximum Assistance- Met  Revised: mod A  5. Lower extremity exercise program x15 reps, with assistance as needed, in order to increase LE strength and (I) with functional mobility.   6. Pt ambulated 10' with max A using jamil walker    Further goals pending pt progress.     Outcome: Ongoing (interventions implemented as appropriate)  Pt met 1 goal on this today. Pt would benefit from continued PT services to improve overall functional mobility.     Rafia Anderson DPT, PT  2017

## 2017-07-17 PROBLEM — Z71.89 GOALS OF CARE, COUNSELING/DISCUSSION: Status: ACTIVE | Noted: 2017-07-17

## 2017-07-17 LAB
ANION GAP SERPL CALC-SCNC: 12 MMOL/L
BUN SERPL-MCNC: 23 MG/DL
CALCIUM SERPL-MCNC: 8.2 MG/DL
CHLORIDE SERPL-SCNC: 97 MMOL/L
CO2 SERPL-SCNC: 25 MMOL/L
CREAT SERPL-MCNC: 0.8 MG/DL
EST. GFR  (AFRICAN AMERICAN): >60 ML/MIN/1.73 M^2
EST. GFR  (NON AFRICAN AMERICAN): >60 ML/MIN/1.73 M^2
GLUCOSE SERPL-MCNC: 75 MG/DL
MAGNESIUM SERPL-MCNC: 1.7 MG/DL
PHOSPHATE SERPL-MCNC: 2.9 MG/DL
POTASSIUM SERPL-SCNC: 3.2 MMOL/L
SODIUM SERPL-SCNC: 134 MMOL/L

## 2017-07-17 PROCEDURE — 25000003 PHARM REV CODE 250: Performed by: STUDENT IN AN ORGANIZED HEALTH CARE EDUCATION/TRAINING PROGRAM

## 2017-07-17 PROCEDURE — 80048 BASIC METABOLIC PNL TOTAL CA: CPT

## 2017-07-17 PROCEDURE — 97535 SELF CARE MNGMENT TRAINING: CPT

## 2017-07-17 PROCEDURE — 63600175 PHARM REV CODE 636 W HCPCS: Performed by: STUDENT IN AN ORGANIZED HEALTH CARE EDUCATION/TRAINING PROGRAM

## 2017-07-17 PROCEDURE — 97530 THERAPEUTIC ACTIVITIES: CPT

## 2017-07-17 PROCEDURE — 99233 SBSQ HOSP IP/OBS HIGH 50: CPT | Mod: ,,, | Performed by: HOSPITALIST

## 2017-07-17 PROCEDURE — 11000001 HC ACUTE MED/SURG PRIVATE ROOM

## 2017-07-17 PROCEDURE — 99231 SBSQ HOSP IP/OBS SF/LOW 25: CPT | Mod: ,,, | Performed by: ANESTHESIOLOGY

## 2017-07-17 PROCEDURE — 63600175 PHARM REV CODE 636 W HCPCS: Performed by: ANESTHESIOLOGY

## 2017-07-17 PROCEDURE — 25000003 PHARM REV CODE 250: Performed by: NURSE PRACTITIONER

## 2017-07-17 PROCEDURE — 25000003 PHARM REV CODE 250: Performed by: INTERNAL MEDICINE

## 2017-07-17 PROCEDURE — 84100 ASSAY OF PHOSPHORUS: CPT

## 2017-07-17 PROCEDURE — 25000003 PHARM REV CODE 250: Performed by: HOSPITALIST

## 2017-07-17 PROCEDURE — 25000003 PHARM REV CODE 250: Performed by: ANESTHESIOLOGY

## 2017-07-17 PROCEDURE — 83735 ASSAY OF MAGNESIUM: CPT

## 2017-07-17 PROCEDURE — 97110 THERAPEUTIC EXERCISES: CPT

## 2017-07-17 RX ORDER — TRAMADOL HYDROCHLORIDE 50 MG/1
50 TABLET ORAL ONCE
Status: COMPLETED | OUTPATIENT
Start: 2017-07-17 | End: 2017-07-17

## 2017-07-17 RX ORDER — POTASSIUM CHLORIDE 20 MEQ/15ML
40 SOLUTION ORAL EVERY 4 HOURS
Status: COMPLETED | OUTPATIENT
Start: 2017-07-17 | End: 2017-07-17

## 2017-07-17 RX ORDER — RAMELTEON 8 MG/1
8 TABLET ORAL NIGHTLY PRN
Status: DISCONTINUED | OUTPATIENT
Start: 2017-07-17 | End: 2017-07-24 | Stop reason: HOSPADM

## 2017-07-17 RX ORDER — BUMETANIDE 1 MG/1
1 TABLET ORAL
Status: DISCONTINUED | OUTPATIENT
Start: 2017-07-19 | End: 2017-07-24 | Stop reason: HOSPADM

## 2017-07-17 RX ORDER — MECLIZINE HYDROCHLORIDE 25 MG/1
25 TABLET ORAL 2 TIMES DAILY PRN
Status: DISCONTINUED | OUTPATIENT
Start: 2017-07-17 | End: 2017-07-24 | Stop reason: HOSPADM

## 2017-07-17 RX ADMIN — DIGOXIN 125 MCG: 125 TABLET ORAL at 04:07

## 2017-07-17 RX ADMIN — POTASSIUM CHLORIDE 40 MEQ: 20 SOLUTION ORAL at 01:07

## 2017-07-17 RX ADMIN — TAMSULOSIN HYDROCHLORIDE 0.4 MG: 0.4 CAPSULE ORAL at 08:07

## 2017-07-17 RX ADMIN — Medication 3 ML: at 01:07

## 2017-07-17 RX ADMIN — ACETAMINOPHEN 1000 MG: 500 TABLET ORAL at 08:07

## 2017-07-17 RX ADMIN — Medication 10 ML: at 07:07

## 2017-07-17 RX ADMIN — LEVETIRACETAM 500 MG: 500 TABLET, FILM COATED ORAL at 09:07

## 2017-07-17 RX ADMIN — AMOXICILLIN AND CLAVULANATE POTASSIUM 500 MG: 500; 125 TABLET, FILM COATED ORAL at 08:07

## 2017-07-17 RX ADMIN — DOCUSATE SODIUM 100 MG: 50 CAPSULE, LIQUID FILLED ORAL at 09:07

## 2017-07-17 RX ADMIN — PANTOPRAZOLE SODIUM 40 MG: 40 GRANULE, DELAYED RELEASE ORAL at 08:07

## 2017-07-17 RX ADMIN — LIDOCAINE 1 PATCH: 50 PATCH CUTANEOUS at 10:07

## 2017-07-17 RX ADMIN — POLYETHYLENE GLYCOL 3350 17 G: 17 POWDER, FOR SOLUTION ORAL at 08:07

## 2017-07-17 RX ADMIN — DOCUSATE SODIUM 100 MG: 50 CAPSULE, LIQUID FILLED ORAL at 06:07

## 2017-07-17 RX ADMIN — SPIRONOLACTONE 12.5 MG: 25 TABLET, FILM COATED ORAL at 08:07

## 2017-07-17 RX ADMIN — Medication 5 ML: at 06:07

## 2017-07-17 RX ADMIN — Medication 10 ML: at 09:07

## 2017-07-17 RX ADMIN — MECLIZINE HYDROCHLORIDE 25 MG: 25 TABLET ORAL at 06:07

## 2017-07-17 RX ADMIN — ROPIVACAINE HYDROCHLORIDE 8 ML/HR: 2 INJECTION, SOLUTION EPIDURAL; INFILTRATION at 11:07

## 2017-07-17 RX ADMIN — Medication 3 ML: at 09:07

## 2017-07-17 RX ADMIN — CARVEDILOL 3.12 MG: 3.12 TABLET, FILM COATED ORAL at 09:07

## 2017-07-17 RX ADMIN — POTASSIUM CHLORIDE 40 MEQ: 20 SOLUTION ORAL at 10:07

## 2017-07-17 RX ADMIN — LEVETIRACETAM 500 MG: 500 TABLET, FILM COATED ORAL at 08:07

## 2017-07-17 RX ADMIN — ACETAMINOPHEN 1000 MG: 500 TABLET ORAL at 04:07

## 2017-07-17 RX ADMIN — AMIODARONE HYDROCHLORIDE 200 MG: 200 TABLET ORAL at 08:07

## 2017-07-17 RX ADMIN — MECLIZINE HYDROCHLORIDE 25 MG: 25 TABLET ORAL at 09:07

## 2017-07-17 RX ADMIN — CITALOPRAM HYDROBROMIDE 30 MG: 20 TABLET ORAL at 08:07

## 2017-07-17 RX ADMIN — DOCUSATE SODIUM 100 MG: 50 CAPSULE, LIQUID FILLED ORAL at 01:07

## 2017-07-17 RX ADMIN — ACETAMINOPHEN 1000 MG: 500 TABLET ORAL at 12:07

## 2017-07-17 RX ADMIN — TRAMADOL HYDROCHLORIDE 50 MG: 50 TABLET, FILM COATED ORAL at 06:07

## 2017-07-17 RX ADMIN — AMIODARONE HYDROCHLORIDE 200 MG: 200 TABLET ORAL at 09:07

## 2017-07-17 RX ADMIN — BUMETANIDE 1 MG: 1 TABLET ORAL at 08:07

## 2017-07-17 RX ADMIN — ENOXAPARIN SODIUM 30 MG: 100 INJECTION SUBCUTANEOUS at 04:07

## 2017-07-17 RX ADMIN — Medication 3 ML: at 06:07

## 2017-07-17 RX ADMIN — LEVOTHYROXINE SODIUM 88 MCG: 88 TABLET ORAL at 06:07

## 2017-07-17 RX ADMIN — RAMELTEON 8 MG: 8 TABLET, FILM COATED ORAL at 09:07

## 2017-07-17 RX ADMIN — AMOXICILLIN AND CLAVULANATE POTASSIUM 500 MG: 500; 125 TABLET, FILM COATED ORAL at 09:07

## 2017-07-17 RX ADMIN — POTASSIUM CHLORIDE 40 MEQ: 20 SOLUTION ORAL at 05:07

## 2017-07-17 NOTE — ASSESSMENT & PLAN NOTE
"Spoke with the patient's daughter at length today about goals of care and appropriate discharge planning. Prior to presentation, she was living at home with her daughter with home hospice (due to her advanced cardiac disease).  The daughter lives alone and really has no other support and feels this situation was too much for her and she can not take her mother home again. We discussed SNF versus NH placement with hospice consult as the 2 remaining options.  Ms Stokes was in SNF at [facility] previously and was "miserably" because she didn't want to work with therapy and cried most of the time she was there.  I explained that when someone is on hospice, the goal is typically to enhance quality of life for the time that person has left and to now extend life by causing pain. Her daughter understood and feels like NH with hospice is probably the better choice, but would like to discuss with family before making a final decision.     "

## 2017-07-17 NOTE — PT/OT/SLP PROGRESS
Occupational Therapy  Treatment    Laurita Stokes   MRN: [id number]   Admitting Diagnosis: Closed displaced fracture of right femoral neck    OT Date of Treatment: 07/17/17   OT Start Time: 1025  OT Stop Time: 1054  OT Total Time (min): 29 min    Billable Minutes:  Self Care/Home Management 29    General Precautions: Standard, fall  Orthopedic Precautions: RUE non weight bearing, RLE weight bearing as tolerated, RLE posterior precautions  Braces: UE Sling (Sling was not on upon arrival.)         Subjective:  Communicated with RN prior to session.    Pain/Comfort  Pain Rating 1: 0/10    Objective:  Patient found with: perineural catheter, FCD, SCD, oxygen     Functional Mobility:  Bed Mobility:  Supine to Sit: Total Assistance    Transfers:   Sit <> Stand Assistance: Total Assistance  Sit <> Stand Assistive Device: No Assistive Device  Bed <> Chair Technique: Stand Pivot  Bed <> Chair Transfer Assistance: Total Assistance  Bed <> Chair Assistive Device: No Assistive Device  Toilet Transfer Technique: Stand Pivot  Toilet Transfer Assistance: Total Assistance  Toilet Transfer Assistive Device: No Assistive Device, bedside commode         Activities of Daily Living:       UE Dressing Level of Assistance: Total assistance (To don sling.)          Toileting Where Assessed: Bedside commode  Toileting Level of Assistance: Total assistance         Balance:   Static Sit: GOOD: Takes MODERATE challenges from all directions  Dynamic Sit: FAIR: Cannot move trunk without losing balance  Static Stand: 0: Needs MAXIMAL assist to maintain   Dynamic stand: POOR: N/A        AM-PAC 6 CLICK ADL   How much help from another person does this patient currently need?   1 = Unable, Total/Dependent Assistance  2 = A lot, Maximum/Moderate Assistance  3 = A little, Minimum/Contact Guard/Supervision  4 = None, Modified [place]/Independent    Putting on and taking off regular lower body clothing? : 1  Bathing (including washing,  "rinsing, drying)?: 1  Toileting, which includes using toilet, bedpan, or urinal? : 1  Putting on and taking off regular upper body clothing?: 2  Taking care of personal grooming such as brushing teeth?: 3  Eating meals?: 3  Total Score: 11     AM-PAC Raw Score CMS "G-Code Modifier Level of Impairment Assistance   6 % Total / Unable   7 - 8 CM 80 - 100% Maximal Assist   9-13 CL 60 - 80% Moderate Assist   14 - 19 CK 40 - 60% Moderate Assist   20 - 22 CJ 20 - 40% Minimal Assist   23 CI 1-20% SBA / CGA   24 CH 0% Independent/ Mod I       Patient left up in chair with all lines intact, call button in reach, RN notified and family present    ASSESSMENT:  Laurita Stokes is a 83 y.o. female with a medical diagnosis of Closed displaced fracture of right femoral neck and presents with deficits in ADL's and functional T/F's.  Pt was able to perform supine/sit, sit/stand, BSC, and bed/chair T/F c total assist.  Pt has poor static/dynamic standing balance.  Able to perform UB dressing and toilet hygiene c total assist.  Educated family on posterior hip precautions and also sling wear/care maintenance schedule.    Rehab identified problem list/impairments: Rehab identified problem list/impairments: weakness, impaired self care skills, impaired functional mobilty, orthopedic precautions, impaired cognition, impaired balance, decreased ROM    Rehab potential is fair.    Activity tolerance: Fair    Discharge recommendations: Discharge Facility/Level Of Care Needs: nursing facility, skilled     Barriers to discharge: Barriers to Discharge: None    Equipment recommendations:  (TBD)     GOALS:    Occupational Therapy Goals        Problem: Occupational Therapy Goal    Goal Priority Disciplines Outcome Interventions   Occupational Therapy Goal     OT, PT/OT Ongoing (interventions implemented as appropriate)    Description:  Patient will increase functional independence with ADLs by performing:    UE Dressing with " Supervision.  LE Dressing with Moderate Assistance.  Grooming while seated with Supervision.  Toileting from bedside commode with Supervision for hygiene and clothing management.   Sitting at edge of bed x20 minutes with Supervision.  Supine to sit with Minimal Assistance.  Stand pivot transfers with Minimal Assistance.  Toilet transfer to bedside commode with Minimal Assistance.  Pt will verbalize hip precautions and incorporate them during ADL performance with Minimal verbal cues                    Plan:  Patient to be seen 6 x/week to address the above listed problems via self-care/home management, therapeutic activities, therapeutic exercises, cognitive retraining  Plan of Care expires: 07/27/17  Plan of Care reviewed with: patient, family         SRAVANI Norton  07/17/2017

## 2017-07-17 NOTE — PLAN OF CARE
11:37 AM  SW spoke with Bee from [facility] who stated pt will need long term. Spoke with physician who stated family may want hospice at [facility]. Family is discussing now. Contacted Ginna with [facility]. Informed Ginna that pt may want hospice at [facility]. Ginna gave sw contact info for hospice [phone]. Spoke with Kusum and informed her of referral. Will fax pt info to [phone]. Will also f/u with family regarding decision.     Lulú Ricardo, CLAUDE   [facility]  Ext [phone]

## 2017-07-17 NOTE — PROGRESS NOTES
[facility] Hospital Medicine  Progress Note    Patient Name: Laurita Stokes  MRN: [id number]  Patient Class: IP- Inpatient   Admission Date: 7/11/2017  Length of Stay: 6 days  Attending Physician: Niki Mccord MD  Primary Care Provider: Alex Capellan MD    [facility] Medicine Team: [facility] Niki Mccord MD    Subjective:     Principal Problem:Closed displaced fracture of right femoral neck    HPI:  83 year old female with PMH of a fib ( coumadin recently stopped due to SAH and frequent falls ) , ICM , HFrEF (EF=20-25%, s/p CRT-D) c NYHA IV sx  s/p AICD placement, HTN/HLPD, s/p 3v-CABG, hx of ventricular tachycardia on amiodarone, CKD III, GERD, dementia, under home hospice ( [facility] ) and on home oxygen for comfort ( 2 L )  presents as a transfer from [facility] for surgical fixation of right femoral neck fracture by orthopedic surgery. Patient is a poor informant with h/o dementia and daughter Brittany Stokes who is the caregiver at bedside provided HPI.      Patient was taken to OSH ED before midnight 07/10 with right shoulder and right hip pain after she fell while ambulating with a walker in the middle of the night while going to the bathroom. She had immediate right hip and shoulder pain.  She denies hitting her head or LOC.  Of note, she was recently admitted on 5/24-5/26 for subarachnoid hemorrhage, at which time she was discharged on hospice with plans to stop her anticoagulation for her atrial fibrillation.  Patient lives with her daughter and ambulates short distance inside the house with a walker. Imaging at OSH showed  proximal right  humerus fracture and right femoral neck fracture. Patient was evaluated by orthopedic surgery and cardiology at [facility].     Daughter states most of her cardiac medications were discontinued recently by hospice. Currently she is taking the following meds : amiodarone, bumex, aldactone, celexa, synthroid, prilosec,  flomax and ambien.     Hospital Course:  Pt was admitted to Hospital medicine Team H. Preoperatively she was found to be hypokalemic to 2.5 which became symptomatic as V-tach and 2 runs of v-fib. It was corrected and she was then stable to be taken to the OR for reduction of the fracture. On 7/12, pt underwent Right hip hemiarthroplasty for femoral neck fracture by Dr Anshul Guerrero with no complications.  Post op, pt went to SICU mildly sedated postop with facemask on 8L O2, not requiring pressors. VSS.  In ICU, pt had bouts of hypotension (responsive to fluids) and hypokalemia, but continued to improve and stepped back down to hospital medicine on 7/16. She is WBAT to E. She is on Lovenox 30 mg daily for DVT ppx. PT/OT consulted and recommending SNF versus home hospice pending her progress and family wishes.     Interval History: Pt complaining of right arm and right hip pain. Otherwise says her breathing is ok.  Denies chest pain.  Is sitting up in bedside chair, but says she feels a little dizzy.     Review of Systems   Constitutional: Negative for chills and fever.   Respiratory: Negative for cough, shortness of breath and wheezing.    Cardiovascular: Negative for chest pain and palpitations.   Gastrointestinal: Negative for abdominal pain, constipation, diarrhea and nausea.   Genitourinary: Negative for difficulty urinating, dysuria and frequency.   Musculoskeletal: Positive for myalgias (to righht arm and right hip).     Objective:     Vital Signs (Most Recent):  Temp: 97.8 °F (36.6 °C) (07/17/17 1217)  Pulse: 80 (07/17/17 1217)  Resp: 16 (07/17/17 1217)  BP: (!) 105/57 (07/17/17 1217)  SpO2: (!) 93 % (07/17/17 1217) Vital Signs (24h Range):  Temp:  [96.4 °F (35.8 °C)-97.8 °F (36.6 °C)] 97.8 °F (36.6 °C)  Pulse:  [79-81] 80  Resp:  [16-18] 16  SpO2:  [93 %-100 %] 93 %  BP: ()/(54-59) 105/57     Weight: 57.8 kg (127 lb 6.8 oz)  Body mass index is 22.57 kg/m².    Intake/Output Summary (Last 24 hours) at  07/17/17 1308  Last data filed at 07/17/17 0400   Gross per 24 hour   Intake          1136.53 ml   Output              450 ml   Net           686.53 ml      Physical Exam   Constitutional: She appears well-developed and well-nourished. No distress.   HENT:   Head: Normocephalic and atraumatic.   Cardiovascular: Normal rate and regular rhythm.    No murmur heard.  Pulmonary/Chest: Effort normal and breath sounds normal. She has no wheezes.   Abdominal: Soft. Bowel sounds are normal. She exhibits no distension. There is no tenderness.   Musculoskeletal: She exhibits tenderness (right hip).   Right arm in sling   Neurological: She is alert.   Skin: Skin is warm and dry. No rash noted.         Assessment/Plan:      * Closed displaced fracture of right femoral neck    Pt was admitted to Hospital medicine Team H. Preoperatively she was found to be hypokalemic to 2.5 which became symptomatic as V-tach and 2 runs of v-fib. It was corrected and she was then stable to be taken to the OR for reduction of the fracture. On 7/12, pt underwent Right hip hemiarthroplasty for femoral neck fracture by Dr Anshul Guerrero with no complications.  Post op, pt went to SICU mildly sedated postop with facemask on 8L O2, not requiring pressors. VSS.  In ICU, pt had bouts of hypotension (responsive to fluids) and hypokalemia, but continued to improve and stepped back down to hospital medicine on 7/16. She is WBAT to LLE. She is on Lovenox 30 mg daily for DVT ppx. PT/OT consulted and recommending SNF versus home hospice pending her progress and family wishes.           Closed fracture of proximal end of right humerus      - Non-operative treatment with sling in place     - Non weight bearing status of RUE           Chronic combined systolic and diastolic heart failure     - continue amiodarone 200 mg bid    - Continue bumex (will reduce dose to Q MWF from daily) and aldactone ( MWF )          Ventricular tachycardia    And Ventricular  "Fibrillation  - due to hypokalemia, now improved          Chronic respiratory failure with hypoxia    Continue oxygen          Essential hypertension    - Continue bumex and aldactone ( MWF )          PAF (paroxysmal atrial fibrillation)    - Recently stopped coumadin after SAH and frequent falls   - Most of her cardiac medications except diuretics and amiodarone were recently stopped under hospice - continue amiodarone 200 mg bid           Coronary artery disease    Continue Imdur           Hypokalemia    replaced          Dementia without behavioral disturbance    At baseline          Goals of care, counseling/discussion    Spoke with the patient's daughter at length today about goals of care and appropriate discharge planning. Prior to presentation, she was living at home with her daughter with home hospice (due to her advanced cardiac disease).  The daughter lives alone and really has no other support and feels this situation was too much for her and she can not take her mother home again. We discussed SNF versus NH placement with hospice consult as the 2 remaining options.  Ms Stokes was in SNF at [facility] previously and was "miserably" because she didn't want to work with therapy and cried most of the time she was there.  I explained that when someone is on hospice, the goal is typically to enhance quality of life for the time that person has left and to now extend life by causing pain. Her daughter understood and feels like NH with hospice is probably the better choice, but would like to discuss with family before making a final decision.             VTE Risk Mitigation         Ordered     enoxaparin injection 30 mg  Daily     Route:  Subcutaneous        07/12/17 1832     Medium Risk of VTE  Once      07/11/17 2319     Place JEREMY hose  Until discontinued      07/11/17 2319     Place sequential compression device  Until discontinued      07/11/17 2319          Niki Mccord MD  Department of Hospital " Medicine   [facility]

## 2017-07-17 NOTE — PLAN OF CARE
Problem: Physical Therapy Goal  Goal: Physical Therapy Goal  Goals to be met by: 17     Patient will increase functional independence with mobility by performin. Supine to sit with Moderate Assistance  2. Sit to supine with Moderate Assistance  3. Sit to stand transfer with Moderate Assistance  4. Bed to chair transfer with Maximum Assistance- Met  Revised: mod A  5. Lower extremity exercise program x15 reps, with assistance as needed, in order to increase LE strength and (I) with functional mobility.   6. Pt ambulated 10' with max A using jamil walker    Further goals pending pt progress.      Outcome: Ongoing (interventions implemented as appropriate)  Pt tolerated session well today. Pt able to perform sit<>stand x2 trials using straight cane. Pt also tolerated sitting exercises well but was unable to recall hip precautions. PT stressed the importance of follow hip precautions to prevent dislocation. PT corrected pt twice today as pt crossed her legs unknowingly. Pt will cont to benefit from skilled therapy services and remains appropriate for SNF placement upon d/c.

## 2017-07-17 NOTE — NURSING
Patient is complaining of pain but has no PRN pain medication ordered, only scheduled Tylenol Q8H (1,000 mg). Pain is located in both her right shoulder and her mouth (s/t two white ulcers inside pt's lower lip). She is also complaining of dizziness at this time.     Family is refusing to re-attempt oxycodone to manage pain.   MD paged, awaiting new orders

## 2017-07-17 NOTE — PLAN OF CARE
Problem: Patient Care Overview  Goal: Plan of Care Review  Outcome: Ongoing (interventions implemented as appropriate)  Pt is progressing with plan of care. Frequent rounds made to assess pain and safety. Pt's pain controlled with pain medication ordered at this time. pt OOB with pt. Bed locked and at lowest position. Side rails up x 2. Call light within reach. Will continue to monitor.

## 2017-07-17 NOTE — PROGRESS NOTES
ORTHO PROGRESS NOTE:    Laurita Stokes is a 83 y.o. female admitted on 7/11/2017  Hospital Day: 6      The patient was seen and examined this morning at the bedside. No acute issues overnight and adequate control of pain on current regimen.    Patient worked with physical therapy over the last 24 hours and was able to sit in the chair.    PHYSICAL EXAM:  Awake/alert/oriented x 3  No acute distress  AFVSS (mild hypotension)  Good inspiratory effort with unlabored breathing; stable on 2L oxygen via NC    RLE : dressings c/d/i. NVI distally.    Vitals:    07/17/17 0027 07/17/17 0048 07/17/17 0300 07/17/17 0439   BP: (!) 93/54 (!) 100/55  (!) 98/55   BP Location:    Left arm   Patient Position:    Lying   BP Method:  Manual  Automatic   Pulse: 81  80 80   Resp: 18   18   Temp: 97.6 °F (36.4 °C)   97.5 °F (36.4 °C)   TempSrc: Oral   Oral   SpO2: 100%   99%   Weight:       Height:         I/O last 3 completed shifts:  In: 2727.5 [P.O.:1098; I.V.:479.5; IV Piggyback:1150]  Out: 1540 [Urine:1540]    Recent Labs  Lab 07/15/17  0953 07/16/17  0534   CALCIUM 8.1* 8.0*   * 133*   K 3.1* 3.2*   CO2 29 27   CL 97 97   BUN 27* 27*   CREATININE 1.1 0.9     No results for input(s): WBC, RBC, HGB, HCT, PLT in the last 72 hours.  No results for input(s): INR, APTT in the last 72 hours.    Invalid input(s): PT    A/P: 83 y.o. female 5 Days Post-Op s/p right hemiarthroplasty for femoral neck fracture. Also with right proximal humerus fracture (non-op treatment)  -- Pain control  -- PT/OT: WBAT to RLE with posterior hip precautions. NWB to RUE; sling for comfort only  -- DVT prophylaxis: Lovenox    Mike Casale, M.D. PGY-4  Department of Orthopedic Surgery    Attg Note:  I agree with the above assessment and plan.    Anshul Guerrero MD

## 2017-07-17 NOTE — SUBJECTIVE & OBJECTIVE
Interval History: Pt complaining of right arm and right hip pain. Otherwise says her breathing is ok.  Denies chest pain.  Is sitting up in bedside chair, but says she feels a little dizzy.     Review of Systems   Constitutional: Negative for chills and fever.   Respiratory: Negative for cough, shortness of breath and wheezing.    Cardiovascular: Negative for chest pain and palpitations.   Gastrointestinal: Negative for abdominal pain, constipation, diarrhea and nausea.   Genitourinary: Negative for difficulty urinating, dysuria and frequency.   Musculoskeletal: Positive for myalgias (to righht arm and right hip).     Objective:     Vital Signs (Most Recent):  Temp: 97.8 °F (36.6 °C) (07/17/17 1217)  Pulse: 80 (07/17/17 1217)  Resp: 16 (07/17/17 1217)  BP: (!) 105/57 (07/17/17 1217)  SpO2: (!) 93 % (07/17/17 1217) Vital Signs (24h Range):  Temp:  [96.4 °F (35.8 °C)-97.8 °F (36.6 °C)] 97.8 °F (36.6 °C)  Pulse:  [79-81] 80  Resp:  [16-18] 16  SpO2:  [93 %-100 %] 93 %  BP: ()/(54-59) 105/57     Weight: 57.8 kg (127 lb 6.8 oz)  Body mass index is 22.57 kg/m².    Intake/Output Summary (Last 24 hours) at 07/17/17 1308  Last data filed at 07/17/17 0400   Gross per 24 hour   Intake          1136.53 ml   Output              450 ml   Net           686.53 ml      Physical Exam   Constitutional: She appears well-developed and well-nourished. No distress.   HENT:   Head: Normocephalic and atraumatic.   Cardiovascular: Normal rate and regular rhythm.    No murmur heard.  Pulmonary/Chest: Effort normal and breath sounds normal. She has no wheezes.   Abdominal: Soft. Bowel sounds are normal. She exhibits no distension. There is no tenderness.   Musculoskeletal: She exhibits tenderness (right hip).   Right arm in sling   Neurological: She is alert.   Skin: Skin is warm and dry. No rash noted.

## 2017-07-17 NOTE — PT/OT/SLP PROGRESS
Physical Therapy  Treatment    Laurtia Stokes   MRN: [id number]   Admitting Diagnosis: Closed displaced fracture of right femoral neck    PT Received On: 07/17/17  PT Start Time: 1328     PT Stop Time: 1400    PT Total Time (min): 32 min       Billable Minutes:  Therapeutic Activity 12 and Therapeutic Exercise 20    Treatment Type: Treatment  PT/PTA: PT     PTA Visit Number: 0       General Precautions: Standard, fall  Orthopedic Precautions: RUE non weight bearing, RLE posterior precautions, RLE weight bearing as tolerated   Braces: UE Sling    Do you have any cultural, spiritual, Church conflicts, given your current situation?: none noted     Subjective:  Communicated with nsg prior to session.  -Pt agreeable to PT session    Pain/Comfort  Pain Rating 1: 5/10  Location - Side 1: Right  Location - Orientation 1: generalized  Location 1: hip  Pain Addressed 1: Cessation of Activity, Reposition, Distraction  Pain Rating Post-Intervention 1: 6/10    Objective:   Patient found with: perineural catheter, FCD, SCD, oxygen    Functional Mobility:  Bed Mobility:   Scooting/Bridging: Maximum Assistance to scoot forward in chair and back onto EOB  Sit to Supine: Maximum Assistance    Transfers:  Sit <> Stand Assistance: Maximum Assistance x2 trials from chair  Sit <> Stand Assistive Device: Quad Cane  Bed <> Chair Technique: Stand Pivot  Bed <> Chair Assistance: Maximum Assistance  Bed <> Chair Assistive Device: No Assistive Device    -Pt stood for ~20 seconds on 1st trial and ~30 seconds on 2nd trial using straight cane. Pt given tactile cueing for postural correction in standing as pt demonstrated flexed posture with back of legs resting against chair for support.    Gait:   Gait Distance: unable to perform today    Balance:   Static Sit: FAIR-: Maintains without assist but inconsistent   Dynamic Sit: FAIR: Cannot move trunk without losing balance  Static Stand: POOR: Needs MODERATE assist to maintain  Dynamic  stand: POOR: N/A     Therapeutic Activities and Exercises:  -Pt performed THR protocol exercises x30 reps of:  A. AP  B. GS  C. QS    -Pt required frequent cueing for completion and attention to task during exercises    -Pt educated on:  A. PT POC and role of PT  B. Importance of OOB activity to improve functional outcomes after surgery  C. Posterior hip precautions  D. Protecting surgical incision during t/f's  E. DME mgmt and 3-point gait sequence  F. S/s associated with THR procedure      AM-PAC 6 CLICK MOBILITY  How much help from another person does this patient currently need?   1 = Unable, Total/Dependent Assistance  2 = A lot, Maximum/Moderate Assistance  3 = A little, Minimum/Contact Guard/Supervision  4 = None, Modified [place]/Independent    Turning over in bed (including adjusting bedclothes, sheets and blankets)?: 2  Sitting down on and standing up from a chair with arms (e.g., wheelchair, bedside commode, etc.): 2  Moving from lying on back to sitting on the side of the bed?: 2  Moving to and from a bed to a chair (including a wheelchair)?: 2  Need to walk in hospital room?: 1  Climbing 3-5 steps with a railing?: 1  Total Score: 10    AM-PAC Raw Score CMS G-Code Modifier Level of Impairment Assistance   6 % Total / Unable   7 - 9 CM 80 - 100% Maximal Assist   10 - 14 CL 60 - 80% Moderate Assist   15 - 19 CK 40 - 60% Moderate Assist   20 - 22 CJ 20 - 40% Minimal Assist   23 CI 1-20% SBA / CGA   24 CH 0% Independent/ Mod I     Patient left supine with all lines intact, call button in reach and nsg notified.    Assessment:  Laurita Stokes is a 83 y.o. female with a medical diagnosis of Closed displaced fracture of right femoral neck and presents with impaired functional mobility. Pt tolerated session well today. Pt able to perform sit<>stand x2 trials using straight cane. Pt also tolerated sitting exercises well but was unable to recall hip precautions. PT stressed the importance of  follow hip precautions to prevent dislocation. PT corrected pt twice today as pt crossed her legs unknowingly. Pt will cont to benefit from skilled therapy services and remains appropriate for SNF placement upon d/c.    Rehab identified problem list/impairments: Rehab identified problem list/impairments: weakness, impaired endurance, impaired self care skills, impaired functional mobilty, gait instability, impaired balance, decreased lower extremity function, decreased upper extremity function, pain, decreased safety awareness, impaired joint extensibility, impaired cognition, decreased ROM, orthopedic precautions    Rehab potential is good.    Activity tolerance: Good    Discharge recommendations: Discharge Facility/Level Of Care Needs: nursing facility, skilled     Barriers to discharge: Barriers to Discharge: None    Equipment recommendations: Equipment Needed After Discharge:  (TBD pending progress with mobility)     GOALS:    Physical Therapy Goals        Problem: Physical Therapy Goal    Goal Priority Disciplines Outcome Goal Variances Interventions   Physical Therapy Goal     PT/OT, PT Ongoing (interventions implemented as appropriate)     Description:  Goals to be met by: 17     Patient will increase functional independence with mobility by performin. Supine to sit with Moderate Assistance  2. Sit to supine with Moderate Assistance  3. Sit to stand transfer with Moderate Assistance  4. Bed to chair transfer with Maximum Assistance- Met  Revised: mod A  5. Lower extremity exercise program x15 reps, with assistance as needed, in order to increase LE strength and (I) with functional mobility.   6. Pt ambulated 10' with max A using jamil walker    Further goals pending pt progress.                       PLAN:    Patient to be seen daily  to address the above listed problems via gait training, therapeutic exercises, therapeutic activities, neuromuscular re-education  Plan of Care expires:  08/11/17  Plan of Care reviewed with: patient, daughter         Ronen Willard, PT  07/17/2017

## 2017-07-17 NOTE — PLAN OF CARE
3:16 PM  SW called daughterMarlene to f/u with decision regarding possible hospice care at nursing home. Informed daughter that [facility] is reviewing for long term and possible hospice at facility. Daughter stated she wanted to communicate with additional family members and call back.    Lulú Ricardo, CLAUDE   [facility]  Ext [phone]

## 2017-07-17 NOTE — ANESTHESIA POST-OP PAIN MANAGEMENT
Acute Pain Service Progress Note    Laurita Stokes is a 83 y.o., female, [id number].       Surgery:  ARTHROPLASTY-HIP-OLAYINKA-right (Right)    Post Op Day #: 5    Catheter type: perineural  SIFI     Infusion type: Ropivacaine 0.2%  8 basal    Problem List:    Active Hospital Problems    Diagnosis  POA    *Closed displaced fracture of right femoral neck [S72.001A]  Yes    Debility [R53.81]  Yes    CKD (chronic kidney disease), stage III [N18.3]  Yes    Chronic respiratory failure with hypoxia [J96.11]  Yes    Hip fracture [S72.009A]  Yes    Closed fracture of proximal end of right humerus [S42.201A]  Yes    Coronary artery disease [I25.10]  Yes    Hypothyroidism [E03.9]  Yes    CHF NYHA class IV [I50.9]  Yes    DDD (degenerative disc disease), lumbar [M51.36]  Yes    Essential hypertension [I10]  Yes    Hypokalemia [E87.6]  Yes    Depression [F32.9]  Yes    Dementia without behavioral disturbance [F03.90]  Yes    Chronic combined systolic and diastolic heart failure [I50.42]  Yes    Ventricular tachycardia [I47.2]  Yes    Biventricular ICD (implantable cardioverter-defibrillator) in place [Z95.810]  Yes     [facility] [place]      Cardiomyopathy, ischemic [I25.5]  Yes    PAF (paroxysmal atrial fibrillation) [I48.0]  Yes      Resolved Hospital Problems    Diagnosis Date Resolved POA   No resolved problems to display.       Subjective:                           General appearance of awake                        Pain with rest: 3    Faces                        Pain with movement: 3                         Side Effects                                              1. Pruritis No                                              2. Nausea No                                              3. Motor Blockade No, 0=Ability to raise lower extremities off bed                                              4. Sedation No, 1=awake and alert     Objective:                              Catheter site clean, dry,  intact         Vitals   Vitals:    07/17/17 0439   BP: (!) 98/55   Pulse: 80   Resp: 18   Temp: 36.4 °C (97.5 °F)        Labs    Admission on 07/11/2017   No results displayed because visit has over 200 results.           Meds   Current Facility-Administered Medications   Medication Dose Route Frequency Provider Last Rate Last Dose    (Magic mouthwash) 1:1:1 Benadryl 12.5mg/5ml liq, aluminum & magnesium hydroxide-simehticone (Maalox), lidocaine viscous 2%  5 mL Swish & Spit Q4H PRN Serafin Zepeda NP   5 mL at 07/17/17 0610    acetaminophen tablet 1,000 mg  1,000 mg Oral Q8H Lisa Cordoba MD   1,000 mg at 07/17/17 0000    albuterol-ipratropium 2.5mg-0.5mg/3mL nebulizer solution 3 mL  3 mL Nebulization Q4H PRN Arup KMalathi Heydi, DO        amiodarone tablet 200 mg  200 mg Oral BID Arup SCHUYLER Soliz, DO   200 mg at 07/16/17 2052    amoxicillin-clavulanate 500-125mg per tablet 500 mg  1 tablet Oral BID Tramaine Sosa MD   500 mg at 07/16/17 2052    bumetanide tablet 1 mg  1 mg Oral Daily Arup SCHUYLER Soliz, DO   1 mg at 07/16/17 0821    carvedilol tablet 3.125 mg  3.125 mg Oral Nightly Tramaine Sosa MD   3.125 mg at 07/16/17 2052    citalopram tablet 30 mg  30 mg Oral Daily Arup SCHUYLER Soliz, DO   30 mg at 07/16/17 0820    digoxin tablet 125 mcg  125 mcg Oral Every Mon, Wed, Fri Tramaine Sosa MD   125 mcg at 07/14/17 1627    docusate sodium capsule 100 mg  100 mg Oral TID Erasmo Koenig MD   100 mg at 07/17/17 0606    enoxaparin injection 30 mg  30 mg Subcutaneous Daily Erasmo Koenig MD   30 mg at 07/16/17 1634    isosorbide mononitrate 24 hr tablet 30 mg  30 mg Oral Daily Tramaine Sosa MD   Stopped at 07/13/17 0900    levetiracetam tablet 500 mg  500 mg Oral BID Tramaine Sosa MD   500 mg at 07/16/17 2052    levothyroxine tablet 88 mcg  88 mcg Oral Before breakfast Dallas Soliz DO   88 mcg at 07/17/17 0606    lidocaine 5 % patch 1 patch  1 patch Transdermal Q24H Janay Parry MD   1 patch at 07/16/17 0935     meclizine tablet 25 mg  25 mg Oral BID PRN Serafin Zepeda NP   25 mg at 07/17/17 0606    nitroGLYCERIN SL tablet 0.4 mg  0.4 mg Sublingual Q5 Min PRN Dallas Soliz, DO        ondansetron injection 4 mg  4 mg Intravenous Q8H PRN Sharon Zamudio MD        pantoprazole suspension 40 mg  40 mg Oral Daily Arup SCHUYLER Soliz, DO   40 mg at 07/16/17 0821    polyethylene glycol packet 17 g  17 g Oral Daily Erasmo Koenig MD   Stopped at 07/16/17 0822    ramelteon tablet 8 mg  8 mg Oral Nightly PRN Michelle Bruner MD        ropivacaine (PF) 2 mg/ml (0.2%) infusion  8 mL/hr Perineural Continuous Mohinder Azevedo MD 8 mL/hr at 07/16/17 1045 8 mL/hr at 07/16/17 1045    sodium chloride 0.9% flush 3 mL  3 mL Intravenous Q8H Dallas Soliz, DO   3 mL at 07/17/17 0614    spironolactone split tablet 12.5 mg  12.5 mg Oral Every Mon, Wed, Fri Arup SCHUYLER Soliz, DO   12.5 mg at 07/14/17 0941    tamsulosin 24 hr capsule 0.4 mg  0.4 mg Oral Daily Arup SCHUYLER Soliz, DO   0.4 mg at 07/16/17 0821    tranexamic acid (CYKLOKAPRON) 3,000 mg in sodium chloride 0.9% 100 mL  3,000 mg Irrigation Once Anshul Guerrero MD        tuberculin injection 5 Units  5 Units Intradermal Once Niki Mccord MD           Assessment:                           Pain control adequate. Patient oriented to person.      Plan:                           Stop PRN Opiods given hx of dementia and confusion while inpatient. Continue SIFI catheter with infusion. Will plan to d/c SIFI at time of discharge from hospital.     Evaluator Zuleyka Reynoso      Patient seen on rounds with Dr. Reynoso and notes reviewed.  Agree with his findings.

## 2017-07-17 NOTE — PLAN OF CARE
Problem: Patient Care Overview     Pt alert, disoriented to time, place, situation. VSS (BP low). Surgical site CDI - arm remains in sling. family at bedside. Weight shift assistance provided as needed. Pain managed with PRN medications. IS education complete. SCD's on and functioning. Fall precautions maintained. Will resume care.      -telemetry monitor on  -pt's daughter requested ambien tonight, as her mother was tossing/turning. ambien not ordered s/t low BP. rozerem ordered to administer nightly PRN

## 2017-07-17 NOTE — ADDENDUM NOTE
Addendum  created 07/17/17 0923 by Tapan Devine MD    Charge Capture section accepted, Sign clinical note

## 2017-07-17 NOTE — PLAN OF CARE
3:30 PM  SW spoke with daughter regarding nursing home facilities. Daughter stated she spoke with sister who wants [facility]. Called [facility] [phone] and left voicemail for admissions. Faxed referral. [facility] is a SNF facility that also has assisted living. Will update family.  3:59 PM  SW spoke with daughter again who stated they want pt to go to SNF and transition back home with hospice. Will update physician.    Lulú Ricardo LMSW   [facility]  Ext [phone]

## 2017-07-17 NOTE — PLAN OF CARE
Problem: Occupational Therapy Goal  Goal: Occupational Therapy Goal  Patient will increase functional independence with ADLs by performing:    UE Dressing with Supervision.  LE Dressing with Moderate Assistance.  Grooming while seated with Supervision.  Toileting from bedside commode with Supervision for hygiene and clothing management.   Sitting at edge of bed x20 minutes with Supervision.  Supine to sit with Minimal Assistance.  Stand pivot transfers with Minimal Assistance.  Toilet transfer to bedside commode with Minimal Assistance.  Pt will verbalize hip precautions and incorporate them during ADL performance with Minimal verbal cues   Cont. POC

## 2017-07-17 NOTE — ASSESSMENT & PLAN NOTE
- continue amiodarone 200 mg bid    - Continue bumex (will reduce dose to Q MWF from daily) and aldactone ( MWF )

## 2017-07-18 PROBLEM — I50.43 ACUTE ON CHRONIC COMBINED SYSTOLIC AND DIASTOLIC CONGESTIVE HEART FAILURE: Status: RESOLVED | Noted: 2017-03-03 | Resolved: 2017-07-18

## 2017-07-18 PROBLEM — S72.001D: Status: ACTIVE | Noted: 2017-07-11

## 2017-07-18 PROBLEM — E86.1 INTRAVASCULAR VOLUME DEPLETION: Status: RESOLVED | Noted: 2017-04-17 | Resolved: 2017-07-18

## 2017-07-18 PROBLEM — S61.412A SKIN TEAR OF LEFT HAND WITHOUT COMPLICATION: Status: RESOLVED | Noted: 2017-05-25 | Resolved: 2017-07-18

## 2017-07-18 PROBLEM — D62 ACUTE BLOOD LOSS ANEMIA: Status: ACTIVE | Noted: 2017-07-18

## 2017-07-18 PROBLEM — E83.42 HYPOMAGNESEMIA: Status: ACTIVE | Noted: 2017-07-18

## 2017-07-18 PROBLEM — I60.9 SAH (SUBARACHNOID HEMORRHAGE): Status: RESOLVED | Noted: 2017-05-24 | Resolved: 2017-07-18

## 2017-07-18 PROBLEM — J32.0 MAXILLARY SINUSITIS: Status: RESOLVED | Noted: 2017-05-29 | Resolved: 2017-07-18

## 2017-07-18 PROBLEM — S81.811A SKIN TEAR OF RIGHT LOWER LEG WITHOUT COMPLICATION: Status: RESOLVED | Noted: 2017-05-25 | Resolved: 2017-07-18

## 2017-07-18 PROBLEM — N30.00 ACUTE CYSTITIS WITHOUT HEMATURIA: Status: RESOLVED | Noted: 2017-04-17 | Resolved: 2017-07-18

## 2017-07-18 PROBLEM — R53.1 WEAKNESS: Status: RESOLVED | Noted: 2017-04-08 | Resolved: 2017-07-18

## 2017-07-18 PROBLEM — S06.6XAA SUBARACHNOID HEMORRHAGE FOLLOWING INJURY, CONCUSSION: Status: RESOLVED | Noted: 2017-05-24 | Resolved: 2017-07-18

## 2017-07-18 PROBLEM — S72.009A HIP FRACTURE: Status: RESOLVED | Noted: 2017-07-12 | Resolved: 2017-07-18

## 2017-07-18 PROBLEM — R07.9 CHEST PAIN WITH MODERATE RISK FOR CARDIAC ETIOLOGY: Status: RESOLVED | Noted: 2017-04-03 | Resolved: 2017-07-18

## 2017-07-18 PROBLEM — F07.81 POST-CONCUSSION SYNDROME: Status: RESOLVED | Noted: 2017-05-28 | Resolved: 2017-07-18

## 2017-07-18 PROBLEM — S72.001D ENCOUNTER FOR AFTERCARE FOR HEALING CLOSED TRAUMATIC FRACTURE OF RIGHT HIP: Status: ACTIVE | Noted: 2017-07-18

## 2017-07-18 PROBLEM — R55 SYNCOPE: Status: RESOLVED | Noted: 2017-05-14 | Resolved: 2017-07-18

## 2017-07-18 LAB
ANION GAP SERPL CALC-SCNC: 8 MMOL/L
BUN SERPL-MCNC: 18 MG/DL
CALCIUM SERPL-MCNC: 8.3 MG/DL
CHLORIDE SERPL-SCNC: 101 MMOL/L
CO2 SERPL-SCNC: 27 MMOL/L
CREAT SERPL-MCNC: 1 MG/DL
EST. GFR  (AFRICAN AMERICAN): >60 ML/MIN/1.73 M^2
EST. GFR  (NON AFRICAN AMERICAN): 52.2 ML/MIN/1.73 M^2
GLUCOSE SERPL-MCNC: 96 MG/DL
MAGNESIUM SERPL-MCNC: 1.5 MG/DL
PHOSPHATE SERPL-MCNC: 2.4 MG/DL
POTASSIUM SERPL-SCNC: 4.6 MMOL/L
SODIUM SERPL-SCNC: 136 MMOL/L

## 2017-07-18 PROCEDURE — 63600175 PHARM REV CODE 636 W HCPCS: Performed by: INTERNAL MEDICINE

## 2017-07-18 PROCEDURE — 99231 SBSQ HOSP IP/OBS SF/LOW 25: CPT | Mod: ,,, | Performed by: ANESTHESIOLOGY

## 2017-07-18 PROCEDURE — 36415 COLL VENOUS BLD VENIPUNCTURE: CPT

## 2017-07-18 PROCEDURE — 63600175 PHARM REV CODE 636 W HCPCS: Performed by: STUDENT IN AN ORGANIZED HEALTH CARE EDUCATION/TRAINING PROGRAM

## 2017-07-18 PROCEDURE — 80048 BASIC METABOLIC PNL TOTAL CA: CPT

## 2017-07-18 PROCEDURE — 11000001 HC ACUTE MED/SURG PRIVATE ROOM

## 2017-07-18 PROCEDURE — 97535 SELF CARE MNGMENT TRAINING: CPT

## 2017-07-18 PROCEDURE — 25000003 PHARM REV CODE 250: Performed by: HOSPITALIST

## 2017-07-18 PROCEDURE — 25000003 PHARM REV CODE 250: Performed by: ANESTHESIOLOGY

## 2017-07-18 PROCEDURE — 25000003 PHARM REV CODE 250: Performed by: INTERNAL MEDICINE

## 2017-07-18 PROCEDURE — 25000003 PHARM REV CODE 250: Performed by: STUDENT IN AN ORGANIZED HEALTH CARE EDUCATION/TRAINING PROGRAM

## 2017-07-18 PROCEDURE — 83735 ASSAY OF MAGNESIUM: CPT

## 2017-07-18 PROCEDURE — 84100 ASSAY OF PHOSPHORUS: CPT

## 2017-07-18 PROCEDURE — 97530 THERAPEUTIC ACTIVITIES: CPT

## 2017-07-18 PROCEDURE — 99232 SBSQ HOSP IP/OBS MODERATE 35: CPT | Mod: ,,, | Performed by: INTERNAL MEDICINE

## 2017-07-18 RX ORDER — TRAMADOL HYDROCHLORIDE 50 MG/1
50 TABLET ORAL EVERY 6 HOURS PRN
Status: DISCONTINUED | OUTPATIENT
Start: 2017-07-18 | End: 2017-07-24 | Stop reason: HOSPADM

## 2017-07-18 RX ORDER — ENOXAPARIN SODIUM 100 MG/ML
40 INJECTION SUBCUTANEOUS EVERY 24 HOURS
Status: DISCONTINUED | OUTPATIENT
Start: 2017-07-19 | End: 2017-07-24 | Stop reason: HOSPADM

## 2017-07-18 RX ORDER — MAGNESIUM SULFATE HEPTAHYDRATE 40 MG/ML
2 INJECTION, SOLUTION INTRAVENOUS ONCE
Status: COMPLETED | OUTPATIENT
Start: 2017-07-18 | End: 2017-07-18

## 2017-07-18 RX ADMIN — TRAMADOL HYDROCHLORIDE 50 MG: 50 TABLET, FILM COATED ORAL at 06:07

## 2017-07-18 RX ADMIN — AMIODARONE HYDROCHLORIDE 200 MG: 200 TABLET ORAL at 09:07

## 2017-07-18 RX ADMIN — ACETAMINOPHEN 1000 MG: 500 TABLET ORAL at 05:07

## 2017-07-18 RX ADMIN — LEVOTHYROXINE SODIUM 88 MCG: 88 TABLET ORAL at 05:07

## 2017-07-18 RX ADMIN — TRAMADOL HYDROCHLORIDE 50 MG: 50 TABLET, FILM COATED ORAL at 09:07

## 2017-07-18 RX ADMIN — LEVETIRACETAM 500 MG: 500 TABLET, FILM COATED ORAL at 09:07

## 2017-07-18 RX ADMIN — AMIODARONE HYDROCHLORIDE 200 MG: 200 TABLET ORAL at 08:07

## 2017-07-18 RX ADMIN — LIDOCAINE 1 PATCH: 50 PATCH CUTANEOUS at 10:07

## 2017-07-18 RX ADMIN — LEVETIRACETAM 500 MG: 500 TABLET, FILM COATED ORAL at 08:07

## 2017-07-18 RX ADMIN — ENOXAPARIN SODIUM 30 MG: 100 INJECTION SUBCUTANEOUS at 04:07

## 2017-07-18 RX ADMIN — PANTOPRAZOLE SODIUM 40 MG: 40 GRANULE, DELAYED RELEASE ORAL at 08:07

## 2017-07-18 RX ADMIN — RAMELTEON 8 MG: 8 TABLET, FILM COATED ORAL at 10:07

## 2017-07-18 RX ADMIN — ACETAMINOPHEN 1000 MG: 500 TABLET ORAL at 09:07

## 2017-07-18 RX ADMIN — TAMSULOSIN HYDROCHLORIDE 0.4 MG: 0.4 CAPSULE ORAL at 08:07

## 2017-07-18 RX ADMIN — CITALOPRAM HYDROBROMIDE 30 MG: 20 TABLET ORAL at 08:07

## 2017-07-18 RX ADMIN — Medication 3 ML: at 05:07

## 2017-07-18 RX ADMIN — Medication 3 ML: at 04:07

## 2017-07-18 RX ADMIN — CARVEDILOL 3.12 MG: 3.12 TABLET, FILM COATED ORAL at 09:07

## 2017-07-18 RX ADMIN — AMOXICILLIN AND CLAVULANATE POTASSIUM 500 MG: 500; 125 TABLET, FILM COATED ORAL at 08:07

## 2017-07-18 RX ADMIN — MAGNESIUM SULFATE IN WATER 2 G: 40 INJECTION, SOLUTION INTRAVENOUS at 10:07

## 2017-07-18 RX ADMIN — AMOXICILLIN AND CLAVULANATE POTASSIUM 500 MG: 500; 125 TABLET, FILM COATED ORAL at 09:07

## 2017-07-18 RX ADMIN — Medication 3 ML: at 09:07

## 2017-07-18 NOTE — ASSESSMENT & PLAN NOTE
Controlled. On admission to hospital, patient was noted to have Hgb of 10. After surgery patient's Hgb level has dropped to 8 and expected blood loss related to surgery and hip fracture. Patient has no signs of active bleeding and hemodynamically stable. Patient is asymptomatic. Goal is keep Hgb >7.

## 2017-07-18 NOTE — ADDENDUM NOTE
Addendum  created 07/18/17 1201 by Candace Gonzalez RN    [employer] properties accepted, Order list changed, Sign clinical note

## 2017-07-18 NOTE — ASSESSMENT & PLAN NOTE
Discussion made with patient's daughter about goals of care and appropriate discharge planning. Prior to presentation, she was living at home with her daughter with home hospice (due to her advanced cardiac disease). The daughter lives alone and really has no other support and feels this situation was too much for her. Discussion made for SNF versus NH placement with hospice consult as the 2 remaining options. After deliberation, daughter decided she wanted to try SNF at NH level and see if patient's mother improves functionally from recent hip surgery and can possibly return home on hospice but if does not improve with PT/OT then plan will be home hospice care transition. Patient is DNR.

## 2017-07-18 NOTE — PLAN OF CARE
9:03 AM  SW called Alisha with [facility] who stated they are denying pt as she is more hospice appropriate and not rehabable at this time. Will update CM, physician and family.   11:40 AM  SW called [facility] and left voicemail regarding SNF placement to indicate if she will accept for SNF.     Lulú Ricardo, CLAUDE   [facility]  Ext [phone]

## 2017-07-18 NOTE — ANESTHESIA POST-OP PAIN MANAGEMENT
Acute Pain Service Progress Note    Laurita Stokes is a 83 y.o., female, [id number].       Surgery:  ARTHROPLASTY-HIP-OLAYINKA-right (Right)    Post Op Day #: 6    Catheter type: perineural  SIFI     Infusion type: Ropivacaine 0.2%  8 basal    Problem List:    Active Hospital Problems    Diagnosis  POA    *Closed displaced fracture of right femoral neck [S72.001A]  Yes    Goals of care, counseling/discussion [Z71.89]  Not Applicable    Debility [R53.81]  Yes    CKD (chronic kidney disease), stage III [N18.3]  Yes    Chronic respiratory failure with hypoxia [J96.11]  Yes    Hip fracture [S72.009A]  Yes    Closed fracture of proximal end of right humerus [S42.201A]  Yes    Coronary artery disease [I25.10]  Yes    Hypothyroidism [E03.9]  Yes    CHF NYHA class IV [I50.9]  Yes    DDD (degenerative disc disease), lumbar [M51.36]  Yes    Essential hypertension [I10]  Yes    Hypokalemia [E87.6]  Yes    Depression [F32.9]  Yes    Dementia without behavioral disturbance [F03.90]  Yes    Chronic combined systolic and diastolic heart failure [I50.42]  Yes    Ventricular tachycardia [I47.2]  Yes    Biventricular ICD (implantable cardioverter-defibrillator) in place [Z95.810]  Yes     [facility] CRT-D      Cardiomyopathy, ischemic [I25.5]  Yes    PAF (paroxysmal atrial fibrillation) [I48.0]  Yes      Resolved Hospital Problems    Diagnosis Date Resolved POA   No resolved problems to display.       Subjective:                           General appearance of awake                        Pain with rest: 4    Faces                        Pain with movement: 4                         Side Effects                                              1. Pruritis No                                              2. Nausea No                                              3. Motor Blockade No, 0=Ability to raise lower extremities off bed                                              4. Sedation No, 1=awake and  alert     Objective:                              Catheter site clean, dry, intact         Vitals   Vitals:    07/18/17 0700   BP:    Pulse: 80   Resp:    Temp:         Labs    Admission on 07/11/2017   No results displayed because visit has over 200 results.           Meds   Current Facility-Administered Medications   Medication Dose Route Frequency Provider Last Rate Last Dose    (Magic mouthwash) 1:1:1 Benadryl 12.5mg/5ml liq, aluminum & magnesium hydroxide-simehticone (Maalox), lidocaine viscous 2%  5 mL Swish & Spit Q4H PRN Serafin Zepeda NP   5 mL at 07/17/17 0610    (Magic mouthwash) 1:1:1 Benadryl 12.5mg/5ml liq, aluminum & magnesium hydroxide-simehticone (Maalox), lidocaine viscous 2%  10 mL Swish & Spit Q4H PRN Niki Mccord MD   10 mL at 07/17/17 2133    acetaminophen tablet 1,000 mg  1,000 mg Oral Q8H Lisa Cordoba MD   1,000 mg at 07/18/17 0510    albuterol-ipratropium 2.5mg-0.5mg/3mL nebulizer solution 3 mL  3 mL Nebulization Q4H PRN Dallas Soliz DO        amiodarone tablet 200 mg  200 mg Oral BID Dallas Soliz DO   200 mg at 07/17/17 2133    amoxicillin-clavulanate 500-125mg per tablet 500 mg  1 tablet Oral BID Tramaine Sosa MD   500 mg at 07/17/17 2133    [START ON 7/19/2017] bumetanide tablet 1 mg  1 mg Oral Every Mon, Wed, Fri Niki Mccord MD        carvedilol tablet 3.125 mg  3.125 mg Oral Nightly Tramaine Sosa MD   3.125 mg at 07/17/17 2133    citalopram tablet 30 mg  30 mg Oral Daily Dallas Soliz DO   30 mg at 07/17/17 0841    digoxin tablet 125 mcg  125 mcg Oral Every Mon, Wed, Fri Tramaine Sosa MD   125 mcg at 07/17/17 1603    docusate sodium capsule 100 mg  100 mg Oral TID Erasmo Koenig MD   100 mg at 07/17/17 2132    enoxaparin injection 30 mg  30 mg Subcutaneous Daily Erasmo Koenig MD   30 mg at 07/17/17 1604    isosorbide mononitrate 24 hr tablet 30 mg  30 mg Oral Daily Tramaine Sosa MD   Stopped at 07/13/17 0900    levetiracetam tablet 500 mg  500  mg Oral BID Tramaine Sosa MD   500 mg at 07/17/17 2132    levothyroxine tablet 88 mcg  88 mcg Oral Before breakfast Arjarrell Soliz, DO   88 mcg at 07/18/17 0510    lidocaine 5 % patch 1 patch  1 patch Transdermal Q24H Janay Parry MD   1 patch at 07/17/17 1013    meclizine tablet 25 mg  25 mg Oral BID PRN Serafin Zepeda NP   25 mg at 07/17/17 2133    nitroGLYCERIN SL tablet 0.4 mg  0.4 mg Sublingual Q5 Min PRN Dallas Soliz, DO        ondansetron injection 4 mg  4 mg Intravenous Q8H PRN Sharon Zamudio MD        pantoprazole suspension 40 mg  40 mg Oral Daily Dallas Soliz, DO   40 mg at 07/17/17 0841    polyethylene glycol packet 17 g  17 g Oral Daily Erasmo Koenig MD   17 g at 07/17/17 0840    ramelteon tablet 8 mg  8 mg Oral Nightly PRN Michelle Bruner MD   8 mg at 07/17/17 2132    ropivacaine (PF) 2 mg/ml (0.2%) infusion  8 mL/hr Perineural Continuous Mohinder Azevedo MD 8 mL/hr at 07/17/17 1132 8 mL/hr at 07/17/17 1132    sodium chloride 0.9% flush 3 mL  3 mL Intravenous Q8H Dallas Soliz, DO   3 mL at 07/18/17 0510    spironolactone split tablet 12.5 mg  12.5 mg Oral Every Mon, Wed, Fri Dallas Soliz, DO   12.5 mg at 07/17/17 0841    tamsulosin 24 hr capsule 0.4 mg  0.4 mg Oral Daily Arup SCHUYLER Soliz, DO   0.4 mg at 07/17/17 0841    tranexamic acid (CYKLOKAPRON) 3,000 mg in sodium chloride 0.9% 100 mL  3,000 mg Irrigation Once Anshul Guerrero MD        tuberculin injection 5 Units  5 Units Intradermal Once Niki Mccord MD           Assessment:                           Pain control adequate. Patient resting comfortably in bed upon examination. Patient easily arousable with verbal stimulation.      Plan:                           Stop PRN Opiods given hx of dementia and confusion while inpatient. Paused SIFI PNC infusion this AM. Will reassess pain later this afternoon and possibly discontinue PNC catheter if patient remains comfortable. Continue scheduled PO acetaminophen.     Evaluator  Zuleyka Reynoso    Patient seen on rounds with Dr. Reynoso and notes reviewed.  Agree with his findings.

## 2017-07-18 NOTE — ASSESSMENT & PLAN NOTE
Orthopedics evaluated and recommends:  Non-operative treatment with sling in place and  non weight bearing status of RUE. Orthopedics to follow-up as outpatient in 1-2 weeks.

## 2017-07-18 NOTE — PLAN OF CARE
3:10 PM  REZA spoke with D.OARTI at [facility] regarding SNF referral. She stated Shirin in admissions spoke with daughter who wants pt to transition to [facility] care after SNF, however daughter who is POA stated she wants pt to go home with hospice after SNF. [facility] is reviewing referral again after REZA faxed latest progress notes and PT notes. [facility] can only possibly take pt for SNF and not for [facility]. Updated CM and physician. Will await final decision.     Lulú Ricardo, CLAUDE   [facility]  Ext [phone]

## 2017-07-18 NOTE — SUBJECTIVE & OBJECTIVE
Interval History: Patient reports minimal pain to right shoulder and right hip. Patient sitting up in bedside chair and daughter at bedside and working with SW and  on discharge planning for patient.     Review of Systems   Constitutional: Negative for chills and fever.   Respiratory: Negative for cough, shortness of breath and wheezing.    Cardiovascular: Negative for chest pain and palpitations.   Gastrointestinal: Negative for abdominal pain, constipation, diarrhea and nausea.   Genitourinary: Negative for difficulty urinating, dysuria and frequency.   Musculoskeletal: Positive for myalgias (to righht arm and right hip).     Objective:     Vital Signs (Most Recent):  Temp: 97.9 °F (36.6 °C) (07/18/17 1149)  Pulse: 80 (07/18/17 1149)  Resp: 16 (07/18/17 1149)  BP: (!) 108/59 (07/18/17 1149)  SpO2: 98 % (07/18/17 1149) Vital Signs (24h Range):  Temp:  [96.5 °F (35.8 °C)-98.2 °F (36.8 °C)] 97.9 °F (36.6 °C)  Pulse:  [80-85] 80  Resp:  [14-19] 16  SpO2:  [92 %-98 %] 98 %  BP: (103-119)/(51-60) 108/59     Weight: 57.8 kg (127 lb 6.8 oz)  Body mass index is 22.57 kg/m².    Intake/Output Summary (Last 24 hours) at 07/18/17 1414  Last data filed at 07/18/17 0400   Gross per 24 hour   Intake             1141 ml   Output                0 ml   Net             1141 ml      Physical Exam   Constitutional: She appears well-developed and well-nourished. No distress.   HENT:   Head: Normocephalic and atraumatic.   Cardiovascular: Normal rate and regular rhythm.    No murmur heard.  Pulmonary/Chest: Effort normal and breath sounds normal. She has no wheezes.   Abdominal: Soft. Bowel sounds are normal. She exhibits no distension. There is no tenderness.   Musculoskeletal: She exhibits tenderness (right hip).   Right arm in sling   Neurological: She is alert.   Skin: Skin is warm and dry. No rash noted.   Surgical bandage in place to right hip       Significant Labs:   CMP:   Recent Labs  Lab 07/17/17  0602  07/18/17  0607   * 136   K 3.2* 4.6   CL 97 101   CO2 25 27   GLU 75 96   BUN 23 18   CREATININE 0.8 1.0   CALCIUM 8.2* 8.3*   ANIONGAP 12 8   EGFRNONAA >60.0 52.2*     Magnesium level 1.5    Significant Imaging: I have reviewed all pertinent imaging results/findings within the past 24 hours.

## 2017-07-18 NOTE — ASSESSMENT & PLAN NOTE
Patient with episodes of v. Tach in hospital due to hypokalemia, now resolved. Continue Amiodarone for treatment.

## 2017-07-18 NOTE — ASSESSMENT & PLAN NOTE
Controlled. Patient currently in normal sinus rhythm. Patient recently on Coumadin but stopped after SAH and frequent falls and considered high fall risk so no plans to restart. Continue Amiodarone to treat.

## 2017-07-18 NOTE — PLAN OF CARE
POD 6 s/p right hip hemiarthroplasty. PT/OT ordered to eval and treat. PT/OT recommending SNF placement. Plans to transfer/discharge patient to [facility] SNF tomorrow. SW and CM will continue to follow for any additional needs. Plan A to discharge to SNF as soon as medically stable. Plan B to discharge home with home health.      07/18/17 1422   Right Care Assessment   Can the patient answer the patient profile reliably? No, cognitively impaired   How often would a person be available to care for the patient? Whenever needed   Describe the patient's ability to walk at the present time. Does not walk or unable to take any steps at all   How does the patient rate their overall health at the present time? Fair   Number of comorbid conditions (as recorded on the chart) Five or more   During the past month, has the patient often been bothered by feeling down, depressed or hopeless? No   Have you felt down, depressed, or hopeless? 0   During the past month, has the patient often been bothered by little interest or pleasure in doing things? No   Have you felt little interest or pleasure in doing things? 0

## 2017-07-18 NOTE — PROGRESS NOTES
[facility] Hospital Medicine  Progress Note    Patient Name: Laurita Stokes  MRN: [id number]  Hospital Medicine Service: H  Admission Date: 7/11/2017  Length of Stay: 7 days  Expected Discharge Date: 7/19/2017  Attending Physician: Ana Bergman MD  Primary Care Provider: Alex Capellan MD    Subjective:     6 Days Post-Op from right hip hemiarthroplasty for fracture     Orthopedic Surgeon: Dr. Anshul Guerrero  Weight Bearing Status: WBAT with posterior hip precautions right lower extremity    Follow-up Visit For: Closed displaced fracture of neck of right femur with routine healing    HPI:  83 year old female with PMH of a fib ( coumadin recently stopped due to SAH and frequent falls ) , ICM , HFrEF (EF=20-25%, s/p CRT-D) c NYHA IV sx  s/p AICD placement, HTN/HLPD, s/p 3v-CABG, hx of ventricular tachycardia on amiodarone, CKD III, GERD, dementia, under home hospice ( [facility] ) and on home oxygen for comfort ( 2 L )  presents as a transfer from [facility] for surgical fixation of right femoral neck fracture by orthopedic surgery. Patient is a poor informant with h/o dementia and daughter Brittany Stokes who is the caregiver at bedside provided HPI.      Patient was taken to OSH ED before midnight 07/10 with right shoulder and right hip pain after she fell while ambulating with a walker in the middle of the night while going to the bathroom. She had immediate right hip and shoulder pain.  She denies hitting her head or LOC.  Of note, she was recently admitted on 5/24-5/26 for subarachnoid hemorrhage, at which time she was discharged on hospice with plans to stop her anticoagulation for her atrial fibrillation.  Patient lives with her daughter and ambulates short distance inside the house with a walker. Imaging at OSH showed  proximal right  humerus fracture and right femoral neck fracture. Patient was evaluated by orthopedic surgery and cardiology at [facility].      Daughter states most of her cardiac medications were discontinued recently by hospice. Currently she is taking the following meds : amiodarone, bumex, aldactone, celexa, synthroid, prilosec, flomax and ambien.     Interval History: Patient reports minimal pain to right shoulder and right hip. Patient sitting up in bedside chair and daughter at bedside and working with SW and  on discharge planning for patient.     Review of Systems   Constitutional: Negative for chills and fever.   Respiratory: Negative for cough, shortness of breath and wheezing.    Cardiovascular: Negative for chest pain and palpitations.   Gastrointestinal: Negative for abdominal pain, constipation, diarrhea and nausea.   Genitourinary: Negative for difficulty urinating, dysuria and frequency.   Musculoskeletal: Positive for myalgias (to righht arm and right hip).     Objective:     Vital Signs (Most Recent):  Temp: 97.9 °F (36.6 °C) (07/18/17 1149)  Pulse: 80 (07/18/17 1149)  Resp: 16 (07/18/17 1149)  BP: (!) 108/59 (07/18/17 1149)  SpO2: 98 % (07/18/17 1149) Vital Signs (24h Range):  Temp:  [96.5 °F (35.8 °C)-98.2 °F (36.8 °C)] 97.9 °F (36.6 °C)  Pulse:  [80-85] 80  Resp:  [14-19] 16  SpO2:  [92 %-98 %] 98 %  BP: (103-119)/(51-60) 108/59     Weight: 57.8 kg (127 lb 6.8 oz)  Body mass index is 22.57 kg/m².    Intake/Output Summary (Last 24 hours) at 07/18/17 1414  Last data filed at 07/18/17 0400   Gross per 24 hour   Intake             1141 ml   Output                0 ml   Net             1141 ml      Physical Exam   Constitutional: She appears well-developed and well-nourished. No distress.   HENT:   Head: Normocephalic and atraumatic.   Cardiovascular: Normal rate and regular rhythm.    No murmur heard.  Pulmonary/Chest: Effort normal and breath sounds normal. She has no wheezes.   Abdominal: Soft. Bowel sounds are normal. She exhibits no distension. There is no tenderness.   Musculoskeletal: She exhibits tenderness (right  hip).   Right arm in sling   Neurological: She is alert.   Skin: Skin is warm and dry. No rash noted.   Surgical bandage in place to right hip       Significant Labs:   CMP:   Recent Labs  Lab 07/17/17  0602 07/18/17  0607   * 136   K 3.2* 4.6   CL 97 101   CO2 25 27   GLU 75 96   BUN 23 18   CREATININE 0.8 1.0   CALCIUM 8.2* 8.3*   ANIONGAP 12 8   EGFRNONAA >60.0 52.2*     Magnesium level 1.5    Significant Imaging: I have reviewed all pertinent imaging results/findings within the past 24 hours.    ASSESSMENT/PLAN:     Acute blood loss anemia    Controlled. On admission to hospital, patient was noted to have Hgb of 10. After surgery patient's Hgb level has dropped to 8 and expected blood loss related to surgery and hip fracture. Patient has no signs of active bleeding and hemodynamically stable. Patient is asymptomatic. Goal is keep Hgb >7.           Closed fracture of proximal end of right humerus with routine healing    Orthopedics evaluated and recommends:  Non-operative treatment with sling in place and  n on weight bearing status of RUE. Orthopedics to follow-up as outpatient in 1-2 weeks.           * Closed displaced fracture of neck of right femur with routine healing s/p hemiarthroplasty on 7/12/2017    Encounter for aftercare for healing closed traumatic fracture of right hip  Patient is POD # 6. Patient reports minimal pain in right hip. Plan is to continue PT/OT for gait training and strengthening and restoration of ADL's. Patient is FWB/WBAT: right lower extremity, posterior hip precautions as per Orthopedic recommendations. Plan to continue Lovenox 40 mg subcutaneous daily for total of 4 weeks post-op for DVT prophylaxis after hip fracture surgery. Orthopedics is following and managing surgical site. Pain is well controlled with Tramadol as needed. PT/OT recommending skilled nursing facility placement and  working on choice of skilled facility with patient and family and patient has  been accepted to [facility] for admit for tomorrow, 7/19.         Hypokalemia    Resolved with oral replacement on 7/17.           Hypomagnesemia    Will treat with Magnesium Sulfate 2 grams IV for 1 dose(s) today and recheck level in the am.           Dementia without behavioral disturbance    Patient at cognitive baseline. Continue delirium precautions.           Ventricular tachycardia    Patient with episodes of v. Tach in hospital due to hypokalemia, now resolved. Continue Amiodarone for treatment.          PAF (paroxysmal atrial fibrillation)    Controlled. Patient currently in normal sinus rhythm. Patient recently on Coumadin but stopped after SAH and frequent falls and considered high fall risk so no plans to restart. Continue Amiodarone to treat.          Chronic combined systolic and diastolic heart failure    Cardiomyopathy, ischemic  Biventricular ICD (implantable cardioverter-defibrillator) in place  Controlled. Patient with no signs to suggest acute heart failure and will continue Bumex (MWF) and Aldactone (MWF) and daily Coreg to treat.           Essential hypertension    Patient's blood pressure is controlled here in the hospital over past 24 hours. Goal for blood pressure is SBP < 150 and DBP < 90 as patient > or = 60 years of age with no diabetes or advanced kidney disease based on JNC 8 guidelines. Continue current treatment regimen with Coreg and Aldactone to treat. Plan to continue to monitor patient's blood pressure routinely while patient is hospitalized.           Coronary artery disease involving native coronary artery of native heart without angina pectoris    Controlled. Patient asymptomatic and will continue Imdur and Coreg to treat.          Goals of care, counseling/discussion    Discussion made with patient's daughter about goals of care and appropriate discharge planning. Prior to presentation, she was living at home with her daughter with home hospice (due to her  advanced cardiac disease). The daughter lives alone and really has no other support and feels this situation was too much for her. Discussion made for SNF versus NH placement with hospice consult as the 2 remaining options. After deliberation, daughter decided she wanted to try SNF at NH level and see if patient's mother improves functionally from recent hip surgery and can possibly return home on hospice but if does not improve with PT/OT then plan will be home hospice care transition. Patient is DNR.           Chronic respiratory failure with hypoxia    Controlled. Continue oxygen at 2 liters nasal cannula as needed.               Anticipated Disposition: Skilled Nursing Facility ([facility]) on 7/19    Time spent in care of patient (Greater than 1/2 spent in direct face-to-face contact): 24 minutes      Ana Bergman MD  Department of Hospital Medicine   [facility]

## 2017-07-18 NOTE — PROGRESS NOTES
Pt resting comfortably.  Left SIFI PNC has been paused. Dressing CDI.  Catheter discontinued, tip intact.  Pt tolerated well.  Educated regarding continued pain management.  Understanding verbalized.

## 2017-07-18 NOTE — ASSESSMENT & PLAN NOTE
Cardiomyopathy, ischemic  Biventricular ICD (implantable cardioverter-defibrillator) in place  Controlled. Patient with no signs to suggest acute heart failure and will continue Bumex (MWF) and Aldactone (MWF) and daily Coreg to treat.

## 2017-07-18 NOTE — PROGRESS NOTES
ORTHO PROGRESS NOTE:    Laurita Stokes is a 83 y.o. female admitted on 7/11/2017  Hospital Day: 7      The patient was seen and examined this morning at the bedside. Overnight, she was severely disoriented/confused.      Patient worked with physical therapy over the last 24 hours and was able to stand for a total of 50 seconds.    Dispo pending (SNF versus hospice)    PHYSICAL EXAM:  Sleeping at time of exam  No acute distress  AFVSS (mild hypotension)  Good inspiratory effort with unlabored breathing; stable on 2L oxygen via NC    RLE : dressings c/d/i. NVI distally.    Vitals:    07/17/17 2300 07/18/17 0300 07/18/17 0400 07/18/17 0700   BP:   (!) 103/51    BP Location:   Left arm    Patient Position:   Sitting    BP Method:   Automatic    Pulse: 80 80 81 80   Resp:   14    Temp:   98.2 °F (36.8 °C)    TempSrc:   Oral    SpO2:   (!) 92%    Weight:       Height:         I/O last 3 completed shifts:  In: 2037.5 [P.O.:1562; I.V.:475.5]  Out: 450 [Urine:450]    Recent Labs  Lab 07/16/17  0534 07/17/17  0602 07/18/17  0607   CALCIUM 8.0* 8.2* 8.3*   * 134* 136   K 3.2* 3.2* 4.6   CO2 27 25 27   CL 97 97 101   BUN 27* 23 18   CREATININE 0.9 0.8 1.0     No results for input(s): WBC, RBC, HGB, HCT, PLT in the last 72 hours.  No results for input(s): INR, APTT in the last 72 hours.    Invalid input(s): PT    A/P: 83 y.o. female 6 Days Post-Op s/p right hemiarthroplasty for femoral neck fracture. Also with right proximal humerus fracture (non-op treatment)  -- Pain control  -- PT/OT: WBAT to RLE with posterior hip precautions. NWB to RUE; sling for comfort only  -- DVT prophylaxis: Lovenox Mike Casale, M.D. PGY-4  Department of Orthopedic Surgery    Attg Note:  I agree with the above assessment and plan.  Continue to mobilize as much as possible.    Anshul Guerrero MD

## 2017-07-18 NOTE — ASSESSMENT & PLAN NOTE
Encounter for aftercare for healing closed traumatic fracture of right hip  Patient is POD # 6. Patient reports minimal pain in right hip. Plan is to continue PT/OT for gait training and strengthening and restoration of ADL's. Patient is FWB/WBAT: right lower extremity, posterior hip precautions as per Orthopedic recommendations. Plan to continue Lovenox 40 mg subcutaneous daily for total of 4 weeks post-op for DVT prophylaxis after hip fracture surgery. Orthopedics is following and managing surgical site. Pain is well controlled with Tramadol as needed. PT/OT recommending skilled nursing facility placement and  working on choice of skilled facility with patient and family and patient has been accepted to [facility] SNF for admit for tomorrow, 7/19.

## 2017-07-18 NOTE — PLAN OF CARE
Problem: Physical Therapy Goal  Goal: Physical Therapy Goal  Goals to be met by: 17     Patient will increase functional independence with mobility by performin. Supine to sit with Moderate Assistance  2. Sit to supine with Moderate Assistance  3. Sit to stand transfer with Moderate Assistance  4. Bed to chair transfer with Maximum Assistance- Met  Revised: mod A  5. Lower extremity exercise program x15 reps, with assistance as needed, in order to increase LE strength and (I) with functional mobility.   6. Pt ambulated 10' with max A using jamil walker    Further goals pending pt progress.      Outcome: Ongoing (interventions implemented as appropriate)  Pt tolerated session fairly today. Pt tolerated 2 standing trials using jamil-walker but required increased assistance for postural correction and to remain upright. Pt continues to require increased assistance for all functional tasks and was unable to recall posterior hip precautions. Pt will cont to benefit from skilled therapy services and remains appropriate for SNF placement upon d/c.

## 2017-07-18 NOTE — PT/OT/SLP PROGRESS
Physical Therapy  Treatment    Laurita Stokes   MRN: [id number]   Admitting Diagnosis: Closed displaced fracture of neck of right femur with routine healing    PT Received On: 07/18/17  PT Start Time: 1135     PT Stop Time: 1201    PT Total Time (min): 26 min       Billable Minutes:  Therapeutic Activity 26    Treatment Type: Treatment  PT/PTA: PT     PTA Visit Number: 0       General Precautions: Standard, fall  Orthopedic Precautions: RUE non weight bearing, RLE weight bearing as tolerated, RLE posterior precautions   Braces: UE Sling    Do you have any cultural, spiritual, Hoahaoism conflicts, given your current situation?: none noted     Subjective:  Communicated with nsg prior to session.  -Pt agreeable to PT session     Pain/Comfort  Pain Rating 1: other (see comments) (did not quantify)  Location - Side 1: Right  Location - Orientation 1: generalized  Location 1: hip  Pain Addressed 1: Reposition, Cessation of Activity  Pain Rating Post-Intervention 1: 6/10    Objective:   Patient found with: perineural catheter    Functional Mobility:  Bed Mobility:   Scooting/Bridging: Maximum Assistance to edge of chair and EOB  Supine to Sit: Total Assistance   -Pt given assistance to move (B) LE and to lift trunk.     Transfers:  Sit <> Stand Assistance: Maximum Assistance x2 trials from chair  Sit <> Stand Assistive Device: Hemiwalker  Bed <> Chair Technique: Stand Pivot  Bed <> Chair Assistance: Maximum Assistance  Bed <> Chair Assistive Device: No Assistive Device    -Pt performed 2 standing trials for ~30 seconds each to perform hygiene and tiana-care after pt had a BM. Pt required assistance for postural correction and balance.    Gait:   Gait Distance: uanble to perform  Balance:   Static Sit: FAIR+: Able to take MINIMAL challenges from all directions  Dynamic Sit: FAIR: Cannot move trunk without losing balance  Static Stand: 0: Needs MAXIMAL assist to maintain   Dynamic stand: 0: N/A     Therapeutic Activities  and Exercises:  -Pt educated on:  A. PT POC and role of PT  B. Importance of OOB activity to improve functional outcomes after surgery  C. Posterior hip precautions- pt recalled 0/3  D. Protecting surgical incision during t/f's  E. DME mgmt and t/f sequencing  F. Performing HEP to reduce the risk of developing a blood clot    AM-PAC 6 CLICK MOBILITY  How much help from another person does this patient currently need?   1 = Unable, Total/Dependent Assistance  2 = A lot, Maximum/Moderate Assistance  3 = A little, Minimum/Contact Guard/Supervision  4 = None, Modified [place]/Independent    Turning over in bed (including adjusting bedclothes, sheets and blankets)?: 2  Sitting down on and standing up from a chair with arms (e.g., wheelchair, bedside commode, etc.): 2  Moving from lying on back to sitting on the side of the bed?: 2  Moving to and from a bed to a chair (including a wheelchair)?: 2  Need to walk in hospital room?: 1  Climbing 3-5 steps with a railing?: 1  Total Score: 10    AM-PAC Raw Score CMS G-Code Modifier Level of Impairment Assistance   6 % Total / Unable   7 - 9 CM 80 - 100% Maximal Assist   10 - 14 CL 60 - 80% Moderate Assist   15 - 19 CK 40 - 60% Moderate Assist   20 - 22 CJ 20 - 40% Minimal Assist   23 CI 1-20% SBA / CGA   24 CH 0% Independent/ Mod I     Patient left up in chair with all lines intact, call button in reach and nsg notified.    Assessment:  Laurita Stokes is a 83 y.o. female with a medical diagnosis of Closed displaced fracture of neck of right femur with routine healing and presents with impaired functional mobility. Pt tolerated session fairly today. Pt tolerated 2 standing trials using jamil-walker but required increased assistance for postural correction and to remain upright. Pt continues to require increased assistance for all functional tasks and was unable to recall posterior hip precautions. Pt will cont to benefit from skilled therapy services and  remains appropriate for SNF placement upon d/c.    Rehab identified problem list/impairments: Rehab identified problem list/impairments: weakness, impaired self care skills, impaired endurance, gait instability, impaired balance, impaired functional mobilty, decreased lower extremity function, decreased upper extremity function, decreased ROM, orthopedic precautions, impaired joint extensibility, pain, decreased safety awareness    Rehab potential is good.    Activity tolerance: Good    Discharge recommendations: Discharge Facility/Level Of Care Needs: nursing facility, skilled     Barriers to discharge: Barriers to Discharge: None    Equipment recommendations: Equipment Needed After Discharge:  (TBD pending further progress with mobility)     GOALS:    Physical Therapy Goals        Problem: Physical Therapy Goal    Goal Priority Disciplines Outcome Goal Variances Interventions   Physical Therapy Goal     PT/OT, PT Ongoing (interventions implemented as appropriate)     Description:  Goals to be met by: 17     Patient will increase functional independence with mobility by performin. Supine to sit with Moderate Assistance  2. Sit to supine with Moderate Assistance  3. Sit to stand transfer with Moderate Assistance  4. Bed to chair transfer with Maximum Assistance- Met  Revised: mod A  5. Lower extremity exercise program x15 reps, with assistance as needed, in order to increase LE strength and (I) with functional mobility.   6. Pt ambulated 10' with max A using jamil walker    Further goals pending pt progress.                       PLAN:    Patient to be seen daily  to address the above listed problems via gait training, therapeutic activities, therapeutic exercises, neuromuscular re-education  Plan of Care expires: 17  Plan of Care reviewed with: patient, daughter         Ronen Willard, PT  2017

## 2017-07-18 NOTE — ADDENDUM NOTE
Addendum  created 07/18/17 1413 by Tapan Devine MD    Charge Capture section accepted, Sign clinical note

## 2017-07-18 NOTE — PT/OT/SLP PROGRESS
Occupational Therapy  Treatment    Laurita Stokes   MRN: [id number]   Admitting Diagnosis: Closed displaced fracture of neck of right femur with routine healing    OT Date of Treatment: 07/18/17   OT Start Time: 1135  OT Stop Time: 1201  OT Total Time (min): 26 min    Billable Minutes:  Self Care/Home Management 26    General Precautions: Standard, fall  Orthopedic Precautions: RUE non weight bearing, RLE weight bearing as tolerated, RLE posterior precautions  Braces: UE Sling    Subjective:  Communicated with RN prior to session.    Pt agreeable to treatment    Pain/Comfort  Pain Rating 1:  (did not rate)  Location - Side 1: Right  Location 1: hip  Pain Addressed 1: Reposition, Distraction, Cessation of Activity    Objective:  Patient found with: perineural catheter, peripheral IV, SCD     Functional Mobility:  Bed Mobility:  Scooting/Bridging: Maximum Assistance  Supine to Sit: Total Assistance    Transfers:   Sit <> Stand Assistance: Maximum Assistance 2 trials from bedside chair  Sit <> Stand Assistive Device: Hemiwalker    Bed <> Chair Technique: Stand Pivot  Bed <> Chair Transfer Assistance: Maximum Assistance  Bed <> Chair Assistive Device: No Assistive Device    Activities of Daily Living:  UE Dressing Level of Assistance: Total assistance   Sascha gown and adjust sling    LE Dressing Level of Assistance: Total assistance     Toileting Where Assessed:  (2 times following incontinent BM in bed and in chair )  Toileting Level of Assistance: Total assistance      Therapeutic Activities and Exercises:  Pt unable to recall any posterior hip precautions despite education throughout session    AM-PAC 6 CLICK ADL   How much help from another person does this patient currently need?   1 = Unable, Total/Dependent Assistance  2 = A lot, Maximum/Moderate Assistance  3 = A little, Minimum/Contact Guard/Supervision  4 = None, Modified [place]/Independent    Putting on and taking off regular lower body clothing? :  "1  Bathing (including washing, rinsing, drying)?: 1  Toileting, which includes using toilet, bedpan, or urinal? : 1  Putting on and taking off regular upper body clothing?: 2  Taking care of personal grooming such as brushing teeth?: 3  Eating meals?: 3  Total Score: 11     AM-PAC Raw Score CMS "G-Code Modifier Level of Impairment Assistance   6 % Total / Unable   7 - 8 CM 80 - 100% Maximal Assist   9-13 CL 60 - 80% Moderate Assist   14 - 19 CK 40 - 60% Moderate Assist   20 - 22 CJ 20 - 40% Minimal Assist   23 CI 1-20% SBA / CGA   24 CH 0% Independent/ Mod I       Patient left up in chair with all lines intact, call button in reach, RN notified and daughter present    ASSESSMENT:  Laurita Stokes is a 83 y.o. female with a medical diagnosis of Closed displaced fracture of neck of right femur with routine healing and tolerated session well with good participation and would continue to benefit from OT services to maximize functional (I) and safety.    Rehab identified problem list/impairments: Rehab identified problem list/impairments: weakness, impaired endurance, impaired self care skills, impaired functional mobilty, gait instability, impaired balance, decreased lower extremity function, decreased upper extremity function, impaired cognition, decreased safety awareness, decreased ROM, impaired skin, edema, pain, orthopedic precautions    Rehab potential is good.    Activity tolerance: Good    Discharge recommendations: Discharge Facility/Level Of Care Needs: nursing facility, skilled     Barriers to discharge: Barriers to Discharge: None    Equipment recommendations:  (TBD)     GOALS:    Occupational Therapy Goals        Problem: Occupational Therapy Goal    Goal Priority Disciplines Outcome Interventions   Occupational Therapy Goal     OT, PT/OT Ongoing (interventions implemented as appropriate)    Description:  Patient will increase functional independence with ADLs by performing:    UE Dressing " with Supervision.  LE Dressing with Moderate Assistance.  Grooming while seated with Supervision.  Toileting from bedside commode with Supervision for hygiene and clothing management.   Sitting at edge of bed x20 minutes with Supervision.  Supine to sit with Minimal Assistance.  Stand pivot transfers with Minimal Assistance.  Toilet transfer to bedside commode with Minimal Assistance.  Pt will verbalize hip precautions and incorporate them during ADL performance with Minimal verbal cues                    Plan:  Patient to be seen 6 x/week to address the above listed problems via self-care/home management, therapeutic activities, therapeutic exercises  Plan of Care expires: 07/27/17  Plan of Care reviewed with: patient, daughter         Areli HERNANDEZ SRAVANI Calloway  07/18/2017

## 2017-07-18 NOTE — PLAN OF CARE
Problem: Patient Care Overview  Goal: Plan of Care Review  Outcome: Ongoing (interventions implemented as appropriate)  Pt is progressing with plan of care. Frequent rounds made to assess pain and safety. Pt's pain controlled with pain medication ordered at this time. Bed locked and at lowest position. Side rails up x 2. Call light within reach. Will continue to monitor.

## 2017-07-18 NOTE — ASSESSMENT & PLAN NOTE
Patient's blood pressure is controlled here in the hospital over past 24 hours. Goal for blood pressure is SBP < 150 and DBP < 90 as patient > or = 60 years of age with no diabetes or advanced kidney disease based on JNC 8 guidelines. Continue current treatment regimen with Coreg and Aldactone to treat. Plan to continue to monitor patient's blood pressure routinely while patient is hospitalized.

## 2017-07-18 NOTE — PLAN OF CARE
Problem: Patient Care Overview     Pt alert, disoriented to time, place, situation. POC reviewed with patient and daughter. VSS. Surgical site CDI - R arm remains in sling. family at bedside. Weight shift assistance provided as needed. Pain managed with PRN medications. IS education complete. SCD's on and functioning. Fall precautions maintained. Will resume care.      -tele monitor on  -PNC infusing

## 2017-07-18 NOTE — PLAN OF CARE
Medicare discharge appeals right discussed with patient. IMM signed and placed in patient's chart. Patient verbalized understanding of IMM rights.     07/18/17 1415   Medicare Message   Important Message from Medicare regarding Discharge Appeal Rights Given to patient/caregiver;Explained to patient/caregiver;Signed/date by patient/caregiver   Date IMM was signed 07/18/17   Time IMM was signed [id number]

## 2017-07-19 LAB
ANION GAP SERPL CALC-SCNC: 9 MMOL/L
BUN SERPL-MCNC: 15 MG/DL
CALCIUM SERPL-MCNC: 8.1 MG/DL
CHLORIDE SERPL-SCNC: 99 MMOL/L
CO2 SERPL-SCNC: 24 MMOL/L
CREAT SERPL-MCNC: 0.8 MG/DL
EST. GFR  (AFRICAN AMERICAN): >60 ML/MIN/1.73 M^2
EST. GFR  (NON AFRICAN AMERICAN): >60 ML/MIN/1.73 M^2
GLUCOSE SERPL-MCNC: 84 MG/DL
MAGNESIUM SERPL-MCNC: 1.7 MG/DL
PHOSPHATE SERPL-MCNC: 2.5 MG/DL
POTASSIUM SERPL-SCNC: 3.9 MMOL/L
SODIUM SERPL-SCNC: 132 MMOL/L

## 2017-07-19 PROCEDURE — 25000003 PHARM REV CODE 250: Performed by: STUDENT IN AN ORGANIZED HEALTH CARE EDUCATION/TRAINING PROGRAM

## 2017-07-19 PROCEDURE — 25000003 PHARM REV CODE 250: Performed by: ANESTHESIOLOGY

## 2017-07-19 PROCEDURE — 99232 SBSQ HOSP IP/OBS MODERATE 35: CPT | Mod: ,,, | Performed by: INTERNAL MEDICINE

## 2017-07-19 PROCEDURE — 84100 ASSAY OF PHOSPHORUS: CPT

## 2017-07-19 PROCEDURE — 97535 SELF CARE MNGMENT TRAINING: CPT

## 2017-07-19 PROCEDURE — 25000003 PHARM REV CODE 250: Performed by: HOSPITALIST

## 2017-07-19 PROCEDURE — 25000003 PHARM REV CODE 250: Performed by: NURSE PRACTITIONER

## 2017-07-19 PROCEDURE — 80048 BASIC METABOLIC PNL TOTAL CA: CPT

## 2017-07-19 PROCEDURE — 11000001 HC ACUTE MED/SURG PRIVATE ROOM

## 2017-07-19 PROCEDURE — 63600175 PHARM REV CODE 636 W HCPCS: Performed by: INTERNAL MEDICINE

## 2017-07-19 PROCEDURE — 83735 ASSAY OF MAGNESIUM: CPT

## 2017-07-19 PROCEDURE — 25000003 PHARM REV CODE 250: Performed by: INTERNAL MEDICINE

## 2017-07-19 RX ORDER — SPIRONOLACTONE 25 MG/1
12.5 TABLET ORAL
Status: ON HOLD
Start: 2017-07-19 | End: 2018-07-06 | Stop reason: HOSPADM

## 2017-07-19 RX ORDER — LEVOTHYROXINE SODIUM 88 UG/1
88 TABLET ORAL
Start: 2017-07-19 | End: 2018-06-28

## 2017-07-19 RX ORDER — ENOXAPARIN SODIUM 100 MG/ML
40 INJECTION SUBCUTANEOUS DAILY
Qty: 8.4 ML | Refills: 0
Start: 2017-07-19 | End: 2017-08-09

## 2017-07-19 RX ORDER — TRAMADOL HYDROCHLORIDE 50 MG/1
50 TABLET ORAL EVERY 6 HOURS PRN
Start: 2017-07-19 | End: 2017-07-21

## 2017-07-19 RX ORDER — POTASSIUM CHLORIDE 750 MG/1
10 TABLET, EXTENDED RELEASE ORAL
Qty: 14 TABLET | Refills: 0
Start: 2017-07-19 | End: 2017-10-07

## 2017-07-19 RX ORDER — PANTOPRAZOLE SODIUM 20 MG/1
40 TABLET, DELAYED RELEASE ORAL DAILY
Start: 2017-07-19 | End: 2018-06-28

## 2017-07-19 RX ORDER — TAMSULOSIN HYDROCHLORIDE 0.4 MG/1
1 CAPSULE ORAL NIGHTLY
Start: 2017-07-19 | End: 2018-06-28

## 2017-07-19 RX ORDER — ACETAMINOPHEN 325 MG/1
650 TABLET ORAL EVERY 6 HOURS PRN
Refills: 0
Start: 2017-07-19 | End: 2017-08-13

## 2017-07-19 RX ADMIN — CITALOPRAM HYDROBROMIDE 30 MG: 20 TABLET ORAL at 09:07

## 2017-07-19 RX ADMIN — CARVEDILOL 3.12 MG: 3.12 TABLET, FILM COATED ORAL at 09:07

## 2017-07-19 RX ADMIN — ENOXAPARIN SODIUM 40 MG: 100 INJECTION SUBCUTANEOUS at 04:07

## 2017-07-19 RX ADMIN — Medication 3 ML: at 01:07

## 2017-07-19 RX ADMIN — TRAMADOL HYDROCHLORIDE 50 MG: 50 TABLET, FILM COATED ORAL at 12:07

## 2017-07-19 RX ADMIN — AMOXICILLIN AND CLAVULANATE POTASSIUM 500 MG: 500; 125 TABLET, FILM COATED ORAL at 09:07

## 2017-07-19 RX ADMIN — AMIODARONE HYDROCHLORIDE 200 MG: 200 TABLET ORAL at 09:07

## 2017-07-19 RX ADMIN — ACETAMINOPHEN 1000 MG: 500 TABLET ORAL at 06:07

## 2017-07-19 RX ADMIN — TAMSULOSIN HYDROCHLORIDE 0.4 MG: 0.4 CAPSULE ORAL at 09:07

## 2017-07-19 RX ADMIN — DIGOXIN 125 MCG: 125 TABLET ORAL at 04:07

## 2017-07-19 RX ADMIN — LIDOCAINE 1 PATCH: 50 PATCH CUTANEOUS at 10:07

## 2017-07-19 RX ADMIN — Medication 3 ML: at 06:07

## 2017-07-19 RX ADMIN — PANTOPRAZOLE SODIUM 40 MG: 40 GRANULE, DELAYED RELEASE ORAL at 09:07

## 2017-07-19 RX ADMIN — LEVETIRACETAM 500 MG: 500 TABLET, FILM COATED ORAL at 09:07

## 2017-07-19 RX ADMIN — BUMETANIDE 1 MG: 1 TABLET ORAL at 09:07

## 2017-07-19 RX ADMIN — DOCUSATE SODIUM 100 MG: 50 CAPSULE, LIQUID FILLED ORAL at 01:07

## 2017-07-19 RX ADMIN — TRAMADOL HYDROCHLORIDE 50 MG: 50 TABLET, FILM COATED ORAL at 09:07

## 2017-07-19 RX ADMIN — ACETAMINOPHEN 1000 MG: 500 TABLET ORAL at 01:07

## 2017-07-19 RX ADMIN — TRAMADOL HYDROCHLORIDE 50 MG: 50 TABLET, FILM COATED ORAL at 04:07

## 2017-07-19 RX ADMIN — ISOSORBIDE MONONITRATE 30 MG: 30 TABLET ORAL at 09:07

## 2017-07-19 RX ADMIN — MECLIZINE HYDROCHLORIDE 25 MG: 25 TABLET ORAL at 10:07

## 2017-07-19 RX ADMIN — LEVOTHYROXINE SODIUM 88 MCG: 88 TABLET ORAL at 06:07

## 2017-07-19 RX ADMIN — Medication 3 ML: at 10:07

## 2017-07-19 RX ADMIN — Medication 5 ML: at 10:07

## 2017-07-19 RX ADMIN — ACETAMINOPHEN 1000 MG: 500 TABLET ORAL at 09:07

## 2017-07-19 RX ADMIN — SPIRONOLACTONE 12.5 MG: 25 TABLET, FILM COATED ORAL at 09:07

## 2017-07-19 NOTE — PLAN OF CARE
Problem: Patient Care Overview     Pt alert, disoriented to situation. VSS. Surgical site CDI - R arm remains in sling. family at bedside. Weight shift assistance provided as needed. Pain managed with PRN medications. IS education complete. SCD's on and functioning. Fall precautions maintained. Will resume care.

## 2017-07-19 NOTE — PLAN OF CARE
[facility]     Department of Hospital Medicine     [address] [place] [address]     [phone] after hours  [phone] fax       NURSING HOME ORDERS    07/21/2017    Admit to [facility]: Skilled Bed                                            Diagnoses:  Active Hospital Problems    Diagnosis  POA    *Closed displaced fracture of neck of right femur with routine healing s/p hemiarthroplasty on 7/12/2017 [S72.001D]  Not Applicable     Priority: 2     Acute blood loss anemia [D62]  No     Priority: 1 - High    Closed fracture of proximal end of right humerus with routine healing [S42.201D]  Not Applicable     Priority: 2     Hypokalemia [E87.6]  Yes     Priority: 3     Hypomagnesemia [E83.42]  No     Priority: 4     Dementia without behavioral disturbance [F03.90]  Yes     Priority: 5     Ventricular tachycardia [I47.2]  Yes     Priority: 6     PAF (paroxysmal atrial fibrillation) [I48.0]  Yes     Priority: 7     Chronic combined systolic and diastolic heart failure [I50.42]  Yes     Priority: 8     Essential hypertension [I10]  Yes     Priority: 9     Coronary artery disease involving native coronary artery of native heart without angina pectoris [I25.10]  Yes     Priority: 10     Goals of care, counseling/discussion [Z71.89]  Not Applicable     Priority: 11     Chronic respiratory failure with hypoxia [J96.11]  Yes     Priority: 12     Encounter for aftercare for healing closed traumatic fracture of right hip [S72.001D]  Not Applicable    Debility [R53.81]  Yes    CKD (chronic kidney disease), stage III [N18.3]  Yes    Acquired hypothyroidism [E03.9]  Yes    CHF NYHA class IV [I50.9]  Yes    DDD (degenerative disc disease), lumbar [M51.36]  Yes    Depression [F32.9]  Yes    Biventricular ICD (implantable cardioverter-defibrillator) in place [Z95.810]  Yes     BSc CRT-D      Cardiomyopathy, ischemic [I25.5]  Yes       Allergies:  Review of  patient's allergies indicates:   Allergen Reactions    Codeine Itching    Azithromycin      History of VT       Vitals: Every shift (Skilled Nursing patients)        Code Status: DNR     Diet: Cardiac diet      Supplement: Ensure, one can every 8 hours with meals                           Activities:   - Up in a chair each morning as tolerated   - Ambulate with assistance to bathroom   - May use walker, cane, or self-propelled wheelchair   - Weight bearing: FWB/WBAT: right lower extremity, Posterior hip precautions    LABS:  Per facility protocol    Nursing Precautions:            - Fall precautions per nursing home protocol      CONSULTS:     Physical Therapy to evaluate and treat 5 times a week     Occupational Therapy to evaluate and treat 5 times a week      MISCELLANEOUS CARE:  Routine Skin Care: Apply moisture barrier cream to all skin folds and wet areas in perineal area daily and after baths and all bowel movements.    Oxygen therapy at 2 liters continuous with goal of oxygen sats > 90%    WOUND CARE ORDERS  Keep surgical bandage to right hip incision in place until clinic follow-up with KATERINA Walsh on 7/26/2017.       Medications:     acetaminophen (TYLENOL) 325 MG tablet  Take 2 tablets (650 mg total) by mouth every 6 (six) hours as needed (Mild pain 1-3/10 or temperature > 100.4 F).     amiodarone (PACERONE) 200 MG Tab  Take 1 tablet (200 mg total) by mouth 2 (two) times daily.     bumetanide (BUMEX) 1 MG tablet  Take 1 tablet (1 mg total) by mouth once daily.     carvedilol (COREG) 3.125 MG tablet  Take 1 tablet (3.125 mg total) by mouth nightly.     citalopram (CELEXA) 20 MG tablet  Take 1.5 tablets (30 mg total) by mouth once daily.     cyanocobalamin, vitamin B-12, 1,000 mcg TbSR  Take 1 tablet by mouth once daily.     digoxin (DIGOX) 125 mcg tablet  Take 125 mcg by mouth every Mon, Wed, Fri.     enalapril (VASOTEC) 2.5 MG tablet  Take 2.5 mg by mouth once daily. Hold for SBP < 110.       enoxaparin (LOVENOX) 40 mg/0.4 mL Syrg  Inject 0.4 mLs (40 mg total) into the skin once daily at 6am. DVT prophylaxis after hip fracture surgery. End date 8/9/2017.     isosorbide mononitrate (IMDUR) 30 MG 24 hr tablet  Take 30 mg by mouth once daily.     lactulose 10 gram/15 ml (CHRONULAC) 10 gram/15 mL (15 mL) solution  Take 10 g by mouth daily as needed (CONSTIPATION).     levetiracetam (KEPPRA) 500 MG Tab  Take 1 tablet (500 mg total) by mouth 2 (two) times daily.     levothyroxine (SYNTHROID) 88 MCG tablet  Take 1 tablet (88 mcg total) by mouth before breakfast.     magnesium oxide (MAG-OX) 400 mg tablet  TAKE 2 TABLETS BY MOUTH TWICE DAILY     nitroGLYCERIN (NITROSTAT) 0.4 MG SL tablet  Place 1 tablet (0.4 mg total) under the tongue every 5 (five) minutes as needed for Chest pain.     pantoprazole (PROTONIX) 20 MG tablet  Take 40 mg by mouth daily.     potassium chloride SA (K-DUR,KLOR-CON) 10 MEQ tablet  Take 1 tablet (10 mEq total) by mouth every Monday, Wednesday, Friday.     spironolactone (ALDACTONE) 25 MG tablet  Take 0.5 tablets (12.5 mg total) by mouth 3 (three) times a week. Monday, Wednesday, Friday     tamsulosin (FLOMAX) 0.4 mg Cp24  Take 1 capsule (0.4 mg total) by mouth nightly.     temazepam (RESTORIL) 15 mg Cap  Take 15 mg by mouth nightly as needed (SLEEP).     tramadol (ULTRAM) 50 mg tablet  Take 1 tablet (50 mg total) by mouth every 6 (six) hours as needed (Moderate to severe pain).            Follow-up: Future Appointments  Date Time Provider Department Center   7/26/2017 1:45 PM Whitney Isbell PA-C [facility]       _________________________________  Ana Bergman MD  07/21/2017

## 2017-07-19 NOTE — PROGRESS NOTES
[facility] Hospital Medicine  Progress Note    Patient Name: Laurita Stokes  MRN: [id number]  Hospital Medicine Service: H  Admission Date: 7/11/2017  Length of Stay: 8 days  Expected Discharge Date: 7/19/2017  Attending Physician: Ana Bergman MD  Primary Care Provider: lAex Capellan MD    Subjective:     7 Days Post-Op from right hip hemiarthroplasty for fracture     Orthopedic Surgeon: Dr. Anshul Guerrero  Weight Bearing Status: WBAT with posterior hip precautions right lower extremity    Follow-up Visit For: Closed displaced fracture of neck of right femur with routine healing    HPI:  83 year old female with PMH of a fib ( coumadin recently stopped due to SAH and frequent falls ) , ICM , HFrEF (EF=20-25%, s/p CRT-D) c NYHA IV sx  s/p AICD placement, HTN/HLPD, s/p 3v-CABG, hx of ventricular tachycardia on amiodarone, CKD III, GERD, dementia, under home hospice ( [facility] ) and on home oxygen for comfort ( 2 L )  presents as a transfer from [facility] for surgical fixation of right femoral neck fracture by orthopedic surgery. Patient is a poor informant with h/o dementia and daughter Brittany Stokes who is the caregiver at bedside provided HPI.      Patient was taken to OSH ED before midnight 07/10 with right shoulder and right hip pain after she fell while ambulating with a walker in the middle of the night while going to the bathroom. She had immediate right hip and shoulder pain.  She denies hitting her head or LOC.  Of note, she was recently admitted on 5/24-5/26 for subarachnoid hemorrhage, at which time she was discharged on hospice with plans to stop her anticoagulation for her atrial fibrillation.  Patient lives with her daughter and ambulates short distance inside the house with a walker. Imaging at OSH showed  proximal right  humerus fracture and right femoral neck fracture. Patient was evaluated by orthopedic surgery and cardiology at [facility].      Daughter states most of her cardiac medications were discontinued recently by hospice. Currently she is taking the following meds : amiodarone, bumex, aldactone, celexa, synthroid, prilosec, flomax and ambien.     Interval History: Having difficulty findings an accepting SNF facility for patient and today was turned down by [facility].    Review of Systems   Constitutional: Negative for chills and fever.   Respiratory: Negative for cough, shortness of breath and wheezing.    Cardiovascular: Negative for chest pain and palpitations.   Gastrointestinal: Negative for abdominal pain, constipation, diarrhea and nausea.   Genitourinary: Negative for difficulty urinating, dysuria and frequency.   Musculoskeletal: Positive for myalgias (to righht arm and right hip).     Objective:     Vital Signs (Most Recent):  Temp: 97.6 °F (36.4 °C) (07/19/17 1138)  Pulse: 100 (07/19/17 1134)  Resp: 16 (07/19/17 1134)  BP: (!) 101/54 (07/19/17 1134)  SpO2: 99 % (07/19/17 1134) Vital Signs (24h Range):  Temp:  [96.4 °F (35.8 °C)-97.6 °F (36.4 °C)] 97.6 °F (36.4 °C)  Pulse:  [] 100  Resp:  [16] 16  SpO2:  [97 %-99 %] 99 %  BP: (100-113)/(54-57) 101/54     Weight: 57.8 kg (127 lb 6.8 oz)  Body mass index is 22.57 kg/m².    Intake/Output Summary (Last 24 hours) at 07/19/17 1520  Last data filed at 07/19/17 1000   Gross per 24 hour   Intake             1146 ml   Output                0 ml   Net             1146 ml      Physical Exam   Constitutional: She appears well-developed and well-nourished. No distress.   HENT:   Head: Normocephalic and atraumatic.   Cardiovascular: Normal rate and regular rhythm.    No murmur heard.  Pulmonary/Chest: Effort normal and breath sounds normal. She has no wheezes.   Abdominal: Soft. Bowel sounds are normal. She exhibits no distension. There is no tenderness.   Musculoskeletal: She exhibits tenderness (right hip).   Right arm in sling   Neurological: She is alert.   Skin: Skin is  warm and dry. No rash noted.   Surgical bandage in place to right hip       Significant Labs:   CMP:   Recent Labs  Lab 07/18/17  0607 07/19/17  0432    132*   K 4.6 3.9    99   CO2 27 24   GLU 96 84   BUN 18 15   CREATININE 1.0 0.8   CALCIUM 8.3* 8.1*   ANIONGAP 8 9   EGFRNONAA 52.2* >60.0     Magnesium level 1.7     Significant Imaging: I have reviewed all pertinent imaging results/findings within the past 24 hours.    ASSESSMENT/PLAN:     Acute blood loss anemia    Controlled. On admission to hospital, patient was noted to have Hgb of 10. After surgery patient's Hgb level has dropped to 8 and expected blood loss related to surgery and hip fracture. Patient has no signs of active bleeding and hemodynamically stable. Patient is asymptomatic. Goal is keep Hgb >7.           * Closed displaced fracture of neck of right femur with routine healing s/p hemiarthroplasty on 7/12/2017    Encounter for aftercare for healing closed traumatic fracture of right hip  Patient is POD # 7. Patient reports minimal pain in right hip. Plan is to continue PT/OT for gait training and strengthening and restoration of ADL's. Patient is FWB/WBAT: right lower extremity, posterior hip precautions as per Orthopedic recommendations. Plan to continue Lovenox 40 mg subcutaneous daily for total of 4 weeks post-op for DVT prophylaxis after hip fracture surgery. Orthopedics is following and managing surgical site. Pain is well controlled with Tramadol as needed. PT/OT recommending skilled nursing facility placement and  working on choice of skilled facility with patient and family but so far unable to get an accepting facility for patient as has been turned down by multiple SNFs.         Closed fracture of proximal end of right humerus with routine healing    Orthopedics evaluated and recommends:  Non-operative treatment with sling in place and  non weight bearing status of RUE. Orthopedics to follow-up as outpatient in 1-2  weeks.           Hypomagnesemia    Resolved with Magnesium Sulfate 2 grams IV for 1 dose(s) on 7/18..           Dementia without behavioral disturbance    Patient at cognitive baseline. Continue delirium precautions.           Ventricular tachycardia    Patient with episodes of V. Tach in hospital due to hypokalemia, now resolved. Continue Amiodarone for treatment.          PAF (paroxysmal atrial fibrillation)    Controlled. Patient currently in normal sinus rhythm. Patient recently on Coumadin but stopped after SAH and frequent falls and considered high fall risk so no plans to restart. Continue Amiodarone to treat.          Chronic combined systolic and diastolic heart failure    Cardiomyopathy, ischemic  Biventricular ICD (implantable cardioverter-defibrillator) in place  Controlled. Patient with no signs to suggest acute heart failure and will continue Bumex (MWF) and Aldactone (MWF) and daily Coreg to treat.           Essential hypertension    Patient's blood pressure is controlled here in the hospital over past 24 hours. Goal for blood pressure is SBP < 150 and DBP < 90 as patient > or = 60 years of age with no diabetes or advanced kidney disease based on JNC 8 guidelines. Continue current treatment regimen with Coreg and Aldactone to treat. Plan to continue to monitor patient's blood pressure routinely while patient is hospitalized.           Coronary artery disease involving native coronary artery of native heart without angina pectoris    Controlled. Patient asymptomatic and will continue Imdur and Coreg to treat.          Goals of care, counseling/discussion    Discussion made with patient's daughter about goals of care and appropriate discharge planning. Prior to presentation, she was living at home with her daughter with home hospice (due to her advanced cardiac disease). The daughter lives alone and really has no other support and feels this situation was too much for her. Discussion made for SNF versus  NH placement with hospice consult as the 2 remaining options. After deliberation, daughter decided she wanted to try SNF at NH level and see if patient's mother improves functionally from recent hip surgery and can possibly return home on hospice but if does not improve with PT/OT then plan will be home hospice care transition. Patient is DNR.           Chronic respiratory failure with hypoxia    Controlled. Continue oxygen at 2 liters nasal cannula as needed.               Anticipated Disposition: Skilled Nursing Facility    Time spent in care of patient (Greater than 1/2 spent in direct face-to-face contact): 18 minutes    Ana Bergman MD  Department of Hospital Medicine   [facility]

## 2017-07-19 NOTE — PLAN OF CARE
10:01 AM  REZA called [facility] to indicate if they will accept pt today. Left voicemail for Shirin to return call. Called pt's daughter, Kinza to inform her that [facility] is still reviewing. Will also send nursing home orders.   11:58 AM  REZA received call from Shirin with [facility] who stated they will not be able to accept pt. Called TOYA, daughter, Kinza to inform her. She stated she will talk with her sister and get back. Will f/u as needed.   12:28 PM  SW received call from pt's granddaughter who is NP in [place], she inquired as to why pt is being denied SNF. Informed her that SNF facilities are stating that pt is not rehabable. She stated she was going to call [facility]. She will call back regarding conversation.     Lulú Ricardo, CLAUDE   [facility]  Ext [phone]

## 2017-07-19 NOTE — ASSESSMENT & PLAN NOTE
Encounter for aftercare for healing closed traumatic fracture of right hip  Patient is POD # 7. Patient reports minimal pain in right hip. Plan is to continue PT/OT for gait training and strengthening and restoration of ADL's. Patient is FWB/WBAT: right lower extremity, posterior hip precautions as per Orthopedic recommendations. Plan to continue Lovenox 40 mg subcutaneous daily for total of 4 weeks post-op for DVT prophylaxis after hip fracture surgery. Orthopedics is following and managing surgical site. Pain is well controlled with Tramadol as needed. PT/OT recommending skilled nursing facility placement and  working on choice of skilled facility with patient and family but so far unable to get an accepting facility for patient as has been turned down by multiple SNFs.

## 2017-07-19 NOTE — SUBJECTIVE & OBJECTIVE
Interval History: Having difficulty findings an accepting SNF facility for patient and today was turned down by [facility].    Review of Systems   Constitutional: Negative for chills and fever.   Respiratory: Negative for cough, shortness of breath and wheezing.    Cardiovascular: Negative for chest pain and palpitations.   Gastrointestinal: Negative for abdominal pain, constipation, diarrhea and nausea.   Genitourinary: Negative for difficulty urinating, dysuria and frequency.   Musculoskeletal: Positive for myalgias (to righht arm and right hip).     Objective:     Vital Signs (Most Recent):  Temp: 97.6 °F (36.4 °C) (07/19/17 1138)  Pulse: 100 (07/19/17 1134)  Resp: 16 (07/19/17 1134)  BP: (!) 101/54 (07/19/17 1134)  SpO2: 99 % (07/19/17 1134) Vital Signs (24h Range):  Temp:  [96.4 °F (35.8 °C)-97.6 °F (36.4 °C)] 97.6 °F (36.4 °C)  Pulse:  [] 100  Resp:  [16] 16  SpO2:  [97 %-99 %] 99 %  BP: (100-113)/(54-57) 101/54     Weight: 57.8 kg (127 lb 6.8 oz)  Body mass index is 22.57 kg/m².    Intake/Output Summary (Last 24 hours) at 07/19/17 1520  Last data filed at 07/19/17 1000   Gross per 24 hour   Intake             1146 ml   Output                0 ml   Net             1146 ml      Physical Exam   Constitutional: She appears well-developed and well-nourished. No distress.   HENT:   Head: Normocephalic and atraumatic.   Cardiovascular: Normal rate and regular rhythm.    No murmur heard.  Pulmonary/Chest: Effort normal and breath sounds normal. She has no wheezes.   Abdominal: Soft. Bowel sounds are normal. She exhibits no distension. There is no tenderness.   Musculoskeletal: She exhibits tenderness (right hip).   Right arm in sling   Neurological: She is alert.   Skin: Skin is warm and dry. No rash noted.   Surgical bandage in place to right hip       Significant Labs:   CMP:   Recent Labs  Lab 07/18/17  0607 07/19/17  0432    132*   K 4.6 3.9    99   CO2 27 24   GLU 96 84   BUN 18  15   CREATININE 1.0 0.8   CALCIUM 8.3* 8.1*   ANIONGAP 8 9   EGFRNONAA 52.2* >60.0     Magnesium level 1.7     Significant Imaging: I have reviewed all pertinent imaging results/findings within the past 24 hours.

## 2017-07-19 NOTE — PLAN OF CARE
3:16 PM  SW called TOYA Echols who stated she would like  to make referral to [facility]. Faxed referral to [facility] and will f/u with admissions coordinator. Called Lois with [facility] to inform her of referral. Will f/u in am.     Lulú Ricardo LMSW   [facility]  Ext [phone]

## 2017-07-19 NOTE — PT/OT/SLP PROGRESS
"Occupational Therapy  Treatment    Laurita Stokes   MRN: [id number]   Admitting Diagnosis: Closed displaced fracture of neck of right femur with routine healing    OT Date of Treatment: 07/19/17   OT Start Time: 1445  OT Stop Time: 1500  OT Total Time (min): 15 min    Billable Minutes:  Self Care/Home Management 15    General Precautions: Standard, fall  Orthopedic Precautions: RUE non weight bearing, RLE weight bearing as tolerated, RLE posterior precautions  Braces: UE Sling         Subjective:  Communicated with RN prior to session.    Pain/Comfort  Pain Rating 1: 0/10    Objective:  Patient found with: perineural catheter, peripheral IV, SCD     Functional Mobility:  Bed Mobility:  Supine to Sit: Total Assistance    Transfers:   Sit <> Stand Assistance: Maximum Assistance  Sit <> Stand Assistive Device: No Assistive Device  Bed <> Chair Technique: Stand Pivot  Bed <> Chair Transfer Assistance: Maximum Assistance  Bed <> Chair Assistive Device: No Assistive Device            Balance:   Static Sit: GOOD: Takes MODERATE challenges from all directions  Dynamic Sit: GOOD: Maintains balance through MODERATE excursions of active trunk movement  Static Stand: 0: Needs MAXIMAL assist to maintain   Dynamic stand: POOR: N/A        AM-PAC 6 CLICK ADL   How much help from another person does this patient currently need?   1 = Unable, Total/Dependent Assistance  2 = A lot, Maximum/Moderate Assistance  3 = A little, Minimum/Contact Guard/Supervision  4 = None, Modified [place]/Independent    Putting on and taking off regular lower body clothing? : 1  Bathing (including washing, rinsing, drying)?: 1  Toileting, which includes using toilet, bedpan, or urinal? : 1  Putting on and taking off regular upper body clothing?: 1  Taking care of personal grooming such as brushing teeth?: 2  Eating meals?: 3  Total Score: 9     AM-PAC Raw Score CMS "G-Code Modifier Level of Impairment Assistance   6 % Total / Unable   7 - " 8 CM 80 - 100% Maximal Assist   9-13 CL 60 - 80% Moderate Assist   14 - 19 CK 40 - 60% Moderate Assist   20 - 22 CJ 20 - 40% Minimal Assist   23 CI 1-20% SBA / CGA   24 CH 0% Independent/ Mod I       Patient left up in chair with all lines intact, call button in reach, RN notified and family present    ASSESSMENT:  Laurita Stokes is a 83 y.o. female with a medical diagnosis of Closed displaced fracture of neck of right femur with routine healing and presents with deficits in ADL's, functional T/F's, decreased strength, endurance, and cognition.  Pt was able to perform supine/sit, sit/stand, and bed/chair T/F c max A/total assist.  Educated pt's daughter on [place] precautions via h/o.    Rehab identified problem list/impairments: Rehab identified problem list/impairments: impaired self care skills, impaired functional mobilty, impaired cognition, impaired balance, impaired endurance, weakness, orthopedic precautions    Rehab potential is good.    Activity tolerance: Good    Discharge recommendations: Discharge Facility/Level Of Care Needs: nursing facility, skilled     Barriers to discharge: Barriers to Discharge: None    Equipment recommendations:  (TBD)     GOALS:    Occupational Therapy Goals        Problem: Occupational Therapy Goal    Goal Priority Disciplines Outcome Interventions   Occupational Therapy Goal     OT, PT/OT Ongoing (interventions implemented as appropriate)    Description:  Patient will increase functional independence with ADLs by performing:    UE Dressing with Supervision.  LE Dressing with Moderate Assistance.  Grooming while seated with Supervision.  Toileting from bedside commode with Supervision for hygiene and clothing management.   Sitting at edge of bed x20 minutes with Supervision.  Supine to sit with Minimal Assistance.  Stand pivot transfers with Minimal Assistance.  Toilet transfer to bedside commode with Minimal Assistance.  Pt will verbalize hip precautions and incorporate  them during ADL performance with Minimal verbal cues                    Plan:  Patient to be seen 6 x/week to address the above listed problems via self-care/home management, therapeutic exercises, therapeutic activities  Plan of Care expires: 07/27/17  Plan of Care reviewed with: patient, family         SRAVANI Norton  07/19/2017

## 2017-07-20 PROBLEM — E83.42 HYPOMAGNESEMIA: Status: RESOLVED | Noted: 2017-07-18 | Resolved: 2017-07-20

## 2017-07-20 PROCEDURE — 99232 SBSQ HOSP IP/OBS MODERATE 35: CPT | Mod: ,,, | Performed by: INTERNAL MEDICINE

## 2017-07-20 PROCEDURE — 25000003 PHARM REV CODE 250: Performed by: HOSPITALIST

## 2017-07-20 PROCEDURE — 25000003 PHARM REV CODE 250: Performed by: STUDENT IN AN ORGANIZED HEALTH CARE EDUCATION/TRAINING PROGRAM

## 2017-07-20 PROCEDURE — 63600175 PHARM REV CODE 636 W HCPCS: Performed by: INTERNAL MEDICINE

## 2017-07-20 PROCEDURE — 11000001 HC ACUTE MED/SURG PRIVATE ROOM

## 2017-07-20 PROCEDURE — 25000003 PHARM REV CODE 250: Performed by: INTERNAL MEDICINE

## 2017-07-20 PROCEDURE — 25000003 PHARM REV CODE 250: Performed by: ANESTHESIOLOGY

## 2017-07-20 RX ADMIN — DOCUSATE SODIUM 100 MG: 50 CAPSULE, LIQUID FILLED ORAL at 09:07

## 2017-07-20 RX ADMIN — Medication 3 ML: at 02:07

## 2017-07-20 RX ADMIN — AMIODARONE HYDROCHLORIDE 200 MG: 200 TABLET ORAL at 09:07

## 2017-07-20 RX ADMIN — ENOXAPARIN SODIUM 40 MG: 100 INJECTION SUBCUTANEOUS at 05:07

## 2017-07-20 RX ADMIN — LEVETIRACETAM 500 MG: 500 TABLET, FILM COATED ORAL at 08:07

## 2017-07-20 RX ADMIN — CITALOPRAM HYDROBROMIDE 30 MG: 20 TABLET ORAL at 08:07

## 2017-07-20 RX ADMIN — LIDOCAINE 1 PATCH: 50 PATCH CUTANEOUS at 10:07

## 2017-07-20 RX ADMIN — LEVETIRACETAM 500 MG: 500 TABLET, FILM COATED ORAL at 09:07

## 2017-07-20 RX ADMIN — TRAMADOL HYDROCHLORIDE 50 MG: 50 TABLET, FILM COATED ORAL at 07:07

## 2017-07-20 RX ADMIN — CARVEDILOL 3.12 MG: 3.12 TABLET, FILM COATED ORAL at 09:07

## 2017-07-20 RX ADMIN — Medication 3 ML: at 10:07

## 2017-07-20 RX ADMIN — AMIODARONE HYDROCHLORIDE 200 MG: 200 TABLET ORAL at 08:07

## 2017-07-20 RX ADMIN — ACETAMINOPHEN 1000 MG: 500 TABLET ORAL at 05:07

## 2017-07-20 RX ADMIN — ACETAMINOPHEN 1000 MG: 500 TABLET ORAL at 09:07

## 2017-07-20 RX ADMIN — TRAMADOL HYDROCHLORIDE 50 MG: 50 TABLET, FILM COATED ORAL at 05:07

## 2017-07-20 RX ADMIN — TAMSULOSIN HYDROCHLORIDE 0.4 MG: 0.4 CAPSULE ORAL at 08:07

## 2017-07-20 RX ADMIN — Medication 3 ML: at 06:07

## 2017-07-20 RX ADMIN — PANTOPRAZOLE SODIUM 40 MG: 40 GRANULE, DELAYED RELEASE ORAL at 08:07

## 2017-07-20 RX ADMIN — LEVOTHYROXINE SODIUM 88 MCG: 88 TABLET ORAL at 05:07

## 2017-07-20 RX ADMIN — DOCUSATE SODIUM 100 MG: 50 CAPSULE, LIQUID FILLED ORAL at 05:07

## 2017-07-20 NOTE — PROGRESS NOTES
[facility] Hospital Medicine  Progress Note    Patient Name: Laurita Stokes  MRN: [id number]  Hospital Medicine Service: H  Admission Date: 7/11/2017  Length of Stay: 9 days  Expected Discharge Date: 7/21/2017  Attending Physician: Ana Bergman MD  Primary Care Provider: Aelx Capellan MD    Subjective:     8 Days Post-Op from right hip cephalomedullary nail fixation for fracture     Orthopedic Surgeon: Dr. Anshul Guerrero  Weight Bearing Status: WBAT right lower extremity    Follow-up Visit For: Closed displaced fracture of neck of right femur with routine healing    HPI:  83 year old female with PMH of a fib ( coumadin recently stopped due to SAH and frequent falls ) , ICM , HFrEF (EF=20-25%, s/p CRT-D) c NYHA IV sx  s/p AICD placement, HTN/HLPD, s/p 3v-CABG, hx of ventricular tachycardia on amiodarone, CKD III, GERD, dementia, under home hospice ( [facility] ) and on home oxygen for comfort ( 2 L )  presents as a transfer from [facility] for surgical fixation of right femoral neck fracture by orthopedic surgery. Patient is a poor informant with h/o dementia and daughter Brittany Stokes who is the caregiver at bedside provided HPI.      Patient was taken to OSH ED before midnight 07/10 with right shoulder and right hip pain after she fell while ambulating with a walker in the middle of the night while going to the bathroom. She had immediate right hip and shoulder pain.  She denies hitting her head or LOC.  Of note, she was recently admitted on 5/24-5/26 for subarachnoid hemorrhage, at which time she was discharged on hospice with plans to stop her anticoagulation for her atrial fibrillation.  Patient lives with her daughter and ambulates short distance inside the house with a walker. Imaging at OSH showed  proximal right  humerus fracture and right femoral neck fracture. Patient was evaluated by orthopedic surgery and cardiology at [facility].     Daughter states most of  her cardiac medications were discontinued recently by hospice. Currently she is taking the following meds : amiodarone, bumex, aldactone, celexa, synthroid, prilosec, flomax and ambien.     Interval History: Referral sent to [facility] this am and awaiting response. Patient in good spirits this am. Patient reports minimal pain in right upper arm and right hip.     Review of Systems   Constitutional: Negative for chills and fever.   Respiratory: Negative for cough, shortness of breath and wheezing.    Cardiovascular: Negative for chest pain and palpitations.   Gastrointestinal: Negative for abdominal pain, constipation, diarrhea and nausea.   Genitourinary: Negative for difficulty urinating, dysuria and frequency.   Musculoskeletal: Positive for myalgias (to righht arm and right hip).     Objective:     Vital Signs (Most Recent):  Temp: 96.8 °F (36 °C) (07/20/17 1522)  Pulse: 80 (07/20/17 1522)  Resp: 16 (07/20/17 1522)  BP: (!) 110/55 (07/20/17 1522)  SpO2: 100 % (07/20/17 1522) Vital Signs (24h Range):  Temp:  [96.1 °F (35.6 °C)-97.6 °F (36.4 °C)] 96.8 °F (36 °C)  Pulse:  [79-80] 80  Resp:  [16-18] 16  SpO2:  [98 %-100 %] 100 %  BP: (102-110)/(51-55) 110/55     Weight: 57.8 kg (127 lb 6.8 oz)  Body mass index is 22.57 kg/m².    Intake/Output Summary (Last 24 hours) at 07/20/17 1730  Last data filed at 07/20/17 1400   Gross per 24 hour   Intake              180 ml   Output                0 ml   Net              180 ml      Physical Exam   Constitutional: She appears well-developed and well-nourished. No distress.   HENT:   Head: Normocephalic and atraumatic.   Cardiovascular: Normal rate and regular rhythm.    No murmur heard.  Pulmonary/Chest: Effort normal and breath sounds normal. She has no wheezes.   Abdominal: Soft. Bowel sounds are normal. She exhibits no distension. There is no tenderness.   Musculoskeletal: She exhibits tenderness (right hip).   Right arm in sling   Neurological: She is alert.   Skin:  Skin is warm and dry. Ecchymosis (Right biceps and triceps area) noted. No rash noted.   Surgical bandage in place to right hip       Significant Labs: No significant labs in past 24 hours    Significant Imaging: I have reviewed all pertinent imaging results/findings within the past 24 hours.    ASSESSMENT/PLAN:     Acute blood loss anemia    Controlled. On admission to hospital, patient was noted to have Hgb of 10. After surgery patient's Hgb level has dropped to 8 and expected blood loss related to surgery and hip fracture. Patient has no signs of active bleeding and hemodynamically stable. Patient is asymptomatic. Goal is keep Hgb >7.           * Closed displaced fracture of neck of right femur with routine healing s/p hemiarthroplasty on 7/12/2017    Encounter for aftercare for healing closed traumatic fracture of right hip  Patient is POD # 8. Patient reports minimal pain in right hip. Plan is to continue PT/OT for gait training and strengthening and restoration of ADL's. Patient is FWB/WBAT: right lower extremity, posterior hip precautions as per Orthopedic recommendations. Plan to continue Lovenox 40 mg subcutaneous daily for total of 4 weeks post-op for DVT prophylaxis after hip fracture surgery. Orthopedics is following and managing surgical site. Pain is well controlled with Tramadol as needed. PT/OT recommending skilled nursing facility placement and  working on choice of skilled facility with patient and family but so far unable to get an accepting facility for patient as has been turned down by multiple SNFs.         Closed fracture of proximal end of right humerus with routine healing    Orthopedics evaluated and recommends:  Non-operative treatment with sling in place and  non weight bearing status of RUE. Orthopedics to follow-up as outpatient in 1-2 weeks.           Dementia without behavioral disturbance    Patient at cognitive baseline. Continue delirium precautions.            Ventricular tachycardia    Patient with episodes of V. Tach in hospital due to hypokalemia, now resolved. Continue Amiodarone for treatment.          PAF (paroxysmal atrial fibrillation)    Controlled. Patient currently in normal sinus rhythm. Patient recently on Coumadin but stopped after SAH and frequent falls and considered high fall risk so no plans to restart. Continue Amiodarone to treat.          Chronic combined systolic and diastolic heart failure    Cardiomyopathy, ischemic  Biventricular ICD (implantable cardioverter-defibrillator) in place  Controlled. Patient with no signs to suggest acute heart failure and will continue Bumex (MWF) and Aldactone (MWF) and daily Coreg to treat.           Essential hypertension    Patient's blood pressure is controlled here in the hospital over past 24 hours. Goal for blood pressure is SBP < 150 and DBP < 90 as patient > or = 60 years of age with no diabetes or advanced kidney disease based on JNC 8 guidelines. Continue current treatment regimen with Coreg and Aldactone to treat. Plan to continue to monitor patient's blood pressure routinely while patient is hospitalized.           Coronary artery disease involving native coronary artery of native heart without angina pectoris    Controlled. Patient asymptomatic and will continue Imdur and Coreg to treat.          Goals of care, counseling/discussion    Discussion made with patient's daughter about goals of care and appropriate discharge planning. Prior to presentation, she was living at home with her daughter with home hospice (due to her advanced cardiac disease). The daughter lives alone and really has no other support and feels this situation was too much for her. Discussion made for SNF versus NH placement with hospice consult as the 2 remaining options. After deliberation, daughter decided she wanted to try SNF at NH level and see if patient's mother improves functionally from recent hip surgery and can possibly  return home on hospice but if does not improve with PT/OT then plan will be home hospice care transition. Patient is DNR.           Chronic respiratory failure with hypoxia    Controlled. Continue oxygen at 2 liters nasal cannula as needed.               Anticipated Disposition: Skilled Nursing Facility    Time spent in care of patient (Greater than 1/2 spent in direct face-to-face contact): 20 minutes      Ana Bergman MD  Department of Hospital Medicine   [facility]

## 2017-07-20 NOTE — PLAN OF CARE
1:00 PM  REZA called Lois with [facility]. Lois was in meeting with family. Confirmed that referral was received and waiting to review. Will call back.   1:17 PM  Lois with [facility] called back stating that RN is reviewing referral and will get back with REZA.    Lulú Ricardo, CLAUDE   [facility]  Ext [phone]

## 2017-07-20 NOTE — PT/OT/SLP PROGRESS
"Physical Therapy      Laurita Stokes  MRN: [id number]    Patient not seen today secondary to patient unwilling to participate. PT encouraged patient multiple times to work with therapy and get out of bed. Despite encouragement, pt continued to refuse and said she "wasn't feeling well".  . Will follow-up tomorrow.    Ronen Willard, PT    "

## 2017-07-20 NOTE — PLAN OF CARE
Problem: Patient Care Overview  Goal: Plan of Care Review  Outcome: Ongoing (interventions implemented as appropriate)  Pt alert and oriented to name and  only. Pt follows commands and is calm and cooperative. Pt bed in lowest position with call light within reach. Safety precautions maintained. No falls observed or reported, at this time. Pt pain assessed and managed. SCD and fcd in place. Pt assisted with toileting. Will continue to monitor

## 2017-07-20 NOTE — PLAN OF CARE
Problem: Fall Risk (Adult)  Intervention: Patient Rounds  Pt alert and oriented to name and . Pt follows commands and is calm and cooperative. Pt bed in lowest position with call light within reach. Safety precautions maintained. No falls observed or reported, at this time. Pt pain assessed and managed. SCDs in place. Pt assisted with toileting.

## 2017-07-20 NOTE — PLAN OF CARE
Problem: Patient Care Overview  Goal: Plan of Care Review  Outcome: Ongoing (interventions implemented as appropriate)  Patient progressing with POC. Pain controlled with scheduled medication. Vital signs stable. Afebrile. Safety and fall precautions active. SCDs and moon boots in place. Patient turned and repositioned throughout the shift. Daughter at bedside. Call light in reach. Will continue to monitor per frequent rounding.

## 2017-07-20 NOTE — ASSESSMENT & PLAN NOTE
Encounter for aftercare for healing closed traumatic fracture of right hip  Patient is POD # 8. Patient reports minimal pain in right hip. Plan is to continue PT/OT for gait training and strengthening and restoration of ADL's. Patient is FWB/WBAT: right lower extremity, posterior hip precautions as per Orthopedic recommendations. Plan to continue Lovenox 40 mg subcutaneous daily for total of 4 weeks post-op for DVT prophylaxis after hip fracture surgery. Orthopedics is following and managing surgical site. Pain is well controlled with Tramadol as needed. PT/OT recommending skilled nursing facility placement and  working on choice of skilled facility with patient and family but so far unable to get an accepting facility for patient as has been turned down by multiple SNFs.

## 2017-07-20 NOTE — SUBJECTIVE & OBJECTIVE
Interval History: Referral sent to [facility] this am and awaiting response. Patient in good spirits this am. Patient reports minimal pain in right upper arm and right hip.     Review of Systems   Constitutional: Negative for chills and fever.   Respiratory: Negative for cough, shortness of breath and wheezing.    Cardiovascular: Negative for chest pain and palpitations.   Gastrointestinal: Negative for abdominal pain, constipation, diarrhea and nausea.   Genitourinary: Negative for difficulty urinating, dysuria and frequency.   Musculoskeletal: Positive for myalgias (to righht arm and right hip).     Objective:     Vital Signs (Most Recent):  Temp: 96.8 °F (36 °C) (07/20/17 1522)  Pulse: 80 (07/20/17 1522)  Resp: 16 (07/20/17 1522)  BP: (!) 110/55 (07/20/17 1522)  SpO2: 100 % (07/20/17 1522) Vital Signs (24h Range):  Temp:  [96.1 °F (35.6 °C)-97.6 °F (36.4 °C)] 96.8 °F (36 °C)  Pulse:  [79-80] 80  Resp:  [16-18] 16  SpO2:  [98 %-100 %] 100 %  BP: (102-110)/(51-55) 110/55     Weight: 57.8 kg (127 lb 6.8 oz)  Body mass index is 22.57 kg/m².    Intake/Output Summary (Last 24 hours) at 07/20/17 1730  Last data filed at 07/20/17 1400   Gross per 24 hour   Intake              180 ml   Output                0 ml   Net              180 ml      Physical Exam   Constitutional: She appears well-developed and well-nourished. No distress.   HENT:   Head: Normocephalic and atraumatic.   Cardiovascular: Normal rate and regular rhythm.    No murmur heard.  Pulmonary/Chest: Effort normal and breath sounds normal. She has no wheezes.   Abdominal: Soft. Bowel sounds are normal. She exhibits no distension. There is no tenderness.   Musculoskeletal: She exhibits tenderness (right hip).   Right arm in sling   Neurological: She is alert.   Skin: Skin is warm and dry. Ecchymosis (Right biceps and triceps area) noted. No rash noted.   Surgical bandage in place to right hip       Significant Labs: No significant labs in past 24  hours    Significant Imaging: I have reviewed all pertinent imaging results/findings within the past 24 hours.

## 2017-07-21 PROBLEM — Z71.89 GOALS OF CARE, COUNSELING/DISCUSSION: Status: RESOLVED | Noted: 2017-07-17 | Resolved: 2017-07-21

## 2017-07-21 PROBLEM — D62 ACUTE BLOOD LOSS ANEMIA: Status: RESOLVED | Noted: 2017-07-18 | Resolved: 2017-07-21

## 2017-07-21 PROCEDURE — 11000001 HC ACUTE MED/SURG PRIVATE ROOM

## 2017-07-21 PROCEDURE — 97535 SELF CARE MNGMENT TRAINING: CPT

## 2017-07-21 PROCEDURE — 25000003 PHARM REV CODE 250: Performed by: STUDENT IN AN ORGANIZED HEALTH CARE EDUCATION/TRAINING PROGRAM

## 2017-07-21 PROCEDURE — 25000003 PHARM REV CODE 250: Performed by: ANESTHESIOLOGY

## 2017-07-21 PROCEDURE — 25000003 PHARM REV CODE 250: Performed by: INTERNAL MEDICINE

## 2017-07-21 PROCEDURE — 63600175 PHARM REV CODE 636 W HCPCS: Performed by: INTERNAL MEDICINE

## 2017-07-21 PROCEDURE — 25000003 PHARM REV CODE 250: Performed by: HOSPITALIST

## 2017-07-21 PROCEDURE — 99231 SBSQ HOSP IP/OBS SF/LOW 25: CPT | Mod: ,,, | Performed by: INTERNAL MEDICINE

## 2017-07-21 PROCEDURE — 97530 THERAPEUTIC ACTIVITIES: CPT

## 2017-07-21 RX ORDER — TRAMADOL HYDROCHLORIDE 50 MG/1
50 TABLET ORAL EVERY 6 HOURS PRN
Qty: 40 TABLET | Refills: 1 | Status: SHIPPED | OUTPATIENT
Start: 2017-07-21 | End: 2017-07-31

## 2017-07-21 RX ORDER — TEMAZEPAM 15 MG/1
15 CAPSULE ORAL NIGHTLY PRN
Qty: 30 CAPSULE | Refills: 1 | Status: SHIPPED | OUTPATIENT
Start: 2017-07-21 | End: 2018-06-28

## 2017-07-21 RX ORDER — ACETAMINOPHEN 500 MG
1000 TABLET ORAL EVERY 8 HOURS PRN
Status: DISCONTINUED | OUTPATIENT
Start: 2017-07-21 | End: 2017-07-24 | Stop reason: HOSPADM

## 2017-07-21 RX ADMIN — Medication 3 ML: at 10:07

## 2017-07-21 RX ADMIN — BUMETANIDE 1 MG: 1 TABLET ORAL at 09:07

## 2017-07-21 RX ADMIN — DOCUSATE SODIUM 100 MG: 50 CAPSULE, LIQUID FILLED ORAL at 05:07

## 2017-07-21 RX ADMIN — RAMELTEON 8 MG: 8 TABLET, FILM COATED ORAL at 12:07

## 2017-07-21 RX ADMIN — ACETAMINOPHEN 1000 MG: 500 TABLET ORAL at 03:07

## 2017-07-21 RX ADMIN — ENOXAPARIN SODIUM 40 MG: 100 INJECTION SUBCUTANEOUS at 05:07

## 2017-07-21 RX ADMIN — TRAMADOL HYDROCHLORIDE 50 MG: 50 TABLET, FILM COATED ORAL at 11:07

## 2017-07-21 RX ADMIN — AMIODARONE HYDROCHLORIDE 200 MG: 200 TABLET ORAL at 10:07

## 2017-07-21 RX ADMIN — SPIRONOLACTONE 12.5 MG: 25 TABLET, FILM COATED ORAL at 09:07

## 2017-07-21 RX ADMIN — CITALOPRAM HYDROBROMIDE 30 MG: 20 TABLET ORAL at 09:07

## 2017-07-21 RX ADMIN — LEVETIRACETAM 500 MG: 500 TABLET, FILM COATED ORAL at 10:07

## 2017-07-21 RX ADMIN — RAMELTEON 8 MG: 8 TABLET, FILM COATED ORAL at 10:07

## 2017-07-21 RX ADMIN — LIDOCAINE 1 PATCH: 50 PATCH CUTANEOUS at 09:07

## 2017-07-21 RX ADMIN — DIGOXIN 125 MCG: 125 TABLET ORAL at 05:07

## 2017-07-21 RX ADMIN — LEVETIRACETAM 500 MG: 500 TABLET, FILM COATED ORAL at 09:07

## 2017-07-21 RX ADMIN — AMIODARONE HYDROCHLORIDE 200 MG: 200 TABLET ORAL at 09:07

## 2017-07-21 RX ADMIN — Medication 3 ML: at 06:07

## 2017-07-21 RX ADMIN — TRAMADOL HYDROCHLORIDE 50 MG: 50 TABLET, FILM COATED ORAL at 05:07

## 2017-07-21 RX ADMIN — LEVOTHYROXINE SODIUM 88 MCG: 88 TABLET ORAL at 05:07

## 2017-07-21 RX ADMIN — POLYETHYLENE GLYCOL 3350 17 G: 17 POWDER, FOR SOLUTION ORAL at 09:07

## 2017-07-21 RX ADMIN — DOCUSATE SODIUM 100 MG: 50 CAPSULE, LIQUID FILLED ORAL at 10:07

## 2017-07-21 RX ADMIN — ISOSORBIDE MONONITRATE 30 MG: 30 TABLET ORAL at 09:07

## 2017-07-21 RX ADMIN — ACETAMINOPHEN 1000 MG: 500 TABLET ORAL at 05:07

## 2017-07-21 RX ADMIN — PANTOPRAZOLE SODIUM 40 MG: 40 GRANULE, DELAYED RELEASE ORAL at 09:07

## 2017-07-21 RX ADMIN — TAMSULOSIN HYDROCHLORIDE 0.4 MG: 0.4 CAPSULE ORAL at 09:07

## 2017-07-21 NOTE — PT/OT/SLP PROGRESS
Physical Therapy  Treatment    Laurita Stokes   MRN: [id number]   Admitting Diagnosis: Closed displaced fracture of neck of right femur with routine healing    PT Received On: 07/21/17  PT Start Time: 1400     PT Stop Time: 1418    PT Total Time (min): 18 min       Billable Minutes:  Therapeutic Activity 18    Treatment Type: Treatment  PT/PTA: PT     PTA Visit Number: 0       General Precautions: Standard, fall  Orthopedic Precautions: RUE non weight bearing, RLE weight bearing as tolerated, RLE posterior precautions   Braces: UE Sling    Do you have any cultural, spiritual, Advent conflicts, given your current situation?: none noted     Subjective:  Communicated with nsg prior to session.  -Pt agreeable to PT session    Pain/Comfort  Pain Rating 1: other (see comments) (did not indicate)  Pain Rating Post-Intervention 1: other (see comments)    Objective:   Patient found with: peripheral IV    Functional Mobility:  Bed Mobility:   Scooting/Bridging: Maximum Assistance  Supine to Sit: Maximum Assistance    Transfers:  Sit <> Stand Assistance: Maximum Assistance  Sit <> Stand Assistive Device: No Assistive Device  Bed <> Chair Technique: Stand Pivot  Bed <> Chair Assistance: Total Assistance  Bed <> Chair Assistive Device: No Assistive Device    Gait:   Gait Distance: unable to perform today    Balance:   Static Sit: FAIR+: Able to take MINIMAL challenges from all directions  Dynamic Sit: FAIR+: Maintains balance through MINIMAL excursions of active trunk motion  Static Stand: 0: Needs MAXIMAL assist to maintain   Dynamic stand: POOR: N/A     Therapeutic Activities and Exercises:  -Pt educated on:  A. PT POC and role of PT  B. Importance of OOB activity to improve functional outcomes after surgery  C. Posterior hip precautions  D. Protecting surgical incision during t/f's  E. DME mgmt and 3-point gait sequence  F. S/s associated with THR procedure       AM-PAC 6 CLICK MOBILITY  How much help from another  person does this patient currently need?   1 = Unable, Total/Dependent Assistance  2 = A lot, Maximum/Moderate Assistance  3 = A little, Minimum/Contact Guard/Supervision  4 = None, Modified [place]/Independent    Turning over in bed (including adjusting bedclothes, sheets and blankets)?: 2  Sitting down on and standing up from a chair with arms (e.g., wheelchair, bedside commode, etc.): 2  Moving from lying on back to sitting on the side of the bed?: 2  Moving to and from a bed to a chair (including a wheelchair)?: 2  Need to walk in hospital room?: 1  Climbing 3-5 steps with a railing?: 1  Total Score: 10    AM-PAC Raw Score CMS G-Code Modifier Level of Impairment Assistance   6 % Total / Unable   7 - 9 CM 80 - 100% Maximal Assist   10 - 14 CL 60 - 80% Moderate Assist   15 - 19 CK 40 - 60% Moderate Assist   20 - 22 CJ 20 - 40% Minimal Assist   23 CI 1-20% SBA / CGA   24 CH 0% Independent/ Mod I     Patient left up in chair with all lines intact, call button in reach and nsg notified.    Assessment:  Laurita Stokes is a 83 y.o. female with a medical diagnosis of Closed displaced fracture of neck of right femur with routine healing and presents with impaired functional mobility. Goals updated to reflect current functional status. Pt continues to require increased assistance for functional tasks. Pt unable to recall hip precautions and was pleasantly confused during session. Pt able to perform sit<>stand trial x1 attempt and t/f to chair after. Continue POC.    Rehab identified problem list/impairments: Rehab identified problem list/impairments: weakness, impaired endurance, impaired self care skills, impaired functional mobilty, impaired balance, gait instability, impaired cognition, pain, impaired joint extensibility, decreased safety awareness, orthopedic precautions, impaired muscle length    Rehab potential is good.    Activity tolerance: Fair    Discharge recommendations: Discharge  Facility/Level Of Care Needs: nursing facility, skilled     Barriers to discharge: Barriers to Discharge: None    Equipment recommendations: Equipment Needed After Discharge:  (TBD)     GOALS:    Physical Therapy Goals        Problem: Physical Therapy Goal    Goal Priority Disciplines Outcome Goal Variances Interventions   Physical Therapy Goal     PT/OT, PT Ongoing (interventions implemented as appropriate)     Description:  Goals to be met by: 17     Patient will increase functional independence with mobility by performin. Supine to sit with Moderate Assistance  2. Sit to supine with Moderate Assistance  3. Sit to stand transfer with Moderate Assistance  4. Bed to chair transfer with Maximum Assistance- Met  Revised: mod A  5. Lower extremity exercise program x15 reps, with assistance as needed, in order to increase LE strength and (I) with functional mobility.   6. Pt ambulated 10' with max A using jamil walker-discontinued                          PLAN:    Patient to be seen daily  to address the above listed problems via gait training, therapeutic activities, therapeutic exercises, neuromuscular re-education  Plan of Care expires: 17  Plan of Care reviewed with: patient         Ronen Willard, PT  2017

## 2017-07-21 NOTE — PLAN OF CARE
Problem: Patient Care Overview  Goal: Plan of Care Review  Outcome: Ongoing (interventions implemented as appropriate)  Patient AAOx4, calm. Skin bruised, dry, intact. VS signs stable. Pain addressed. Bed at lowest position, call light within reach. Will continue to monitor.

## 2017-07-21 NOTE — SUBJECTIVE & OBJECTIVE
Interval History: Patient turned down at [place] for SNF admission but family and  able to get acceptance at [facility] for admit on 7/24. Patient reports minimal pain in right arm and hip today.     Review of Systems   Constitutional: Negative for chills and fever.   Respiratory: Negative for cough, shortness of breath and wheezing.    Cardiovascular: Negative for chest pain and palpitations.   Gastrointestinal: Negative for abdominal pain, constipation, diarrhea and nausea.   Genitourinary: Negative for difficulty urinating, dysuria and frequency.   Musculoskeletal: Positive for myalgias (to righht arm and right hip).     Objective:     Vital Signs (Most Recent):  Temp: 98.2 °F (36.8 °C) (07/21/17 1605)  Pulse: 82 (07/21/17 1605)  Resp: 18 (07/21/17 1605)  BP: 111/61 (07/21/17 1605)  SpO2: 98 % (07/21/17 1605) Vital Signs (24h Range):  Temp:  [98 °F (36.7 °C)-98.3 °F (36.8 °C)] 98.2 °F (36.8 °C)  Pulse:  [80-88] 82  Resp:  [16-18] 18  SpO2:  [97 %-99 %] 98 %  BP: (107-117)/(53-61) 111/61     Weight: 57.8 kg (127 lb 6.8 oz)  Body mass index is 22.57 kg/m².    Intake/Output Summary (Last 24 hours) at 07/21/17 1757  Last data filed at 07/21/17 1400   Gross per 24 hour   Intake              180 ml   Output              125 ml   Net               55 ml      Physical Exam   Constitutional: She appears well-developed and well-nourished. No distress.   HENT:   Head: Normocephalic and atraumatic.   Cardiovascular: Normal rate and regular rhythm.    No murmur heard.  Pulmonary/Chest: Effort normal and breath sounds normal. She has no wheezes.   Abdominal: Soft. Bowel sounds are normal. She exhibits no distension. There is no tenderness.   Musculoskeletal: She exhibits tenderness (right hip).   Right arm in sling   Neurological: She is alert.   Skin: Skin is warm and dry. Ecchymosis (Right biceps and triceps area) noted. No rash noted.   Surgical bandage in place to right hip       Significant Labs:  None    Significant Imaging: I have reviewed all pertinent imaging results/findings within the past 24 hours.

## 2017-07-21 NOTE — PROGRESS NOTES
[facility] Hospital Medicine  Progress Note    Patient Name: Laurita Stokes  MRN: [id number]  Hospital Medicine Service: H  Admission Date: 7/11/2017  Length of Stay: 10 days  Expected Discharge Date: 7/22/2017  Attending Physician: Ana Bergman MD  Primary Care Provider: Alex Capellan MD    Subjective:     9 Days Post-Op from right hip cephalomedullary nail fixation for fracture     Orthopedic Surgeon: Dr. Anshul Guerrero  Weight Bearing Status: WBAT right lower extremity    Follow-up Visit For: Closed displaced fracture of neck of right femur with routine healing    HPI:  83 year old female with PMH of a fib ( coumadin recently stopped due to SAH and frequent falls ) , ICM , HFrEF (EF=20-25%, s/p CRT-D) c NYHA IV sx  s/p AICD placement, HTN/HLPD, s/p 3v-CABG, hx of ventricular tachycardia on amiodarone, CKD III, GERD, dementia, under home hospice ( [facility] ) and on home oxygen for comfort ( 2 L )  presents as a transfer from [facility] for surgical fixation of right femoral neck fracture by orthopedic surgery. Patient is a poor informant with h/o dementia and daughter Brittany Stokes who is the caregiver at bedside provided HPI.      Patient was taken to OSH ED before midnight 07/10 with right shoulder and right hip pain after she fell while ambulating with a walker in the middle of the night while going to the bathroom. She had immediate right hip and shoulder pain.  She denies hitting her head or LOC.  Of note, she was recently admitted on 5/24-5/26 for subarachnoid hemorrhage, at which time she was discharged on hospice with plans to stop her anticoagulation for her atrial fibrillation.  Patient lives with her daughter and ambulates short distance inside the house with a walker. Imaging at OSH showed  proximal right  humerus fracture and right femoral neck fracture. Patient was evaluated by orthopedic surgery and cardiology at [facility].     Daughter states most  of her cardiac medications were discontinued recently by hospice. Currently she is taking the following meds : amiodarone, bumex, aldactone, celexa, synthroid, prilosec, flomax and ambien.     Interval History: Patient turned down at [place] for SNF admission but family and  able to get acceptance at [facility] for admit on 7/24. Patient reports minimal pain in right arm and hip today.     Review of Systems   Constitutional: Negative for chills and fever.   Respiratory: Negative for cough, shortness of breath and wheezing.    Cardiovascular: Negative for chest pain and palpitations.   Gastrointestinal: Negative for abdominal pain, constipation, diarrhea and nausea.   Genitourinary: Negative for difficulty urinating, dysuria and frequency.   Musculoskeletal: Positive for myalgias (to righht arm and right hip).     Objective:     Vital Signs (Most Recent):  Temp: 98.2 °F (36.8 °C) (07/21/17 1605)  Pulse: 82 (07/21/17 1605)  Resp: 18 (07/21/17 1605)  BP: 111/61 (07/21/17 1605)  SpO2: 98 % (07/21/17 1605) Vital Signs (24h Range):  Temp:  [98 °F (36.7 °C)-98.3 °F (36.8 °C)] 98.2 °F (36.8 °C)  Pulse:  [80-88] 82  Resp:  [16-18] 18  SpO2:  [97 %-99 %] 98 %  BP: (107-117)/(53-61) 111/61     Weight: 57.8 kg (127 lb 6.8 oz)  Body mass index is 22.57 kg/m².    Intake/Output Summary (Last 24 hours) at 07/21/17 1757  Last data filed at 07/21/17 1400   Gross per 24 hour   Intake              180 ml   Output              125 ml   Net               55 ml      Physical Exam   Constitutional: She appears well-developed and well-nourished. No distress.   HENT:   Head: Normocephalic and atraumatic.   Cardiovascular: Normal rate and regular rhythm.    No murmur heard.  Pulmonary/Chest: Effort normal and breath sounds normal. She has no wheezes.   Abdominal: Soft. Bowel sounds are normal. She exhibits no distension. There is no tenderness.   Musculoskeletal: She exhibits tenderness (right hip).   Right arm in sling    Neurological: She is alert.   Skin: Skin is warm and dry. Ecchymosis (Right biceps and triceps area) noted. No rash noted.   Surgical bandage in place to right hip       Significant Labs: None    Significant Imaging: I have reviewed all pertinent imaging results/findings within the past 24 hours.    ASSESSMENT/PLAN:     Acute blood loss anemia    Controlled. On admission to hospital, patient was noted to have Hgb of 10. After surgery patient's Hgb level has dropped to 8 and expected blood loss related to surgery and hip fracture. Patient has no signs of active bleeding and hemodynamically stable. Patient is asymptomatic. Goal is keep Hgb >7.           * Closed displaced fracture of neck of right femur with routine healing s/p hemiarthroplasty on 7/12/2017    Encounter for aftercare for healing closed traumatic fracture of right hip  Patient is POD # 9. Patient reports minimal pain in right hip. Plan is to continue PT/OT for gait training and strengthening and restoration of ADL's. Patient is FWB/WBAT: right lower extremity, posterior hip precautions as per Orthopedic recommendations. Plan to continue Lovenox 40 mg subcutaneous daily for total of 4 weeks post-op for DVT prophylaxis after hip fracture surgery. Orthopedics is following and managing surgical site. Pain is well controlled with Tramadol as needed. PT/OT recommending skilled nursing facility placement and  working on choice of skilled facility with patient and family but so far unable to get an accepting facility until today and patient has been accepted at [facility] for 7/24.         Closed fracture of proximal end of right humerus with routine healing    Orthopedics evaluated and recommends:  Non-operative treatment with sling in place and  non weight bearing status of RUE. Orthopedics to follow-up as outpatient in 1-2 weeks.           Dementia without behavioral disturbance    Patient at cognitive baseline. Continue delirium  precautions.           Ventricular tachycardia    Patient with episodes of V. Tach in hospital due to hypokalemia, now resolved. Continue Amiodarone for treatment.          PAF (paroxysmal atrial fibrillation)    Controlled. Patient currently in normal sinus rhythm. Patient recently on Coumadin but stopped after SAH and frequent falls and considered high fall risk so no plans to restart. Continue Amiodarone to treat.          Chronic combined systolic and diastolic heart failure    Cardiomyopathy, ischemic  Biventricular ICD (implantable cardioverter-defibrillator) in place  Controlled. Patient with no signs to suggest acute heart failure and will continue Bumex (MWF) and Aldactone (MWF) and daily Coreg to treat.           Essential hypertension    Patient's blood pressure is controlled here in the hospital over past 24 hours. Goal for blood pressure is SBP < 150 and DBP < 90 as patient > or = 60 years of age with no diabetes or advanced kidney disease based on JNC 8 guidelines. Continue current treatment regimen with Coreg and Aldactone to treat. Plan to continue to monitor patient's blood pressure routinely while patient is hospitalized.           Coronary artery disease involving native coronary artery of native heart without angina pectoris    Controlled. Patient asymptomatic and will continue Imdur and Coreg to treat.          Chronic respiratory failure with hypoxia    Controlled. Continue oxygen at 2 liters nasal cannula as needed.               Anticipated Disposition: [facility] Skilled Nursing Unit on 7/24/2017    Time spent in care of patient (Greater than 1/2 spent in direct face-to-face contact): 20 minutes      Ana Bergman MD  Department of Hospital Medicine   [facility]

## 2017-07-21 NOTE — PLAN OF CARE
11:22 AM  REZA spoke with Lois at [facility] who stated RN is still reviewing referral and will get back to . Lois stated they do have a bed available today. Will f/u as needed.   12:18 PM  REZA received voicemail from Lois with [facility] stating they will not be able to accept pt. Updated physician and CM. Will update family.   1:05 PM  REZA called TOYA Echols to update her on final decision of [facility]. Kinza stated her spouse is going to [facility] now to discuss paying privately. Kinza stated her sister cannot take care of pt alone at home, they would have to get sitters and by that time paying privately at nursing home would be just as expensive. Informed CM. Called [facility] and left voicemail for admissions coordinator, Alisha informing her of family's decision.     Lulú iRcardo, CLAUDE   [facility]  Ext [phone]

## 2017-07-21 NOTE — PLAN OF CARE
Problem: Physical Therapy Goal  Goal: Physical Therapy Goal  Goals to be met by: 17     Patient will increase functional independence with mobility by performin. Supine to sit with Moderate Assistance  2. Sit to supine with Moderate Assistance  3. Sit to stand transfer with Moderate Assistance  4. Bed to chair transfer with Maximum Assistance- Met  Revised: mod A  5. Lower extremity exercise program x15 reps, with assistance as needed, in order to increase LE strength and (I) with functional mobility.   6. Pt ambulated 10' with max A using jamil walker-discontinued        Outcome: Ongoing (interventions implemented as appropriate)  Goals updated to reflect current functional status. Pt continues to require increased assistance for functional tasks. Pt unable to recall hip precautions and was pleasantly confused during session. Pt able to perform sit<>stand trial x1 attempt and t/f to chair after. Continue POC.

## 2017-07-21 NOTE — PLAN OF CARE
1:59 PM  REZA spoke with Kinza who stated her spouse met with Pavithra in admissions with [facility] who stated they would accept pt for SNF. Contacted Pavithra with [facility] at [phone]. Pavithra stated she could accept pt tomorrow. Faxed referral to [phone]. Will arrange transportation through [facility] in will call for tomorrow.   2:52 PM  REZA spoke with Pavithra with [facility] who stated she can accept pt on Monday. Updated physician.     Lulú Ricardo, CLAUDE   [facility]  Ext [phone]

## 2017-07-22 PROCEDURE — 25000003 PHARM REV CODE 250: Performed by: STUDENT IN AN ORGANIZED HEALTH CARE EDUCATION/TRAINING PROGRAM

## 2017-07-22 PROCEDURE — 99231 SBSQ HOSP IP/OBS SF/LOW 25: CPT | Mod: ,,, | Performed by: INTERNAL MEDICINE

## 2017-07-22 PROCEDURE — 97112 NEUROMUSCULAR REEDUCATION: CPT

## 2017-07-22 PROCEDURE — 25000003 PHARM REV CODE 250: Performed by: HOSPITALIST

## 2017-07-22 PROCEDURE — 25000003 PHARM REV CODE 250: Performed by: INTERNAL MEDICINE

## 2017-07-22 PROCEDURE — 11000001 HC ACUTE MED/SURG PRIVATE ROOM

## 2017-07-22 PROCEDURE — 63600175 PHARM REV CODE 636 W HCPCS: Performed by: INTERNAL MEDICINE

## 2017-07-22 PROCEDURE — 97530 THERAPEUTIC ACTIVITIES: CPT

## 2017-07-22 RX ORDER — MEGESTROL ACETATE 40 MG/ML
400 SUSPENSION ORAL DAILY
Status: DISCONTINUED | OUTPATIENT
Start: 2017-07-22 | End: 2017-07-24 | Stop reason: HOSPADM

## 2017-07-22 RX ADMIN — DOCUSATE SODIUM 100 MG: 50 CAPSULE, LIQUID FILLED ORAL at 09:07

## 2017-07-22 RX ADMIN — LEVETIRACETAM 500 MG: 500 TABLET, FILM COATED ORAL at 09:07

## 2017-07-22 RX ADMIN — CITALOPRAM HYDROBROMIDE 30 MG: 20 TABLET ORAL at 10:07

## 2017-07-22 RX ADMIN — Medication 3 ML: at 05:07

## 2017-07-22 RX ADMIN — TRAMADOL HYDROCHLORIDE 50 MG: 50 TABLET, FILM COATED ORAL at 05:07

## 2017-07-22 RX ADMIN — MEGESTROL ACETATE 400 MG: 40 SUSPENSION ORAL at 01:07

## 2017-07-22 RX ADMIN — CARVEDILOL 3.12 MG: 3.12 TABLET, FILM COATED ORAL at 09:07

## 2017-07-22 RX ADMIN — PANTOPRAZOLE SODIUM 40 MG: 40 GRANULE, DELAYED RELEASE ORAL at 10:07

## 2017-07-22 RX ADMIN — AMIODARONE HYDROCHLORIDE 200 MG: 200 TABLET ORAL at 09:07

## 2017-07-22 RX ADMIN — ACETAMINOPHEN 1000 MG: 500 TABLET ORAL at 09:07

## 2017-07-22 RX ADMIN — Medication 3 ML: at 09:07

## 2017-07-22 RX ADMIN — ACETAMINOPHEN 1000 MG: 500 TABLET ORAL at 01:07

## 2017-07-22 RX ADMIN — POLYETHYLENE GLYCOL 3350 17 G: 17 POWDER, FOR SOLUTION ORAL at 10:07

## 2017-07-22 RX ADMIN — DOCUSATE SODIUM 100 MG: 50 CAPSULE, LIQUID FILLED ORAL at 05:07

## 2017-07-22 RX ADMIN — LIDOCAINE 1 PATCH: 50 PATCH CUTANEOUS at 01:07

## 2017-07-22 RX ADMIN — DOCUSATE SODIUM 100 MG: 50 CAPSULE, LIQUID FILLED ORAL at 01:07

## 2017-07-22 RX ADMIN — TRAMADOL HYDROCHLORIDE 50 MG: 50 TABLET, FILM COATED ORAL at 11:07

## 2017-07-22 RX ADMIN — AMIODARONE HYDROCHLORIDE 200 MG: 200 TABLET ORAL at 10:07

## 2017-07-22 RX ADMIN — Medication 3 ML: at 01:07

## 2017-07-22 RX ADMIN — ENOXAPARIN SODIUM 40 MG: 100 INJECTION SUBCUTANEOUS at 04:07

## 2017-07-22 RX ADMIN — LEVOTHYROXINE SODIUM 88 MCG: 88 TABLET ORAL at 05:07

## 2017-07-22 RX ADMIN — TAMSULOSIN HYDROCHLORIDE 0.4 MG: 0.4 CAPSULE ORAL at 10:07

## 2017-07-22 RX ADMIN — LEVETIRACETAM 500 MG: 500 TABLET, FILM COATED ORAL at 10:07

## 2017-07-22 NOTE — SUBJECTIVE & OBJECTIVE
Interval History: Patient with continued poor appetite. Patient awaiting SNF placement at [facility] for 7/24.     Review of Systems   Constitutional: Positive for appetite change (poor appetite). Negative for chills and fever.   Respiratory: Negative for cough, shortness of breath and wheezing.    Cardiovascular: Negative for chest pain.   Gastrointestinal: Negative for abdominal pain, constipation, diarrhea and nausea.   Genitourinary: Negative for difficulty urinating, dysuria and frequency.   Musculoskeletal: Positive for myalgias (to right arm and right hip).     Objective:     Vital Signs (Most Recent):  Temp: 96.7 °F (35.9 °C) (07/22/17 1150)  Pulse: 86 (07/22/17 1150)  Resp: 16 (07/22/17 1150)  BP: (!) 111/56 (07/22/17 1150)  SpO2: 97 % (07/22/17 1150) Vital Signs (24h Range):  Temp:  [96.7 °F (35.9 °C)-98.9 °F (37.2 °C)] 96.7 °F (35.9 °C)  Pulse:  [79-86] 86  Resp:  [16-18] 16  SpO2:  [96 %-98 %] 97 %  BP: (101-111)/(51-61) 111/56     Weight: 57.8 kg (127 lb 6.8 oz)  Body mass index is 22.57 kg/m².    Intake/Output Summary (Last 24 hours) at 07/22/17 1154  Last data filed at 07/21/17 1400   Gross per 24 hour   Intake              120 ml   Output                0 ml   Net              120 ml      Physical Exam   Constitutional: She appears well-developed and well-nourished. No distress.   HENT:   Head: Normocephalic and atraumatic.   Cardiovascular: Normal rate and regular rhythm.    No murmur heard.  Pulmonary/Chest: Effort normal and breath sounds normal. She has no wheezes.   Abdominal: Soft. Bowel sounds are normal. She exhibits no distension. There is no tenderness.   Musculoskeletal: She exhibits tenderness (right hip).   Right arm in sling   Neurological: She is alert.   Skin: Skin is warm and dry. Ecchymosis (Right biceps and triceps area) noted. No rash noted.   Surgical bandage in place to right hip       Significant Labs:   BMP:   Recent Labs  Lab 07/23/17  0451   GLU 77      K 3.2*       CO2 28   BUN 10   CREATININE 0.8   CALCIUM 8.0*   MG 1.6       Significant Imaging: None in past 24 hours

## 2017-07-22 NOTE — ASSESSMENT & PLAN NOTE
Orthopedics evaluated and recommends:  Non-operative treatment with sling in place and  non weight bearing status of RUE. Orthopedics to follow-up as outpatient in 1 week.

## 2017-07-22 NOTE — PT/OT/SLP PROGRESS
"Physical Therapy  Treatment    Laurita Stokes   MRN: [id number]   Admitting Diagnosis: Closed displaced fracture of neck of right femur with routine healing    PT Received On: 07/22/17  PT Start Time: 1055     PT Stop Time: 1120    PT Total Time (min): 25 min       Billable Minutes:  Therapeutic Activity  15 and Neuromuscular Re-education 10    Treatment Type: Treatment  PT/PTA: PTA     PTA Visit Number: 1       General Precautions: Standard, fall  Orthopedic Precautions: RUE non weight bearing, RLE weight bearing as tolerated, RLE posterior precautions   Braces: UE Sling    Do you have any cultural, spiritual, Anabaptism conflicts, given your current situation?: none noted     Subjective:  Communicated with NSG prior to session.  Patient states " I can't do this, I told you. My arm hurts and I am dizzy"    Pain/Comfort  Pain Rating 1:  (No visible pain noted, only when transferring.)  Location - Side 1: Right  Location - Orientation 1: generalized  Location 1: arm  Pain Addressed 1: Pre-medicate for activity, Reposition, Distraction  Pain Rating Post-Intervention 1: 0/10    Objective:   Patient found with: SCD    Functional Mobility:  Bed Mobility:   Scooting/Bridging: Maximum Assistance  Supine to Sit: Maximum Assistance  Sit to Supine: Maximum Assistance    Transfers:  Sit <> Stand Assistance: Moderate Assistance  Sit <> Stand Assistive Device: No Assistive Device    Gait:   Gait Distance: Unable to perform today.    Stairs:      Balance:   Static Sit: FAIR: Maintains without assist, but unable to take any challenges   Dynamic Sit: FAIR: Cannot move trunk without losing balance  Static Stand: POOR: Needs MODERATE assist to maintain  Dynamic stand: 0: N/A     Therapeutic Activities and Exercises:  Doffed/Donned SCD's. Patient transferred to EOB with max assistance. Adjusted  R UE sling while sitting at EOB. Patient sat at EOB x 20 minutes with CGA. Static standing balance activity with L hand held assistance " 4x30 secs with min to mod assistance.  Returned back to bed and performed ankle pumps 3x15 reps B LE.     AM-PAC 6 CLICK MOBILITY  How much help from another person does this patient currently need?   1 = Unable, Total/Dependent Assistance  2 = A lot, Maximum/Moderate Assistance  3 = A little, Minimum/Contact Guard/Supervision  4 = None, Modified [place]/Independent    Turning over in bed (including adjusting bedclothes, sheets and blankets)?: 2  Sitting down on and standing up from a chair with arms (e.g., wheelchair, bedside commode, etc.): 2  Moving from lying on back to sitting on the side of the bed?: 2  Moving to and from a bed to a chair (including a wheelchair)?: 2  Need to walk in hospital room?: 1  Climbing 3-5 steps with a railing?: 1  Total Score: 10    AM-PAC Raw Score CMS G-Code Modifier Level of Impairment Assistance   6 % Total / Unable   7 - 9 CM 80 - 100% Maximal Assist   10 - 14 CL 60 - 80% Moderate Assist   15 - 19 CK 40 - 60% Moderate Assist   20 - 22 CJ 20 - 40% Minimal Assist   23 CI 1-20% SBA / CGA   24 CH 0% Independent/ Mod I     Patient left HOB elevated with all lines intact and call button in reach.    Assessment:  Laurita Stokes is a 83 y.o. female with a medical diagnosis of Closed displaced fracture of neck of right femur with routine healing and presents with decreased functional mobility. Patient requires coaxing and encouragement to participate with Physical Therapy treatment. Patient showed limited ability to bear weight on the R UE during static standing balance activity. Patient would benefit from continued P.T. To address deficits .    Rehab identified problem list/impairments: Rehab identified problem list/impairments: weakness, impaired endurance, impaired self care skills, impaired functional mobilty, impaired balance, decreased upper extremity function, decreased lower extremity function, decreased safety awareness, pain, decreased ROM, edema,  orthopedic precautions    Rehab potential is fair.    Activity tolerance: Fair    Discharge recommendations: Discharge Facility/Level Of Care Needs: nursing facility, skilled     Barriers to discharge: Barriers to Discharge: None    Equipment recommendations:       GOALS:    Physical Therapy Goals        Problem: Physical Therapy Goal    Goal Priority Disciplines Outcome Goal Variances Interventions   Physical Therapy Goal     PT/OT, PT Ongoing (interventions implemented as appropriate)     Description:  Goals to be met by: 17     Patient will increase functional independence with mobility by performin. Supine to sit with Moderate Assistance  2. Sit to supine with Moderate Assistance  3. Sit to stand transfer with Moderate Assistance  4. Bed to chair transfer with Maximum Assistance- Met  Revised: mod A  5. Lower extremity exercise program x15 reps, with assistance as needed, in order to increase LE strength and (I) with functional mobility.   6. Pt ambulated 10' with max A using jamil walker-discontinued                          PLAN:    Patient to be seen daily  to address the above listed problems via gait training, therapeutic activities, therapeutic exercises, neuromuscular re-education  Plan of Care expires: 17  Plan of Care reviewed with: patient         John  Tone, PTA  2017

## 2017-07-22 NOTE — ASSESSMENT & PLAN NOTE
Encounter for aftercare for healing closed traumatic fracture of right hip  Patient is POD # 11. Patient reports minimal pain in right hip. Plan is to continue PT/OT for gait training and strengthening and restoration of ADL's. Patient is FWB/WBAT: right lower extremity, posterior hip precautions as per Orthopedic recommendations. Plan to continue Lovenox 40 mg subcutaneous daily for total of 4 weeks post-op for DVT prophylaxis after hip fracture surgery. Orthopedics is following and managing surgical site. Pain is well controlled with Tramadol and Tylenol as needed. PT/OT recommending skilled nursing facility placement and patient has been accepted at [facility] for 7/24.

## 2017-07-22 NOTE — ASSESSMENT & PLAN NOTE
Patient at cognitive baseline. Continue delirium precautions. Continue Megace 400 mg po daily for appetite stimulation.

## 2017-07-22 NOTE — PLAN OF CARE
Problem: Physical Therapy Goal  Goal: Physical Therapy Goal  Goals to be met by: 17     Patient will increase functional independence with mobility by performin. Supine to sit with Moderate Assistance  2. Sit to supine with Moderate Assistance  3. Sit to stand transfer with Moderate Assistance  4. Bed to chair transfer with Maximum Assistance- Met  Revised: mod A  5. Lower extremity exercise program x15 reps, with assistance as needed, in order to increase LE strength and (I) with functional mobility.   6. Pt ambulated 10' with max A using jamil walker-discontinued         Outcome: Ongoing (interventions implemented as appropriate)  Functional mobility continues to require maximum assistance.

## 2017-07-23 LAB
ANION GAP SERPL CALC-SCNC: 8 MMOL/L
BUN SERPL-MCNC: 10 MG/DL
CALCIUM SERPL-MCNC: 8 MG/DL
CHLORIDE SERPL-SCNC: 101 MMOL/L
CO2 SERPL-SCNC: 28 MMOL/L
CREAT SERPL-MCNC: 0.8 MG/DL
EST. GFR  (AFRICAN AMERICAN): >60 ML/MIN/1.73 M^2
EST. GFR  (NON AFRICAN AMERICAN): >60 ML/MIN/1.73 M^2
GLUCOSE SERPL-MCNC: 77 MG/DL
MAGNESIUM SERPL-MCNC: 1.6 MG/DL
POTASSIUM SERPL-SCNC: 3.2 MMOL/L
SODIUM SERPL-SCNC: 137 MMOL/L

## 2017-07-23 PROCEDURE — 97530 THERAPEUTIC ACTIVITIES: CPT

## 2017-07-23 PROCEDURE — 11000001 HC ACUTE MED/SURG PRIVATE ROOM

## 2017-07-23 PROCEDURE — 25000003 PHARM REV CODE 250: Performed by: INTERNAL MEDICINE

## 2017-07-23 PROCEDURE — 25000003 PHARM REV CODE 250: Performed by: STUDENT IN AN ORGANIZED HEALTH CARE EDUCATION/TRAINING PROGRAM

## 2017-07-23 PROCEDURE — 63600175 PHARM REV CODE 636 W HCPCS: Performed by: INTERNAL MEDICINE

## 2017-07-23 PROCEDURE — 25000003 PHARM REV CODE 250: Performed by: HOSPITALIST

## 2017-07-23 PROCEDURE — 80048 BASIC METABOLIC PNL TOTAL CA: CPT

## 2017-07-23 PROCEDURE — 99232 SBSQ HOSP IP/OBS MODERATE 35: CPT | Mod: ,,, | Performed by: INTERNAL MEDICINE

## 2017-07-23 PROCEDURE — 36415 COLL VENOUS BLD VENIPUNCTURE: CPT

## 2017-07-23 PROCEDURE — 83735 ASSAY OF MAGNESIUM: CPT

## 2017-07-23 RX ORDER — POTASSIUM CHLORIDE 20 MEQ/15ML
40 SOLUTION ORAL ONCE
Status: COMPLETED | OUTPATIENT
Start: 2017-07-23 | End: 2017-07-23

## 2017-07-23 RX ADMIN — ACETAMINOPHEN 1000 MG: 500 TABLET ORAL at 09:07

## 2017-07-23 RX ADMIN — ISOSORBIDE MONONITRATE 30 MG: 30 TABLET ORAL at 09:07

## 2017-07-23 RX ADMIN — RAMELTEON 8 MG: 8 TABLET, FILM COATED ORAL at 08:07

## 2017-07-23 RX ADMIN — LEVOTHYROXINE SODIUM 88 MCG: 88 TABLET ORAL at 05:07

## 2017-07-23 RX ADMIN — DOCUSATE SODIUM 100 MG: 50 CAPSULE, LIQUID FILLED ORAL at 05:07

## 2017-07-23 RX ADMIN — TRAMADOL HYDROCHLORIDE 50 MG: 50 TABLET, FILM COATED ORAL at 10:07

## 2017-07-23 RX ADMIN — LEVETIRACETAM 500 MG: 500 TABLET, FILM COATED ORAL at 09:07

## 2017-07-23 RX ADMIN — Medication 3 ML: at 05:07

## 2017-07-23 RX ADMIN — AMIODARONE HYDROCHLORIDE 200 MG: 200 TABLET ORAL at 09:07

## 2017-07-23 RX ADMIN — MEGESTROL ACETATE 400 MG: 40 SUSPENSION ORAL at 10:07

## 2017-07-23 RX ADMIN — POTASSIUM CHLORIDE 40 MEQ: 20 SOLUTION ORAL at 10:07

## 2017-07-23 RX ADMIN — TRAMADOL HYDROCHLORIDE 50 MG: 50 TABLET, FILM COATED ORAL at 08:07

## 2017-07-23 RX ADMIN — CITALOPRAM HYDROBROMIDE 30 MG: 20 TABLET ORAL at 09:07

## 2017-07-23 RX ADMIN — LIDOCAINE 1 PATCH: 50 PATCH CUTANEOUS at 09:07

## 2017-07-23 RX ADMIN — TRAMADOL HYDROCHLORIDE 50 MG: 50 TABLET, FILM COATED ORAL at 04:07

## 2017-07-23 RX ADMIN — POLYETHYLENE GLYCOL 3350 17 G: 17 POWDER, FOR SOLUTION ORAL at 09:07

## 2017-07-23 RX ADMIN — PANTOPRAZOLE SODIUM 40 MG: 40 GRANULE, DELAYED RELEASE ORAL at 09:07

## 2017-07-23 RX ADMIN — Medication 3 ML: at 01:07

## 2017-07-23 RX ADMIN — LEVETIRACETAM 500 MG: 500 TABLET, FILM COATED ORAL at 08:07

## 2017-07-23 RX ADMIN — TRAMADOL HYDROCHLORIDE 50 MG: 50 TABLET, FILM COATED ORAL at 05:07

## 2017-07-23 RX ADMIN — TAMSULOSIN HYDROCHLORIDE 0.4 MG: 0.4 CAPSULE ORAL at 09:07

## 2017-07-23 RX ADMIN — ENOXAPARIN SODIUM 40 MG: 100 INJECTION SUBCUTANEOUS at 04:07

## 2017-07-23 RX ADMIN — AMIODARONE HYDROCHLORIDE 200 MG: 200 TABLET ORAL at 08:07

## 2017-07-23 RX ADMIN — CARVEDILOL 3.12 MG: 3.12 TABLET, FILM COATED ORAL at 08:07

## 2017-07-23 NOTE — NURSING
Pt's daughter was educated on the meaning of DNR, she told this nurse that is not what she wants for her mother. She is going to contact her sister who is POA and talk about the wishes for their mother. Will continue to monitor.

## 2017-07-23 NOTE — PLAN OF CARE
Problem: Patient Care Overview  Goal: Plan of Care Review  Outcome: Ongoing (interventions implemented as appropriate)  No acute events throughout night, VS and assessment per flow sheet, patient progressing towards goals as tolerated, plan of care reviewed with Laurita Stokes, all concerns addressed, will continue to monitor.

## 2017-07-23 NOTE — PROGRESS NOTES
[facility]  Orthopedics  Progress Note    Patient Name: Laurita Stokes  MRN: [id number]  Admission Date: 7/11/2017  Hospital Length of Stay: 12 days  Attending Provider: Ana Bergman MD  Primary Care Provider: Alex Capellan MD  Follow-up For: Procedure(s) (LRB):  ARTHROPLASTY-HIP-OLAYINKA-right (Right)    Post-Operative Day: 11 Days Post-Op  Subjective:     Principal Problem:Closed displaced fracture of neck of right femur with routine healing    Principal Orthopedic Problem: s/p R hip olayinka    Interval History: Patient seen and examined at bedside.  No acute events overnight.  Pain controlled.  Transfers with PT yesterday.    Review of patient's allergies indicates:   Allergen Reactions    Codeine Itching    Azithromycin      History of VT       Current Facility-Administered Medications   Medication    (Magic mouthwash) 1:1:1 Benadryl 12.5mg/5ml liq, aluminum & magnesium hydroxide-simehticone (Maalox), lidocaine viscous 2%    (Magic mouthwash) 1:1:1 Benadryl 12.5mg/5ml liq, aluminum & magnesium hydroxide-simehticone (Maalox), lidocaine viscous 2%    acetaminophen tablet 1,000 mg    albuterol-ipratropium 2.5mg-0.5mg/3mL nebulizer solution 3 mL    amiodarone tablet 200 mg    bumetanide tablet 1 mg    carvedilol tablet 3.125 mg    citalopram tablet 30 mg    digoxin tablet 125 mcg    docusate sodium capsule 100 mg    enoxaparin injection 40 mg    isosorbide mononitrate 24 hr tablet 30 mg    levetiracetam tablet 500 mg    levothyroxine tablet 88 mcg    lidocaine 5 % patch 1 patch    meclizine tablet 25 mg    megestrol 400 mg/10 mL (40 mg/mL) suspension 400 mg    nitroGLYCERIN SL tablet 0.4 mg    ondansetron injection 4 mg    pantoprazole suspension 40 mg    polyethylene glycol packet 17 g    ramelteon tablet 8 mg    sodium chloride 0.9% flush 3 mL    spironolactone split tablet 12.5 mg    tamsulosin 24 hr capsule 0.4 mg    tramadol tablet 50 mg    tuberculin  "injection 5 Units     Objective:     Vital Signs (Most Recent):  Temp: 98.4 °F (36.9 °C) (07/23/17 0500)  Pulse: 80 (07/23/17 0500)  Resp: 18 (07/22/17 2006)  BP: (!) 103/58 (07/23/17 0500)  SpO2: 96 % (07/23/17 0500) Vital Signs (24h Range):  Temp:  [96.7 °F (35.9 °C)-98.9 °F (37.2 °C)] 98.4 °F (36.9 °C)  Pulse:  [80-86] 80  Resp:  [16-18] 18  SpO2:  [89 %-98 %] 96 %  BP: (101-116)/(51-61) 103/58     Weight: 57.8 kg (127 lb 6.8 oz)  Height: 5' 3" (160 cm)  Body mass index is 22.57 kg/m².      Intake/Output Summary (Last 24 hours) at 07/23/17 0634  Last data filed at 07/22/17 1700   Gross per 24 hour   Intake              200 ml   Output                0 ml   Net              200 ml       Ortho/SPM Exam   NAD  RRR  No increased WOB  Aquacel c/d/i  SILT and motor intact T/SP/DP  SILT M/R/U and motor intact AIN/PIN/U  WWP extremities    Significant Labs: All pertinent labs within the past 24 hours have been reviewed.    Significant Imaging: I have reviewed all pertinent imaging results/findings.    Assessment/Plan:     Closed fracture of proximal end of right humerus with routine healing    - Non-operative treatment in [facility] RUE        * Closed displaced fracture of neck of right femur with routine healing s/p hemiarthroplasty on 7/12/2017    83 y.o. female POD11 s/p R hip hemiarthroplasty    Pain control  PT/OT: WBAT RLE, posterior hip precautions  DVT PPx: lovenox daily, FCDs at all times when not ambulating  Abx: postop Ancef complete      Dispo: [facility]              Erasmo Koenig MD  Orthopedics  [facility]  "

## 2017-07-23 NOTE — PLAN OF CARE
Problem: Physical Therapy Goal  Goal: Physical Therapy Goal  Goals to be met by: 17     Patient will increase functional independence with mobility by performin. Supine to sit with Moderate Assistance  2. Sit to supine with Moderate Assistance  3. Sit to stand transfer with Moderate Assistance  4. Bed to chair transfer with Maximum Assistance- Met  Revised: mod A  5. Lower extremity exercise program x15 reps, with assistance as needed, in order to increase LE strength and (I) with functional mobility.   6. Pt ambulated 10' with max A using jamil walker-discontinued         Outcome: Ongoing (interventions implemented as appropriate)  Patient continues to show difficulty with functional mobility.

## 2017-07-23 NOTE — PT/OT/SLP PROGRESS
"Physical Therapy  Treatment    Laurita Stokes   MRN: [id number]   Admitting Diagnosis: Closed displaced fracture of neck of right femur with routine healing    PT Received On: 07/23/17  PT Start Time: 1022     PT Stop Time: 1035    PT Total Time (min): 13 min       Billable Minutes:  Therapeutic Activity 13    Treatment Type: Treatment  PT/PTA: PTA     PTA Visit Number: 2       General Precautions: Standard, fall  Orthopedic Precautions: RUE non weight bearing, RLE posterior precautions, RLE weight bearing as tolerated   Braces: UE Sling    Do you have any cultural, spiritual, Presybeterian conflicts, given your current situation?: none noted     Subjective:  Communicated with NSG prior to session.  Patient states " I can't do it, I told you that I can't do it today"    Pain/Comfort  Pain Rating 1:  (Pain noted and reported only with transfers.)  Location - Side 1: Right  Location - Orientation 1: generalized  Location 1: shoulder  Pain Addressed 1: Reposition, Cessation of Activity, Nurse notified    Objective:   Patient found with: oxygen, telemetry    Functional Mobility:  Bed Mobility:   Scooting/Bridging: Maximum Assistance  Supine to Sit: Maximum Assistance  Sit to Supine: Maximum Assistance    Transfers:  Sit <> Stand Assistance: Maximum Assistance  Sit <> Stand Assistive Device: No Assistive Device    Gait:   Gait Distance: Unable to perform today.    Stairs:      Balance:   Static Sit: FAIR-: Maintains without assist but inconsistent   Dynamic Sit: FAIR: Cannot move trunk without losing balance  Static Stand: 0: Needs MAXIMAL assist to maintain   Dynamic stand: 0: N/A     Therapeutic Activities and Exercises:  Static standing balance with HHA 2x20 secs with max assistance and encouragement. B LE AAROM/AROM :LAQ, HIP FLEX AND AP 2X10 REPS.     AM-PAC 6 CLICK MOBILITY  How much help from another person does this patient currently need?   1 = Unable, Total/Dependent Assistance  2 = A lot, Maximum/Moderate " Assistance  3 = A little, Minimum/Contact Guard/Supervision  4 = None, Modified Louisa/Independent    Turning over in bed (including adjusting bedclothes, sheets and blankets)?: 2  Moving from lying on back to sitting on the side of the bed?: 2  Moving to and from a bed to a chair (including a wheelchair)?: 2  Need to walk in hospital room?: 1  Climbing 3-5 steps with a railing?: 1    AM-PAC Raw Score CMS G-Code Modifier Level of Impairment Assistance   6 % Total / Unable   7 - 9 CM 80 - 100% Maximal Assist   10 - 14 CL 60 - 80% Moderate Assist   15 - 19 CK 40 - 60% Moderate Assist   20 - 22 CJ 20 - 40% Minimal Assist   23 CI 1-20% SBA / CGA   24 CH 0% Independent/ Mod I     Patient left up in chair with all lines intact, call button in reach and RN notified.    Assessment:  Laurita Stokes is a 83 y.o. female with a medical diagnosis of Closed displaced fracture of neck of right femur with routine healing and presents with decreased functional mobility and postural control. Patient showed more difficulty with static standing balance today due to increased posterior lean. Treatment was also limited as Patient required coaxing to participate and was becoming agitated, RN was notified. Patient would benefit from continued P.T. To address deficits.    Rehab identified problem list/impairments: Rehab identified problem list/impairments: weakness, impaired endurance, impaired self care skills, impaired functional mobilty, impaired balance, decreased upper extremity function, decreased safety awareness, pain, decreased ROM, edema, orthopedic precautions, impaired cardiopulmonary response to activity    Rehab potential is fair.    Activity tolerance: Poor    Discharge recommendations: Discharge Facility/Level Of Care Needs: nursing facility, skilled     Barriers to discharge: Barriers to Discharge: None    Equipment recommendations: Equipment Needed After Discharge: other (see comments) (TBD pending  progress.)     GOALS:    Physical Therapy Goals        Problem: Physical Therapy Goal    Goal Priority Disciplines Outcome Goal Variances Interventions   Physical Therapy Goal     PT/OT, PT Ongoing (interventions implemented as appropriate)     Description:  Goals to be met by: 17     Patient will increase functional independence with mobility by performin. Supine to sit with Moderate Assistance  2. Sit to supine with Moderate Assistance  3. Sit to stand transfer with Moderate Assistance  4. Bed to chair transfer with Maximum Assistance- Met  Revised: mod A  5. Lower extremity exercise program x15 reps, with assistance as needed, in order to increase LE strength and (I) with functional mobility.   6. Pt ambulated 10' with max A using jamil walker-discontinued                          PLAN:    Patient to be seen daily  to address the above listed problems via gait training, therapeutic activities, therapeutic exercises, neuromuscular re-education  Plan of Care expires: 17  Plan of Care reviewed with: patient, daughter         John [place], PTA  2017

## 2017-07-23 NOTE — ASSESSMENT & PLAN NOTE
83 y.o. female POD11 s/p R hip hemiarthroplasty    Pain control  PT/OT: WBAT RLE, posterior hip precautions  DVT PPx: lovenox daily, FCDs at all times when not ambulating  Abx: postop Ancef complete      Dispo: SNF

## 2017-07-23 NOTE — PROGRESS NOTES
[facility] Hospital Medicine  Progress Note    Patient Name: Laurita Stokes  MRN: [id number]  Hospital Medicine Service: H  Admission Date: 7/11/2017  Length of Stay: 12 days  Expected Discharge Date: 7/22/2017  Attending Physician: Ana Bergman MD  Primary Care Provider: Alex Capellan MD    Subjective:     11 Days Post-Op from right hip cephalomedullary nail fixation for fracture     Orthopedic Surgeon: Dr. Anshul Guerrero  Weight Bearing Status: WBAT right lower extremity    Follow-up Visit For: Closed displaced fracture of neck of right femur with routine healing    HPI:  83 year old female with PMH of a fib ( coumadin recently stopped due to SAH and frequent falls ) , ICM , HFrEF (EF=20-25%, s/p CRT-D) c NYHA IV sx  s/p AICD placement, HTN/HLPD, s/p 3v-CABG, hx of ventricular tachycardia on amiodarone, CKD III, GERD, dementia, under home hospice ( [facility] ) and on home oxygen for comfort ( 2 L )  presents as a transfer from [facility] for surgical fixation of right femoral neck fracture by orthopedic surgery. Patient is a poor informant with h/o dementia and daughter Brittany Stokes who is the caregiver at bedside provided HPI.      Patient was taken to OSH ED before midnight 07/10 with right shoulder and right hip pain after she fell while ambulating with a walker in the middle of the night while going to the bathroom. She had immediate right hip and shoulder pain.  She denies hitting her head or LOC.  Of note, she was recently admitted on 5/24-5/26 for subarachnoid hemorrhage, at which time she was discharged on hospice with plans to stop her anticoagulation for her atrial fibrillation.  Patient lives with her daughter and ambulates short distance inside the house with a walker. Imaging at OSH showed  proximal right  humerus fracture and right femoral neck fracture. Patient was evaluated by orthopedic surgery and cardiology at [facility].     Daughter states most  of her cardiac medications were discontinued recently by hospice. Currently she is taking the following meds : amiodarone, bumex, aldactone, celexa, synthroid, prilosec, flomax and ambien.     Interval History: Patient with continued poor appetite. Patient awaiting SNF placement at [facility] for 7/24.     Review of Systems   Constitutional: Positive for appetite change (poor appetite). Negative for chills and fever.   Respiratory: Negative for cough, shortness of breath and wheezing.    Cardiovascular: Negative for chest pain.   Gastrointestinal: Negative for abdominal pain, constipation, diarrhea and nausea.   Genitourinary: Negative for difficulty urinating, dysuria and frequency.   Musculoskeletal: Positive for myalgias (to right arm and right hip).     Objective:     Vital Signs (Most Recent):  Temp: 96.7 °F (35.9 °C) (07/22/17 1150)  Pulse: 86 (07/22/17 1150)  Resp: 16 (07/22/17 1150)  BP: (!) 111/56 (07/22/17 1150)  SpO2: 97 % (07/22/17 1150) Vital Signs (24h Range):  Temp:  [96.7 °F (35.9 °C)-98.9 °F (37.2 °C)] 96.7 °F (35.9 °C)  Pulse:  [79-86] 86  Resp:  [16-18] 16  SpO2:  [96 %-98 %] 97 %  BP: (101-111)/(51-61) 111/56     Weight: 57.8 kg (127 lb 6.8 oz)  Body mass index is 22.57 kg/m².    Intake/Output Summary (Last 24 hours) at 07/22/17 1154  Last data filed at 07/21/17 1400   Gross per 24 hour   Intake              120 ml   Output                0 ml   Net              120 ml      Physical Exam   Constitutional: She appears well-developed and well-nourished. No distress.   HENT:   Head: Normocephalic and atraumatic.   Cardiovascular: Normal rate and regular rhythm.    No murmur heard.  Pulmonary/Chest: Effort normal and breath sounds normal. She has no wheezes.   Abdominal: Soft. Bowel sounds are normal. She exhibits no distension. There is no tenderness.   Musculoskeletal: She exhibits tenderness (right hip).   Right arm in sling   Neurological: She is alert.   Skin: Skin is warm and dry. Ecchymosis  (Right biceps and triceps area) noted. No rash noted.   Surgical bandage in place to right hip       Significant Labs:   BMP:   Recent Labs  Lab 07/23/17  0451   GLU 77      K 3.2*      CO2 28   BUN 10   CREATININE 0.8   CALCIUM 8.0*   MG 1.6       Significant Imaging: None in past 24 hours    ASSESSMENT/PLAN:     Acute blood loss anemia    Controlled. On admission to hospital, patient was noted to have Hgb of 10. After surgery patient's Hgb level has dropped to 8 and expected blood loss related to surgery and hip fracture. Patient has no signs of active bleeding and hemodynamically stable. Patient is asymptomatic. Goal is keep Hgb >7.           * Closed displaced fracture of neck of right femur with routine healing s/p hemiarthroplasty on 7/12/2017    Encounter for aftercare for healing closed traumatic fracture of right hip  Patient is POD # 11. Patient reports minimal pain in right hip. Plan is to continue PT/OT for gait training and strengthening and restoration of ADL's. Patient is FWB/WBAT: right lower extremity, posterior hip precautions as per Orthopedic recommendations. Plan to continue Lovenox 40 mg subcutaneous daily for total of 4 weeks post-op for DVT prophylaxis after hip fracture surgery. Orthopedics is following and managing surgical site. Pain is well controlled with Tramadol and Tylenol as needed. PT/OT recommending skilled nursing facility placement and patient has been accepted at [facility] for 7/24.         Closed fracture of proximal end of right humerus with routine healing    Orthopedics evaluated and recommends:  Non-operative treatment with sling in place and  non weight bearing status of RUE. Orthopedics to follow-up as outpatient in 1 week.           Hypokalemia    Will treat with KCL liquid solution 40 mEq po for 1 dose(s) today.        Dementia without behavioral disturbance    Patient at cognitive baseline. Continue delirium precautions. Continue Megace 400 mg po daily  for appetite stimulation.           Ventricular tachycardia    Patient with episodes of V. Tach in hospital due to hypokalemia, now resolved. Continue Amiodarone for treatment.          PAF (paroxysmal atrial fibrillation)    Controlled. Patient currently in normal sinus rhythm. Patient recently on Coumadin but stopped after SAH and frequent falls and considered high fall risk so no plans to restart. Continue Amiodarone to treat.          Chronic combined systolic and diastolic heart failure    Cardiomyopathy, ischemic  Biventricular ICD (implantable cardioverter-defibrillator) in place  Controlled. Patient with no signs to suggest acute heart failure and will continue Bumex (MWF) and Aldactone (MWF) and daily Coreg to treat.           Essential hypertension    Patient's blood pressure is controlled here in the hospital over past 24 hours. Goal for blood pressure is SBP < 150 and DBP < 90 as patient > or = 60 years of age with no diabetes or advanced kidney disease based on JNC 8 guidelines. Continue current treatment regimen with Coreg and Aldactone to treat. Plan to continue to monitor patient's blood pressure routinely while patient is hospitalized.           Coronary artery disease involving native coronary artery of native heart without angina pectoris    Controlled. Patient asymptomatic and will continue Imdur and Coreg to treat.          Chronic respiratory failure with hypoxia    Controlled. Continue oxygen at 2 liters nasal cannula as needed.               Anticipated Disposition: [facility] Nursing Unit on 7/24    Time spent in care of patient (Greater than 1/2 spent in direct face-to-face contact): 16 minutes      Ana Bergman MD  Department of Hospital Medicine   [facility]

## 2017-07-23 NOTE — SUBJECTIVE & OBJECTIVE
Principal Problem:Closed displaced fracture of neck of right femur with routine healing    Principal Orthopedic Problem: s/p R hip jamil    Interval History: Patient seen and examined at bedside.  No acute events overnight.  Pain controlled.  Transfers with PT yesterday.    Review of patient's allergies indicates:   Allergen Reactions    Codeine Itching    Azithromycin      History of VT       Current Facility-Administered Medications   Medication    (Magic mouthwash) 1:1:1 Benadryl 12.5mg/5ml liq, aluminum & magnesium hydroxide-simehticone (Maalox), lidocaine viscous 2%    (Magic mouthwash) 1:1:1 Benadryl 12.5mg/5ml liq, aluminum & magnesium hydroxide-simehticone (Maalox), lidocaine viscous 2%    acetaminophen tablet 1,000 mg    albuterol-ipratropium 2.5mg-0.5mg/3mL nebulizer solution 3 mL    amiodarone tablet 200 mg    bumetanide tablet 1 mg    carvedilol tablet 3.125 mg    citalopram tablet 30 mg    digoxin tablet 125 mcg    docusate sodium capsule 100 mg    enoxaparin injection 40 mg    isosorbide mononitrate 24 hr tablet 30 mg    levetiracetam tablet 500 mg    levothyroxine tablet 88 mcg    lidocaine 5 % patch 1 patch    meclizine tablet 25 mg    megestrol 400 mg/10 mL (40 mg/mL) suspension 400 mg    nitroGLYCERIN SL tablet 0.4 mg    ondansetron injection 4 mg    pantoprazole suspension 40 mg    polyethylene glycol packet 17 g    ramelteon tablet 8 mg    sodium chloride 0.9% flush 3 mL    spironolactone split tablet 12.5 mg    tamsulosin 24 hr capsule 0.4 mg    tramadol tablet 50 mg    tuberculin injection 5 Units     Objective:     Vital Signs (Most Recent):  Temp: 98.4 °F (36.9 °C) (07/23/17 0500)  Pulse: 80 (07/23/17 0500)  Resp: 18 (07/22/17 2006)  BP: (!) 103/58 (07/23/17 0500)  SpO2: 96 % (07/23/17 0500) Vital Signs (24h Range):  Temp:  [96.7 °F (35.9 °C)-98.9 °F (37.2 °C)] 98.4 °F (36.9 °C)  Pulse:  [80-86] 80  Resp:  [16-18] 18  SpO2:  [89 %-98 %] 96 %  BP: (101-116)/(51-61)  "103/58     Weight: 57.8 kg (127 lb 6.8 oz)  Height: 5' 3" (160 cm)  Body mass index is 22.57 kg/m².      Intake/Output Summary (Last 24 hours) at 07/23/17 0634  Last data filed at 07/22/17 1700   Gross per 24 hour   Intake              200 ml   Output                0 ml   Net              200 ml       Ortho/SPM Exam   NAD  RRR  No increased WOB  Aquacel c/d/i  SILT and motor intact T/SP/DP  SILT M/R/U and motor intact AIN/PIN/U  WWP extremities    Significant Labs: All pertinent labs within the past 24 hours have been reviewed.    Significant Imaging: I have reviewed all pertinent imaging results/findings.  "

## 2017-07-24 VITALS
BODY MASS INDEX: 22.58 KG/M2 | SYSTOLIC BLOOD PRESSURE: 109 MMHG | WEIGHT: 127.44 LBS | DIASTOLIC BLOOD PRESSURE: 54 MMHG | RESPIRATION RATE: 14 BRPM | OXYGEN SATURATION: 94 % | HEIGHT: 63 IN | TEMPERATURE: 98 F | HEART RATE: 78 BPM

## 2017-07-24 PROBLEM — Z51.5 COMFORT MEASURES ONLY STATUS: Status: ACTIVE | Noted: 2017-07-24

## 2017-07-24 PROBLEM — D62 ACUTE BLOOD LOSS ANEMIA: Status: RESOLVED | Noted: 2017-07-18 | Resolved: 2017-07-24

## 2017-07-24 PROCEDURE — 25000003 PHARM REV CODE 250: Performed by: NURSE PRACTITIONER

## 2017-07-24 PROCEDURE — 25000003 PHARM REV CODE 250: Performed by: HOSPITALIST

## 2017-07-24 PROCEDURE — 25000003 PHARM REV CODE 250: Performed by: INTERNAL MEDICINE

## 2017-07-24 PROCEDURE — 25000003 PHARM REV CODE 250: Performed by: STUDENT IN AN ORGANIZED HEALTH CARE EDUCATION/TRAINING PROGRAM

## 2017-07-24 PROCEDURE — 99239 HOSP IP/OBS DSCHRG MGMT >30: CPT | Mod: GW,,, | Performed by: INTERNAL MEDICINE

## 2017-07-24 RX ORDER — HEPARIN 100 UNIT/ML
5 SYRINGE INTRAVENOUS
Status: DISCONTINUED | OUTPATIENT
Start: 2017-07-24 | End: 2017-07-24 | Stop reason: HOSPADM

## 2017-07-24 RX ADMIN — TRAMADOL HYDROCHLORIDE 50 MG: 50 TABLET, FILM COATED ORAL at 08:07

## 2017-07-24 RX ADMIN — HEPARIN 500 UNITS: 100 SYRINGE at 01:07

## 2017-07-24 RX ADMIN — LIDOCAINE 1 PATCH: 50 PATCH CUTANEOUS at 10:07

## 2017-07-24 RX ADMIN — SPIRONOLACTONE 12.5 MG: 25 TABLET, FILM COATED ORAL at 09:07

## 2017-07-24 RX ADMIN — AMIODARONE HYDROCHLORIDE 200 MG: 200 TABLET ORAL at 09:07

## 2017-07-24 RX ADMIN — LEVETIRACETAM 500 MG: 500 TABLET, FILM COATED ORAL at 09:07

## 2017-07-24 RX ADMIN — ISOSORBIDE MONONITRATE 30 MG: 30 TABLET ORAL at 09:07

## 2017-07-24 RX ADMIN — ACETAMINOPHEN 1000 MG: 500 TABLET ORAL at 10:07

## 2017-07-24 RX ADMIN — BUMETANIDE 1 MG: 1 TABLET ORAL at 09:07

## 2017-07-24 RX ADMIN — LEVOTHYROXINE SODIUM 88 MCG: 88 TABLET ORAL at 05:07

## 2017-07-24 RX ADMIN — CITALOPRAM HYDROBROMIDE 30 MG: 20 TABLET ORAL at 09:07

## 2017-07-24 RX ADMIN — Medication 3 ML: at 05:07

## 2017-07-24 RX ADMIN — TAMSULOSIN HYDROCHLORIDE 0.4 MG: 0.4 CAPSULE ORAL at 09:07

## 2017-07-24 RX ADMIN — MECLIZINE HYDROCHLORIDE 25 MG: 25 TABLET ORAL at 01:07

## 2017-07-24 NOTE — ASSESSMENT & PLAN NOTE
Patient with episodes of V. Tach in hospital due to hypokalemia, now resolved. Continue Amiodarone for treatment on discharge.

## 2017-07-24 NOTE — PROGRESS NOTES
[facility] Hospital Medicine  Progress Note    Patient Name: Laurita Stokes  MRN: [id number]  Hospital Medicine Service: H  Admission Date: 7/11/2017  Length of Stay: 13 days  Expected Discharge Date: 7/24/2017  Attending Physician: Ana Bergman MD  Primary Care Provider: Alex Capellan MD    Subjective:     12 Days Post-Op from right hip cephalomedullary nail fixation for fracture     Orthopedic Surgeon: Dr. Anshul Guerrero  Weight Bearing Status: WBAT right lower extremity    Follow-up Visit For: Closed displaced fracture of neck of right femur with routine healing    HPI:  83 year old female with PMH of atrial fibrillation ( Coumadin recently stopped due to SAH and frequent falls ) , ICM , HFrEF (EF=20-25%, s/p CRT-D) c NYHA IV sx  s/p AICD placement, HTN/HLPD, s/p 3v-CABG, hx of ventricular tachycardia on amiodarone, CKD III, GERD, dementia, under home hospice ( [facility] ) and on home oxygen for comfort ( 2 L )  presents as a transfer from [facility] for surgical fixation of right femoral neck fracture by orthopedic surgery. Patient is a poor informant with h/o dementia and daughter Brittany Stokes who is the caregiver at bedside provided HPI.      Patient was taken to OSH ED before midnight 07/10 with right shoulder and right hip pain after she fell while ambulating with a walker in the middle of the night while going to the bathroom. She had immediate right hip and shoulder pain.  She denies hitting her head or LOC.  Of note, she was recently admitted on 5/24-5/26 for subarachnoid hemorrhage, at which time she was discharged on hospice with plans to stop her anticoagulation for her atrial fibrillation.  Patient lives with her daughter and ambulates short distance inside the house with a walker. Imaging at OSH showed  proximal right  humerus fracture and right femoral neck fracture. Patient was evaluated by orthopedic surgery and cardiology at [facility].      Daughter states most of her cardiac medications were discontinued recently by hospice. Currently she is taking the following meds : amiodarone, bumex, aldactone, celexa, synthroid, prilosec, flomax and ambien.     Interval History: Patient complaining of right arm pain but hip pain ion right side controlled. Patient being discharged to [facility] today.     Review of Systems   Constitutional: Positive for appetite change (poor appetite). Negative for chills and fever.   Respiratory: Negative for cough, shortness of breath and wheezing.    Cardiovascular: Negative for chest pain.   Gastrointestinal: Negative for abdominal pain, constipation, diarrhea and nausea.   Genitourinary: Negative for difficulty urinating, dysuria and frequency.   Musculoskeletal: Positive for myalgias (to right arm and right hip).     Objective:     Vital Signs (Most Recent):  Temp: 98.3 °F (36.8 °C) (07/24/17 1200)  Pulse: 78 (07/24/17 1200)  Resp: 14 (07/24/17 1200)  BP: (!) 109/54 (07/24/17 1200)  SpO2: (!) 94 % (07/24/17 1200) Vital Signs (24h Range):  Temp:  [97.2 °F (36.2 °C)-98.6 °F (37 °C)] 98.3 °F (36.8 °C)  Pulse:  [78-83] 78  Resp:  [14-16] 14  SpO2:  [94 %-97 %] 94 %  BP: (107-113)/(54-64) 109/54     Weight: 57.8 kg (127 lb 6.8 oz)  Body mass index is 22.57 kg/m².  No intake or output data in the 24 hours ending 07/24/17 1845   Physical Exam   Constitutional: She appears well-developed and well-nourished. No distress.   HENT:   Head: Normocephalic and atraumatic.   Cardiovascular: Normal rate and regular rhythm.    No murmur heard.  Pulmonary/Chest: Effort normal and breath sounds normal. She has no wheezes.   Abdominal: Soft. Bowel sounds are normal. She exhibits no distension. There is no tenderness.   Musculoskeletal: She exhibits tenderness (right hip).   Right arm in sling   Neurological: She is alert.   Skin: Skin is warm and dry. Ecchymosis (Right biceps and triceps area) noted. No rash noted.   Surgical bandage in  place to right hip       Significant Labs: None    Significant Imaging: I have reviewed all pertinent imaging results/findings within the past 24 hours.    ASSESSMENT/PLAN:     * Closed displaced fracture of neck of right femur with routine healing s/p hemiarthroplasty on 7/12/2017    Encounter for aftercare for healing closed traumatic fracture of right hip  Patient is POD # 12. Patient reports minimal pain in right hip. Plan is to continue PT/OT for gait training and strengthening and restoration of ADL's at [facility]. Patient is FWB/WBAT: right lower extremity as per Orthopedic recommendations on discharge. Plan to continue Lovenox 40 mg subcutaneous daily for total of 4 weeks post-op for DVT prophylaxis after hip fracture surgery on discharge. Pain is well controlled with Tramadol and Tylenol as needed and to continue on discharge.        Closed fracture of proximal end of right humerus with routine healing    Orthopedics evaluated and recommends:  Non-operative treatment with sling in place and  non weight bearing status of RUE. Orthopedics to follow-up as outpatient in 1 week.           Dementia without behavioral disturbance    Patient at cognitive baseline. Patient to continue Megace 400 mg po daily for appetite stimulation on discharge.           Ventricular tachycardia    Patient with episodes of V. Tach in hospital due to hypokalemia, now resolved. Continue Amiodarone for treatment on discharge.          PAF (paroxysmal atrial fibrillation)    Controlled. Patient currently in normal sinus rhythm. Patient recently on Coumadin but stopped after SAH and frequent falls and considered high fall risk so no plans to restart. Continue Amiodarone to treat on discharge.          Chronic combined systolic and diastolic heart failure    Cardiomyopathy, ischemic  Biventricular ICD (implantable cardioverter-defibrillator) in place  Controlled. Patient with no signs to suggest acute heart failure and will continue  Bumex (MWF) and Aldactone (MWF) and daily Coreg to treat on discharge.           Essential hypertension    Patient's blood pressure controlled here in the hospital. Goal for blood pressure is SBP < 150 and DBP < 90 as patient > or = 60 years of age with no diabetes or advanced kidney disease based on JNC 8 guidelines. Continue current treatment regimen with Coreg to treat on discharge.           Coronary artery disease involving native coronary artery of native heart without angina pectoris    Controlled. Patient asymptomatic and will continue Imdur and Coreg to treat on discharge.          Chronic respiratory failure with hypoxia    Controlled. Continue oxygen at 2 liters nasal cannula as needed on discharge.             DISCHARGE PLANNING:  Future Appointments  Date Time Provider Department Center   7/28/2017 1:45 PM Whitney Isbell PA-C [facility]       Discharge Disposition: [facility] Skilled Nursing Unit    TIME SPENT: I spent 34 minutes evaluating, treating, counseling, and coordinating care for the patient for discharge.     Ana Bergman MD  Department of Hospital Medicine   [facility]

## 2017-07-24 NOTE — CARE UPDATE
"Called to patient's room by daughter. Daughter states it is taking a long time to get service this am. She states it took 20 mins for the bed pan and 30 min for bed caballero. Daughter states she feels it it is the "Idiot " answering the calls and she has got a good mind to get up and "Kick her ass". Explained to daughter would do everything to ensure service, but threats of violence would not be tolerated. Explained to staff understands she is upset and would like to work better with her to ensure care. Offered to have patient relations and unit director speak with her and offer declined.  "

## 2017-07-24 NOTE — NURSING
Patient alert but not oriented to situation, VSS, c/o pain in r shoulder which is currently in sling, patient reminded throughout the night to keep right leg straight. Patient given PRN meds as ordered, pain resolved overnight, daughter at bedside, will continue to monitor patient.

## 2017-07-24 NOTE — SUBJECTIVE & OBJECTIVE
Interval History: Patient complaining of right arm pain but hip pain ion right side controlled. Patient being discharged to [facility] today.     Review of Systems   Constitutional: Positive for appetite change (poor appetite). Negative for chills and fever.   Respiratory: Negative for cough, shortness of breath and wheezing.    Cardiovascular: Negative for chest pain.   Gastrointestinal: Negative for abdominal pain, constipation, diarrhea and nausea.   Genitourinary: Negative for difficulty urinating, dysuria and frequency.   Musculoskeletal: Positive for myalgias (to right arm and right hip).     Objective:     Vital Signs (Most Recent):  Temp: 98.3 °F (36.8 °C) (07/24/17 1200)  Pulse: 78 (07/24/17 1200)  Resp: 14 (07/24/17 1200)  BP: (!) 109/54 (07/24/17 1200)  SpO2: (!) 94 % (07/24/17 1200) Vital Signs (24h Range):  Temp:  [97.2 °F (36.2 °C)-98.6 °F (37 °C)] 98.3 °F (36.8 °C)  Pulse:  [78-83] 78  Resp:  [14-16] 14  SpO2:  [94 %-97 %] 94 %  BP: (107-113)/(54-64) 109/54     Weight: 57.8 kg (127 lb 6.8 oz)  Body mass index is 22.57 kg/m².  No intake or output data in the 24 hours ending 07/24/17 1845   Physical Exam   Constitutional: She appears well-developed and well-nourished. No distress.   HENT:   Head: Normocephalic and atraumatic.   Cardiovascular: Normal rate and regular rhythm.    No murmur heard.  Pulmonary/Chest: Effort normal and breath sounds normal. She has no wheezes.   Abdominal: Soft. Bowel sounds are normal. She exhibits no distension. There is no tenderness.   Musculoskeletal: She exhibits tenderness (right hip).   Right arm in sling   Neurological: She is alert.   Skin: Skin is warm and dry. Ecchymosis (Right biceps and triceps area) noted. No rash noted.   Surgical bandage in place to right hip       Significant Labs: None    Significant Imaging: I have reviewed all pertinent imaging results/findings within the past 24 hours.

## 2017-07-24 NOTE — ASSESSMENT & PLAN NOTE
Patient at cognitive baseline. Patient to continue Megace 400 mg po daily for appetite stimulation on discharge.

## 2017-07-24 NOTE — PLAN OF CARE
07/24/17 1051   Right Care Assessment   Can the patient answer the patient profile reliably? No, cognitively impaired   How often would a person be available to care for the patient? Whenever needed   Describe the patient's ability to walk at the present time. Does not walk or unable to take any steps at all   How does the patient rate their overall health at the present time? Fair   Number of comorbid conditions (as recorded on the chart) Five or more   During the past month, has the patient often been bothered by feeling down, depressed or hopeless? No   Have you felt down, depressed, or hopeless? 0   During the past month, has the patient often been bothered by little interest or pleasure in doing things? No   Have you felt little interest or pleasure in doing things? 0

## 2017-07-24 NOTE — NURSING
Nursing Transfer Note  Report called to Nayeli at [facility]. All questions answered, all concerns addressed. Belongings packed and patient prepared for transport. VS stable, no distress noted.

## 2017-07-24 NOTE — ASSESSMENT & PLAN NOTE
Controlled. On admission to hospital, patient was noted to have Hgb of 10. After surgery patient's Hgb level dropped to 8 and remained stable at that level and expected blood loss related to surgery and hip fracture. Patient did not require blood transfusion during hospitalization. Patient has no signs of active bleeding and hemodynamically stable and asymptomatic on discharge.

## 2017-07-24 NOTE — NURSING
Pt prepared for transport. Pt assisted into wheelchair with padding around hips. All belongings sent with patient. No apparent distress noted.

## 2017-07-24 NOTE — PLAN OF CARE
9:56 AM  REZA spoke with Lynsey at [facility] who stated pt looks good to come today. Faxed updated orders. Arranged transportation with \[facility]". SPD will arrive between 11:00-11:30. Will inform nurse who to call report to once information received.     10:36 AM  REZA called Lynsey with [facility] who stated nurse can call report to Nayeli at [phone]. Pt will be going to room 913. Informed RNDoreen.    Lulú Ricardo, CLAUDE   [facility]  Ext [phone]

## 2017-07-24 NOTE — PT/OT/SLP DISCHARGE
Physical Therapy Discharge Summary    Laurita Stokes  MRN: [id number]   Closed displaced fracture of neck of right femur with routine healing   Patient Discharged from acute Physical Therapy on 17.  Please refer to prior PT noted date on 17 for functional status.     Assessment:   Patient was discharge unexpectedly.  Information required to complete and accurate discharge summary is unknown.  Refer to therapy initial evaluation and last progress note for initial and most recent functional status and goal achievement.  Recommendations made may be found in medical record.  GOALS:    Physical Therapy Goals        Problem: Physical Therapy Goal    Goal Priority Disciplines Outcome Goal Variances Interventions   Physical Therapy Goal     PT/OT, PT Ongoing (interventions implemented as appropriate)     Description:  Goals to be met by: 17     Patient will increase functional independence with mobility by performin. Supine to sit with Moderate Assistance  2. Sit to supine with Moderate Assistance  3. Sit to stand transfer with Moderate Assistance  4. Bed to chair transfer with Maximum Assistance- Met  Revised: mod A  5. Lower extremity exercise program x15 reps, with assistance as needed, in order to increase LE strength and (I) with functional mobility.   6. Pt ambulated 10' with max A using jamil walker-discontinued                        Reasons for Discontinuation of Therapy Services  Transfer to alternate level of care.      Plan:  Patient Discharged to: Skilled Nursing Facility   Ronen Willard, PT  .

## 2017-07-24 NOTE — ASSESSMENT & PLAN NOTE
Controlled. Patient currently in normal sinus rhythm. Patient recently on Coumadin but stopped after SAH and frequent falls and considered high fall risk so no plans to restart. Continue Amiodarone to treat on discharge.

## 2017-07-24 NOTE — ASSESSMENT & PLAN NOTE
Cardiomyopathy, ischemic  Biventricular ICD (implantable cardioverter-defibrillator) in place  Controlled. Patient with no signs to suggest acute heart failure and will continue Bumex (MWF) and Aldactone (MWF) and daily Coreg to treat on discharge.

## 2017-07-24 NOTE — ASSESSMENT & PLAN NOTE
Patient's blood pressure controlled here in the hospital. Goal for blood pressure is SBP < 150 and DBP < 90 as patient > or = 60 years of age with no diabetes or advanced kidney disease based on JNC 8 guidelines. Continue current treatment regimen with Coreg to treat on discharge.

## 2017-07-24 NOTE — PLAN OF CARE
POD 12 s/p right hip hemiarthroplasty. PT/OT recommended SNF placement. Plans to transfer/discharge patient to [facility] today; see  notes for details. Patient's family verbalized understanding of today's discharge plan. SW and CM will continue to follow for any additional needs. Discharge and follow-up instructions to be completed by the bedside nurse.    Future Appointments  Date Time Provider Department Center   7/26/2017 1:45 PM Whitney Isbell PA-C [facility]      07/24/17 1052   Final Note   Assessment Type Final Discharge Note   Discharge Disposition SNF  ([facility])   Discharge planning education complete? Yes   What phone number can be called within the next 1-3 days to see how you are doing after discharge? ([phone] (Kinza- POA))   Hospital Follow Up  Appt(s) scheduled? Yes   Discharge plans and expectations educations in teach back method with documentation complete? Yes   Offered [facility] -- Bedside Delivery? Yes   Discharge/Hospital Encounter Summary to (non-[facility]) PCP n/a   Referral to Outpatient Case Management complete? n/a   Referral to / orders for Home Health Complete? n/a   30 day supply of medicines given at discharge, if documented non-compliance / non-adherence? n/a   Any social issues identified prior to discharge? No   Did you assess the readiness or willingness of the family or caregiver to support self management of care? Yes

## 2017-07-24 NOTE — ASSESSMENT & PLAN NOTE
Encounter for aftercare for healing closed traumatic fracture of right hip  Patient is POD # 12. Patient reports minimal pain in right hip. Plan is to continue PT/OT for gait training and strengthening and restoration of ADL's at [facility]. Patient is FWB/WBAT: right lower extremity as per Orthopedic recommendations on discharge. Plan to continue Lovenox 40 mg subcutaneous daily for total of 4 weeks post-op for DVT prophylaxis after hip fracture surgery on discharge. Pain is well controlled with Tramadol and Tylenol as needed and to continue on discharge.

## 2017-07-25 ENCOUNTER — TELEPHONE (OUTPATIENT)
Dept: ORTHOPEDICS | Facility: CLINIC | Age: 82
End: 2017-07-25

## 2017-07-25 ENCOUNTER — PATIENT OUTREACH (OUTPATIENT)
Dept: ADMINISTRATIVE | Facility: CLINIC | Age: 82
End: 2017-07-25

## 2017-07-25 NOTE — TELEPHONE ENCOUNTER
----- Message from [place] sent at 7/25/2017 12:33 PM CDT -----  Contact: daughter (Nighat Rosario request a call to reschedule the PO. [phone]

## 2017-07-25 NOTE — ASSESSMENT & PLAN NOTE
Encounter for aftercare for healing closed traumatic fracture of right hip  Patient is POD # 12. Patient reports minimal pain in right hip. Plan is to continue PT/OT for gait training and strengthening and restoration of ADL's at [facility]. Patient is FWB/WBAT: right lower extremity as per Orthopedic recommendations on discharge. Plan to continue Lovenox 40 mg subcutaneous daily for total of 4 weeks post-op for DVT prophylaxis after hip fracture surgery on discharge. Pain is well controlled with Tramadol and Tylenol as needed and to continue on discharge.

## 2017-07-25 NOTE — DISCHARGE SUMMARY
[facility] Hospital Medicine  Discharge Summary      Patient Name: Laurita Stokes  MRN: [id number]  Admission Date: 7/11/2017  Hospital Length of Stay: 13 days  Discharge Date and Time: 7/24/2017  1:22 PM  Attending Physician: Ana Bergman MD  Discharging Provider: Ana Bergman MD  Primary Care Provider: Alex Capellan MD  [facility] Medicine Team: [facility] HOSP MED H Ana Bergman MD    HPI:   83 year old female with PMH of atrial fibrillation ( Coumadin recently stopped due to SAH and frequent falls ) , ICM , HFrEF (EF=20-25%, s/p CRT-D) c NYHA IV sx  s/p AICD placement, HTN/HLPD, s/p 3v-CABG, hx of ventricular tachycardia on amiodarone, CKD III, GERD, dementia, under home hospice ( [facility] ) and on home oxygen for comfort ( 2 L )  presents as a transfer from [facility] for surgical fixation of right femoral neck fracture by orthopedic surgery. Patient is a poor informant with h/o dementia and daughter Brittany Stokes who is the caregiver at bedside provided HPI.      Patient was taken to OSH ED before midnight 07/10 with right shoulder and right hip pain after she fell while ambulating with a walker in the middle of the night while going to the bathroom. She had immediate right hip and shoulder pain.  She denies hitting her head or LOC.  Of note, she was recently admitted on 5/24-5/26 for subarachnoid hemorrhage, at which time she was discharged on hospice with plans to stop her anticoagulation for her atrial fibrillation.  Patient lives with her daughter and ambulates short distance inside the house with a walker. Imaging at OSH showed  proximal right  humerus fracture and right femoral neck fracture. Patient was evaluated by orthopedic surgery and cardiology at [facility].     Daughter states most of her cardiac medications were discontinued recently by hospice. Currently she is taking the following meds : amiodarone, bumex, aldactone, celexa, synthroid,  prilosec, flomax and ambien.     OR date: 7/12/2017   Procedure(s) (LRB):  ARTHROPLASTY-HIP-OLAYINKA-right (Right)   Surgeon(s):  MD Erasmo Espinoza MD       Indwelling Lines/Drains at time of discharge:   Lines/Drains/Airways     Central Venous Catheter Line                 Port A Cath Single Lumen 07/11/17 0127 right subclavian 13 days              Hospital Course:   Pt was admitted to Hospital Medicine Team H. Preoperatively she was found to be hypokalemic to 2.5 which became symptomatic as V-tach and 2 runs of V-fib. It was corrected and she was then stable to be taken to the OR for reduction of the fracture. On 7/12, patient underwent right hip hemiarthroplasty for femoral neck fracture by Dr. Anshul Guerrero with no complications. Post op, patient went to SICU mildly sedated postop with facemask on 8L O2, not requiring pressors. VSS.  In ICU, pt had bouts of hypotension (responsive to fluids) and hypokalemia, but continued to improve and stepped back down to hospital medicine on 7/16. She is WBAT to RLE with posterior hip precautions. Patient is on Lovenox 30 mg daily for DVT prophylaxis. PT/OT consulted and recommending SNF versus home hospice pending her progress and family wishes. Referrals sent out to several SNF facilities. Patient turned down by [facility]), [facility] and [facility] and [facility] SN[facility] unit. Patient with good pain control on Tramadol for pain. Perineural pain catheter removed by Anesthesia pain service on 7/18. Non-operative management of right humerus fracture as per Ortho recs.  worked on continued placement at SNF level but family was also considering [facility] care at NH if turned down by SNFs but prefer SNF care. Patient finally accepted at [facility] for admit after re consulted that facility and plan to discharge to [facility] on 7/24. NH SNF orders done and faxed to facility. Patient medically stable for discharge. Pain  controlled to right hip and arm with Tramadol and discharged on Tramadol as needed for pain. Surgical bandage to remain in place to right hip until clinic follow-up on 7/26. Patient's right arm in sling for comfort but no operative repair needed for right humerus fracture and NWB to right arm on discharge. Patient discharged on Lovenox for DVT prophylaxis for total of 28 days post-op. Patient discharged with NWB to RUE and WBAT with posterior hip precautions as per Orthopedic recommendations. Plan on discharge is SNF at [facility] and see if patient improves functionally from recent hip surgery but if does not improve with PT/OT then plan will be hospice care transition at the NH. Patient DNR during hospital stay and on discharge.     Consults:   Consults         Status Ordering Provider     Inpatient consult to Cardiology  Once     Provider:  (Not yet assigned)    Completed KWESI COON     Inpatient consult to SN[facility]  Once     Provider:  (Not yet assigned)    Completed FERNANDO VOSS          Significant Diagnostic Studies: Labs:   CMP   Recent Labs  Lab 07/23/17  0451      K 3.2*      CO2 28   GLU 77   BUN 10   CREATININE 0.8   CALCIUM 8.0*   ANIONGAP 8   ESTGFRAFRICA >60.0   EGFRNONAA >60.0     Hemoglobin   Date Value Ref Range Status   07/14/2017 8.0 (L) 12.0 - 16.0 g/dL Final   07/13/2017 8.2 (L) 12.0 - 16.0 g/dL Final   07/13/2017 8.6 (L) 12.0 - 16.0 g/dL Final   07/13/2017 8.6 (L) 12.0 - 16.0 g/dL Final   07/12/2017 9.2 (L) 12.0 - 16.0 g/dL Final     POC Hematocrit   Date Value Ref Range Status   07/12/2017 29 (L) 36 - 54 %PCV Final   07/12/2017 29 (L) 36 - 54 %PCV Final     Hematocrit   Date Value Ref Range Status   07/14/2017 25.6 (L) 37.0 - 48.5 % Final   07/13/2017 25.9 (L) 37.0 - 48.5 % Final   07/13/2017 27.3 (L) 37.0 - 48.5 % Final   07/13/2017 27.3 (L) 37.0 - 48.5 % Final   07/12/2017 28.5 (L) 37.0 - 48.5 % Final         Final Active Diagnoses:    Diagnosis Date Noted  POA    PRINCIPAL PROBLEM:  Closed displaced fracture of neck of right femur with routine healing s/p hemiarthroplasty on 7/12/2017 [S72.001D] 07/11/2017 Not Applicable    Closed fracture of proximal end of right humerus with routine healing [S42.201D]  Not Applicable    Dementia without behavioral disturbance [F03.90] 03/18/2014 Yes    Ventricular tachycardia [I47.2] 01/06/2014 Yes    PAF (paroxysmal atrial fibrillation) [I48.0] 10/02/2013 Yes    Chronic combined systolic and diastolic heart failure [I50.42] 01/08/2014 Yes    Essential hypertension [I10] 04/21/2014 Yes    Coronary artery disease involving native coronary artery of native heart without angina pectoris [I25.10] 02/20/2017 Yes    Chronic respiratory failure with hypoxia [J96.11] 07/12/2017 Yes    Comfort measures only status [Z51.5] 07/24/2017 Not Applicable    Encounter for aftercare for healing closed traumatic fracture of right hip [S72.001D] 07/18/2017 Not Applicable    Debility [R53.81] 07/12/2017 Yes    CKD (chronic kidney disease), stage III [N18.3] 07/12/2017 Yes    Acquired hypothyroidism [E03.9] 04/29/2016 Yes    CHF NYHA class IV [I50.9] 12/03/2015 Yes    DDD (degenerative disc disease), lumbar [M51.36] 08/20/2015 Yes    Depression [F32.9] 04/16/2014 Yes    Biventricular ICD (implantable cardioverter-defibrillator) in place [Z95.810] 10/02/2013 Yes    Cardiomyopathy, ischemic [I25.5] 10/02/2013 Yes      Problems Resolved During this Admission:    Diagnosis Date Noted Date Resolved POA    Acute blood loss anemia [D62] 07/18/2017 07/24/2017 No    Hypokalemia [E87.6] 04/21/2014 07/24/2017 Yes    Hypomagnesemia [E83.42] 07/18/2017 07/20/2017 No    Goals of care, counseling/discussion [Z71.89] 07/17/2017 07/21/2017 Not Applicable    Hip fracture [S72.009A] 07/12/2017 07/18/2017 Yes      Acute blood loss anemia-resolved as of 7/24/2017    Controlled. On admission to hospital, patient was noted to have Hgb of 10. After  surgery patient's Hgb level dropped to 8 and remained stable at that level and expected blood loss related to surgery and hip fracture. Patient did not require blood transfusion during hospitalization. Patient has no signs of active bleeding and hemodynamically stable and asymptomatic on discharge.        Closed fracture of proximal end of right humerus with routine healing    Orthopedics evaluated and recommends:  Non-operative treatment with sling in place and  non weight bearing status of RUE. Orthopedics to follow-up as outpatient in 1 week.           Hypokalemia-resolved as of 7/24/2017    Resolved with treatment with KCL liquid solution 40 mEq po for 1 dose(s) on 7/23.        Dementia without behavioral disturbance    Patient at cognitive baseline. Patient to continue Megace 400 mg po daily for appetite stimulation on discharge.           Ventricular tachycardia    Patient with episodes of V. Tach in hospital due to hypokalemia, now resolved. Continue Amiodarone for treatment on discharge.          PAF (paroxysmal atrial fibrillation)    Controlled. Patient currently in normal sinus rhythm. Patient recently on Coumadin but stopped after SAH and frequent falls and considered high fall risk so no plans to restart. Continue Amiodarone to treat on discharge.          Chronic combined systolic and diastolic heart failure    Cardiomyopathy, ischemic  Biventricular ICD (implantable cardioverter-defibrillator) in place  Controlled. Patient with no signs to suggest acute heart failure and will continue Bumex (MWF) and Aldactone (MWF) and daily Coreg to treat on discharge.           Essential hypertension    Patient's blood pressure controlled here in the hospital. Goal for blood pressure is SBP < 150 and DBP < 90 as patient > or = 60 years of age with no diabetes or advanced kidney disease based on JNC 8 guidelines. Continue current treatment regimen with Coreg to treat on discharge.           Coronary artery disease  involving native coronary artery of native heart without angina pectoris    Controlled. Patient asymptomatic and will continue Imdur and Coreg to treat on discharge.          Chronic respiratory failure with hypoxia    Controlled. Continue oxygen at 2 liters nasal cannula as needed on discharge.           Goals of care, counseling/discussion-resolved as of 7/21/2017    Discussion made with patient's daughter about goals of care and appropriate discharge planning. Prior to presentation, she was living at home with her daughter with home hospice (due to her advanced cardiac disease). The daughter lives alone and really has no other support and feels this situation was too much for her. Discussion made for SNF versus NH placement with hospice consult as the 2 remaining options. After deliberation, daughter decided she wanted to try SNF at NH level and see if patient's mother improves functionally from recent hip surgery and can possibly return home on hospice but if does not improve with PT/OT then plan will be hospice care transition at the NH. Patient DNR during hospital stay and on discharge.               Discharged Condition: good    Disposition: Skilled Nursing Facility ([facility])    Follow Up:  Future Appointments  Date Time Provider Department Center   7/28/2017 1:45 PM Whitney Isbell PA-C [facility] ORTHO Ivan Chua       Patient Instructions:     Diet general     Activity as tolerated     Call MD for:  temperature >100.4     Call MD for:  increased confusion or weakness     Call MD for:  persistent dizziness, light-headedness, or visual disturbances     Call MD for:  worsening rash     Call MD for:  severe persistent headache     Call MD for:  difficulty breathing or increased cough     Call MD for:  redness, tenderness, or signs of infection (pain, swelling, redness, odor or green/yellow discharge around incision site)     Call MD for:  severe uncontrolled pain     Call MD for:  persistent nausea and vomiting or  diarrhea       Medications:  Reconciled Home Medications:   Discharge Medication List as of 7/24/2017  1:23 PM      START taking these medications    Details   enoxaparin (LOVENOX) 40 mg/0.4 mL Syrg Inject 0.4 mLs (40 mg total) into the skin once daily at 6am. DVT prophylaxis after hip fracture surgery. End date 8/9/2017., Starting Wed 7/19/2017, Until Wed 8/9/2017, No Print         CONTINUE these medications which have CHANGED    Details   acetaminophen (TYLENOL) 325 MG tablet Take 2 tablets (650 mg total) by mouth every 6 (six) hours as needed (Mild pain 1-3/10 or temperature > 100.4 F)., Starting Wed 7/19/2017, No Print      levothyroxine (SYNTHROID) 88 MCG tablet Take 1 tablet (88 mcg total) by mouth before breakfast., Starting Wed 7/19/2017, No Print      pantoprazole (PROTONIX) 20 MG tablet Take 2 tablets (40 mg total) by mouth once daily., Starting Wed 7/19/2017, No Print      potassium chloride SA (K-DUR,KLOR-CON) 10 MEQ tablet Take 1 tablet (10 mEq total) by mouth every Mon, Wed, Fri., Starting Wed 7/19/2017, No Print      spironolactone (ALDACTONE) 25 MG tablet Take 0.5 tablets (12.5 mg total) by mouth 3 (three) times a week. Monday, Wednesday, Friday, Starting Wed 7/19/2017, Until Thu 7/19/2018, No Print      tamsulosin (FLOMAX) 0.4 mg Cp24 Take 1 capsule (0.4 mg total) by mouth nightly., Starting Wed 7/19/2017, No Print      temazepam (RESTORIL) 15 mg Cap Take 1 capsule (15 mg total) by mouth nightly as needed (SLEEP)., Starting Fri 7/21/2017, Print      tramadol (ULTRAM) 50 mg tablet Take 1 tablet (50 mg total) by mouth every 6 (six) hours as needed (Moderate to severe pain)., Starting Fri 7/21/2017, Until Mon 7/31/2017, Print         CONTINUE these medications which have NOT CHANGED    Details   amiodarone (PACERONE) 200 MG Tab Take 1 tablet (200 mg total) by mouth 2 (two) times daily., Starting 5/18/2017, Until Fri 5/18/18, No Print      bumetanide (BUMEX) 1 MG tablet Take 1 tablet (1 mg total) by  mouth once daily., Starting Wed 5/31/2017, Until Thu 5/31/2018, Normal      carvedilol (COREG) 3.125 MG tablet Take 1 tablet (3.125 mg total) by mouth nightly., Starting Wed 5/31/2017, Normal      citalopram (CELEXA) 20 MG tablet Take 1.5 tablets (30 mg total) by mouth once daily., Starting Wed 5/31/2017, Normal      cyanocobalamin, vitamin B-12, 1,000 mcg TbSR Take 1 tablet by mouth once daily., Starting Fri 5/26/2017, Home Health Historical Med      digoxin (DIGOX) 125 mcg tablet Take 125 mcg by mouth every Mon, Wed, Fri., Starting Fri 5/26/2017, Home Health Historical Med      enalapril (VASOTEC) 2.5 MG tablet Take 2.5 mg by mouth once daily., Starting Fri 5/26/2017, Home Health Historical Med      isosorbide mononitrate (IMDUR) 30 MG 24 hr tablet Take 30 mg by mouth once daily., Starting Fri 5/26/2017, Home Health Historical Med      lactulose 10 gram/15 ml (CHRONULAC) 10 gram/15 mL (15 mL) solution Take 10 g by mouth daily as needed (CONSTIPATION)., Starting Fri 5/19/2017, Home Health Historical Med      levetiracetam (KEPPRA) 500 MG Tab Take 1 tablet (500 mg total) by mouth 2 (two) times daily., Starting Fri 5/26/2017, Until Sat 5/26/2018, Print      magnesium oxide (MAG-OX) 400 mg tablet TAKE 2 TABLETS BY MOUTH TWICE DAILY, Normal      nitroGLYCERIN (NITROSTAT) 0.4 MG SL tablet Place 1 tablet (0.4 mg total) under the tongue every 5 (five) minutes as needed for Chest pain., Starting 12/12/2016, Until Tue 12/12/17, Normal         STOP taking these medications       acetaminophen (TYLENOL) 650 MG Supp Comments:   Reason for Stopping:         atropine 1% (ISOPTO ATROPINE) 1 % Drop Comments:   Reason for Stopping:         CARAFATE 100 mg/mL suspension Comments:   Reason for Stopping:         cefUROXime (CEFTIN) 250 MG tablet Comments:   Reason for Stopping:         dexamethasone (DECADRON) 4 MG Tab Comments:   Reason for Stopping:         FLORANEX 1 million cell Tab Comments:   Reason for Stopping:          lorazepam 2 mg/ml oral conc (ATIVAN) 2 mg/mL Conc Comments:   Reason for Stopping:         meclizine (ANTIVERT) 12.5 mg tablet Comments:   Reason for Stopping:         metOLazone (ZAROXOLYN) 5 MG tablet Comments:   Reason for Stopping:         morphine 100 mg/5 mL (20 mg/mL) concentrated solution Comments:   Reason for Stopping:         MV-MN/FA/COQ10/LYCOPENE/LUTEIN (THERAGRAN-M PREMIER 50 PLUS ORAL) Comments:   Reason for Stopping:         nystatin (MYCOSTATIN) 100,000 unit/mL suspension Comments:   Reason for Stopping:         omeprazole (PRILOSEC) 20 MG capsule Comments:   Reason for Stopping:         ondansetron (ZOFRAN-ODT) 4 MG TbDL Comments:   Reason for Stopping:         rosuvastatin (CRESTOR) 10 MG tablet Comments:   Reason for Stopping:         tobramycin-dexamethasone 0.3-0.1% (TOBRADEX) 0.3-0.1 % Oint Comments:   Reason for Stopping:         ZOLPIDEM TARTRATE (AMBIEN ORAL) Comments:   Reason for Stopping:             Time spent on the discharge of patient: 34 minutes    Ana Bergman MD  Department of Hospital Medicine  [facility]

## 2017-07-25 NOTE — TELEPHONE ENCOUNTER
Spoke with Marlene.  States pt will not be keeping her 2 wk post op appointment on 7/28.   Marlene states that pt is doing fine and does not need to be seen until her 6 wk post op appointment.  Advised Marlene that I will discuss this with Dr Guerrero upon his return to the office.   Cxl'd 7/28 appointment.   6 wk post op appointment mailed to pt's home address.

## 2017-08-22 ENCOUNTER — OFFICE VISIT (OUTPATIENT)
Dept: ORTHOPEDICS | Facility: CLINIC | Age: 82
End: 2017-08-22
Payer: MEDICARE

## 2017-08-22 VITALS — HEIGHT: 63 IN

## 2017-08-22 DIAGNOSIS — S42.291D OTHER CLOSED DISPLACED FRACTURE OF PROXIMAL END OF RIGHT HUMERUS WITH ROUTINE HEALING, SUBSEQUENT ENCOUNTER: Primary | ICD-10-CM

## 2017-08-22 DIAGNOSIS — Z96.649 S/P HIP HEMIARTHROPLASTY: ICD-10-CM

## 2017-08-22 PROCEDURE — 99213 OFFICE O/P EST LOW 20 MIN: CPT | Mod: PBBFAC | Performed by: ORTHOPAEDIC SURGERY

## 2017-08-22 PROCEDURE — 99999 PR PBB SHADOW E&M-EST. PATIENT-LVL III: CPT | Mod: PBBFAC,,, | Performed by: ORTHOPAEDIC SURGERY

## 2017-08-22 PROCEDURE — 99024 POSTOP FOLLOW-UP VISIT: CPT | Mod: ,,, | Performed by: ORTHOPAEDIC SURGERY

## 2017-08-22 RX ORDER — TRAMADOL HYDROCHLORIDE 50 MG/1
50 TABLET ORAL EVERY 6 HOURS PRN
COMMUNITY
End: 2018-06-28

## 2017-08-22 RX ORDER — OSELTAMIVIR PHOSPHATE 75 MG/1
CAPSULE ORAL
COMMUNITY
Start: 2017-05-22 | End: 2017-09-21

## 2017-08-22 NOTE — PROGRESS NOTES
HPI:  83 year old female who is s/p right hip hemiarthroplasty for femoral neck fracture on 7/12/17. She had a fall from standing height and also sustained a proximal humerus fracture that was treated non-operatively at the same time. She has been living at [facility] since her discharge and was doing well, though she sustained another fall on 8/13/17. She was evaluated at [facility] and was found to have a possible ND left radial head fracture.     With regards to her hip, she has 0/10 pain. She does report 10/10 pain to the right shoulder; worse with movement and better with rest.    PE:     - Right hip: incision healed. No pain with PROM.   - Right shoulder: skin intact with some mild swelling and ecchymoses. Able to FF to about 60 degrees before pain. NVI throughout.   - Left elbow: skin intact; no swelling or palpable effusion. Mild tenderness over RH. Full, painless ROM. NVI throughout    Rads:   - Right hip: hemiarthroplasty in place   - Right shoulder: impacted proximal humerus in good alignment.    - Left elbow: no evidence of fracture    A/P:  The patient is progressing well despite the minor setback from her fall last week. She can continue WBAT with posterior hip precautions to the RLE and can WBAT to the LUE. Her right shoulder is still painful, but this will continue to improve with time. A new PT script was written to include PROM of the right shoulder. She will follow-up with us in 4 weeks with x-rays of the right shoulder and right hip.

## 2017-08-25 PROBLEM — R07.9 CHEST PAIN WITH MODERATE RISK FOR CARDIAC ETIOLOGY: Status: ACTIVE | Noted: 2017-08-25

## 2017-08-25 PROBLEM — R79.89 ELEVATED TROPONIN: Status: ACTIVE | Noted: 2017-08-25

## 2017-08-26 PROBLEM — R07.89 CHEST PAIN, MUSCULOSKELETAL: Status: ACTIVE | Noted: 2017-08-25

## 2017-09-13 DIAGNOSIS — Z96.649 S/P HIP HEMIARTHROPLASTY: Primary | ICD-10-CM

## 2017-09-13 DIAGNOSIS — S42.291D OTHER CLOSED DISPLACED FRACTURE OF PROXIMAL END OF RIGHT HUMERUS WITH ROUTINE HEALING, SUBSEQUENT ENCOUNTER: ICD-10-CM

## 2017-09-19 ENCOUNTER — OFFICE VISIT (OUTPATIENT)
Dept: ORTHOPEDICS | Facility: CLINIC | Age: 82
End: 2017-09-19
Payer: MEDICARE

## 2017-09-19 ENCOUNTER — HOSPITAL ENCOUNTER (OUTPATIENT)
Dept: RADIOLOGY | Facility: HOSPITAL | Age: 82
Discharge: HOME | End: 2017-09-19
Attending: ORTHOPAEDIC SURGERY
Payer: MEDICARE

## 2017-09-19 ENCOUNTER — OUTPATIENT CASE MANAGEMENT (OUTPATIENT)
Dept: ADMINISTRATIVE | Facility: OTHER | Age: 82
End: 2017-09-19

## 2017-09-19 VITALS
SYSTOLIC BLOOD PRESSURE: 109 MMHG | TEMPERATURE: 98 F | DIASTOLIC BLOOD PRESSURE: 67 MMHG | HEART RATE: 80 BPM | HEIGHT: 64 IN

## 2017-09-19 DIAGNOSIS — Z96.649 S/P HIP HEMIARTHROPLASTY: ICD-10-CM

## 2017-09-19 DIAGNOSIS — S42.291D OTHER CLOSED DISPLACED FRACTURE OF PROXIMAL END OF RIGHT HUMERUS WITH ROUTINE HEALING, SUBSEQUENT ENCOUNTER: Primary | ICD-10-CM

## 2017-09-19 DIAGNOSIS — S42.291D OTHER CLOSED DISPLACED FRACTURE OF PROXIMAL END OF RIGHT HUMERUS WITH ROUTINE HEALING, SUBSEQUENT ENCOUNTER: ICD-10-CM

## 2017-09-19 DIAGNOSIS — S72.001D CLOSED DISPLACED FRACTURE OF NECK OF RIGHT FEMUR WITH ROUTINE HEALING: ICD-10-CM

## 2017-09-19 PROCEDURE — 73502 X-RAY EXAM HIP UNI 2-3 VIEWS: CPT | Mod: TC,RT

## 2017-09-19 PROCEDURE — 99024 POSTOP FOLLOW-UP VISIT: CPT | Mod: ,,, | Performed by: ORTHOPAEDIC SURGERY

## 2017-09-19 PROCEDURE — 99999 PR PBB SHADOW E&M-EST. PATIENT-LVL III: CPT | Mod: PBBFAC,,, | Performed by: ORTHOPAEDIC SURGERY

## 2017-09-19 PROCEDURE — 99213 OFFICE O/P EST LOW 20 MIN: CPT | Mod: PBBFAC,25 | Performed by: ORTHOPAEDIC SURGERY

## 2017-09-19 PROCEDURE — 73030 X-RAY EXAM OF SHOULDER: CPT | Mod: TC,RT

## 2017-09-19 PROCEDURE — 73030 X-RAY EXAM OF SHOULDER: CPT | Mod: 26,GV,RT, | Performed by: RADIOLOGY

## 2017-09-19 PROCEDURE — 73502 X-RAY EXAM HIP UNI 2-3 VIEWS: CPT | Mod: 26,GV,RT, | Performed by: RADIOLOGY

## 2017-09-19 NOTE — PROGRESS NOTES
9/19/17-Chart review completed. Patient currently admitted to hospice. Will close case at this time as needs are being met by others.

## 2017-09-19 NOTE — PROGRESS NOTES
The following patient has been assigned to [place] RN with Outpatient Complex Care Management for high risk screening.    Reason: High Risk    Please contact [facility] at [phone] with any questions.    Thank you,  Franci Tavares

## 2017-09-21 NOTE — PROGRESS NOTES
HPI:  83 year old female who is s/p right hip hemiarthroplasty for femoral neck fracture on 7/12/17. She had a fall from standing height and also sustained a proximal humerus fracture that was treated non-operatively at the same time. She has been living at [facility] since her discharge and was doing well, though she sustained another fall on 8/13/17. She's done well since then with no more falls.  She is not really ambulating much, but gets around in a WC.  No pain in the hip.  Some pain in the right shoulder with movement.  OT has been doing ROM with the elbow but not really with the shoulder.      PE:      Healed hip incision with good ROM and no TTP  Right shoulder with limited motion.  I am able to passively get her to about 80 of FF.  NVI distally.    Rads:   - Right hip: hemiarthroplasty in place   - Right shoulder: impacted proximal humerus in good alignment.  No change in position.  Improved healing.     A/P:  She will continue with WBAT on the right hip with posterior hip precautions.  Her OT can begin gentle PROM with the right shoulder as she tolerates and advance to AROM.  OT prescription was given.  RTC in 3 months with x-rays of the right shoulder AP and scapular Y and right hip.

## 2018-06-28 ENCOUNTER — HOSPITAL ENCOUNTER (INPATIENT)
Facility: HOSPITAL | Age: 83
LOS: 8 days | Discharge: HOSPICE | DRG: 682 | End: 2018-07-06
Attending: EMERGENCY MEDICINE | Admitting: EMERGENCY MEDICINE
Payer: MEDICARE

## 2018-06-28 DIAGNOSIS — Z00.8 MEDICAL CLEARANCE FOR PSYCHIATRIC ADMISSION: ICD-10-CM

## 2018-06-28 DIAGNOSIS — I27.20 PULMONARY HTN: ICD-10-CM

## 2018-06-28 DIAGNOSIS — R07.9 CHEST PAIN: ICD-10-CM

## 2018-06-28 DIAGNOSIS — N18.30 CKD (CHRONIC KIDNEY DISEASE), STAGE III: ICD-10-CM

## 2018-06-28 DIAGNOSIS — F05 DELIRIUM DUE TO MULTIPLE ETIOLOGIES, ACUTE, HYPERACTIVE: ICD-10-CM

## 2018-06-28 DIAGNOSIS — N30.00 ACUTE CYSTITIS WITHOUT HEMATURIA: ICD-10-CM

## 2018-06-28 DIAGNOSIS — R53.81 DEBILITY: ICD-10-CM

## 2018-06-28 DIAGNOSIS — I50.43 ACUTE ON CHRONIC COMBINED SYSTOLIC AND DIASTOLIC CONGESTIVE HEART FAILURE, NYHA CLASS 4: ICD-10-CM

## 2018-06-28 DIAGNOSIS — F05 DELIRIUM DUE TO ANOTHER MEDICAL CONDITION: ICD-10-CM

## 2018-06-28 DIAGNOSIS — F03.91 DEMENTIA WITH BEHAVIORAL DISTURBANCE, UNSPECIFIED DEMENTIA TYPE: Primary | ICD-10-CM

## 2018-06-28 DIAGNOSIS — I50.9 CHF (CONGESTIVE HEART FAILURE): ICD-10-CM

## 2018-06-28 DIAGNOSIS — K59.01 SLOW TRANSIT CONSTIPATION: ICD-10-CM

## 2018-06-28 DIAGNOSIS — I10 ESSENTIAL HYPERTENSION: ICD-10-CM

## 2018-06-28 DIAGNOSIS — L03.115 CELLULITIS OF RIGHT LOWER EXTREMITY: ICD-10-CM

## 2018-06-28 DIAGNOSIS — I48.19 PERSISTENT ATRIAL FIBRILLATION: ICD-10-CM

## 2018-06-28 DIAGNOSIS — E44.1 MALNUTRITION OF MILD DEGREE: Chronic | ICD-10-CM

## 2018-06-28 DIAGNOSIS — K59.00 CONSTIPATION: ICD-10-CM

## 2018-06-28 DIAGNOSIS — R94.31 QT PROLONGATION: ICD-10-CM

## 2018-06-28 DIAGNOSIS — I50.40 COMBINED SYSTOLIC AND DIASTOLIC CONGESTIVE HEART FAILURE, UNSPECIFIED HF CHRONICITY: ICD-10-CM

## 2018-06-28 DIAGNOSIS — N17.9 AKI (ACUTE KIDNEY INJURY): ICD-10-CM

## 2018-06-28 DIAGNOSIS — I25.5 CARDIOMYOPATHY, ISCHEMIC: ICD-10-CM

## 2018-06-28 DIAGNOSIS — E03.4 HYPOTHYROIDISM DUE TO ACQUIRED ATROPHY OF THYROID: ICD-10-CM

## 2018-06-28 PROBLEM — R79.89 ELEVATED TROPONIN: Status: RESOLVED | Noted: 2017-08-25 | Resolved: 2018-06-28

## 2018-06-28 PROBLEM — Z51.5 COMFORT MEASURES ONLY STATUS: Status: RESOLVED | Noted: 2017-07-24 | Resolved: 2018-06-28

## 2018-06-28 PROBLEM — R07.89 CHEST PAIN, MUSCULOSKELETAL: Status: RESOLVED | Noted: 2017-08-25 | Resolved: 2018-06-28

## 2018-06-28 PROBLEM — R19.5 OCCULT BLOOD IN STOOLS: Status: RESOLVED | Noted: 2017-04-10 | Resolved: 2018-06-28

## 2018-06-28 LAB
ALBUMIN SERPL BCP-MCNC: 3.5 G/DL
ALP SERPL-CCNC: 178 U/L
ALT SERPL W/O P-5'-P-CCNC: 193 U/L
AMPHET+METHAMPHET UR QL: NEGATIVE
ANION GAP SERPL CALC-SCNC: 13 MMOL/L
APAP SERPL-MCNC: 6 UG/ML
AST SERPL-CCNC: 210 U/L
BARBITURATES UR QL SCN>200 NG/ML: NEGATIVE
BASOPHILS # BLD AUTO: 0.05 K/UL
BASOPHILS NFR BLD: 0.6 %
BENZODIAZ UR QL SCN>200 NG/ML: NEGATIVE
BILIRUB SERPL-MCNC: 1.3 MG/DL
BNP SERPL-MCNC: >4900 PG/ML
BUN SERPL-MCNC: 40 MG/DL
BZE UR QL SCN: NEGATIVE
CALCIUM SERPL-MCNC: 9 MG/DL
CANNABINOIDS UR QL SCN: NEGATIVE
CHLORIDE SERPL-SCNC: 101 MMOL/L
CO2 SERPL-SCNC: 24 MMOL/L
CREAT SERPL-MCNC: 1.5 MG/DL
CREAT UR-MCNC: 18 MG/DL
DIFFERENTIAL METHOD: ABNORMAL
EOSINOPHIL # BLD AUTO: 0.2 K/UL
EOSINOPHIL NFR BLD: 2.1 %
ERYTHROCYTE [DISTWIDTH] IN BLOOD BY AUTOMATED COUNT: 17.6 %
EST. GFR  (AFRICAN AMERICAN): 36.6 ML/MIN/1.73 M^2
EST. GFR  (NON AFRICAN AMERICAN): 31.8 ML/MIN/1.73 M^2
ETHANOL SERPL-MCNC: <10 MG/DL
GLUCOSE SERPL-MCNC: 95 MG/DL
HCT VFR BLD AUTO: 34.9 %
HGB BLD-MCNC: 10.7 G/DL
IMM GRANULOCYTES # BLD AUTO: 0.02 K/UL
IMM GRANULOCYTES NFR BLD AUTO: 0.3 %
LYMPHOCYTES # BLD AUTO: 1.2 K/UL
LYMPHOCYTES NFR BLD: 15.7 %
MCH RBC QN AUTO: 29.2 PG
MCHC RBC AUTO-ENTMCNC: 30.7 G/DL
MCV RBC AUTO: 95 FL
METHADONE UR QL SCN>300 NG/ML: NEGATIVE
MONOCYTES # BLD AUTO: 0.8 K/UL
MONOCYTES NFR BLD: 10.8 %
NEUTROPHILS # BLD AUTO: 5.5 K/UL
NEUTROPHILS NFR BLD: 70.5 %
NRBC BLD-RTO: 0 /100 WBC
OPIATES UR QL SCN: NEGATIVE
PCP UR QL SCN>25 NG/ML: NEGATIVE
PLATELET # BLD AUTO: 223 K/UL
PMV BLD AUTO: 11 FL
POTASSIUM SERPL-SCNC: 4.1 MMOL/L
PROT SERPL-MCNC: 6.9 G/DL
RBC # BLD AUTO: 3.66 M/UL
SODIUM SERPL-SCNC: 138 MMOL/L
TOXICOLOGY INFORMATION: NORMAL
TROPONIN I SERPL DL<=0.01 NG/ML-MCNC: 0.15 NG/ML
TROPONIN I SERPL DL<=0.01 NG/ML-MCNC: 0.16 NG/ML
TSH SERPL DL<=0.005 MIU/L-ACNC: 2.33 UIU/ML
WBC # BLD AUTO: 7.79 K/UL

## 2018-06-28 PROCEDURE — 99223 1ST HOSP IP/OBS HIGH 75: CPT | Mod: AI,GW,HPC, | Performed by: HOSPITALIST

## 2018-06-28 PROCEDURE — 80320 DRUG SCREEN QUANTALCOHOLS: CPT

## 2018-06-28 PROCEDURE — 85025 COMPLETE CBC W/AUTO DIFF WBC: CPT

## 2018-06-28 PROCEDURE — 84443 ASSAY THYROID STIM HORMONE: CPT

## 2018-06-28 PROCEDURE — 80329 ANALGESICS NON-OPIOID 1 OR 2: CPT

## 2018-06-28 PROCEDURE — 93010 ELECTROCARDIOGRAM REPORT: CPT | Mod: GW,,, | Performed by: INTERNAL MEDICINE

## 2018-06-28 PROCEDURE — 81001 URINALYSIS AUTO W/SCOPE: CPT

## 2018-06-28 PROCEDURE — 96374 THER/PROPH/DIAG INJ IV PUSH: CPT

## 2018-06-28 PROCEDURE — 99292 CRITICAL CARE ADDL 30 MIN: CPT | Mod: ,,, | Performed by: EMERGENCY MEDICINE

## 2018-06-28 PROCEDURE — 99285 EMERGENCY DEPT VISIT HI MDM: CPT | Mod: 25

## 2018-06-28 PROCEDURE — 25000003 PHARM REV CODE 250: Performed by: PHYSICIAN ASSISTANT

## 2018-06-28 PROCEDURE — 20600001 HC STEP DOWN PRIVATE ROOM

## 2018-06-28 PROCEDURE — 96372 THER/PROPH/DIAG INJ SC/IM: CPT

## 2018-06-28 PROCEDURE — 83880 ASSAY OF NATRIURETIC PEPTIDE: CPT

## 2018-06-28 PROCEDURE — 80307 DRUG TEST PRSMV CHEM ANLYZR: CPT

## 2018-06-28 PROCEDURE — 63600175 PHARM REV CODE 636 W HCPCS: Performed by: PHYSICIAN ASSISTANT

## 2018-06-28 PROCEDURE — 84484 ASSAY OF TROPONIN QUANT: CPT | Mod: 91

## 2018-06-28 PROCEDURE — 63600175 PHARM REV CODE 636 W HCPCS: Performed by: HOSPITALIST

## 2018-06-28 PROCEDURE — 99291 CRITICAL CARE FIRST HOUR: CPT | Mod: ,,, | Performed by: EMERGENCY MEDICINE

## 2018-06-28 PROCEDURE — 25000003 PHARM REV CODE 250

## 2018-06-28 PROCEDURE — 99282 EMERGENCY DEPT VISIT SF MDM: CPT | Mod: ,,, | Performed by: INTERNAL MEDICINE

## 2018-06-28 PROCEDURE — 94761 N-INVAS EAR/PLS OXIMETRY MLT: CPT

## 2018-06-28 PROCEDURE — 25000003 PHARM REV CODE 250: Performed by: HOSPITALIST

## 2018-06-28 PROCEDURE — 63600175 PHARM REV CODE 636 W HCPCS: Performed by: EMERGENCY MEDICINE

## 2018-06-28 PROCEDURE — 80053 COMPREHEN METABOLIC PANEL: CPT

## 2018-06-28 RX ORDER — ACETAMINOPHEN 325 MG/1
650 TABLET ORAL EVERY 8 HOURS PRN
Status: DISCONTINUED | OUTPATIENT
Start: 2018-06-28 | End: 2018-06-28

## 2018-06-28 RX ORDER — ARIPIPRAZOLE 5 MG/1
5 TABLET ORAL DAILY
Status: ON HOLD | COMMUNITY
End: 2018-07-06 | Stop reason: HOSPADM

## 2018-06-28 RX ORDER — BUMETANIDE 1 MG/1
1 TABLET ORAL DAILY
Status: ON HOLD | COMMUNITY
End: 2018-07-06 | Stop reason: HOSPADM

## 2018-06-28 RX ORDER — CARBAMAZEPINE 100 MG/1
100 TABLET, CHEWABLE ORAL 2 TIMES DAILY
Status: DISCONTINUED | OUTPATIENT
Start: 2018-06-28 | End: 2018-07-05

## 2018-06-28 RX ORDER — NITROGLYCERIN 0.4 MG/1
0.4 TABLET SUBLINGUAL EVERY 5 MIN PRN
Status: DISCONTINUED | OUTPATIENT
Start: 2018-06-28 | End: 2018-07-06 | Stop reason: HOSPADM

## 2018-06-28 RX ORDER — PANTOPRAZOLE SODIUM 20 MG/1
20 TABLET, DELAYED RELEASE ORAL DAILY
Status: DISCONTINUED | OUTPATIENT
Start: 2018-06-29 | End: 2018-07-06 | Stop reason: HOSPADM

## 2018-06-28 RX ORDER — HEPARIN SODIUM 5000 [USP'U]/ML
5000 INJECTION, SOLUTION INTRAVENOUS; SUBCUTANEOUS EVERY 8 HOURS
Status: DISCONTINUED | OUTPATIENT
Start: 2018-06-28 | End: 2018-07-06 | Stop reason: HOSPADM

## 2018-06-28 RX ORDER — IPRATROPIUM BROMIDE AND ALBUTEROL SULFATE 2.5; .5 MG/3ML; MG/3ML
3 SOLUTION RESPIRATORY (INHALATION) EVERY 4 HOURS PRN
COMMUNITY

## 2018-06-28 RX ORDER — POLYETHYLENE GLYCOL 3350 17 G/17G
17 POWDER, FOR SOLUTION ORAL
COMMUNITY

## 2018-06-28 RX ORDER — FOLIC ACID 1 MG/1
1 TABLET ORAL DAILY
COMMUNITY

## 2018-06-28 RX ORDER — ASPIRIN 325 MG
325 TABLET ORAL
Status: COMPLETED | OUTPATIENT
Start: 2018-06-28 | End: 2018-06-28

## 2018-06-28 RX ORDER — ACETAMINOPHEN 500 MG
1000 TABLET ORAL DAILY
COMMUNITY

## 2018-06-28 RX ORDER — GLUCAGON 1 MG
1 KIT INJECTION
Status: DISCONTINUED | OUTPATIENT
Start: 2018-06-28 | End: 2018-07-06 | Stop reason: HOSPADM

## 2018-06-28 RX ORDER — LEVETIRACETAM 250 MG/1
250 TABLET ORAL 2 TIMES DAILY
Status: DISCONTINUED | OUTPATIENT
Start: 2018-06-28 | End: 2018-06-29

## 2018-06-28 RX ORDER — CARBAMAZEPINE 100 MG/1
100 TABLET, CHEWABLE ORAL 2 TIMES DAILY
Status: ON HOLD | COMMUNITY
End: 2018-07-06 | Stop reason: HOSPADM

## 2018-06-28 RX ORDER — VITAMIN B COMPLEX
1 CAPSULE ORAL DAILY
COMMUNITY

## 2018-06-28 RX ORDER — OLANZAPINE 10 MG/2ML
2.5 INJECTION, POWDER, FOR SOLUTION INTRAMUSCULAR EVERY 6 HOURS PRN
Status: DISCONTINUED | OUTPATIENT
Start: 2018-06-28 | End: 2018-06-29

## 2018-06-28 RX ORDER — IBUPROFEN 200 MG
24 TABLET ORAL
Status: DISCONTINUED | OUTPATIENT
Start: 2018-06-28 | End: 2018-07-06 | Stop reason: HOSPADM

## 2018-06-28 RX ORDER — LEVOTHYROXINE SODIUM 100 UG/1
100 TABLET ORAL
COMMUNITY

## 2018-06-28 RX ORDER — DIGOXIN 125 MCG
125 TABLET ORAL
Status: DISCONTINUED | OUTPATIENT
Start: 2018-06-29 | End: 2018-07-06 | Stop reason: HOSPADM

## 2018-06-28 RX ORDER — HALOPERIDOL 5 MG/ML
2 INJECTION INTRAMUSCULAR
Status: COMPLETED | OUTPATIENT
Start: 2018-06-28 | End: 2018-06-28

## 2018-06-28 RX ORDER — HYOSCYAMINE SULFATE 0.125 MG
125 TABLET ORAL EVERY 4 HOURS PRN
Status: ON HOLD | COMMUNITY
End: 2018-07-06 | Stop reason: HOSPADM

## 2018-06-28 RX ORDER — FOLIC ACID 1 MG/1
1 TABLET ORAL DAILY
Status: DISCONTINUED | OUTPATIENT
Start: 2018-06-29 | End: 2018-07-06 | Stop reason: HOSPADM

## 2018-06-28 RX ORDER — BUMETANIDE 1 MG/1
1 TABLET ORAL 2 TIMES DAILY
Status: DISCONTINUED | OUTPATIENT
Start: 2018-06-28 | End: 2018-07-01

## 2018-06-28 RX ORDER — OLANZAPINE 10 MG/2ML
2.5 INJECTION, POWDER, FOR SOLUTION INTRAMUSCULAR EVERY 6 HOURS PRN
Status: DISCONTINUED | OUTPATIENT
Start: 2018-06-28 | End: 2018-06-28

## 2018-06-28 RX ORDER — FUROSEMIDE 10 MG/ML
80 INJECTION INTRAMUSCULAR; INTRAVENOUS
Status: COMPLETED | OUTPATIENT
Start: 2018-06-28 | End: 2018-06-28

## 2018-06-28 RX ORDER — POLYETHYLENE GLYCOL 3350 17 G/17G
17 POWDER, FOR SOLUTION ORAL 2 TIMES DAILY PRN
Status: DISCONTINUED | OUTPATIENT
Start: 2018-06-28 | End: 2018-07-06 | Stop reason: HOSPADM

## 2018-06-28 RX ORDER — HALOPERIDOL 5 MG/ML
2 INJECTION INTRAMUSCULAR EVERY 4 HOURS PRN
Status: DISCONTINUED | OUTPATIENT
Start: 2018-06-28 | End: 2018-06-28

## 2018-06-28 RX ORDER — ASPIRIN 325 MG
TABLET ORAL
Status: COMPLETED
Start: 2018-06-28 | End: 2018-06-28

## 2018-06-28 RX ORDER — ISOSORBIDE MONONITRATE 30 MG/1
30 TABLET, EXTENDED RELEASE ORAL DAILY
Status: DISCONTINUED | OUTPATIENT
Start: 2018-06-29 | End: 2018-07-01

## 2018-06-28 RX ORDER — LEVETIRACETAM 250 MG/1
250 TABLET ORAL 2 TIMES DAILY
Status: ON HOLD | COMMUNITY
End: 2018-07-06 | Stop reason: HOSPADM

## 2018-06-28 RX ORDER — ACETAMINOPHEN 325 MG/1
650 TABLET ORAL EVERY 8 HOURS PRN
Status: DISCONTINUED | OUTPATIENT
Start: 2018-06-28 | End: 2018-07-06 | Stop reason: HOSPADM

## 2018-06-28 RX ORDER — ARIPIPRAZOLE 5 MG/1
5 TABLET ORAL DAILY
Status: DISCONTINUED | OUTPATIENT
Start: 2018-06-29 | End: 2018-06-28

## 2018-06-28 RX ORDER — OLANZAPINE 2.5 MG/1
5 TABLET ORAL EVERY 6 HOURS PRN
Status: DISCONTINUED | OUTPATIENT
Start: 2018-06-28 | End: 2018-06-29

## 2018-06-28 RX ORDER — AMOXICILLIN 250 MG
1 CAPSULE ORAL DAILY
Status: DISCONTINUED | OUTPATIENT
Start: 2018-06-29 | End: 2018-07-06 | Stop reason: HOSPADM

## 2018-06-28 RX ORDER — VILAZODONE HYDROCHLORIDE 10 MG/1
10 TABLET ORAL EVERY MORNING
Status: DISCONTINUED | OUTPATIENT
Start: 2018-06-29 | End: 2018-06-28

## 2018-06-28 RX ORDER — VILAZODONE HYDROCHLORIDE 10 MG/1
10 TABLET ORAL EVERY MORNING
Status: ON HOLD | COMMUNITY
End: 2018-07-06 | Stop reason: HOSPADM

## 2018-06-28 RX ORDER — MIRTAZAPINE 15 MG/1
15 TABLET, FILM COATED ORAL NIGHTLY
Status: ON HOLD | COMMUNITY
End: 2018-07-06 | Stop reason: HOSPADM

## 2018-06-28 RX ORDER — POTASSIUM CHLORIDE 750 MG/1
10 TABLET, EXTENDED RELEASE ORAL DAILY
Status: ON HOLD | COMMUNITY
End: 2018-07-06

## 2018-06-28 RX ORDER — ONDANSETRON 8 MG/1
8 TABLET, ORALLY DISINTEGRATING ORAL EVERY 8 HOURS PRN
Status: DISCONTINUED | OUTPATIENT
Start: 2018-06-28 | End: 2018-07-06 | Stop reason: HOSPADM

## 2018-06-28 RX ORDER — SODIUM CHLORIDE 0.9 % (FLUSH) 0.9 %
5 SYRINGE (ML) INJECTION
Status: DISCONTINUED | OUTPATIENT
Start: 2018-06-28 | End: 2018-07-06 | Stop reason: HOSPADM

## 2018-06-28 RX ORDER — IBUPROFEN 200 MG
16 TABLET ORAL
Status: DISCONTINUED | OUTPATIENT
Start: 2018-06-28 | End: 2018-07-06 | Stop reason: HOSPADM

## 2018-06-28 RX ORDER — PANTOPRAZOLE SODIUM 20 MG/1
20 TABLET, DELAYED RELEASE ORAL DAILY
COMMUNITY

## 2018-06-28 RX ORDER — PROMETHAZINE HYDROCHLORIDE 25 MG/1
25 TABLET ORAL EVERY 6 HOURS PRN
Status: DISCONTINUED | OUTPATIENT
Start: 2018-06-28 | End: 2018-06-30

## 2018-06-28 RX ADMIN — BUMETANIDE 1 MG: 1 TABLET ORAL at 11:06

## 2018-06-28 RX ADMIN — NITROGLYCERIN 1 INCH: 20 OINTMENT TOPICAL at 12:06

## 2018-06-28 RX ADMIN — LEVETIRACETAM 250 MG: 250 TABLET, FILM COATED ORAL at 11:06

## 2018-06-28 RX ADMIN — Medication 325 MG: at 12:06

## 2018-06-28 RX ADMIN — FUROSEMIDE 80 MG: 10 INJECTION, SOLUTION INTRAMUSCULAR; INTRAVENOUS at 04:06

## 2018-06-28 RX ADMIN — HEPARIN SODIUM 5000 UNITS: 5000 INJECTION, SOLUTION INTRAVENOUS; SUBCUTANEOUS at 11:06

## 2018-06-28 RX ADMIN — CARBAMAZEPINE 100 MG: 100 TABLET, CHEWABLE ORAL at 11:06

## 2018-06-28 RX ADMIN — HALOPERIDOL LACTATE 2 MG: 5 INJECTION, SOLUTION INTRAMUSCULAR at 04:06

## 2018-06-28 RX ADMIN — HALOPERIDOL LACTATE 2 MG: 5 INJECTION, SOLUTION INTRAMUSCULAR at 12:06

## 2018-06-28 RX ADMIN — ACETAMINOPHEN 650 MG: 325 TABLET, FILM COATED ORAL at 08:06
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
## 2018-06-28 NOTE — SUBJECTIVE & OBJECTIVE
Past Medical History:   Diagnosis Date    *Atrial fibrillation     on warfarin    Acquired hypothyroidism 4/29/2016    Allergy     seasonal    Anticoagulant long-term use     ASA 81 mg, Coumadin    Blood clotting tendency     Blood transfusion     Cataract     CHF (congestive heart failure)     CKD (chronic kidney disease) stage 4, GFR 15-29 ml/min 2/21/2017    Closed displaced fracture of neck of right femur with routine healing s/p hemiarthroplasty on 7/12/2017 7/11/2017    Coronary artery disease     PET stress in 1/14: lesions not amenable to PCI.    Coronary artery disease involving native coronary artery of native heart without angina pectoris 2/20/2017    DDD (degenerative disc disease), lumbar 8/20/2015    GERD (gastroesophageal reflux disease)     HEARING LOSS     wears Hearing aide    Hx of Ischemic colitis s/p colectomy 1/13/2014    Hyperlipidemia     Hypertension     Hypothyroid     MCI (mild cognitive impairment)     Myocardial infarct 1999    Pacemaker 2000    S/P CABG x 3 2000    SAH (subarachnoid hemorrhage) 5/24/2017    Ulcerative colitis     Ventricular tachycardia 1/14    VT storm 1/14, mexilitine added       Past Surgical History:   Procedure Laterality Date    CARDIAC CATHETERIZATION  8/13    Heart care center [facility], Dr Wallace; LIMA and SVG occluded. Proximal LAD 40%, ostial Diagonal 60%, LCX 30-40% in-stent restenosis, RCA totally occluded proximally    CATARACT EXTRACTION W/  INTRAOCULAR LENS IMPLANT Right 3/19/96    COLON SURGERY      ischemic colitis    COLONOSCOPY N/A 6/21/2016    Procedure: COLONOSCOPY;  Surgeon: Rob Cartagena MD;  Location: [facility] ENDO;  Service: Endoscopy;  Laterality: N/A;    COLONOSCOPY N/A 7/18/2016    Procedure: COLONOSCOPY;  Surgeon: Rob Cartagena MD;  Location: [facility] ENDO;  Service: Endoscopy;  Laterality: N/A;    CORONARY ARTERY BYPASS GRAFT  3/2001    3 vessel    CORONARY STENT PLACEMENT  12/2015    Dr. Freire    EYE  SURGERY      HIP FRACTURE SURGERY Right 07/12/2017    Hemiarthroplasty for femoral neck fracture     HYSTERECTOMY      total    INSERT / REPLACE / REMOVE PACEMAKER  12[place]    [facility] [place]    ORIF WRIST FRACTURE Right     TONSILLECTOMY         Review of patient's allergies indicates:   Allergen Reactions    Codeine Itching    Hydrocodone      GOT VERY VIOLANT    Azithromycin      History of VT    Lorazepam        No current facility-administered medications on file prior to encounter.      Current Outpatient Prescriptions on File Prior to Encounter   Medication Sig    acetaminophen (TYLENOL) 650 MG Supp Place 650 mg rectally every 4 (four) hours as needed (temp or pain).    alprazolam (XANAX) 0.25 MG tablet Take 1 tablet (0.25 mg total) by mouth 2 (two) times daily.    amiodarone (PACERONE) 200 MG Tab Take 1 tablet (200 mg total) by mouth 2 (two) times daily.    atropine 1% (ISOPTO ATROPINE) 1 % Drop Take 1 drop by mouth every 2 (two) hours as needed.    bumetanide (BUMEX) 1 MG tablet Take 1 tablet (1 mg total) by mouth once daily.    carvedilol (COREG) 3.125 MG tablet Take 1 tablet (3.125 mg total) by mouth nightly.    cyanocobalamin, vitamin B-12, 1,000 mcg TbSR Take 1 tablet by mouth once daily.    digoxin (DIGOX) 125 mcg tablet Take 125 mcg by mouth every Mon, Wed, Fri.    enalapril (VASOTEC) 2.5 MG tablet Take 2.5 mg by mouth once daily.    haloperidol (HALDOL) 2 mg/mL solution Take 0.25 mg by mouth every 4 (four) hours as needed (agitation).    isosorbide mononitrate (IMDUR) 30 MG 24 hr tablet Take 30 mg by mouth once daily.    lactulose 10 gram/15 ml (CHRONULAC) 10 gram/15 mL (15 mL) solution Take 10 g by mouth daily as needed (CONSTIPATION).    levetiracetam (KEPPRA) 500 MG Tab Take 1 tablet (500 mg total) by mouth 2 (two) times daily.    levothyroxine (SYNTHROID) 88 MCG tablet Take 1 tablet (88 mcg total) by mouth before breakfast.    magnesium oxide (MAG-OX) 400 mg  tablet TAKE 2 TABLETS BY MOUTH TWICE DAILY    morphine 20 mg/mL concentrated syringe Take 5 mg by mouth every 12 (twelve) hours.    morphine 20 mg/mL concentrated syringe Take 5 mg by mouth every 2 (two) hours as needed (pain or shortness of breath).    nitroGLYCERIN (NITROSTAT) 0.4 MG SL tablet Place 1 tablet (0.4 mg total) under the tongue every 5 (five) minutes as needed for Chest pain.    ondansetron (ZOFRAN) 4 MG tablet Take 4 mg by mouth every 4 (four) hours as needed.    pantoprazole (PROTONIX) 20 MG tablet Take 2 tablets (40 mg total) by mouth once daily.    senna-docusate 8.6-50 mg (PERICOLACE) 8.6-50 mg per tablet Take 1 tablet by mouth daily as needed for Constipation.    sertraline (ZOLOFT) 50 MG tablet Take 100 mg by mouth nightly.    spironolactone (ALDACTONE) 25 MG tablet Take 0.5 tablets (12.5 mg total) by mouth 3 (three) times a week. Monday, Wednesday, Friday (Patient taking differently: Take 12.5 mg by mouth once daily. )    tamsulosin (FLOMAX) 0.4 mg Cp24 Take 1 capsule (0.4 mg total) by mouth nightly.    temazepam (RESTORIL) 15 mg Cap Take 1 capsule (15 mg total) by mouth nightly as needed (SLEEP).    tramadol (ULTRAM) 50 mg tablet Take 50 mg by mouth every 6 (six) hours as needed for Pain.     Family History     Problem Relation (Age of Onset)    Cancer Sister    Heart disease Father (65), Sister (55)        Social History Main Topics    Smoking status: Former Smoker     Packs/day: 1.00     Years: 40.00     Types: Cigarettes     Start date: 1/1/1953     Quit date: 1/1/2013    Smokeless tobacco: Never Used    Alcohol use No    Drug use: No    Sexual activity: No     Review of Systems   Unable to perform ROS: dementia   All other systems reviewed and are negative.    Objective:     Vital Signs (Most Recent):  Temp: 97.5 °F (36.4 °C) (06/28/18 0829)  Pulse: 85 (06/28/18 1318)  Resp: 19 (06/28/18 1318)  BP: 124/71 (06/28/18 1318)  SpO2: 95 % (06/28/18 1318) Vital Signs (24h  Range):  Temp:  [97.5 °F (36.4 °C)] 97.5 °F (36.4 °C)  Pulse:  [80-93] 85  Resp:  [13-29] 19  SpO2:  [94 %-98 %] 95 %  BP: (113-135)/(57-86) 124/71        There is no height or weight on file to calculate BMI.    SpO2: 95 %  O2 Device (Oxygen Therapy): room air    No intake or output data in the 24 hours ending 06/28/18 1344    Lines/Drains/Airways          No matching active lines, drains, or airways          Physical Exam   Constitutional: She appears well-developed and well-nourished. No distress.   HENT:   Head: Normocephalic and atraumatic.   Eyes: No scleral icterus.   Neck:   JVD elevated to the angle of the mandible    Cardiovascular: Normal rate and regular rhythm.    Murmur heard.  Pulmonary/Chest: Effort normal. No respiratory distress. She has no wheezes. She has rales.   Rales at bases    Musculoskeletal: She exhibits no edema.   Neurological: She is alert.   Skin: Skin is warm. She is not diaphoretic.       Significant Labs: All pertinent lab results from the last 24 hours have been reviewed.    Significant Imaging: X-Ray: CXR: X-Ray Chest 1 View (CXR): No results found for this visit on 06/28/18.

## 2018-06-28 NOTE — ED NOTES
LOC: The patient is awake, alert and aware of environment with an appropriate affect, the patient is oriented x 3 and speaking appropriately.  APPEARANCE: Patient resting comfortably and in no acute distress, patient is clean and well groomed, patient's clothing is properly fastened.   SKIN: The skin is warm and dry, patient has poor skin turgor and moist mucus membranes, skin intact, no breakdown or brusing noted.   MUSKULOSKELETAL: Patient moving all extremities well, no obvious swelling or deformities noted. Patient did complain of pain in L pointer finger.  RESPIRATORY: Airway is open and patent, respirations are spontaneous, patient has a normal effort and rate. Breath sounds are clear and equal bilaterally.  CARDIAC: Normal heart sounds. 3+ Bilateral L extremity peripheral edema. Patient end stage CHF.  ABDOMEN: Soft and non tender to palpation, no distention noted. Bowel sounds present.  NEURO: Patient suffers from dementia, hand grasp equal and very strong, no drift noted, no facial droop noted. Speech is clear. Patient has intermittent burst of violence as per facility and family due to dementia. Patient has been recently admitted this past month for suicidal ideations, but denies currently.

## 2018-06-28 NOTE — HPI
83-year-old female, cardiology consulted for elevated troponin and chest pain. Known ischemic cardiomyopathy, here from hospice for agitation. EKG in 2015 paced rhythm, now LBBB.   Complained of chest pain in the ED that has resolved on my interview, she has a nitro patch that was placed in ED. Has dementia therefore unable to describe the episode of chest pain. On room air breathing comfortably. Bedside echo with EF 15-20, moderate to severe MR and moderate TR, no pericardial effusion, RV low normal to mildly depressed but not dilated.  Inferior wall akinetic, anterolateral wall akinetic, anterior wall is hypokinetic as well as the infero and delio septum hypokinetic. There is global hypokinesis.  Patient is hospice care and interview with daughters, they do not prefer procedures such as a cardiac care at the moment.       With a history of cardiomyopathy with AICD placement along with episodes of ventricular tachycardia and ventricular fibrillation PAF used to be on warfarin. She also has dementia and chronic kidney disease. She's had multiple recurrent falls complicated by a right hip fracture, bilateral arm fractures, and intracranial bleeding. She was at an outside facility from July 11-24 2017 with a right hip fracture.    CAD (s/p CABG x3 in 2000), hx of VT (On Amiodarone), HFrEF (EF=20-25%, s/p CRT-D, per daughter her ICD has been turned off as patient is in hospice care) c NYHA IV sx, pAF (Not on Coumadin due to recent SAH).    Per cardiology consult note in 2017   left heart catheterization in December 2015, that revealed an atretic LIMA to LAD, occluded saphenous vein graft to the obtuse marginal branch, and occluded saphenous vein graft to the right coronary artery.  The mid LAD stent was patent. The proximal LAD has a significant lesion that   was treated with a drug-eluting stent. The diagonal stent was patent. The left  circumflex stent and the obtuse marginal stents were also patent. The right    coronary artery was totally occluded with left to right collaterals. A repeat   left heart catheterization in 2016 revealed no change in anatomy.

## 2018-06-28 NOTE — CONSULTS
[facility]  Cardiology  Consult Note    Patient Name: Laurita Stokes  MRN: [id number]  Admission Date: 6/28/2018  Hospital Length of Stay: 0 days  Code Status: Prior   Attending Provider: Ayala Rogers MD   Consulting Provider: Caleb Velasco MD  Primary Care Physician: Alex Capellan MD  Principal Problem:<principal problem not specified>    Patient information was obtained from relative(s) and ER records.     Inpatient consult to Cardiology  Consult performed by: CALEB VELASCO  Consult ordered by: BRII HERRERA        Subjective:     Chief Complaint:  Chest pain     HPI:   83-year-old female, cardiology consulted for elevated troponin and chest pain. Known ischemic cardiomyopathy, here from hospice for agitation. EKG in 2015 paced rhythm, now LBBB.   Complained of chest pain in the ED that has resolved on my interview, she has a nitro patch that was placed in ED. Has dementia therefore unable to describe the episode of chest pain. On room air breathing comfortably. Bedside echo with EF 15-20, moderate to severe MR and moderate TR, no pericardial effusion, RV low normal to mildly depressed but not dilated.  Inferior wall akinetic, anterolateral wall akinetic, anterior wall is hypokinetic as well as the infero and delio septum hypokinetic. There is global hypokinesis.  Patient is hospice care and interview with daughters, they do not prefer procedures such as a cardiac care at the moment.       With a history of cardiomyopathy with AICD placement along with episodes of ventricular tachycardia and ventricular fibrillation PAF used to be on warfarin. She also has dementia and chronic kidney disease. She's had multiple recurrent falls complicated by a right hip fracture, bilateral arm fractures, and intracranial bleeding. She was at an outside facility from July 11-24 2017 with a right hip fracture.    CAD (s/p CABG x3 in 2000), hx of VT (On Amiodarone), HFrEF  (EF=20-25%, s/p CRT-D, per daughter her ICD has been turned off as patient is in hospice care) c NYHA IV sx, pAF (Not on Coumadin due to recent SAH).    Per cardiology consult note in 2017   left heart catheterization in December 2015, that revealed an atretic LIMA to LAD, occluded saphenous vein graft to the obtuse marginal branch, and occluded saphenous vein graft to the right coronary artery.  The mid LAD stent was patent. The proximal LAD has a significant lesion that   was treated with a drug-eluting stent. The diagonal stent was patent. The left  circumflex stent and the obtuse marginal stents were also patent. The right   coronary artery was totally occluded with left to right collaterals. A repeat   left heart catheterization in 2016 revealed no change in anatomy.      Past Medical History:   Diagnosis Date    *Atrial fibrillation     on warfarin    Acquired hypothyroidism 4/29/2016    Allergy     seasonal    Anticoagulant long-term use     ASA 81 mg, Coumadin    Blood clotting tendency     Blood transfusion     Cataract     CHF (congestive heart failure)     CKD (chronic kidney disease) stage 4, GFR 15-29 ml/min 2/21/2017    Closed displaced fracture of neck of right femur with routine healing s/p hemiarthroplasty on 7/12/2017 7/11/2017    Coronary artery disease     PET stress in 1/14: lesions not amenable to PCI.    Coronary artery disease involving native coronary artery of native heart without angina pectoris 2/20/2017    DDD (degenerative disc disease), lumbar 8/20/2015    GERD (gastroesophageal reflux disease)     HEARING LOSS     wears Hearing aide    Hx of Ischemic colitis s/p colectomy 1/13/2014    Hyperlipidemia     Hypertension     Hypothyroid     MCI (mild cognitive impairment)     Myocardial infarct 1999    Pacemaker 2000    S/P CABG x 3 2000    SAH (subarachnoid hemorrhage) 5/24/2017    Ulcerative colitis     Ventricular tachycardia 1/14    VT storm 1/14, mexilitine  added       Past Surgical History:   Procedure Laterality Date    CARDIAC CATHETERIZATION  8/13    [facility], Dr Wallace; LIMA and SVG occluded. Proximal LAD 40%, ostial Diagonal 60%, LCX 30-40% in-stent restenosis, RCA totally occluded proximally    CATARACT EXTRACTION W/  INTRAOCULAR LENS IMPLANT Right 3/19/96    COLON SURGERY      ischemic colitis    COLONOSCOPY N/A 6/21/2016    Procedure: COLONOSCOPY;  Surgeon: Rob Cartagena MD;  Location: [facility] ENDO;  Service: Endoscopy;  Laterality: N/A;    COLONOSCOPY N/A 7/18/2016    Procedure: COLONOSCOPY;  Surgeon: Rob Cartagena MD;  Location: [facility] ENDO;  Service: Endoscopy;  Laterality: N/A;    CORONARY ARTERY BYPASS GRAFT  3/2001    3 vessel    CORONARY STENT PLACEMENT  12/2015    Dr. Freire    EYE SURGERY      HIP FRACTURE SURGERY Right 07/12/2017    Hemiarthroplasty for femoral neck fracture     HYSTERECTOMY      total    INSERT / REPLACE / REMOVE PACEMAKER  12/2009    [facility] CRT-D generator changeout    ORIF WRIST FRACTURE Right     TONSILLECTOMY         Review of patient's allergies indicates:   Allergen Reactions    Codeine Itching    Hydrocodone      GOT VERY VIOLANT    Azithromycin      History of VT    Lorazepam        No current facility-administered medications on file prior to encounter.      Current Outpatient Prescriptions on File Prior to Encounter   Medication Sig    acetaminophen (TYLENOL) 650 MG Supp Place 650 mg rectally every 4 (four) hours as needed (temp or pain).    alprazolam (XANAX) 0.25 MG tablet Take 1 tablet (0.25 mg total) by mouth 2 (two) times daily.    amiodarone (PACERONE) 200 MG Tab Take 1 tablet (200 mg total) by mouth 2 (two) times daily.    atropine 1% (ISOPTO ATROPINE) 1 % Drop Take 1 drop by mouth every 2 (two) hours as needed.    bumetanide (BUMEX) 1 MG tablet Take 1 tablet (1 mg total) by mouth once daily.    carvedilol (COREG) 3.125 MG tablet Take 1 tablet (3.125 mg total) by mouth  nightly.    cyanocobalamin, vitamin B-12, 1,000 mcg TbSR Take 1 tablet by mouth once daily.    digoxin (DIGOX) 125 mcg tablet Take 125 mcg by mouth every Mon, Wed, Fri.    enalapril (VASOTEC) 2.5 MG tablet Take 2.5 mg by mouth once daily.    haloperidol (HALDOL) 2 mg/mL solution Take 0.25 mg by mouth every 4 (four) hours as needed (agitation).    isosorbide mononitrate (IMDUR) 30 MG 24 hr tablet Take 30 mg by mouth once daily.    lactulose 10 gram/15 ml (CHRONULAC) 10 gram/15 mL (15 mL) solution Take 10 g by mouth daily as needed (CONSTIPATION).    levetiracetam (KEPPRA) 500 MG Tab Take 1 tablet (500 mg total) by mouth 2 (two) times daily.    levothyroxine (SYNTHROID) 88 MCG tablet Take 1 tablet (88 mcg total) by mouth before breakfast.    magnesium oxide (MAG-OX) 400 mg tablet TAKE 2 TABLETS BY MOUTH TWICE DAILY    morphine 20 mg/mL concentrated syringe Take 5 mg by mouth every 12 (twelve) hours.    morphine 20 mg/mL concentrated syringe Take 5 mg by mouth every 2 (two) hours as needed (pain or shortness of breath).    nitroGLYCERIN (NITROSTAT) 0.4 MG SL tablet Place 1 tablet (0.4 mg total) under the tongue every 5 (five) minutes as needed for Chest pain.    ondansetron (ZOFRAN) 4 MG tablet Take 4 mg by mouth every 4 (four) hours as needed.    pantoprazole (PROTONIX) 20 MG tablet Take 2 tablets (40 mg total) by mouth once daily.    senna-docusate 8.6-50 mg (PERICOLACE) 8.6-50 mg per tablet Take 1 tablet by mouth daily as needed for Constipation.    sertraline (ZOLOFT) 50 MG tablet Take 100 mg by mouth nightly.    spironolactone (ALDACTONE) 25 MG tablet Take 0.5 tablets (12.5 mg total) by mouth 3 (three) times a week. Monday, Wednesday, Friday (Patient taking differently: Take 12.5 mg by mouth once daily. )    tamsulosin (FLOMAX) 0.4 mg Cp24 Take 1 capsule (0.4 mg total) by mouth nightly.    temazepam (RESTORIL) 15 mg Cap Take 1 capsule (15 mg total) by mouth nightly as needed (SLEEP).     tramadol (ULTRAM) 50 mg tablet Take 50 mg by mouth every 6 (six) hours as needed for Pain.     Family History     Problem Relation (Age of Onset)    Cancer Sister    Heart disease Father (65), Sister (55)        Social History Main Topics    Smoking status: Former Smoker     Packs/day: 1.00     Years: 40.00     Types: Cigarettes     Start date: 1/1/1953     Quit date: 1/1/2013    Smokeless tobacco: Never Used    Alcohol use No    Drug use: No    Sexual activity: No     Review of Systems   Unable to perform ROS: dementia   All other systems reviewed and are negative.    Objective:     Vital Signs (Most Recent):  Temp: 97.5 °F (36.4 °C) (06/28/18 0829)  Pulse: 85 (06/28/18 1318)  Resp: 19 (06/28/18 1318)  BP: 124/71 (06/28/18 1318)  SpO2: 95 % (06/28/18 1318) Vital Signs (24h Range):  Temp:  [97.5 °F (36.4 °C)] 97.5 °F (36.4 °C)  Pulse:  [80-93] 85  Resp:  [13-29] 19  SpO2:  [94 %-98 %] 95 %  BP: (113-135)/(57-86) 124/71        There is no height or weight on file to calculate BMI.    SpO2: 95 %  O2 Device (Oxygen Therapy): room air    No intake or output data in the 24 hours ending 06/28/18 1344    Lines/Drains/Airways          No matching active lines, drains, or airways          Physical Exam   Constitutional: She appears well-developed and well-nourished. No distress.   HENT:   Head: Normocephalic and atraumatic.   Eyes: No scleral icterus.   Neck:   JVD elevated to the angle of the mandible    Cardiovascular: Normal rate and regular rhythm.    Murmur heard.  Pulmonary/Chest: Effort normal. No respiratory distress. She has no wheezes. She has rales.   Rales at bases    Musculoskeletal: 2-3+ EDEMA  Of the lower extremities below the knee.   Neurological: She is alert.   Skin: Skin is warm. She is not diaphoretic.       Significant Labs: All pertinent lab results from the last 24 hours have been reviewed.    Significant Imaging: X-Ray: CXR: X-Ray Chest 1 View (CXR): No results found for this visit on  06/28/18.    Assessment and Plan:     Cardiomyopathy, ischemic    85 y/o F here from hospice (hospice due to end stage HF) for agitation found to have ADHF, with an episode of chest pain while in ED. Cardiac biomarkers are elevated. EKG with LBBB, last EKG is from 2015 and it was paced rhythm at the time. Currently no chest pain, given that she is hypervolemic recommend one dose of lasix 80mg IV once, thereafter increase her home bumex 1mg daily to bumex 1mg BID.   She is hospice care therefore would not favor any additional ischemia work up.            VTE Risk Mitigation     None          Thank you for your consult. I will sign off. Please contact us if you have any additional questions.    Caleb Velasco MD  Cardiology   [facility]

## 2018-06-28 NOTE — ASSESSMENT & PLAN NOTE
85 y/o F here from hospice (hospice due to end stage HF) for agitation found to have ADHF, with an episode of chest pain while in ED. Cardiac biomarkers are elevated. EKG with LBBB last EKG is from 2015 and it was paced rhythm at the time. Currently no chest pain, given that she is hypervolemic recommend one dose of lasix 80mg IV once, thereafter increase her home bumex 1mg daily to bumex 1mg BID.
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
## 2018-06-29 PROBLEM — L03.115 CELLULITIS OF RIGHT LOWER EXTREMITY: Status: ACTIVE | Noted: 2018-06-29

## 2018-06-29 LAB
ALBUMIN SERPL BCP-MCNC: 3.1 G/DL
ALP SERPL-CCNC: 166 U/L
ALT SERPL W/O P-5'-P-CCNC: 151 U/L
ANION GAP SERPL CALC-SCNC: 12 MMOL/L
AST SERPL-CCNC: 132 U/L
BACTERIA #/AREA URNS AUTO: ABNORMAL /HPF
BACTERIA #/AREA URNS AUTO: NORMAL /HPF
BASOPHILS # BLD AUTO: 0.03 K/UL
BASOPHILS NFR BLD: 0.5 %
BILIRUB SERPL-MCNC: 1.4 MG/DL
BILIRUB UR QL STRIP: NEGATIVE
BILIRUB UR QL STRIP: NEGATIVE
BUN SERPL-MCNC: 34 MG/DL
CALCIUM SERPL-MCNC: 8.4 MG/DL
CHLORIDE SERPL-SCNC: 103 MMOL/L
CLARITY UR REFRACT.AUTO: CLEAR
CLARITY UR REFRACT.AUTO: CLEAR
CO2 SERPL-SCNC: 26 MMOL/L
COLOR UR AUTO: ABNORMAL
COLOR UR AUTO: YELLOW
CREAT SERPL-MCNC: 1.4 MG/DL
DIFFERENTIAL METHOD: ABNORMAL
DIGOXIN SERPL-MCNC: 0.4 NG/ML
EOSINOPHIL # BLD AUTO: 0.2 K/UL
EOSINOPHIL NFR BLD: 3.4 %
ERYTHROCYTE [DISTWIDTH] IN BLOOD BY AUTOMATED COUNT: 17.9 %
EST. GFR  (AFRICAN AMERICAN): 39.8 ML/MIN/1.73 M^2
EST. GFR  (NON AFRICAN AMERICAN): 34.5 ML/MIN/1.73 M^2
ESTIMATED AVG GLUCOSE: 111 MG/DL
GLUCOSE SERPL-MCNC: 96 MG/DL
GLUCOSE UR QL STRIP: NEGATIVE
GLUCOSE UR QL STRIP: NEGATIVE
HBA1C MFR BLD HPLC: 5.5 %
HCT VFR BLD AUTO: 32.8 %
HGB BLD-MCNC: 10.3 G/DL
HGB UR QL STRIP: NEGATIVE
HGB UR QL STRIP: NEGATIVE
IMM GRANULOCYTES # BLD AUTO: 0.01 K/UL
IMM GRANULOCYTES NFR BLD AUTO: 0.2 %
KETONES UR QL STRIP: NEGATIVE
KETONES UR QL STRIP: NEGATIVE
LACTATE SERPL-SCNC: 1.2 MMOL/L
LACTATE SERPL-SCNC: 1.2 MMOL/L
LEUKOCYTE ESTERASE UR QL STRIP: ABNORMAL
LEUKOCYTE ESTERASE UR QL STRIP: ABNORMAL
LYMPHOCYTES # BLD AUTO: 0.9 K/UL
LYMPHOCYTES NFR BLD: 15.5 %
MAGNESIUM SERPL-MCNC: 1.7 MG/DL
MCH RBC QN AUTO: 29.8 PG
MCHC RBC AUTO-ENTMCNC: 31.4 G/DL
MCV RBC AUTO: 95 FL
MICROSCOPIC COMMENT: ABNORMAL
MICROSCOPIC COMMENT: NORMAL
MONOCYTES # BLD AUTO: 0.7 K/UL
MONOCYTES NFR BLD: 11.6 %
NEUTROPHILS # BLD AUTO: 3.9 K/UL
NEUTROPHILS NFR BLD: 68.8 %
NITRITE UR QL STRIP: NEGATIVE
NITRITE UR QL STRIP: NEGATIVE
NON-SQ EPI CELLS #/AREA URNS AUTO: <1 /HPF
NRBC BLD-RTO: 0 /100 WBC
PH UR STRIP: 6 [PH] (ref 5–8)
PH UR STRIP: 7 [PH] (ref 5–8)
PHOSPHATE SERPL-MCNC: 2.8 MG/DL
PLATELET # BLD AUTO: 197 K/UL
PMV BLD AUTO: 10.7 FL
POTASSIUM SERPL-SCNC: 3.5 MMOL/L
PROCALCITONIN SERPL IA-MCNC: 0.15 NG/ML
PROT SERPL-MCNC: 6.3 G/DL
PROT UR QL STRIP: NEGATIVE
PROT UR QL STRIP: NEGATIVE
RBC # BLD AUTO: 3.46 M/UL
RBC #/AREA URNS AUTO: 0 /HPF (ref 0–4)
RBC #/AREA URNS AUTO: 1 /HPF (ref 0–4)
SODIUM SERPL-SCNC: 141 MMOL/L
SP GR UR STRIP: 1.01 (ref 1–1.03)
SP GR UR STRIP: 1.01 (ref 1–1.03)
SQUAMOUS #/AREA URNS AUTO: 0 /HPF
SQUAMOUS #/AREA URNS AUTO: 1 /HPF
TROPONIN I SERPL DL<=0.01 NG/ML-MCNC: 0.13 NG/ML
URN SPEC COLLECT METH UR: ABNORMAL
URN SPEC COLLECT METH UR: ABNORMAL
UROBILINOGEN UR STRIP-ACNC: NEGATIVE EU/DL
UROBILINOGEN UR STRIP-ACNC: NEGATIVE EU/DL
WBC # BLD AUTO: 5.62 K/UL
WBC #/AREA URNS AUTO: 1 /HPF (ref 0–5)
WBC #/AREA URNS AUTO: 20 /HPF (ref 0–5)

## 2018-06-29 PROCEDURE — 84145 PROCALCITONIN (PCT): CPT

## 2018-06-29 PROCEDURE — 83036 HEMOGLOBIN GLYCOSYLATED A1C: CPT

## 2018-06-29 PROCEDURE — 80162 ASSAY OF DIGOXIN TOTAL: CPT

## 2018-06-29 PROCEDURE — 11000001 HC ACUTE MED/SURG PRIVATE ROOM

## 2018-06-29 PROCEDURE — 87186 SC STD MICRODIL/AGAR DIL: CPT

## 2018-06-29 PROCEDURE — 80053 COMPREHEN METABOLIC PANEL: CPT

## 2018-06-29 PROCEDURE — 85025 COMPLETE CBC W/AUTO DIFF WBC: CPT

## 2018-06-29 PROCEDURE — 87088 URINE BACTERIA CULTURE: CPT

## 2018-06-29 PROCEDURE — 87086 URINE CULTURE/COLONY COUNT: CPT

## 2018-06-29 PROCEDURE — 36415 COLL VENOUS BLD VENIPUNCTURE: CPT

## 2018-06-29 PROCEDURE — 84100 ASSAY OF PHOSPHORUS: CPT

## 2018-06-29 PROCEDURE — 83735 ASSAY OF MAGNESIUM: CPT

## 2018-06-29 PROCEDURE — 93010 ELECTROCARDIOGRAM REPORT: CPT | Mod: GW,77,, | Performed by: INTERNAL MEDICINE

## 2018-06-29 PROCEDURE — 63600175 PHARM REV CODE 636 W HCPCS: Performed by: HOSPITALIST

## 2018-06-29 PROCEDURE — 99222 1ST HOSP IP/OBS MODERATE 55: CPT | Mod: GW,HPC,, | Performed by: PSYCHIATRY & NEUROLOGY

## 2018-06-29 PROCEDURE — 83605 ASSAY OF LACTIC ACID: CPT

## 2018-06-29 PROCEDURE — 87077 CULTURE AEROBIC IDENTIFY: CPT | Mod: 59

## 2018-06-29 PROCEDURE — 84484 ASSAY OF TROPONIN QUANT: CPT

## 2018-06-29 PROCEDURE — 93010 ELECTROCARDIOGRAM REPORT: CPT | Mod: GW,,, | Performed by: INTERNAL MEDICINE

## 2018-06-29 PROCEDURE — 25000003 PHARM REV CODE 250: Performed by: HOSPITALIST

## 2018-06-29 PROCEDURE — 93010 ELECTROCARDIOGRAM REPORT: CPT | Mod: GW,76,HPC, | Performed by: INTERNAL MEDICINE

## 2018-06-29 PROCEDURE — 81001 URINALYSIS AUTO W/SCOPE: CPT

## 2018-06-29 PROCEDURE — 93005 ELECTROCARDIOGRAM TRACING: CPT

## 2018-06-29 PROCEDURE — 99233 SBSQ HOSP IP/OBS HIGH 50: CPT | Mod: GW,HPC,, | Performed by: HOSPITALIST

## 2018-06-29 RX ORDER — CEFTRIAXONE 1 G/1
1 INJECTION, POWDER, FOR SOLUTION INTRAMUSCULAR; INTRAVENOUS
Status: DISCONTINUED | OUTPATIENT
Start: 2018-06-29 | End: 2018-07-02

## 2018-06-29 RX ORDER — METOLAZONE 2.5 MG/1
5 TABLET ORAL DAILY
Status: COMPLETED | OUTPATIENT
Start: 2018-06-29 | End: 2018-06-30

## 2018-06-29 RX ORDER — RISPERIDONE 1 MG/1
1 TABLET ORAL 2 TIMES DAILY PRN
Status: DISCONTINUED | OUTPATIENT
Start: 2018-06-29 | End: 2018-07-06 | Stop reason: HOSPADM

## 2018-06-29 RX ORDER — IPRATROPIUM BROMIDE AND ALBUTEROL SULFATE 2.5; .5 MG/3ML; MG/3ML
3 SOLUTION RESPIRATORY (INHALATION) EVERY 4 HOURS PRN
Status: DISCONTINUED | OUTPATIENT
Start: 2018-06-29 | End: 2018-07-06 | Stop reason: HOSPADM

## 2018-06-29 RX ADMIN — BUMETANIDE 1 MG: 1 TABLET ORAL at 09:06

## 2018-06-29 RX ADMIN — CEFTRIAXONE SODIUM 1 G: 1 INJECTION, POWDER, FOR SOLUTION INTRAMUSCULAR; INTRAVENOUS at 03:06

## 2018-06-29 RX ADMIN — HEPARIN SODIUM 5000 UNITS: 5000 INJECTION, SOLUTION INTRAVENOUS; SUBCUTANEOUS at 06:06

## 2018-06-29 RX ADMIN — POTASSIUM BICARBONATE 25 MEQ: 25 TABLET, EFFERVESCENT ORAL at 11:06

## 2018-06-29 RX ADMIN — DIGOXIN 125 MCG: 0.12 TABLET ORAL at 05:06

## 2018-06-29 RX ADMIN — HEPARIN SODIUM 5000 UNITS: 5000 INJECTION, SOLUTION INTRAVENOUS; SUBCUTANEOUS at 03:06

## 2018-06-29 RX ADMIN — LEVETIRACETAM 250 MG: 250 TABLET, FILM COATED ORAL at 09:06

## 2018-06-29 RX ADMIN — METOLAZONE 5 MG: 2.5 TABLET ORAL at 11:06

## 2018-06-29 RX ADMIN — SENNOSIDES AND DOCUSATE SODIUM 1 TABLET: 8.6; 5 TABLET ORAL at 09:06

## 2018-06-29 RX ADMIN — HEPARIN SODIUM 5000 UNITS: 5000 INJECTION, SOLUTION INTRAVENOUS; SUBCUTANEOUS at 10:06

## 2018-06-29 RX ADMIN — ISOSORBIDE MONONITRATE 30 MG: 30 TABLET, EXTENDED RELEASE ORAL at 09:06

## 2018-06-29 RX ADMIN — BUMETANIDE 1 MG: 1 TABLET ORAL at 10:06

## 2018-06-29 RX ADMIN — RISPERIDONE 1 MG: 1 TABLET ORAL at 11:06

## 2018-06-29 RX ADMIN — FOLIC ACID 1 MG: 1 TABLET ORAL at 09:06

## 2018-06-29 RX ADMIN — ACETAMINOPHEN 650 MG: 325 TABLET, FILM COATED ORAL at 11:06

## 2018-06-29 RX ADMIN — PANTOPRAZOLE SODIUM 20 MG: 20 TABLET, DELAYED RELEASE ORAL at 09:06

## 2018-06-29 RX ADMIN — CARBAMAZEPINE 100 MG: 100 TABLET, CHEWABLE ORAL at 09:06

## 2018-06-29 RX ADMIN — CARBAMAZEPINE 100 MG: 100 TABLET, CHEWABLE ORAL at 10:06

## 2018-06-29 NOTE — PLAN OF CARE
Problem: Patient Care Overview  Goal: Plan of Care Review  Outcome: Ongoing (interventions implemented as appropriate)  Plan of care reviewed. Pt AAO transport to restroom with assist. No distress noted. Daughters at bedside. Will cont to monitor
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
## 2018-06-29 NOTE — ASSESSMENT & PLAN NOTE
Cardiomyopathy, ischemic  Pulmonary HTN  Per report of hospice due to end stage HF found to have ADHF, with an episode of chest pain while in ED. Per Cards, Cardiac biomarkers are elevated. EKG with LBBB, last EKG is from 2015 and it was paced rhythm at the time. Currently no chest pain, given that she is hypervolemic recommend one dose of lasix 80mg IV once, thereafter increase her home bumex 1mg daily to bumex 1mg BID. She is hospice care therefore would not favor any additional ischemia work up.  - On Dig, level check WNL  - BNP elevation, LE edema, suspect cardio-renal syndrome given elevated BUN/Cr  - Bumex 1mg BID, added metolazone   - Strict I/Os, Cardiac diet (per daughter was not follow at NH)  - Monitor renal function  - Discuss with daughter, ok with diuresis given DNR

## 2018-06-29 NOTE — ASSESSMENT & PLAN NOTE
Cardiomyopathy, ischemic  Pulmonary HTN  Per report of hospice due to end stage HF found to have ADHF, with an episode of chest pain while in ED. Per Cards, Cardiac biomarkers are elevated. EKG with LBBB, last EKG is from 2015 and it was paced rhythm at the time. Currently no chest pain, given that she is hypervolemic recommend one dose of lasix 80mg IV once, thereafter increase her home bumex 1mg daily to bumex 1mg BID. She is hospice care therefore would not favor any additional ischemia work up.  - On Dig (checking level in AM)  - BNP elevation, LE edema, suspect cardio-renal syndrome given elevated BUN/Cr  - Bumex restarted, pt w/o IV access, midline consult place for access  - Strict I/Os, Cardiac diet (per daughter was not follow at NH)  - Monitor renal function  - Discuss with daughter, ok with diuresis given DNR

## 2018-06-29 NOTE — NURSING
On arrival to [facility], pt complains of chest pain and foot pain. Feet are red and 1+ to 2+ edema. Had kicked a door earlier during episode at nursing home. Will contact on call provider

## 2018-06-29 NOTE — MEDICAL/APP STUDENT
"Initial Psychiatric Evaluation      6/29/2018 9:43 AM   Laurita Stokes   12/12/1933   [id number]           Date of Admission: 6/28/2018  8:32 AM    Chief Complaint/Reason for consult:  "She has been more aggressive lately."    SUBJECTIVE:   History of Present Illness:   Laurita Stokes is a 84 y.o. female with past psychiatric history of depression (30 years) and medical history of atrial fibrillation (not on coumadin b/c subarachnoid hemorrhage), end stage CHF, CKD stage III, CAD with previous MI, CABG, pacemaker, and psychiatry was consulted for acute changes in her behavior.    History was taken from the patient's two daughters Marlene and Kinza (Power of ).  The patient is a resident at [facility] with Guardian Danial Leyva (with DNR), and was without any complaints until she began "acting up" last week.  She notes that the patient has been yelling, cursing, kicking, and throwing objects at other residents in the facility because "she thinks other people are trying to get into the home."  She also reports that the patient has been having visual hallucinations of people and objections since last week.  Furthermore, she notes that the pt has been complaining of feeling hot/ cold.    The daughter notes that ever since the patient had a fall last year and broke her hip, the patient never was able come back to baseline.  She notes that the patient had imaging of her head at [facility], which was negative for "mini-strokes", and was diagnosed with dementia at this time.  The patient has been living at [facility] since Sept 2017.  6 weeks ago, the patient was admitted to geriatric psychiatry at [facility] for a similar presentation and stayed there for 2 weeks.  She was treated for a UTI at this time.    The daughter suspects that this may be related to the patient's lack of sleep, changes in medication and questions whether her mother would be better back at home on hospice.  She notes " never having problems when the patient was living at home.  She states that the patient normally is able to get 8 hours of sleep, but has been sleeping 2 hours a night this past week.    Collateral: Daughters, Marlene and Kinza PIMENTEL    Past Psychiatric History:   Previous Psychiatric Hospitalizations: Yes, 6wks ago geriatric psych, [facility]  Previous Medication Trials:  No  History of psychotherapy:  No  Previous Suicide Attempts:  No   History of Violence:  No   History of physical/sexual abuse: No  Outpatient psychiatrist (current & past): Dr. Bustillo    Substance Abuse History:   Tobacco: Yes, 40 pack year, quit 2013  Alcohol: No  Illicit Substances: No  Detox/Rehab: No      Past Medical/Surgical History:   Past Medical History:   Diagnosis Date    *Atrial fibrillation     on warfarin    Acquired hypothyroidism 4/29/2016    Allergy     seasonal    Anticoagulant long-term use     ASA 81 mg, Coumadin    Blood clotting tendency     Blood transfusion     Cataract     CHF (congestive heart failure)     CKD (chronic kidney disease) stage 4, GFR 15-29 ml/min 2/21/2017    Closed displaced fracture of neck of right femur with routine healing s/p hemiarthroplasty on 7/12/2017 7/11/2017    Coronary artery disease     PET stress in 1/14: lesions not amenable to PCI.    Coronary artery disease involving native coronary artery of native heart without angina pectoris 2/20/2017    DDD (degenerative disc disease), lumbar 8/20/2015    GERD (gastroesophageal reflux disease)     HEARING LOSS     wears Hearing aide    Hx of Ischemic colitis s/p colectomy 1/13/2014    Hyperlipidemia     Hypertension     Hypothyroid     MCI (mild cognitive impairment)     Myocardial infarct 1999    Pacemaker 2000    S/P CABG x 3 2000    SAH (subarachnoid hemorrhage) 5/24/2017    Ulcerative colitis     Ventricular tachycardia 1/14    VT storm 1/14, mexilitine added     Past Surgical History:   Procedure Laterality Date     CARDIAC CATHETERIZATION  8/13    Heart care center [facility], Dr Wallace; LIMA and SVG occluded. Proximal LAD 40%, ostial Diagonal 60%, LCX 30-40% in-stent restenosis, RCA totally occluded proximally    CATARACT EXTRACTION W/  INTRAOCULAR LENS IMPLANT Right 3/19/96    COLON SURGERY      ischemic colitis    COLONOSCOPY N/A 6/21/2016    Procedure: COLONOSCOPY;  Surgeon: Rob Cartagena MD;  Location: [facility] ENDO;  Service: Endoscopy;  Laterality: N/A;    COLONOSCOPY N/A 7/18/2016    Procedure: COLONOSCOPY;  Surgeon: Rob Cartagena MD;  Location[facility] ENDO;  Service: Endoscopy;  Laterality: N/A;    CORONARY ARTERY BYPASS GRAFT  3/2001    3 vessel    CORONARY STENT PLACEMENT  12/2015    Dr. Freire    EYE SURGERY      HIP FRACTURE SURGERY Right 07/12/2017    Hemiarthroplasty for femoral neck fracture     HYSTERECTOMY      total    INSERT / REPLACE / REMOVE PACEMAKER  12/2009    [facility] CRT-D generator changeout    ORIF WRIST FRACTURE Right     TONSILLECTOMY           Current Medications:   MEDICATIONS: See list below  Keppra 250 BID  Abilify  Remeron 250 BID  Viibryd (vilazodone) 10mg  Compazine  Morphine 5mg q12h  Carbamazepine  Xanax 0.25 mg  Tegretol 100mg BID    Bumetanide  Enalapril  Isosorbide mononitrate    PRN Zyprexa    Allergies:   Review of patient's allergies indicates:   Allergen Reactions    Codeine Itching    Hydrocodone      GOT VERY VIOLANT    Azithromycin      History of VT    Lorazepam          Neurological History:   Seizures: No   Head trauma: No       Family Psychiatric History: No  Social History:  Developmental/Childhood: Achieved all developmental milestones timely    Education:Bachelor's Degree  Employment Status/Finances:Retired , previously clerical work  Relationship Status/Sexual Orientation: Single  Children: 2  Housing Status: [facility]    history:  No   Access to gun: No  Advent:n/a  Recreational activities:Time with family      Legal History:   Past  Charges/Incarcerations:  No      OBJECTIVE:       Vitals   Vitals:    06/29/18 0757   BP: 136/62   Pulse: 102   Resp: 14   Temp: 98.4 °F (36.9 °C)        Labs/Imaging/Studies:   Recent Results (from the past 48 hour(s))   CBC auto differential    Collection Time: 06/28/18  9:41 AM   Result Value Ref Range    WBC 7.79 3.90 - 12.70 K/uL    RBC 3.66 (L) 4.00 - 5.40 M/uL    Hemoglobin 10.7 (L) 12.0 - 16.0 g/dL    Hematocrit 34.9 (L) 37.0 - 48.5 %    MCV 95 82 - 98 fL    MCH 29.2 27.0 - 31.0 pg    MCHC 30.7 (L) 32.0 - 36.0 g/dL    RDW 17.6 (H) 11.5 - 14.5 %    Platelets 223 150 - 350 K/uL    MPV 11.0 9.2 - 12.9 fL    Immature Granulocytes 0.3 0.0 - 0.5 %    Gran # (ANC) 5.5 1.8 - 7.7 K/uL    Immature Grans (Abs) 0.02 0.00 - 0.04 K/uL    Lymph # 1.2 1.0 - 4.8 K/uL    Mono # 0.8 0.3 - 1.0 K/uL    Eos # 0.2 0.0 - 0.5 K/uL    Baso # 0.05 0.00 - 0.20 K/uL    nRBC 0 0 /100 WBC    Gran% 70.5 38.0 - 73.0 %    Lymph% 15.7 (L) 18.0 - 48.0 %    Mono% 10.8 4.0 - 15.0 %    Eosinophil% 2.1 0.0 - 8.0 %    Basophil% 0.6 0.0 - 1.9 %    Differential Method Automated    Comprehensive metabolic panel    Collection Time: 06/28/18  9:41 AM   Result Value Ref Range    Sodium 138 136 - 145 mmol/L    Potassium 4.1 3.5 - 5.1 mmol/L    Chloride 101 95 - 110 mmol/L    CO2 24 23 - 29 mmol/L    Glucose 95 70 - 110 mg/dL    BUN, Bld 40 (H) 8 - 23 mg/dL    Creatinine 1.5 (H) 0.5 - 1.4 mg/dL    Calcium 9.0 8.7 - 10.5 mg/dL    Total Protein 6.9 6.0 - 8.4 g/dL    Albumin 3.5 3.5 - 5.2 g/dL    Total Bilirubin 1.3 (H) 0.1 - 1.0 mg/dL    Alkaline Phosphatase 178 (H) 55 - 135 U/L     (H) 10 - 40 U/L     (H) 10 - 44 U/L    Anion Gap 13 8 - 16 mmol/L    eGFR if African American 36.6 (A) >60 mL/min/1.73 m^2    eGFR if non  31.8 (A) >60 mL/min/1.73 m^2   TSH    Collection Time: 06/28/18  9:41 AM   Result Value Ref Range    TSH 2.329 0.400 - 4.000 uIU/mL   Ethanol    Collection Time: 06/28/18  9:41 AM   Result Value Ref Range     Alcohol, Medical, Serum <10 <10 mg/dL   Acetaminophen level    Collection Time: 06/28/18  9:41 AM   Result Value Ref Range    Acetaminophen (Tylenol), Serum 6.0 (L) 10.0 - 20.0 ug/mL   Brain natriuretic peptide    Collection Time: 06/28/18  9:41 AM   Result Value Ref Range    BNP >4,900 (H) 0 - 99 pg/mL   Troponin I    Collection Time: 06/28/18  9:41 AM   Result Value Ref Range    Troponin I 0.146 (H) 0.000 - 0.026 ng/mL   Troponin I    Collection Time: 06/28/18  1:04 PM   Result Value Ref Range    Troponin I 0.160 (H) 0.000 - 0.026 ng/mL   Drug screen panel, emergency    Collection Time: 06/28/18  9:08 PM   Result Value Ref Range    Benzodiazepines Negative     Methadone metabolites Negative     Cocaine (Metab.) Negative     Opiate Scrn, Ur Negative     Barbiturate Screen, Ur Negative     Amphetamine Screen, Ur Negative     THC Negative     Phencyclidine Negative     Creatinine, Random Ur 18.0 15.0 - 325.0 mg/dL    Toxicology Information SEE COMMENT    Urinalysis    Collection Time: 06/28/18  9:08 PM   Result Value Ref Range    Specimen UA Urine, Unspecified     Color, UA Straw Yellow, Straw, Hortensia    Appearance, UA Clear Clear    pH, UA 6.0 5.0 - 8.0    Specific Gravity, UA 1.010 1.005 - 1.030    Protein, UA Negative Negative    Glucose, UA Negative Negative    Ketones, UA Negative Negative    Bilirubin (UA) Negative Negative    Occult Blood UA Negative Negative    Nitrite, UA Negative Negative    Urobilinogen, UA Negative <2.0 EU/dL    Leukocytes, UA Trace (A) Negative   Urinalysis Microscopic    Collection Time: 06/28/18  9:08 PM   Result Value Ref Range    RBC, UA 0 0 - 4 /hpf    WBC, UA 1 0 - 5 /hpf    Bacteria, UA Rare None-Occ /hpf    Squam Epithel, UA 0 /hpf    Microscopic Comment SEE COMMENT    Lactic acid, plasma    Collection Time: 06/29/18  6:29 AM   Result Value Ref Range    Lactate (Lactic Acid) 1.2 0.5 - 2.2 mmol/L   CBC auto differential    Collection Time: 06/29/18  6:29 AM   Result Value Ref Range     WBC 5.62 3.90 - 12.70 K/uL    RBC 3.46 (L) 4.00 - 5.40 M/uL    Hemoglobin 10.3 (L) 12.0 - 16.0 g/dL    Hematocrit 32.8 (L) 37.0 - 48.5 %    MCV 95 82 - 98 fL    MCH 29.8 27.0 - 31.0 pg    MCHC 31.4 (L) 32.0 - 36.0 g/dL    RDW 17.9 (H) 11.5 - 14.5 %    Platelets 197 150 - 350 K/uL    MPV 10.7 9.2 - 12.9 fL    Immature Granulocytes 0.2 0.0 - 0.5 %    Gran # (ANC) 3.9 1.8 - 7.7 K/uL    Immature Grans (Abs) 0.01 0.00 - 0.04 K/uL    Lymph # 0.9 (L) 1.0 - 4.8 K/uL    Mono # 0.7 0.3 - 1.0 K/uL    Eos # 0.2 0.0 - 0.5 K/uL    Baso # 0.03 0.00 - 0.20 K/uL    nRBC 0 0 /100 WBC    Gran% 68.8 38.0 - 73.0 %    Lymph% 15.5 (L) 18.0 - 48.0 %    Mono% 11.6 4.0 - 15.0 %    Eosinophil% 3.4 0.0 - 8.0 %    Basophil% 0.5 0.0 - 1.9 %    Differential Method Automated    Comprehensive metabolic panel    Collection Time: 06/29/18  6:29 AM   Result Value Ref Range    Sodium 141 136 - 145 mmol/L    Potassium 3.5 3.5 - 5.1 mmol/L    Chloride 103 95 - 110 mmol/L    CO2 26 23 - 29 mmol/L    Glucose 96 70 - 110 mg/dL    BUN, Bld 34 (H) 8 - 23 mg/dL    Creatinine 1.4 0.5 - 1.4 mg/dL    Calcium 8.4 (L) 8.7 - 10.5 mg/dL    Total Protein 6.3 6.0 - 8.4 g/dL    Albumin 3.1 (L) 3.5 - 5.2 g/dL    Total Bilirubin 1.4 (H) 0.1 - 1.0 mg/dL    Alkaline Phosphatase 166 (H) 55 - 135 U/L     (H) 10 - 40 U/L     (H) 10 - 44 U/L    Anion Gap 12 8 - 16 mmol/L    eGFR if African American 39.8 (A) >60 mL/min/1.73 m^2    eGFR if non  34.5 (A) >60 mL/min/1.73 m^2   Digoxin level    Collection Time: 06/29/18  6:29 AM   Result Value Ref Range    Digoxin Lvl 0.4 (L) 0.8 - 2.0 ng/mL   Hemoglobin A1c    Collection Time: 06/29/18  6:29 AM   Result Value Ref Range    Hemoglobin A1C 5.5 4.0 - 5.6 %    Estimated Avg Glucose 111 68 - 131 mg/dL   Lactic acid, plasma    Collection Time: 06/29/18  6:29 AM   Result Value Ref Range    Lactate (Lactic Acid) 1.2 0.5 - 2.2 mmol/L   Procalcitonin    Collection Time: 06/29/18  6:29 AM   Result Value Ref  "Range    Procalcitonin 0.15 <0.25 ng/mL   Magnesium    Collection Time: 06/29/18  6:29 AM   Result Value Ref Range    Magnesium 1.7 1.6 - 2.6 mg/dL   Phosphorus    Collection Time: 06/29/18  6:29 AM   Result Value Ref Range    Phosphorus 2.8 2.7 - 4.5 mg/dL   Troponin I    Collection Time: 06/29/18  6:29 AM   Result Value Ref Range    Troponin I 0.130 (H) 0.000 - 0.026 ng/mL      No results found for: PHENYTOIN, PHENOBARB, VALPROATE, CBMZ      Musculoskeletal Exam:  Abnormal Involuntary Movements: No  Strength: Greater than antigravity (3+/5) in all extremities   Muscle tone: No impairment   Station: Grossly normal       General/Constitutional Exam:  Appearance: no effort/attention to appearance    Strengths AND Liabilities  Strength: Patient has positive support network., Liability: Patient has intellectual deficits., Liability: Patient lacks coping skills.    Psychiatric Mental Status Exam:  CAM-ICU positive.    Arousal: obtunded  Sensorium/Orientation: oriented to person, named year 1957, states in hospital but not which  Behavior/Cooperation: psychomotor retardation, eye contact minimal   Speech: normal volume, slowed, increased latency of response, non-spontaneous  Language: able to repeat  Mood: " okay "   Affect: blunted  Thought Process: concrete, poverty of thought  Thought Content:   Auditory hallucinations: NO  Visual hallucinations: YES: sees people and objects that aren't present     Paranoia: YES  Delusions:  YES: believes people are trying to get into the house     Suicidal ideation: NO  Homicidal ideation: NO  Attention/Concentration:  unable to spell "WORLD" backwards  Memory:    Recent:  Absent   Remote: Absent   3/3 immediate, 0/3 at 5 min  Fund of Knowledge: unable to assess   Abstract reasoning: proverbs were concrete, similarities were concrete  Insight: impaired awareness of illness  Judgment: behavior is adequate to circumstances, limited         ASSESSMENT/PLAN:   Diagnosis: Senile dementia " with delirium     Senile dementia with delirium   Discontinue current psychiatric medications due to worsening delirium   1 mg Risperdal PRN as needed for agitation, to help with visual hallucinations. Safer for QTc prolongation. (Zyprexa would make more delirious)   0.5mg Xanax as needed for agitation.   EKG result showed QTc of 516, BNP >4900   Await results of        Mich Ware MS3  Psychiatry

## 2018-06-29 NOTE — SUBJECTIVE & OBJECTIVE
"Interval History: Sitter at bedside. Calmer today. R foot redness per daughter "she kicked a door yesterday". No obvious tenderness on eval. Psych consulted, updated daughter on plan of care    Review of Systems   Unable to perform ROS: Dementia     Objective:     Vital Signs (Most Recent):  Temp: 97.8 °F (36.6 °C) (06/29/18 1114)  Pulse: 76 (06/29/18 1114)  Resp: 16 (06/29/18 1114)  BP: (!) 111/56 (06/29/18 1114)  SpO2: 95 % (06/29/18 1114) Vital Signs (24h Range):  Temp:  [97.2 °F (36.2 °C)-98.4 °F (36.9 °C)] 97.8 °F (36.6 °C)  Pulse:  [] 76  Resp:  [14-23] 16  SpO2:  [92 %-96 %] 95 %  BP: (108-136)/(56-78) 111/56     Weight: 43 kg (94 lb 11.2 oz)  Body mass index is 16.26 kg/m².    Intake/Output Summary (Last 24 hours) at 06/29/18 1502  Last data filed at 06/29/18 1228   Gross per 24 hour   Intake              100 ml   Output              625 ml   Net             -525 ml      Physical Exam  Constitutional: She appears well-developed. She is not diaphoretic. No distress.   HENT:   Head: Normocephalic and atraumatic.   Right Ear: External ear normal.   Left Ear: External ear normal.   Mouth/Throat: No oropharyngeal exudate.   Eyes: Conjunctivae and EOM are normal. Pupils are equal, round, and reactive to light.   Neck: JVD present.   Cardiovascular: Normal rate, regular rhythm, normal heart sounds and intact distal pulses.   Pulmonary/Chest: No respiratory distress. Rales: mild rales at lung bases B/L.   Abdominal: Soft. Bowel sounds are normal. There is no tenderness.   Musculoskeletal: Normal range of motion. She exhibits 2-3+ EDEMA, R foot erythema but not TTP  Neurological: She is alert. No cranial nerve deficit.   Skin: Skin is warm and dry. Skin tears noted on extremities  Psychiatric: She has a normal mood and affect. Her behavior is normal. Judgment and thought content normal.       MELD-Na score: 9 at 7/14/2017  3:00 AM  MELD score: 9 at 7/14/2017  3:00 AM  Calculated from:  Serum Creatinine: 1.1 " mg/dL at 7/14/2017  3:00 AM  Serum Sodium: 133 mmol/L at 7/14/2017  3:00 AM  Total Bilirubin: 1 mg/dL at 7/14/2017  3:00 AM  INR(ratio): 1.2 at 7/12/2017  4:53 AM  Age: 83 years    Significant Labs:  CBC:    Recent Labs  Lab 06/28/18  0941 06/29/18  0629   WBC 7.79 5.62   HGB 10.7* 10.3*   HCT 34.9* 32.8*    197     CMP:    Recent Labs  Lab 06/28/18  0941 06/29/18  0629    141   K 4.1 3.5    103   CO2 24 26   GLU 95 96   BUN 40* 34*   CREATININE 1.5* 1.4   CALCIUM 9.0 8.4*   PROT 6.9 6.3   ALBUMIN 3.5 3.1*   BILITOT 1.3* 1.4*   ALKPHOS 178* 166*   * 132*   * 151*   ANIONGAP 13 12   EGFRNONAA 31.8* 34.5*     PTINR:  No results for input(s): INR in the last 48 hours.    Significant Procedures:   Dobutamine Stress Test with Color Flow: No results found. However, due to the size of the patient record, not all encounters were searched. Please check Results Review for a complete set of results.    None
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
## 2018-06-29 NOTE — ASSESSMENT & PLAN NOTE
"- R foot redness per daughter "she kicked a door yesterday". No obvious tenderness on eval  - ?cellulitis, Xray negative  - start IV rocephin for now    "
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
## 2018-06-29 NOTE — PROGRESS NOTES
DNR formed signs as per the pt official state recognized state form ( [place] reflecting DNR)    Placed in chart.

## 2018-06-29 NOTE — PLAN OF CARE
Per daughter, Marlene, who's in the room: Kinza is the POA legally  Per Marlene, pt stays  The Memory Care Unit at [facility] with [facility].     [facility]  Hospice in [place], [place]  Address: [address] [place] [address]  Phone: [phone]      R care referral initiated    Pt has hx dementia and has an [facility] at the bs    Daughter would like for hospice to resume when pt dc -see r care referral     06/29/18 0802   Discharge Assessment   Assessment Type Discharge Planning Assessment   Confirmed/corrected address and phone number on facesheet? Yes   Assessment information obtained from? Caregiver   Expected Length of Stay (days) 4   Communicated expected length of stay with patient/caregiver yes   Prior to hospitilization cognitive status: (HX DEMENTIA, IN MEMORY CARE UNIT AT [facility])   Prior to hospitalization functional status: Assistive Equipment;Needs Assistance   Current cognitive status: (CONFUSED)   Current Functional Status: Needs Assistance;Assistive Equipment   Lives With (MEMORY CARE UNIT AT [facility] WITH [facility] CARE RFERRAL)   Is patient able to care for self after discharge? No   Patient's perception of discharge disposition other (comments)   Patient currently being followed by outpatient case management? No   Patient currently receives any other outside agency services? No   Equipment Currently Used at Home bedside commode;wheelchair;walker, rolling;hospital bed;shower chair;grab bar;oxygen   Do you have any problems affording any of your prescribed medications? No   Does the patient have transportation home? Yes  (TBD)   Transportation Available family or friend will provide   Does the patient receive services at the [facility]? Yes   Discharge Plan A Other   Discharge Plan B Other   Patient/Family In Agreement With Plan yes

## 2018-06-29 NOTE — PLAN OF CARE
Problem: Fall Risk (Adult)  Goal: Identify Related Risk Factors and Signs and Symptoms  Related risk factors and signs and symptoms are identified upon initiation of Human Response Clinical Practice Guideline (CPG)   Outcome: Ongoing (interventions implemented as appropriate)   06/29/18 0548   Fall Risk   Related Risk Factors (Fall Risk) age-related changes;confusion/agitation;depression/anxiety;gait/mobility problems;history of falls;impaired vision;inadequate lighting;neuro disease/injury;objects hard to reach;polypharmacy;sleep pattern alteration;environment unfamiliar       Problem: Patient Care Overview  Goal: Plan of Care Review  Outcome: Ongoing (interventions implemented as appropriate)   06/29/18 0548   Coping/Psychosocial   Plan Of Care Reviewed With patient       Problem: Infection, Risk/Actual (Adult)  Goal: Identify Related Risk Factors and Signs and Symptoms  Related risk factors and signs and symptoms are identified upon initiation of Human Response Clinical Practice Guideline (CPG)   Outcome: Ongoing (interventions implemented as appropriate)   06/29/18 0548   Infection, Risk/Actual   Related Risk Factors (Infection, Risk/Actual) age extremes;chronic illness/condition;medication effects;skin integrity impairment;sleep disturbance   Signs and Symptoms (Infection, Risk/Actual) mental/behavioral changes       Problem: Pressure Ulcer Risk (Gage Scale) (Adult,Obstetrics,Pediatric)  Goal: Identify Related Risk Factors and Signs and Symptoms  Related risk factors and signs and symptoms are identified upon initiation of Human Response Clinical Practice Guideline (CPG)   Outcome: Ongoing (interventions implemented as appropriate)   06/29/18 0548   Pressure Ulcer Risk (Gage Scale)   Related Risk Factors (Pressure Ulcer Risk (Gage Scale)) age extremes;cognitive impairment;fluid intake inadequate;medication;mobility impaired

## 2018-06-29 NOTE — SUBJECTIVE & OBJECTIVE
Past Medical History:   Diagnosis Date    *Atrial fibrillation     on warfarin    Acquired hypothyroidism 4/29/2016    Allergy     seasonal    Anticoagulant long-term use     ASA 81 mg, Coumadin    Blood clotting tendency     Blood transfusion     Cataract     CHF (congestive heart failure)     CKD (chronic kidney disease) stage 4, GFR 15-29 ml/min 2/21/2017    Closed displaced fracture of neck of right femur with routine healing s/p hemiarthroplasty on 7/12/2017 7/11/2017    Coronary artery disease     PET stress in 1/14: lesions not amenable to PCI.    Coronary artery disease involving native coronary artery of native heart without angina pectoris 2/20/2017    DDD (degenerative disc disease), lumbar 8/20/2015    GERD (gastroesophageal reflux disease)     HEARING LOSS     wears Hearing aide    Hx of Ischemic colitis s/p colectomy 1/13/2014    Hyperlipidemia     Hypertension     Hypothyroid     MCI (mild cognitive impairment)     Myocardial infarct 1999    Pacemaker 2000    S/P CABG x 3 2000    SAH (subarachnoid hemorrhage) 5/24/2017    Ulcerative colitis     Ventricular tachycardia 1/14    VT storm 1/14, mexilitine added       Past Surgical History:   Procedure Laterality Date    CARDIAC CATHETERIZATION  8/13    Heart care center [facility], Dr Wallace; LIMA and SVG occluded. Proximal LAD 40%, ostial Diagonal 60%, LCX 30-40% in-stent restenosis, RCA totally occluded proximally    CATARACT EXTRACTION W/  INTRAOCULAR LENS IMPLANT Right 3/19/96    COLON SURGERY      ischemic colitis    COLONOSCOPY N/A 6/21/2016    Procedure: COLONOSCOPY;  Surgeon: Rob Cartagena MD;  Location: [facility] ENDO;  Service: Endoscopy;  Laterality: N/A;    COLONOSCOPY N/A 7/18/2016    Procedure: COLONOSCOPY;  Surgeon: Rob Cartagena MD;  Location: [facility] ENDO;  Service: Endoscopy;  Laterality: N/A;    CORONARY ARTERY BYPASS GRAFT  3/2001    3 vessel    CORONARY STENT PLACEMENT  12/2015    Dr. Freire    EYE  SURGERY      HIP FRACTURE SURGERY Right 07/12/2017    Hemiarthroplasty for femoral neck fracture     HYSTERECTOMY      total    INSERT / REPLACE / REMOVE PACEMAKER  12/2009    [facility] CRT-D generator changeout    ORIF WRIST FRACTURE Right     TONSILLECTOMY         Review of patient's allergies indicates:   Allergen Reactions    Codeine Itching    Hydrocodone      GOT VERY VIOLANT    Azithromycin      History of VT    Lorazepam        No current facility-administered medications on file prior to encounter.      Current Outpatient Prescriptions on File Prior to Encounter   Medication Sig    digoxin (DIGOX) 125 mcg tablet Take 125 mcg by mouth every Mon, Wed, Fri.    enalapril (VASOTEC) 2.5 MG tablet Take 2.5 mg by mouth once daily.    isosorbide mononitrate (IMDUR) 30 MG 24 hr tablet Take 30 mg by mouth once daily.    magnesium oxide (MAG-OX) 400 mg tablet TAKE 2 TABLETS BY MOUTH TWICE DAILY    senna-docusate 8.6-50 mg (PERICOLACE) 8.6-50 mg per tablet Take 1 tablet by mouth daily as needed for Constipation. (Patient taking differently: Take 1 tablet by mouth once daily. )    spironolactone (ALDACTONE) 25 MG tablet Take 0.5 tablets (12.5 mg total) by mouth 3 (three) times a week. Monday, Wednesday, Friday (Patient taking differently: Take 12.5 mg by mouth once daily. )    acetaminophen (TYLENOL) 650 MG Supp Place 650 mg rectally every 4 (four) hours as needed (temp or pain).    amiodarone (PACERONE) 200 MG Tab Take 1 tablet (200 mg total) by mouth 2 (two) times daily.    morphine 20 mg/mL concentrated syringe Take 5 mg by mouth every 2 (two) hours as needed (pain or shortness of breath).    nitroGLYCERIN (NITROSTAT) 0.4 MG SL tablet Place 1 tablet (0.4 mg total) under the tongue every 5 (five) minutes as needed for Chest pain.    [DISCONTINUED] alprazolam (XANAX) 0.25 MG tablet Take 1 tablet (0.25 mg total) by mouth 2 (two) times daily.    [DISCONTINUED] atropine 1% (ISOPTO ATROPINE) 1 % Drop Take 1  drop by mouth every 2 (two) hours as needed.    [DISCONTINUED] carvedilol (COREG) 3.125 MG tablet Take 1 tablet (3.125 mg total) by mouth nightly.    [DISCONTINUED] cyanocobalamin, vitamin B-12, 1,000 mcg TbSR Take 1 tablet by mouth once daily.    [DISCONTINUED] haloperidol (HALDOL) 2 mg/mL solution Take 0.25 mg by mouth every 4 (four) hours as needed (agitation).    [DISCONTINUED] lactulose 10 gram/15 ml (CHRONULAC) 10 gram/15 mL (15 mL) solution Take 10 g by mouth daily as needed (CONSTIPATION).    [DISCONTINUED] levothyroxine (SYNTHROID) 88 MCG tablet Take 1 tablet (88 mcg total) by mouth before breakfast.    [DISCONTINUED] morphine 20 mg/mL concentrated syringe Take 5 mg by mouth every 12 (twelve) hours.    [DISCONTINUED] ondansetron (ZOFRAN) 4 MG tablet Take 4 mg by mouth every 4 (four) hours as needed.    [DISCONTINUED] pantoprazole (PROTONIX) 20 MG tablet Take 2 tablets (40 mg total) by mouth once daily. (Patient taking differently: Take 20 mg by mouth once daily. )    [DISCONTINUED] sertraline (ZOLOFT) 50 MG tablet Take 100 mg by mouth nightly.    [DISCONTINUED] tamsulosin (FLOMAX) 0.4 mg Cp24 Take 1 capsule (0.4 mg total) by mouth nightly.    [DISCONTINUED] temazepam (RESTORIL) 15 mg Cap Take 1 capsule (15 mg total) by mouth nightly as needed (SLEEP).    [DISCONTINUED] tramadol (ULTRAM) 50 mg tablet Take 50 mg by mouth every 6 (six) hours as needed for Pain.     Family History     Problem Relation (Age of Onset)    Cancer Sister    Heart disease Father (65), Sister (55)        Social History Main Topics    Smoking status: Former Smoker     Packs/day: 1.00     Years: 40.00     Types: Cigarettes     Start date: 1/1/1953     Quit date: 1/1/2013    Smokeless tobacco: Never Used    Alcohol use No    Drug use: No    Sexual activity: No     Review of Systems   Unable to perform ROS: Dementia     Objective:     Vital Signs (Most Recent):  Temp: 97.5 °F (36.4 °C) (06/28/18 0829)  Pulse: 85 (06/28/18  1632)  Resp: (!) 23 (06/28/18 1632)  BP: 108/74 (06/28/18 1632)  SpO2: 96 % (06/28/18 1842) Vital Signs (24h Range):  Temp:  [97.5 °F (36.4 °C)] 97.5 °F (36.4 °C)  Pulse:  [80-93] 85  Resp:  [13-31] 23  SpO2:  [94 %-98 %] 96 %  BP: (108-135)/(57-86) 108/74        There is no height or weight on file to calculate BMI.    Physical Exam    Constitutional: She appears well-developed. She is not diaphoretic. No distress.   HENT:   Head: Normocephalic and atraumatic.   Right Ear: External ear normal.   Left Ear: External ear normal.   Mouth/Throat: No oropharyngeal exudate.   Eyes: Conjunctivae and EOM are normal. Pupils are equal, round, and reactive to light.   Neck: JVD present.   Cardiovascular: Normal rate, regular rhythm, normal heart sounds and intact distal pulses.   Pulmonary/Chest: No respiratory distress. Rales: mild rales at lung bases B/L.   Abdominal: Soft. Bowel sounds are normal. There is no tenderness.   Musculoskeletal: Normal range of motion. She exhibits 2-3+ EDEMA    Neurological: She is alert. No cranial nerve deficit.   Skin: Skin is warm and dry. Capillary refill takes less than 2 seconds. No rash noted. No erythema. There is pallor.   Psychiatric: She has a normal mood and affect. Her behavior is normal. Judgment and thought content normal.         Significant Labs:   CBC:   Recent Labs  Lab 06/28/18  0941   WBC 7.79   HGB 10.7*   HCT 34.9*        CMP:   Recent Labs  Lab 06/28/18  0941      K 4.1      CO2 24   GLU 95   BUN 40*   CREATININE 1.5*   CALCIUM 9.0   PROT 6.9   ALBUMIN 3.5   BILITOT 1.3*   ALKPHOS 178*   *   *   ANIONGAP 13   EGFRNONAA 31.8*     Cardiac Markers:   Recent Labs  Lab 06/28/18  0941   BNP >4,900*     Urine Culture: No results for input(s): LABURIN in the last 48 hours.    Significant Imaging: I have reviewed all pertinent imaging results/findings within the past 24 hours.

## 2018-06-29 NOTE — PROGRESS NOTES
MD notified of pt DNR form not signed per MD treat pt as DNR MD also notified of cardiac activity per tele monitor MD states he will look over pt activity once he gets to the hospital. Will cont to monitor pt

## 2018-06-30 LAB
ALBUMIN SERPL BCP-MCNC: 3.3 G/DL
ALP SERPL-CCNC: 158 U/L
ALT SERPL W/O P-5'-P-CCNC: 121 U/L
ANION GAP SERPL CALC-SCNC: 12 MMOL/L
AST SERPL-CCNC: 97 U/L
BASOPHILS # BLD AUTO: 0.02 K/UL
BASOPHILS NFR BLD: 0.3 %
BILIRUB SERPL-MCNC: 1 MG/DL
BUN SERPL-MCNC: 33 MG/DL
CALCIUM SERPL-MCNC: 8.6 MG/DL
CHLORIDE SERPL-SCNC: 99 MMOL/L
CO2 SERPL-SCNC: 27 MMOL/L
CREAT SERPL-MCNC: 1.5 MG/DL
DIFFERENTIAL METHOD: ABNORMAL
EOSINOPHIL # BLD AUTO: 0.2 K/UL
EOSINOPHIL NFR BLD: 2.6 %
ERYTHROCYTE [DISTWIDTH] IN BLOOD BY AUTOMATED COUNT: 17.8 %
EST. GFR  (AFRICAN AMERICAN): 36.6 ML/MIN/1.73 M^2
EST. GFR  (NON AFRICAN AMERICAN): 31.8 ML/MIN/1.73 M^2
GLUCOSE SERPL-MCNC: 112 MG/DL
HCT VFR BLD AUTO: 31.4 %
HGB BLD-MCNC: 9.9 G/DL
IMM GRANULOCYTES # BLD AUTO: 0.02 K/UL
IMM GRANULOCYTES NFR BLD AUTO: 0.3 %
LYMPHOCYTES # BLD AUTO: 1 K/UL
LYMPHOCYTES NFR BLD: 13.1 %
MAGNESIUM SERPL-MCNC: 1.8 MG/DL
MCH RBC QN AUTO: 29.6 PG
MCHC RBC AUTO-ENTMCNC: 31.5 G/DL
MCV RBC AUTO: 94 FL
MONOCYTES # BLD AUTO: 0.8 K/UL
MONOCYTES NFR BLD: 9.9 %
NEUTROPHILS # BLD AUTO: 5.6 K/UL
NEUTROPHILS NFR BLD: 73.8 %
NRBC BLD-RTO: 0 /100 WBC
PHOSPHATE SERPL-MCNC: 3.1 MG/DL
PLATELET # BLD AUTO: 203 K/UL
PMV BLD AUTO: 10.8 FL
POTASSIUM SERPL-SCNC: 3.2 MMOL/L
PROT SERPL-MCNC: 6.5 G/DL
RBC # BLD AUTO: 3.35 M/UL
SODIUM SERPL-SCNC: 138 MMOL/L
WBC # BLD AUTO: 7.55 K/UL

## 2018-06-30 PROCEDURE — 83735 ASSAY OF MAGNESIUM: CPT

## 2018-06-30 PROCEDURE — 85025 COMPLETE CBC W/AUTO DIFF WBC: CPT

## 2018-06-30 PROCEDURE — 80053 COMPREHEN METABOLIC PANEL: CPT

## 2018-06-30 PROCEDURE — 84100 ASSAY OF PHOSPHORUS: CPT

## 2018-06-30 PROCEDURE — 99232 SBSQ HOSP IP/OBS MODERATE 35: CPT | Mod: GW,HPC,, | Performed by: HOSPITALIST

## 2018-06-30 PROCEDURE — 25000003 PHARM REV CODE 250: Performed by: HOSPITALIST

## 2018-06-30 PROCEDURE — 36415 COLL VENOUS BLD VENIPUNCTURE: CPT

## 2018-06-30 PROCEDURE — 63600175 PHARM REV CODE 636 W HCPCS: Performed by: HOSPITALIST

## 2018-06-30 PROCEDURE — 11000001 HC ACUTE MED/SURG PRIVATE ROOM

## 2018-06-30 RX ORDER — PROCHLORPERAZINE EDISYLATE 5 MG/ML
10 INJECTION INTRAMUSCULAR; INTRAVENOUS EVERY 6 HOURS PRN
Status: DISCONTINUED | OUTPATIENT
Start: 2018-06-30 | End: 2018-07-02

## 2018-06-30 RX ADMIN — HEPARIN SODIUM 5000 UNITS: 5000 INJECTION, SOLUTION INTRAVENOUS; SUBCUTANEOUS at 10:06

## 2018-06-30 RX ADMIN — POTASSIUM BICARBONATE 50 MEQ: 25 TABLET, EFFERVESCENT ORAL at 09:06

## 2018-06-30 RX ADMIN — BUMETANIDE 1 MG: 1 TABLET ORAL at 10:06

## 2018-06-30 RX ADMIN — CARBAMAZEPINE 100 MG: 100 TABLET, CHEWABLE ORAL at 09:06

## 2018-06-30 RX ADMIN — ISOSORBIDE MONONITRATE 30 MG: 30 TABLET, EXTENDED RELEASE ORAL at 09:06

## 2018-06-30 RX ADMIN — ACETAMINOPHEN 650 MG: 325 TABLET, FILM COATED ORAL at 08:06

## 2018-06-30 RX ADMIN — HEPARIN SODIUM 5000 UNITS: 5000 INJECTION, SOLUTION INTRAVENOUS; SUBCUTANEOUS at 02:06

## 2018-06-30 RX ADMIN — ONDANSETRON 8 MG: 8 TABLET, ORALLY DISINTEGRATING ORAL at 06:06

## 2018-06-30 RX ADMIN — BUMETANIDE 1 MG: 1 TABLET ORAL at 09:06

## 2018-06-30 RX ADMIN — PROMETHAZINE HYDROCHLORIDE 25 MG: 25 TABLET ORAL at 08:06

## 2018-06-30 RX ADMIN — ACETAMINOPHEN 650 MG: 325 TABLET, FILM COATED ORAL at 02:06

## 2018-06-30 RX ADMIN — SENNOSIDES AND DOCUSATE SODIUM 1 TABLET: 8.6; 5 TABLET ORAL at 09:06

## 2018-06-30 RX ADMIN — CEFTRIAXONE SODIUM 1 G: 1 INJECTION, POWDER, FOR SOLUTION INTRAMUSCULAR; INTRAVENOUS at 02:06

## 2018-06-30 RX ADMIN — PANTOPRAZOLE SODIUM 20 MG: 20 TABLET, DELAYED RELEASE ORAL at 09:06

## 2018-06-30 RX ADMIN — METOLAZONE 5 MG: 2.5 TABLET ORAL at 11:06

## 2018-06-30 RX ADMIN — HEPARIN SODIUM 5000 UNITS: 5000 INJECTION, SOLUTION INTRAVENOUS; SUBCUTANEOUS at 05:06

## 2018-06-30 RX ADMIN — Medication 5 ML: at 01:06

## 2018-06-30 RX ADMIN — FOLIC ACID 1 MG: 1 TABLET ORAL at 09:06

## 2018-06-30 RX ADMIN — CARBAMAZEPINE 100 MG: 100 TABLET, CHEWABLE ORAL at 10:06

## 2018-06-30 NOTE — PLAN OF CARE
Chart reviewed. Per notes, patient with short episode of agitation overnight; given Risperdal with good effect. Would continue current management for now, including PRN medications and delirium precautions. QTc 508 yesterday at 22:38 before getting Risperdal at 23:45; continue to monitor QTc especially if giving antipsychotics.     Noel Arce MD  Resident, [facility]   Pager [phone]

## 2018-06-30 NOTE — ASSESSMENT & PLAN NOTE
Cardiomyopathy, ischemic  Pulmonary HTN  Per report of hospice due to end stage HF found to have ADHF, with an episode of chest pain while in ED. Per Cards, Cardiac biomarkers are elevated. EKG with LBBB, last EKG is from 2015 and it was paced rhythm at the time. Currently no chest pain, given that she is hypervolemic recommend one dose of lasix 80mg IV once, thereafter increase her home bumex 1mg daily to bumex 1mg BID. She is hospice care therefore would not favor any additional ischemia work up.  - On Dig, level  WNL  - BNP elevation, LE edema Strict I/Os, Cardiac diet (per daughter was not follow at NH)  - Bumex 1mg QD now as pt is euvolemic, improvement LE edema  - Monitor renal function
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
## 2018-06-30 NOTE — PLAN OF CARE
Problem: Patient Care Overview  Goal: Plan of Care Review  Outcome: Ongoing (interventions implemented as appropriate)  Pt alert and verbally responsive, able to make her needs known. However, confused. Redirected as needed. POC was reviewed with daughters. Showed good understanding. Pt was smiling and friendly to staff in pleasant mood on this shift. C/O pain to lower back treated with prn tylenol with effect. Short episode of agitation noted. PRN PO resperidone given with effect. Delirium precaution and fall precaution maintained. Free from falls as of this time of the shift. Continue to monitor.

## 2018-06-30 NOTE — SUBJECTIVE & OBJECTIVE
Interval History: Sitter at bedside. Calmer today. 1x use of PRN last night. R foot redness per daughter that is improving. Encourage family to keep pt up and alert in the day time so she can sleep at night, updated daughter on plan of care    Review of Systems   Unable to perform ROS: Dementia     Objective:     Vital Signs (Most Recent):  Temp: 97.8 °F (36.6 °C) (06/30/18 1645)  Pulse: 101 (06/30/18 1645)  Resp: 18 (06/30/18 1645)  BP: (!) 130/56 (06/30/18 1645)  SpO2: (!) 92 % (06/30/18 1645) Vital Signs (24h Range):  Temp:  [97.4 °F (36.3 °C)-97.8 °F (36.6 °C)] 97.8 °F (36.6 °C)  Pulse:  [] 101  Resp:  [18] 18  SpO2:  [92 %-98 %] 92 %  BP: (112-138)/(56-78) 130/56     Weight: 43 kg (94 lb 11.2 oz)  Body mass index is 16.26 kg/m².    Intake/Output Summary (Last 24 hours) at 06/30/18 1700  Last data filed at 06/30/18 0500   Gross per 24 hour   Intake              200 ml   Output                0 ml   Net              200 ml      Physical Exam  Constitutional: She appears well-developed. She is not diaphoretic. No distress.   HENT:   Head: Normocephalic and atraumatic.   Right Ear: External ear normal.   Left Ear: External ear normal.   Mouth/Throat: No oropharyngeal exudate.   Eyes: Conjunctivae and EOM are normal. Pupils are equal, round, and reactive to light.   Neck:  Cardiovascular: Normal rate, regular rhythm, normal heart sounds and intact distal pulses.   Pulmonary/Chest: No respiratory distress. Rales: mild rales  Abdominal: Soft. Bowel sounds are normal. There is no tenderness.   Musculoskeletal: Normal range of motion. She exhibits +2 EDEMA, R foot erythema but not TTP  Neurological: She is alert. No cranial nerve deficit.   Skin: Skin is warm and dry. Skin tears noted on extremities  Psychiatric: She has a normal mood and affect. Her behavior is normal. Judgment and thought content normal.       MELD-Na score: 9 at 7/14/2017  3:00 AM  MELD score: 9 at 7/14/2017  3:00 AM  Calculated from:  Serum  Creatinine: 1.1 mg/dL at 7/14/2017  3:00 AM  Serum Sodium: 133 mmol/L at 7/14/2017  3:00 AM  Total Bilirubin: 1 mg/dL at 7/14/2017  3:00 AM  INR(ratio): 1.2 at 7/12/2017  4:53 AM  Age: 83 years    Significant Labs:  CBC:    Recent Labs  Lab 06/29/18  0629 06/30/18  0407   WBC 5.62 7.55   HGB 10.3* 9.9*   HCT 32.8* 31.4*    203     CMP:    Recent Labs  Lab 06/29/18  0629 06/30/18  0407    138   K 3.5 3.2*    99   CO2 26 27   GLU 96 112*   BUN 34* 33*   CREATININE 1.4 1.5*   CALCIUM 8.4* 8.6*   PROT 6.3 6.5   ALBUMIN 3.1* 3.3*   BILITOT 1.4* 1.0   ALKPHOS 166* 158*   * 97*   * 121*   ANIONGAP 12 12   EGFRNONAA 34.5* 31.8*     PTINR:  No results for input(s): INR in the last 48 hours.    Significant Procedures:   Dobutamine Stress Test with Color Flow: No results found. However, due to the size of the patient record, not all encounters were searched. Please check Results Review for a complete set of results.    None

## 2018-07-01 LAB
ALBUMIN SERPL BCP-MCNC: 3.4 G/DL
ALP SERPL-CCNC: 154 U/L
ALT SERPL W/O P-5'-P-CCNC: 106 U/L
ANION GAP SERPL CALC-SCNC: 16 MMOL/L
AST SERPL-CCNC: 72 U/L
BASOPHILS # BLD AUTO: 0.03 K/UL
BASOPHILS NFR BLD: 0.4 %
BILIRUB SERPL-MCNC: 1 MG/DL
BUN SERPL-MCNC: 33 MG/DL
CALCIUM SERPL-MCNC: 8.8 MG/DL
CHLORIDE SERPL-SCNC: 94 MMOL/L
CO2 SERPL-SCNC: 30 MMOL/L
CREAT SERPL-MCNC: 1.6 MG/DL
DIFFERENTIAL METHOD: ABNORMAL
EOSINOPHIL # BLD AUTO: 0.1 K/UL
EOSINOPHIL NFR BLD: 2 %
ERYTHROCYTE [DISTWIDTH] IN BLOOD BY AUTOMATED COUNT: 17.5 %
EST. GFR  (AFRICAN AMERICAN): 33.9 ML/MIN/1.73 M^2
EST. GFR  (NON AFRICAN AMERICAN): 29.4 ML/MIN/1.73 M^2
GLUCOSE SERPL-MCNC: 136 MG/DL
HCT VFR BLD AUTO: 34.3 %
HGB BLD-MCNC: 10.7 G/DL
IMM GRANULOCYTES # BLD AUTO: 0.02 K/UL
IMM GRANULOCYTES NFR BLD AUTO: 0.3 %
LYMPHOCYTES # BLD AUTO: 1.1 K/UL
LYMPHOCYTES NFR BLD: 14.9 %
MAGNESIUM SERPL-MCNC: 1.7 MG/DL
MCH RBC QN AUTO: 29.2 PG
MCHC RBC AUTO-ENTMCNC: 31.2 G/DL
MCV RBC AUTO: 94 FL
MONOCYTES # BLD AUTO: 0.6 K/UL
MONOCYTES NFR BLD: 9 %
NEUTROPHILS # BLD AUTO: 5.2 K/UL
NEUTROPHILS NFR BLD: 73.4 %
NRBC BLD-RTO: 0 /100 WBC
PHOSPHATE SERPL-MCNC: 3.8 MG/DL
PLATELET # BLD AUTO: 236 K/UL
PMV BLD AUTO: 10.6 FL
POTASSIUM SERPL-SCNC: 2.9 MMOL/L
PROT SERPL-MCNC: 6.9 G/DL
RBC # BLD AUTO: 3.66 M/UL
SODIUM SERPL-SCNC: 140 MMOL/L
WBC # BLD AUTO: 7.13 K/UL

## 2018-07-01 PROCEDURE — 99232 SBSQ HOSP IP/OBS MODERATE 35: CPT | Mod: GW,HPC,, | Performed by: HOSPITALIST

## 2018-07-01 PROCEDURE — 63600175 PHARM REV CODE 636 W HCPCS: Performed by: PHYSICIAN ASSISTANT

## 2018-07-01 PROCEDURE — 63600175 PHARM REV CODE 636 W HCPCS: Performed by: HOSPITALIST

## 2018-07-01 PROCEDURE — 11000001 HC ACUTE MED/SURG PRIVATE ROOM

## 2018-07-01 PROCEDURE — 93005 ELECTROCARDIOGRAM TRACING: CPT

## 2018-07-01 PROCEDURE — 80053 COMPREHEN METABOLIC PANEL: CPT

## 2018-07-01 PROCEDURE — 84100 ASSAY OF PHOSPHORUS: CPT

## 2018-07-01 PROCEDURE — 85025 COMPLETE CBC W/AUTO DIFF WBC: CPT

## 2018-07-01 PROCEDURE — 25000003 PHARM REV CODE 250: Performed by: HOSPITALIST

## 2018-07-01 PROCEDURE — 83735 ASSAY OF MAGNESIUM: CPT

## 2018-07-01 RX ORDER — BUMETANIDE 1 MG/1
1 TABLET ORAL DAILY
Status: DISCONTINUED | OUTPATIENT
Start: 2018-07-01 | End: 2018-07-01

## 2018-07-01 RX ORDER — POTASSIUM CHLORIDE 7.45 MG/ML
10 INJECTION INTRAVENOUS
Status: COMPLETED | OUTPATIENT
Start: 2018-07-01 | End: 2018-07-01

## 2018-07-01 RX ORDER — METOPROLOL TARTRATE 25 MG/1
25 TABLET, FILM COATED ORAL 3 TIMES DAILY
Status: DISCONTINUED | OUTPATIENT
Start: 2018-07-01 | End: 2018-07-06 | Stop reason: HOSPADM

## 2018-07-01 RX ORDER — BUMETANIDE 1 MG/1
1 TABLET ORAL DAILY
Status: DISCONTINUED | OUTPATIENT
Start: 2018-07-02 | End: 2018-07-03

## 2018-07-01 RX ADMIN — CARBAMAZEPINE 100 MG: 100 TABLET, CHEWABLE ORAL at 10:07

## 2018-07-01 RX ADMIN — POTASSIUM CHLORIDE 10 MEQ: 10 INJECTION, SOLUTION INTRAVENOUS at 06:07

## 2018-07-01 RX ADMIN — HEPARIN SODIUM 5000 UNITS: 5000 INJECTION, SOLUTION INTRAVENOUS; SUBCUTANEOUS at 10:07

## 2018-07-01 RX ADMIN — CARBAMAZEPINE 100 MG: 100 TABLET, CHEWABLE ORAL at 09:07

## 2018-07-01 RX ADMIN — ISOSORBIDE MONONITRATE 30 MG: 30 TABLET, EXTENDED RELEASE ORAL at 09:07

## 2018-07-01 RX ADMIN — SENNOSIDES AND DOCUSATE SODIUM 1 TABLET: 8.6; 5 TABLET ORAL at 09:07

## 2018-07-01 RX ADMIN — PROCHLORPERAZINE EDISYLATE 10 MG: 5 INJECTION INTRAMUSCULAR; INTRAVENOUS at 11:07

## 2018-07-01 RX ADMIN — HEPARIN SODIUM 5000 UNITS: 5000 INJECTION, SOLUTION INTRAVENOUS; SUBCUTANEOUS at 05:07

## 2018-07-01 RX ADMIN — FOLIC ACID 1 MG: 1 TABLET ORAL at 09:07

## 2018-07-01 RX ADMIN — PANTOPRAZOLE SODIUM 20 MG: 20 TABLET, DELAYED RELEASE ORAL at 09:07

## 2018-07-01 RX ADMIN — POTASSIUM CHLORIDE 10 MEQ: 10 INJECTION, SOLUTION INTRAVENOUS at 04:07

## 2018-07-01 RX ADMIN — POTASSIUM CHLORIDE 10 MEQ: 10 INJECTION, SOLUTION INTRAVENOUS at 07:07

## 2018-07-01 RX ADMIN — METOPROLOL TARTRATE 25 MG: 25 TABLET ORAL at 03:07

## 2018-07-01 RX ADMIN — CEFTRIAXONE SODIUM 1 G: 1 INJECTION, POWDER, FOR SOLUTION INTRAMUSCULAR; INTRAVENOUS at 03:07

## 2018-07-01 RX ADMIN — HEPARIN SODIUM 5000 UNITS: 5000 INJECTION, SOLUTION INTRAVENOUS; SUBCUTANEOUS at 03:07

## 2018-07-01 RX ADMIN — ACETAMINOPHEN 650 MG: 325 TABLET, FILM COATED ORAL at 06:07

## 2018-07-01 RX ADMIN — METOPROLOL TARTRATE 25 MG: 25 TABLET ORAL at 10:07

## 2018-07-01 NOTE — PROGRESS NOTES
Complain of abdominal pain which tylenol was given. MD called due to still having some nausea /vomiting for IV nausea med.

## 2018-07-01 NOTE — PROGRESS NOTES
"WHile rounding on patient/ daughter stated "she had wet underwear at end of bed and I had to throw them in the trash. She mentioned about pt does not have any underwear on but patient was asleep and she agreed it was ok to let her sleep.  "

## 2018-07-01 NOTE — PROGRESS NOTES
Patient nauseated and vomiting what appears to be food particles. Day shift RN just gave zofran. I applied a cool damp cloth to forehead and neck and assisted to side of bed while vomiting. Remain with pt til vomiting improved.

## 2018-07-01 NOTE — SUBJECTIVE & OBJECTIVE
Interval History: Eventful night, pt was seen agitated pulling off her clothes and ?pulling her IV. Security was called on her daughter (see nursing note for overnight details). Different daughter in her room today, pt is more lethargic, did not sleep much over the last few days. 1-2x emesis, IV compazine was given. KUB ordered.  Bumex stopped for now given poor oral intake    Review of Systems   Unable to perform ROS: Dementia     Objective:     Vital Signs (Most Recent):  Temp: 96.5 °F (35.8 °C) (07/01/18 1146)  Pulse: (!) 136 (07/01/18 1146)  Resp: 18 (07/01/18 1146)  BP: (!) 141/68 (07/01/18 1146)  SpO2: (!) 94 % (07/01/18 1146) Vital Signs (24h Range):  Temp:  [96.5 °F (35.8 °C)-98.4 °F (36.9 °C)] 96.5 °F (35.8 °C)  Pulse:  [] 136  Resp:  [18] 18  SpO2:  [92 %-94 %] 94 %  BP: (106-141)/(56-77) 141/68     Weight: 36.6 kg (80 lb 11.2 oz)  Body mass index is 13.85 kg/m².    Intake/Output Summary (Last 24 hours) at 07/01/18 1318  Last data filed at 06/30/18 2200   Gross per 24 hour   Intake              570 ml   Output                0 ml   Net              570 ml      Physical Exam  Constitutional: She appears well-developed. She is not diaphoretic. No distress.   HENT:   Head: Normocephalic and atraumatic.   Right Ear: External ear normal.   Left Ear: External ear normal.   Mouth/Throat: No oropharyngeal exudate.   Eyes: Conjunctivae and EOM are normal. Pupils are equal, round, and reactive to light.   Neck:  Cardiovascular: Normal rate, regular rhythm, normal heart sounds and intact distal pulses.   Pulmonary/Chest: No respiratory distress. Rales: mild rales  Abdominal: Soft. Bowel sounds are normal. There is no tenderness.   Musculoskeletal: Normal range of motion. She exhibits +2 EDEMA, R foot erythema but not TTP  Neurological: She is alert. No cranial nerve deficit.   Skin: Skin is warm and dry. Skin tears noted on extremities  Psychiatric: She has a normal mood and affect. Her behavior is normal.  Judgment and thought content normal.       MELD-Na score: 9 at 7/14/2017  3:00 AM  MELD score: 9 at 7/14/2017  3:00 AM  Calculated from:  Serum Creatinine: 1.1 mg/dL at 7/14/2017  3:00 AM  Serum Sodium: 133 mmol/L at 7/14/2017  3:00 AM  Total Bilirubin: 1 mg/dL at 7/14/2017  3:00 AM  INR(ratio): 1.2 at 7/12/2017  4:53 AM  Age: 83 years    Significant Labs:  CBC:    Recent Labs  Lab 06/30/18 0407 07/01/18  0421   WBC 7.55 7.13   HGB 9.9* 10.7*   HCT 31.4* 34.3*    236     CMP:    Recent Labs  Lab 06/30/18 0407 07/01/18  0421    140   K 3.2* 2.9*   CL 99 94*   CO2 27 30*   * 136*   BUN 33* 33*   CREATININE 1.5* 1.6*   CALCIUM 8.6* 8.8   PROT 6.5 6.9   ALBUMIN 3.3* 3.4*   BILITOT 1.0 1.0   ALKPHOS 158* 154*   AST 97* 72*   * 106*   ANIONGAP 12 16   EGFRNONAA 31.8* 29.4*     PTINR:  No results for input(s): INR in the last 48 hours.    Significant Procedures:   Dobutamine Stress Test with Color Flow: No results found. However, due to the size of the patient record, not all encounters were searched. Please check Results Review for a complete set of results.    None

## 2018-07-01 NOTE — PROGRESS NOTES
"I was called by unit coordinaor to go to patient room which I immediately did. Upon entering room pt had taken her gown off and removed tape part of port of cath with needle still in place. I attempted to place her hand to her side to keep her from pulling at any other item when her daughter became irate stated "you know mother have a sore on that arm why are you grabbing her arm . I barely was holding her arm at the time. I immediately went out of room while daughter was yelling to have charge RN come in room to assist with pt .Daughter continue to be irate also yelling at charge Rn as well. the patient was lying in the bed. At this time while daughter was disrespecting staff. We continue to place new dressing to port a cath site after cleaning using sterile tech, and place new gown to patient as well has change her bed which I told her staff will do this shift. Earlier this shift pt got small amount vomitus which was clean with damp wash cloth on linen when vomiting earlier. Hospital supervisor and security was called due to daughter yelling at staff and saying to PCT Joy ,Myself and CN  calling "all of us bunch of stupid Niggers,CN asked her do she know what she just said she said yes I know. Daughter agreed to leave and have another sister come stay with patient. We assured her that her mom would be care for prior to her leaving. Hospital supervisor and security in room talking to  Daughter at length  "

## 2018-07-01 NOTE — ASSESSMENT & PLAN NOTE
- not on anticoagulation per note  - HR not controlled, add metoprolol  - need to fix K, IV supplementation started  - digoxin
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
## 2018-07-01 NOTE — PLAN OF CARE
Problem: Fall Risk (Adult)  Goal: Identify Related Risk Factors and Signs and Symptoms  Related risk factors and signs and symptoms are identified upon initiation of Human Response Clinical Practice Guideline (CPG)   Outcome: Ongoing (interventions implemented as appropriate)   06/30/18 2214   Fall Risk   Related Risk Factors (Fall Risk) age-related changes;culprit medication(s);fear of falling;fatigue/slow reaction;gait/mobility problems;homeostatic imbalance;confusion/agitation;polypharmacy;sleep pattern alteration;slipper/uneven surfaces;environment unfamiliar   Signs and Symptoms (Fall Risk) presence of risk factors       Problem: Patient Care Overview  Goal: Plan of Care Review  Outcome: Ongoing (interventions implemented as appropriate)  Pt alert and oriented x 1, to self only.  Pt's skin is thin and has skin tears and bruising on arms.  Pt is not on any oxygen at this time and she was assisted multiple times throughout the day to ambulate to the restroom.  Pt does have edema of primarily the right foot.  She has been changing positions and have gone from sitting in lounge chair in room and then back to bed.  She has been assisted in and out of bed today and activity has been encouraged as tolerated.  Pt was stable throughout the day until the end of shift around 6:30-7pm, pt started getting nauseated.  She vomited once and was given Zofran sublingual.  Night nurse is aware of the nausea during shift change.  Nothing significant to report this shift other than what happened at end of shift which is experiencing nausea and vomiting.

## 2018-07-02 PROBLEM — N30.00 ACUTE CYSTITIS WITHOUT HEMATURIA: Status: ACTIVE | Noted: 2018-07-02

## 2018-07-02 PROBLEM — N17.9 AKI (ACUTE KIDNEY INJURY): Status: RESOLVED | Noted: 2018-06-28 | Resolved: 2018-07-02

## 2018-07-02 LAB
ALBUMIN SERPL BCP-MCNC: 3.3 G/DL
ALP SERPL-CCNC: 167 U/L
ALT SERPL W/O P-5'-P-CCNC: 118 U/L
ANION GAP SERPL CALC-SCNC: 14 MMOL/L
AST SERPL-CCNC: 103 U/L
BACTERIA UR CULT: NORMAL
BACTERIA UR CULT: NORMAL
BASOPHILS # BLD AUTO: 0.06 K/UL
BASOPHILS NFR BLD: 0.8 %
BILIRUB SERPL-MCNC: 1 MG/DL
BUN SERPL-MCNC: 38 MG/DL
CALCIUM SERPL-MCNC: 8.9 MG/DL
CHLORIDE SERPL-SCNC: 96 MMOL/L
CO2 SERPL-SCNC: 30 MMOL/L
CREAT SERPL-MCNC: 1.4 MG/DL
DIFFERENTIAL METHOD: ABNORMAL
EOSINOPHIL # BLD AUTO: 0.2 K/UL
EOSINOPHIL NFR BLD: 3.1 %
ERYTHROCYTE [DISTWIDTH] IN BLOOD BY AUTOMATED COUNT: 17.8 %
EST. GFR  (AFRICAN AMERICAN): 39.8 ML/MIN/1.73 M^2
EST. GFR  (NON AFRICAN AMERICAN): 34.5 ML/MIN/1.73 M^2
GLUCOSE SERPL-MCNC: 101 MG/DL
HCT VFR BLD AUTO: 33.7 %
HGB BLD-MCNC: 10.5 G/DL
IMM GRANULOCYTES # BLD AUTO: 0.02 K/UL
IMM GRANULOCYTES NFR BLD AUTO: 0.3 %
LIPASE SERPL-CCNC: 55 U/L
LYMPHOCYTES # BLD AUTO: 1.3 K/UL
LYMPHOCYTES NFR BLD: 16.1 %
MAGNESIUM SERPL-MCNC: 2.1 MG/DL
MCH RBC QN AUTO: 29.2 PG
MCHC RBC AUTO-ENTMCNC: 31.2 G/DL
MCV RBC AUTO: 94 FL
MONOCYTES # BLD AUTO: 0.8 K/UL
MONOCYTES NFR BLD: 10.6 %
NEUTROPHILS # BLD AUTO: 5.4 K/UL
NEUTROPHILS NFR BLD: 69.1 %
NRBC BLD-RTO: 0 /100 WBC
PHOSPHATE SERPL-MCNC: 4.8 MG/DL
PLATELET # BLD AUTO: 239 K/UL
PMV BLD AUTO: 10.5 FL
POTASSIUM SERPL-SCNC: 3.5 MMOL/L
PROT SERPL-MCNC: 6.8 G/DL
RBC # BLD AUTO: 3.59 M/UL
SODIUM SERPL-SCNC: 140 MMOL/L
WBC # BLD AUTO: 7.82 K/UL

## 2018-07-02 PROCEDURE — 25000003 PHARM REV CODE 250: Performed by: HOSPITALIST

## 2018-07-02 PROCEDURE — 85025 COMPLETE CBC W/AUTO DIFF WBC: CPT

## 2018-07-02 PROCEDURE — 11000001 HC ACUTE MED/SURG PRIVATE ROOM

## 2018-07-02 PROCEDURE — 80053 COMPREHEN METABOLIC PANEL: CPT

## 2018-07-02 PROCEDURE — 63600175 PHARM REV CODE 636 W HCPCS: Performed by: HOSPITALIST

## 2018-07-02 PROCEDURE — 99232 SBSQ HOSP IP/OBS MODERATE 35: CPT | Mod: GW,HPC,, | Performed by: HOSPITALIST

## 2018-07-02 PROCEDURE — 83735 ASSAY OF MAGNESIUM: CPT

## 2018-07-02 PROCEDURE — 83690 ASSAY OF LIPASE: CPT

## 2018-07-02 PROCEDURE — 84100 ASSAY OF PHOSPHORUS: CPT

## 2018-07-02 RX ORDER — AMOXICILLIN AND CLAVULANATE POTASSIUM 250; 125 MG/1; MG/1
1 TABLET, FILM COATED ORAL EVERY 12 HOURS
Status: DISCONTINUED | OUTPATIENT
Start: 2018-07-02 | End: 2018-07-06 | Stop reason: HOSPADM

## 2018-07-02 RX ADMIN — HEPARIN SODIUM 5000 UNITS: 5000 INJECTION, SOLUTION INTRAVENOUS; SUBCUTANEOUS at 10:07

## 2018-07-02 RX ADMIN — CARBAMAZEPINE 100 MG: 100 TABLET, CHEWABLE ORAL at 10:07

## 2018-07-02 RX ADMIN — AMOXICILLIN AND CLAVULANATE POTASSIUM 1 TABLET: 250; 125 TABLET, FILM COATED ORAL at 11:07

## 2018-07-02 RX ADMIN — ACETAMINOPHEN 650 MG: 325 TABLET, FILM COATED ORAL at 10:07

## 2018-07-02 RX ADMIN — BUMETANIDE 1 MG: 1 TABLET ORAL at 10:07

## 2018-07-02 RX ADMIN — PANTOPRAZOLE SODIUM 20 MG: 20 TABLET, DELAYED RELEASE ORAL at 10:07

## 2018-07-02 RX ADMIN — ONDANSETRON 8 MG: 8 TABLET, ORALLY DISINTEGRATING ORAL at 01:07

## 2018-07-02 RX ADMIN — DIGOXIN 125 MCG: 0.12 TABLET ORAL at 04:07

## 2018-07-02 RX ADMIN — METOPROLOL TARTRATE 25 MG: 25 TABLET ORAL at 10:07

## 2018-07-02 RX ADMIN — SENNOSIDES AND DOCUSATE SODIUM 1 TABLET: 8.6; 5 TABLET ORAL at 10:07

## 2018-07-02 RX ADMIN — HEPARIN SODIUM 5000 UNITS: 5000 INJECTION, SOLUTION INTRAVENOUS; SUBCUTANEOUS at 06:07

## 2018-07-02 RX ADMIN — AMOXICILLIN AND CLAVULANATE POTASSIUM 1 TABLET: 250; 125 TABLET, FILM COATED ORAL at 10:07

## 2018-07-02 RX ADMIN — HEPARIN SODIUM 5000 UNITS: 5000 INJECTION, SOLUTION INTRAVENOUS; SUBCUTANEOUS at 04:07

## 2018-07-02 RX ADMIN — POTASSIUM BICARBONATE 50 MEQ: 25 TABLET, EFFERVESCENT ORAL at 10:07

## 2018-07-02 RX ADMIN — METOPROLOL TARTRATE 25 MG: 25 TABLET ORAL at 04:07

## 2018-07-02 RX ADMIN — FOLIC ACID 1 MG: 1 TABLET ORAL at 10:07

## 2018-07-02 NOTE — PLAN OF CARE
Problem: Fall Risk (Adult)  Goal: Identify Related Risk Factors and Signs and Symptoms  Related risk factors and signs and symptoms are identified upon initiation of Human Response Clinical Practice Guideline (CPG)   Outcome: Ongoing (interventions implemented as appropriate)   07/01/18 2020   Fall Risk   Related Risk Factors (Fall Risk) age-related changes;confusion/agitation;culprit medication(s);fatigue/slow reaction;gait/mobility problems;homeostatic imbalance;polypharmacy;sensory deficits;sleep pattern alteration;slipper/uneven surfaces;environment unfamiliar   Signs and Symptoms (Fall Risk) presence of risk factors     Goal: Absence of Falls  Patient will demonstrate the desired outcomes by discharge/transition of care.   Outcome: Ongoing (interventions implemented as appropriate)   07/02/18 0249   Fall Risk (Adult)   Absence of Falls making progress toward outcome       Problem: Patient Care Overview  Goal: Plan of Care Review  Outcome: Ongoing (interventions implemented as appropriate)   07/01/18 2020   Coping/Psychosocial   Plan Of Care Reviewed With patient;daughter

## 2018-07-02 NOTE — PROGRESS NOTES
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     Height: 5' 4" (162.6 cm)  Weight: 36.6 kg (80 lb 11.2 oz)  Body mass index is 13.85 kg/m².      Intake/Output Summary (Last 24 hours) at 07/02/18 1641  Last data filed at 07/02/18 1100   Gross per 24 hour   Intake              445 ml   Output                0 ml   Net              445 ml       Physical Exam      Gen: Somnolent    Head: MMM   Lungs: respirations unlabored   Chest wall: No deformity   Extremities: Extremities normal, atraumatic   Skin: no rashes or lesions   Neurologic: CN II-XII grossly intact     Appearance: age appropriate, lying in bed   Behavior: somnolent   Speech/Language: somnolent   Mood: unable to assess   Affect: mood congruent   Thought Process:  normal and logical, unable to assess   Thought Content: poverty of content   Orientation: unable to assess   Cognition: impaired due to dementia   Insight: poor   Judgment: poor       Significant Labs:   Last 24 Hours:   Recent Lab Results       07/02/18  0404      Immature Granulocytes 0.3     Immature Grans (Abs) 0.02  Comment:  Mild elevation in immature granulocytes is non specific and   can be seen in a variety of conditions including stress response, "   acute inflammation, trauma and pregnancy. Correlation with other   laboratory and clinical findings is essential.       Albumin 3.3(L)     Alkaline Phosphatase 167(H)     (H)     Anion Gap 14     (H)     Baso # 0.06     Basophil% 0.8     Total Bilirubin 1.0  Comment:  For infants and newborns, interpretation of results should be based  on gestational age, weight and in agreement with clinical  observations.  Premature Infant recommended reference ranges:  Up to 24 hours.............<8.0 mg/dL  Up to 48 hours............<12.0 mg/dL  3-5 days..................<15.0 mg/dL  6-29 days.................<15.0 mg/dL       BUN, Bld 38(H)     Calcium 8.9     Chloride 96     CO2 30(H)     Creatinine 1.4     Differential Method Automated     eGFR if  39.8(A)     eGFR if non  34.5  Comment:  Calculation used to obtain the estimated glomerular filtration  rate (eGFR) is the CKD-EPI equation.   (A)     Eos # 0.2     Eosinophil% 3.1     Glucose 101     Gran # (ANC) 5.4     Gran% 69.1     Hematocrit 33.7(L)     Hemoglobin 10.5(L)     Lipase 55     Lymph # 1.3     Lymph% 16.1(L)     Magnesium 2.1     MCH 29.2     MCHC 31.2(L)     MCV 94     Mono # 0.8     Mono% 10.6     MPV 10.5     nRBC 0     Phosphorus 4.8(H)     Platelets 239     Potassium 3.5     Total Protein 6.8     RBC 3.59(L)     RDW 17.8(H)     Sodium 140     WBC 7.82           Significant Imaging: I have reviewed all pertinent imaging results/findings within the past 24 hours.    Assessment/Plan:     * Delirium due to multiple etiologies, acute, hyperactive    · Delirium, due to underlying medical condition, mixed  ·   · Risperdal 1 mg PO PRN/ Ativan 0.5 mg IV q6h PRN non directable agitation.  Avoid other Antipsychotics time. Monitor for QTc prolongation and adverse reactions.  QTc 516 6/29.  · May continue Tegretol.   · Transaminitis precludes use of Depakote  · Hold home regimen: Abilify 5 mg, Keppra 250 mg BID, Vibrid 10  mg, Xanax 0.25 mg, Morphine 5 mg q12, Zoloft 50 mg, Tramadol 50 mg.  · Avoid benzodiazepines, antihistamines, anticholinergics, and minimize opiate use as these may worsen delirium.  · Recommend consult to PT/OT. Early mobility and exercise has been shown to decrease duration of delirium. Provide appropriate lighting and clear signage; a clock and calendar should be easily visible to the patient.  · Monitor environmental factors. Reduce light and noise at night (close shades, turn off lights, turn off TV, ect). Correct any alterations in sleep cycle.  · Reorient the patient to person, place, time and situation on each encounter.   · Correct sensory deficits if possible (replace eye glasses, hearing aids, ect).  · Avoid restraints if possible. Severely delirious patients benefit from constant observation by a sitter.  · Do not leave patient unattended.  · Patient does not have capacity for medical decision making.  Recommend defering to her daughter Kinza Stanley.                 Need for Continued Hospitalization:   No need for inpatient psychiatric hospitalization. Continue medical care as per the primary team.    Anticipated Disposition: NA     Total time:  25 with greater than 50% of this time spent in counseling and/or coordination of care.       Teetee Whitmore MD   Psychiatry  [facility]

## 2018-07-02 NOTE — PLAN OF CARE
Problem: Fall Risk (Adult)  Goal: Identify Related Risk Factors and Signs and Symptoms  Related risk factors and signs and symptoms are identified upon initiation of Human Response Clinical Practice Guideline (CPG)   Outcome: Ongoing (interventions implemented as appropriate)   07/01/18 2020   Fall Risk   Related Risk Factors (Fall Risk) age-related changes;confusion/agitation;culprit medication(s);fatigue/slow reaction;gait/mobility problems;homeostatic imbalance;polypharmacy;sensory deficits;sleep pattern alteration;slipper/uneven surfaces;environment unfamiliar   Signs and Symptoms (Fall Risk) presence of risk factors       Problem: Patient Care Overview  Goal: Plan of Care Review  Outcome: Ongoing (interventions implemented as appropriate)  Pt is alert and oriented x self only.  Pt was able to tolerate breakfast and ate about 25% of her meal. Then it was followed by about 4 episodes of emesis, attempted to give Zofran and pt threw that up and then was given secondary anti nausea medication.  Pt has been changed to clear liquid diet and has been doing okay so far, no episodes of nausea and vomiting.  Pt also given IV Potassium Chloride and seems to be tolerating well.  Pt no longer experiencing nausea and vomiting episodes shortly after starting potassium IV.  Pt was more alert towards end of the shift but has begun to try to climb out of bed.  Pt's daughters remain at bedside. Nothing significant to report other than the 4 episodes of emesis.

## 2018-07-02 NOTE — PLAN OF CARE
07/02/18 1500   Right Care Assessment   Can the patient answer the patient profile reliably? (not medically stable for dc just yet)   How often would a person be available to care for the patient? (dc plan: ewa/hospice-see right care referral)

## 2018-07-02 NOTE — ASSESSMENT & PLAN NOTE
- +UA  - UCx: showed ESBL (contact precaution placed)  - Change to PO Augmentin 7/2/2018 (renally dosed) given new cultures finding  - plan to treat for 7-10 days

## 2018-07-02 NOTE — ASSESSMENT & PLAN NOTE
· Delirium, due to underlying medical condition, mixed  ·   · Risperdal 1 mg PO PRN/ Ativan 0.5 mg IV q6h PRN non directable agitation.  Avoid other Antipsychotics time. Monitor for QTc prolongation and adverse reactions.  QTc 516 6/29.  · May continue Tegretol.   · Transaminitis precludes use of Depakote  · Hold home regimen: Abilify 5 mg, Keppra 250 mg BID, Vibrid 10 mg, Xanax 0.25 mg, Morphine 5 mg q12, Zoloft 50 mg, Tramadol 50 mg.  · Avoid benzodiazepines, antihistamines, anticholinergics, and minimize opiate use as these may worsen delirium.  · Recommend consult to PT/OT. Early mobility and exercise has been shown to decrease duration of delirium. Provide appropriate lighting and clear signage; a clock and calendar should be easily visible to the patient.  · Monitor environmental factors. Reduce light and noise at night (close shades, turn off lights, turn off TV, ect). Correct any alterations in sleep cycle.  · Reorient the patient to person, place, time and situation on each encounter.   · Correct sensory deficits if possible (replace eye glasses, hearing aids, ect).  · Avoid restraints if possible. Severely delirious patients benefit from constant observation by a sitter.  · Do not leave patient unattended.  · Patient does not have capacity for medical decision making.  Recommend defering to her daughter Kinza Stanley.
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
## 2018-07-02 NOTE — SUBJECTIVE & OBJECTIVE
Interval History: Daughters are concern about lethargy today, no more nausea/vomitting. Daughters are concern about insomnia    Review of Systems   Unable to perform ROS: Dementia     Objective:     Vital Signs (Most Recent):  Temp: 97.4 °F (36.3 °C) (07/02/18 1104)  Pulse: 76 (07/02/18 1104)  Resp: 17 (07/02/18 1104)  BP: (!) 114/56 (07/02/18 1104)  SpO2: 100 % (07/02/18 1104) Vital Signs (24h Range):  Temp:  [97.4 °F (36.3 °C)-98.2 °F (36.8 °C)] 97.4 °F (36.3 °C)  Pulse:  [] 76  Resp:  [17-18] 17  SpO2:  [90 %-100 %] 100 %  BP: (105-124)/(52-78) 114/56     Weight: 36.6 kg (80 lb 11.2 oz)  Body mass index is 13.85 kg/m².    Intake/Output Summary (Last 24 hours) at 07/02/18 1305  Last data filed at 07/02/18 1100   Gross per 24 hour   Intake              545 ml   Output                0 ml   Net              545 ml      Physical Exam  Constitutional: She appears well-developed. She is not diaphoretic. No distress.   HENT:   Head: Normocephalic and atraumatic.   Right Ear: External ear normal.   Left Ear: External ear normal.   Mouth/Throat: No oropharyngeal exudate.   Eyes: Conjunctivae and EOM are normal. Pupils are equal, round, and reactive to light.   Neck:  Cardiovascular: Normal rate, regular rhythm, normal heart sounds and intact distal pulses.   Pulmonary/Chest: No respiratory distress. Rales: mild rales  Abdominal: Soft. Bowel sounds are normal. There is no tenderness.   Musculoskeletal: Normal range of motion. She exhibits +1 EDEMA (improvement), improved R foot erythema   Neurological: She is alert. No cranial nerve deficit.   Skin: Skin is warm and dry. Skin tears noted on extremities  Psychiatric: She has a normal mood and affect. Her behavior is normal. Judgment and thought content normal.       MELD-Na score: 9 at 7/14/2017  3:00 AM  MELD score: 9 at 7/14/2017  3:00 AM  Calculated from:  Serum Creatinine: 1.1 mg/dL at 7/14/2017  3:00 AM  Serum Sodium: 133 mmol/L at 7/14/2017  3:00 AM  Total  Bilirubin: 1 mg/dL at 7/14/2017  3:00 AM  INR(ratio): 1.2 at 7/12/2017  4:53 AM  Age: 83 years    Significant Labs:  CBC:    Recent Labs  Lab 07/01/18  0421 07/02/18  0404   WBC 7.13 7.82   HGB 10.7* 10.5*   HCT 34.3* 33.7*    239     CMP:    Recent Labs  Lab 07/01/18  0421 07/02/18  0404    140   K 2.9* 3.5   CL 94* 96   CO2 30* 30*   * 101   BUN 33* 38*   CREATININE 1.6* 1.4   CALCIUM 8.8 8.9   PROT 6.9 6.8   ALBUMIN 3.4* 3.3*   BILITOT 1.0 1.0   ALKPHOS 154* 167*   AST 72* 103*   * 118*   ANIONGAP 16 14   EGFRNONAA 29.4* 34.5*     PTINR:  No results for input(s): INR in the last 48 hours.    Significant Procedures:   Dobutamine Stress Test with Color Flow: No results found. However, due to the size of the patient record, not all encounters were searched. Please check Results Review for a complete set of results.    None

## 2018-07-02 NOTE — ASSESSMENT & PLAN NOTE
Cardiomyopathy, ischemic  Pulmonary HTN  Per report of hospice due to end stage HF found to have ADHF, with an episode of chest pain while in ED. Per Cards, Cardiac biomarkers are elevated. EKG with LBBB, last EKG is from 2015 and it was paced rhythm at the time. Currently no chest pain, given that she is hypervolemic recommend one dose of lasix 80mg IV once, thereafter increase her home bumex 1mg daily to bumex 1mg BID. She is hospice care therefore would not favor any additional ischemia work up.  - On Dig, level  WNL  - BNP elevation, LE edema Strict I/Os, Cardiac diet (per daughter was not follow at NH)  - Bumex 1mg QD now as pt is euvolemic, drastic improvement LE edema  - Monitor renal function, stable

## 2018-07-02 NOTE — SUBJECTIVE & OBJECTIVE
"Interval History:     No concerns for agitations overnight. No sleep disturbance overnight. Oriented to person / improved level of alertness. Family reports of intermittent positive hallucinations.     Family History     Problem Relation (Age of Onset)    Cancer Sister    Heart disease Father (65), Sister (55)        Social History Main Topics    Smoking status: Former Smoker     Packs/day: 1.00     Years: 40.00     Types: Cigarettes     Start date: 1/1/1953     Quit date: 1/1/2013    Smokeless tobacco: Never Used    Alcohol use No    Drug use: No    Sexual activity: No     Psychotherapeutics     Start     Stop Route Frequency Ordered    06/29/18 1831  risperiDONE tablet 1 mg      -- Oral 2 times daily PRN 06/29/18 1732           Review of Systems  Objective:     Vital Signs (Most Recent):  Temp: 96.9 °F (36.1 °C) (07/02/18 1600)  Pulse: 69 (07/02/18 1600)  Resp: 18 (07/02/18 1600)  BP: (!) 106/53 (07/02/18 1600)  SpO2: 100 % (07/02/18 1600) Vital Signs (24h Range):  Temp:  [96.9 °F (36.1 °C)-98.2 °F (36.8 °C)] 96.9 °F (36.1 °C)  Pulse:  [69-95] 69  Resp:  [17-18] 18  SpO2:  [90 %-100 %] 100 %  BP: (106-124)/(52-78) 106/53     Height: 5' 4" (162.6 cm)  Weight: 36.6 kg (80 lb 11.2 oz)  Body mass index is 13.85 kg/m².      Intake/Output Summary (Last 24 hours) at 07/02/18 1641  Last data filed at 07/02/18 1100   Gross per 24 hour   Intake              445 ml   Output                0 ml   Net              445 ml       Physical Exam      Gen: Somnolent    Head: MMM   Lungs: respirations unlabored   Chest wall: No deformity   Extremities: Extremities normal, atraumatic   Skin: no rashes or lesions   Neurologic: CN II-XII grossly intact     Appearance: age appropriate, lying in bed   Behavior: somnolent   Speech/Language: somnolent   Mood: unable to assess   Affect: mood congruent   Thought Process:  normal and logical, unable to assess   Thought Content: poverty of content   Orientation: unable to assess "   Cognition: impaired due to dementia   Insight: poor   Judgment: poor       Significant Labs:   Last 24 Hours:   Recent Lab Results       07/02/18  0404      Immature Granulocytes 0.3     Immature Grans (Abs) 0.02  Comment:  Mild elevation in immature granulocytes is non specific and   can be seen in a variety of conditions including stress response,   acute inflammation, trauma and pregnancy. Correlation with other   laboratory and clinical findings is essential.       Albumin 3.3(L)     Alkaline Phosphatase 167(H)     (H)     Anion Gap 14     (H)     Baso # 0.06     Basophil% 0.8     Total Bilirubin 1.0  Comment:  For infants and newborns, interpretation of results should be based  on gestational age, weight and in agreement with clinical  observations.  Premature Infant recommended reference ranges:  Up to 24 hours.............<8.0 mg/dL  Up to 48 hours............<12.0 mg/dL  3-5 days..................<15.0 mg/dL  6-29 days.................<15.0 mg/dL       BUN, Bld 38(H)     Calcium 8.9     Chloride 96     CO2 30(H)     Creatinine 1.4     Differential Method Automated     eGFR if  39.8(A)     eGFR if non  34.5  Comment:  Calculation used to obtain the estimated glomerular filtration  rate (eGFR) is the CKD-EPI equation.   (A)     Eos # 0.2     Eosinophil% 3.1     Glucose 101     Gran # (ANC) 5.4     Gran% 69.1     Hematocrit 33.7(L)     Hemoglobin 10.5(L)     Lipase 55     Lymph # 1.3     Lymph% 16.1(L)     Magnesium 2.1     MCH 29.2     MCHC 31.2(L)     MCV 94     Mono # 0.8     Mono% 10.6     MPV 10.5     nRBC 0     Phosphorus 4.8(H)     Platelets 239     Potassium 3.5     Total Protein 6.8     RBC 3.59(L)     RDW 17.8(H)     Sodium 140     WBC 7.82           Significant Imaging: I have reviewed all pertinent imaging results/findings within the past 24 hours.

## 2018-07-02 NOTE — PLAN OF CARE
Problem: Patient Care Overview  Goal: Plan of Care Review  Patient alert and pleasantly confused. At times will doze off. Does ambulate with on man assist to and from bathroom. On isolation for ESBL initiated today. Daughters educated on same . Afebrile , vital signs stable. Monitored for fall risk and bed alarm and camera in use. Tolerated all medications well. Started amoxicillian clavulate well. Daughters stated her defibrillator in the left chest wall turned off. Had a bowel movement today. Ate fair , medicated once for nausea per co worker. Sat up in chair twice today. Tolerated well. Assessment on going. Tolerated fluids well.
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
## 2018-07-03 LAB
ALBUMIN SERPL BCP-MCNC: 3.4 G/DL
ALP SERPL-CCNC: 169 U/L
ALT SERPL W/O P-5'-P-CCNC: 109 U/L
ANION GAP SERPL CALC-SCNC: 14 MMOL/L
ANION GAP SERPL CALC-SCNC: 15 MMOL/L
AST SERPL-CCNC: 85 U/L
BASOPHILS # BLD AUTO: 0.05 K/UL
BASOPHILS NFR BLD: 0.6 %
BILIRUB SERPL-MCNC: 1.3 MG/DL
BUN SERPL-MCNC: 50 MG/DL
BUN SERPL-MCNC: 61 MG/DL
CALCIUM SERPL-MCNC: 8.5 MG/DL
CALCIUM SERPL-MCNC: 8.6 MG/DL
CHLORIDE SERPL-SCNC: 93 MMOL/L
CHLORIDE SERPL-SCNC: 93 MMOL/L
CO2 SERPL-SCNC: 28 MMOL/L
CO2 SERPL-SCNC: 28 MMOL/L
CREAT SERPL-MCNC: 1.8 MG/DL
CREAT SERPL-MCNC: 2.4 MG/DL
DIFFERENTIAL METHOD: ABNORMAL
EOSINOPHIL # BLD AUTO: 0.2 K/UL
EOSINOPHIL NFR BLD: 2.1 %
ERYTHROCYTE [DISTWIDTH] IN BLOOD BY AUTOMATED COUNT: 17.3 %
EST. GFR  (AFRICAN AMERICAN): 20.7 ML/MIN/1.73 M^2
EST. GFR  (AFRICAN AMERICAN): 29.4 ML/MIN/1.73 M^2
EST. GFR  (NON AFRICAN AMERICAN): 18 ML/MIN/1.73 M^2
EST. GFR  (NON AFRICAN AMERICAN): 25.5 ML/MIN/1.73 M^2
GLUCOSE SERPL-MCNC: 102 MG/DL
GLUCOSE SERPL-MCNC: 142 MG/DL
HCT VFR BLD AUTO: 34.7 %
HGB BLD-MCNC: 10.9 G/DL
IMM GRANULOCYTES # BLD AUTO: 0.02 K/UL
IMM GRANULOCYTES NFR BLD AUTO: 0.2 %
LYMPHOCYTES # BLD AUTO: 1.6 K/UL
LYMPHOCYTES NFR BLD: 18.4 %
MAGNESIUM SERPL-MCNC: 2.3 MG/DL
MCH RBC QN AUTO: 29.1 PG
MCHC RBC AUTO-ENTMCNC: 31.4 G/DL
MCV RBC AUTO: 93 FL
MONOCYTES # BLD AUTO: 0.9 K/UL
MONOCYTES NFR BLD: 10 %
NEUTROPHILS # BLD AUTO: 6.1 K/UL
NEUTROPHILS NFR BLD: 68.7 %
NRBC BLD-RTO: 0 /100 WBC
PHOSPHATE SERPL-MCNC: 5.1 MG/DL
PLATELET # BLD AUTO: 255 K/UL
PMV BLD AUTO: 11.1 FL
POTASSIUM SERPL-SCNC: 3.8 MMOL/L
POTASSIUM SERPL-SCNC: 4.3 MMOL/L
PROT SERPL-MCNC: 6.9 G/DL
RBC # BLD AUTO: 3.74 M/UL
SODIUM SERPL-SCNC: 135 MMOL/L
SODIUM SERPL-SCNC: 136 MMOL/L
WBC # BLD AUTO: 8.88 K/UL

## 2018-07-03 PROCEDURE — 97165 OT EVAL LOW COMPLEX 30 MIN: CPT

## 2018-07-03 PROCEDURE — 83735 ASSAY OF MAGNESIUM: CPT

## 2018-07-03 PROCEDURE — 80053 COMPREHEN METABOLIC PANEL: CPT

## 2018-07-03 PROCEDURE — 25000003 PHARM REV CODE 250: Performed by: HOSPITALIST

## 2018-07-03 PROCEDURE — 63600175 PHARM REV CODE 636 W HCPCS: Performed by: HOSPITALIST

## 2018-07-03 PROCEDURE — 80048 BASIC METABOLIC PNL TOTAL CA: CPT

## 2018-07-03 PROCEDURE — 99232 SBSQ HOSP IP/OBS MODERATE 35: CPT | Mod: GW,HPC,, | Performed by: HOSPITALIST

## 2018-07-03 PROCEDURE — 84100 ASSAY OF PHOSPHORUS: CPT

## 2018-07-03 PROCEDURE — 85025 COMPLETE CBC W/AUTO DIFF WBC: CPT

## 2018-07-03 PROCEDURE — 11000001 HC ACUTE MED/SURG PRIVATE ROOM

## 2018-07-03 PROCEDURE — 97161 PT EVAL LOW COMPLEX 20 MIN: CPT

## 2018-07-03 RX ORDER — LEVOTHYROXINE SODIUM 100 UG/1
100 TABLET ORAL
Status: DISCONTINUED | OUTPATIENT
Start: 2018-07-03 | End: 2018-07-06 | Stop reason: HOSPADM

## 2018-07-03 RX ORDER — RAMELTEON 8 MG/1
8 TABLET ORAL NIGHTLY
Status: DISCONTINUED | OUTPATIENT
Start: 2018-07-03 | End: 2018-07-05

## 2018-07-03 RX ORDER — BUMETANIDE 1 MG/1
1 TABLET ORAL DAILY
Status: DISCONTINUED | OUTPATIENT
Start: 2018-07-04 | End: 2018-07-04

## 2018-07-03 RX ORDER — BUMETANIDE 0.5 MG/1
0.5 TABLET ORAL DAILY
Status: DISCONTINUED | OUTPATIENT
Start: 2018-07-03 | End: 2018-07-03

## 2018-07-03 RX ADMIN — SENNOSIDES AND DOCUSATE SODIUM 1 TABLET: 8.6; 5 TABLET ORAL at 09:07

## 2018-07-03 RX ADMIN — RAMELTEON 8 MG: 8 TABLET, FILM COATED ORAL at 09:07

## 2018-07-03 RX ADMIN — HEPARIN SODIUM 5000 UNITS: 5000 INJECTION, SOLUTION INTRAVENOUS; SUBCUTANEOUS at 02:07

## 2018-07-03 RX ADMIN — HEPARIN SODIUM 5000 UNITS: 5000 INJECTION, SOLUTION INTRAVENOUS; SUBCUTANEOUS at 06:07

## 2018-07-03 RX ADMIN — ACETAMINOPHEN 650 MG: 325 TABLET, FILM COATED ORAL at 11:07

## 2018-07-03 RX ADMIN — METOPROLOL TARTRATE 25 MG: 25 TABLET ORAL at 09:07

## 2018-07-03 RX ADMIN — CARBAMAZEPINE 100 MG: 100 TABLET, CHEWABLE ORAL at 09:07

## 2018-07-03 RX ADMIN — AMOXICILLIN AND CLAVULANATE POTASSIUM 1 TABLET: 250; 125 TABLET, FILM COATED ORAL at 09:07

## 2018-07-03 RX ADMIN — PANTOPRAZOLE SODIUM 20 MG: 20 TABLET, DELAYED RELEASE ORAL at 09:07

## 2018-07-03 RX ADMIN — LEVOTHYROXINE SODIUM 100 MCG: 100 TABLET ORAL at 07:07

## 2018-07-03 RX ADMIN — HEPARIN SODIUM 5000 UNITS: 5000 INJECTION, SOLUTION INTRAVENOUS; SUBCUTANEOUS at 09:07

## 2018-07-03 RX ADMIN — FOLIC ACID 1 MG: 1 TABLET ORAL at 09:07

## 2018-07-03 RX ADMIN — METOPROLOL TARTRATE 25 MG: 25 TABLET ORAL at 02:07

## 2018-07-03 NOTE — PT/OT/SLP EVAL
Occupational Therapy   Evaluation    Name: Laurita Stokes  MRN: [id number]  Admitting Diagnosis:  Delirium due to multiple etiologies, acute, hyperactive      Recommendations:     Discharge Recommendations: home with home health  Discharge Equipment Recommendations:  walker, rolling  Barriers to discharge:  None    History:     Occupational Profile:  Living Environment: Pt lives in memory care unit at assisted living   Previous level of function: Pt received dependent self care assitance due to cognitive status  Equipment Owned:   (facility equipment )  Assistance upon Discharge: return to facility     Past Medical History:   Diagnosis Date    *Atrial fibrillation     on warfarin    Acquired hypothyroidism 4/29/2016    Allergy     seasonal    Anticoagulant long-term use     ASA 81 mg, Coumadin    Blood clotting tendency     Blood transfusion     Cataract     CHF (congestive heart failure)     CKD (chronic kidney disease) stage 4, GFR 15-29 ml/min 2/21/2017    Closed displaced fracture of neck of right femur with routine healing s/p hemiarthroplasty on 7/12/2017 7/11/2017    Coronary artery disease     PET stress in 1/14: lesions not amenable to PCI.    Coronary artery disease involving native coronary artery of native heart without angina pectoris 2/20/2017    DDD (degenerative disc disease), lumbar 8/20/2015    GERD (gastroesophageal reflux disease)     HEARING LOSS     wears Hearing aide    Hx of Ischemic colitis s/p colectomy 1/13/2014    Hyperlipidemia     Hypertension     Hypothyroid     MCI (mild cognitive impairment)     Myocardial infarct 1999    Pacemaker 2000    S/P CABG x 3 2000    SAH (subarachnoid hemorrhage) 5/24/2017    Ulcerative colitis     Ventricular tachycardia 1/14    VT storm 1/14, mexilitine added       Past Surgical History:   Procedure Laterality Date    CARDIAC CATHETERIZATION  8/13    [facility], Dr Wallace; LIMA and SVG occluded. Proximal  LAD 40%, ostial Diagonal 60%, LCX 30-40% in-stent restenosis, RCA totally occluded proximally    CATARACT EXTRACTION W/  INTRAOCULAR LENS IMPLANT Right 3/19/96    COLON SURGERY      ischemic colitis    COLONOSCOPY N/A 6/21/2016    Procedure: COLONOSCOPY;  Surgeon: Rob Cartagena MD;  Location: [facility] ENDO;  Service: Endoscopy;  Laterality: N/A;    COLONOSCOPY N/A 7/18/2016    Procedure: COLONOSCOPY;  Surgeon: Rob Cartagena MD;  Location: [facility] ENDO;  Service: Endoscopy;  Laterality: N/A;    CORONARY ARTERY BYPASS GRAFT  3/2001    3 vessel    CORONARY STENT PLACEMENT  12/2015    Dr. Freire    EYE SURGERY      HIP FRACTURE SURGERY Right 07/12/2017    Hemiarthroplasty for femoral neck fracture     HYSTERECTOMY      total    INSERT / REPLACE / REMOVE PACEM[place]  12[place]    [facility] [place]    ORIF WRIST FRACTURE Right     TONSILLECTOMY         Subjective     Chief Complaint: poor functional performance   Patient/Family stated goals: return to facility   Communicated with: RN prior to session.  Pain/Comfort:  · Pain Rating 1: 0/10    Patients cultural, spiritual, Faith conflicts given the current situation: none stated     Objective:     Patient found with:  (lines intact )    General Precautions: Standard, fall   Orthopedic Precautions:N/A   Braces: N/A     Occupational Performance:    Bed Mobility:    · Patient completed Rolling/Turning to Right with minimum assistance  · Patient completed Scooting/Bridging with minimum assistance  · Patient completed Supine to Sit with minimum assistance    Functional Mobility/Transfers:  · Patient completed Sit <> Stand Transfer with minimum assistance  with  no assistive device   · Functional Mobility: Pt with poor balance and would benefit from walker     Activities of Daily Living:  · Pt with overall dependent assistance for self care completion but with potential for increased participation     Cognitive/Visual Perceptual:  Cognitive/Psychosocial  "Skills:     -       Oriented to: Person   -       Follows Commands/attention:Easily distracted  -       Communication: clear/fluent  -       Memory: Impaired STM and Impaired LTM  -       Safety awareness/insight to disability: impaired   -       Mood/Affect/Coping skills/emotional control: Appropriate to situation    Physical Exam:  Upper Extremity Range of Motion:     -       Right Upper Extremity: WFL  -       Left Upper Extremity: WFL  Upper Extremity Strength:    -       Right Upper Extremity: WFL  -       Left Upper Extremity: WNL    Patient left supine with all lines intact and daughter present    [facility] 6 Click:  [facility] Total Score: 15    Treatment & Education:  Evaluation complete.  Pt educated on safety, role of OT, importance of increased participation in self care for gains , expectations for participation, expectations for gains, POC, energy conservation, caregiver strain. White board updated.     Education:    Assessment:     Laurita Stokes is a 84 y.o. female with a medical diagnosis of Delirium due to multiple etiologies, acute, hyperactive.  She presents supine with daughter present.  Pt with dementia and requires consistent cues and redirection.  Pt lives in memory unit and staff provides dependent self care assistance.  Pt with potential for increased participation for gains and improved performance.    Performance deficits affecting function are impaired endurance, impaired self care skills, impaired functional mobilty, impaired balance, impaired cognition.      Rehab Prognosis:  Fair ; patient would benefit from acute skilled OT services to address these deficits and reach maximum level of function.         Clinical Decision Makin.  OT Low:  "Pt evaluation falls under low complexity for evaluation coding due to performance deficits noted in 1-3 areas as stated above and 0 co-morbities affecting current functional status. Data obtained from problem focused assessments. No " "modifications or assistance was required for completion of evaluation. Only brief occupational profile and history review completed."     Plan:     Patient to be seen 3 x/week to address the above listed problems via self-care/home management, therapeutic activities, therapeutic exercises  · Plan of Care Expires: 08/03/18  · Plan of Care Reviewed with: patient, daughter    This Plan of care has been discussed with the patient who was involved in its development and understands and is in agreement with the identified goals and treatment plan    GOALS:    Occupational Therapy Goals        Problem: Occupational Therapy Goal    Goal Priority Disciplines Outcome Interventions   Occupational Therapy Goal     OT, PT/OT Ongoing (interventions implemented as appropriate)    Description:  Goals to be met by: 7/22    Patient will increase functional independence with ADLs by performing:    Feeding with Set-up Assistance.  UE Dressing with Contact Guard Assistance.  LE Dressing with Minimal Assistance.  Grooming while seated with Contact Guard Assistance.  Toileting from toilet with Minimal Assistance for hygiene and clothing management.   Bathing from  edge of bed with Minimal Assistance.                      Time Tracking:     OT Date of Treatment: 07/03/18  OT Start Time: 0805  OT Stop Time: 0820  OT Total Time (min): 15 min    Billable Minutes:Evaluation 15    Sarah Siu, OT  7/3/2018    "
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
## 2018-07-03 NOTE — PLAN OF CARE
Problem: Physical Therapy Goal  Goal: Physical Therapy Goal  Goals to be met by: 18    Patient will increase functional independence with mobility by performin. Supine to sit with Contact Guard Assistance  2. Sit to supine with Contact Guard Assistance  3. Rolling to Left and Right with Contact Guard Assistance.  4. Sit to stand transfer with Minimal Assistance  5. Bed to chair transfer with Contact Guard Assistance using Rolling Walker  6. Gait  x 50 feet with Contact Guard Assistance using Rolling Walker.   7. Lower extremity exercise program x25 reps per handout, with assistance as needed    Outcome: Ongoing (interventions implemented as appropriate)  PT Goals established following initial eval    Herve Nunn, PT, DPT  7/3/2018  Pager [phone]

## 2018-07-03 NOTE — SUBJECTIVE & OBJECTIVE
Interval History: No acute events overnight. Pt remains lethargic this AM but arousable. Per daughter , pt not sleeping - chronic issue.     Review of Systems   Unable to perform ROS: Dementia     Objective:     Vital Signs (Most Recent):  Temp: 98.7 °F (37.1 °C) (07/03/18 1622)  Pulse: 68 (07/03/18 1622)  Resp: 18 (07/03/18 1622)  BP: (!) 122/57 (07/03/18 1622)  SpO2: 96 % (07/03/18 1622) Vital Signs (24h Range):  Temp:  [97 °F (36.1 °C)-98.7 °F (37.1 °C)] 98.7 °F (37.1 °C)  Pulse:  [65-72] 68  Resp:  [18] 18  SpO2:  [91 %-96 %] 96 %  BP: (111-122)/(56-79) 122/57     Weight: 36.6 kg (80 lb 11.2 oz)  Body mass index is 13.85 kg/m².    Intake/Output Summary (Last 24 hours) at 07/03/18 1833  Last data filed at 07/03/18 1300   Gross per 24 hour   Intake              860 ml   Output                0 ml   Net              860 ml      Physical Exam   Constitutional: She appears well-developed and well-nourished.   Cardiovascular: Normal rate and regular rhythm.  Exam reveals no gallop and no friction rub.    Murmur heard.  Pulmonary/Chest: Effort normal and breath sounds normal. No respiratory distress. She has no wheezes. She has no rales.   Abdominal: Soft. Bowel sounds are normal. She exhibits no distension. There is no tenderness.   Musculoskeletal: Normal range of motion.   Neurological:   lethargic    Skin: Skin is warm and dry.       MELD-Na score: 9 at 7/14/2017  3:00 AM  MELD score: 9 at 7/14/2017  3:00 AM  Calculated from:  Serum Creatinine: 1.1 mg/dL at 7/14/2017  3:00 AM  Serum Sodium: 133 mmol/L at 7/14/2017  3:00 AM  Total Bilirubin: 1 mg/dL at 7/14/2017  3:00 AM  INR(ratio): 1.2 at 7/12/2017  4:53 AM  Age: 83 years    Significant Labs:  CBC:    Recent Labs  Lab 07/02/18  0404 07/03/18  0326   WBC 7.82 8.88   HGB 10.5* 10.9*   HCT 33.7* 34.7*    255     CMP:    Recent Labs  Lab 07/02/18  0404 07/03/18  0326    136   K 3.5 4.3   CL 96 93*   CO2 30* 28    102   BUN 38* 50*   CREATININE 1.4  1.8*   CALCIUM 8.9 8.5*   PROT 6.8 6.9   ALBUMIN 3.3* 3.4*   BILITOT 1.0 1.3*   ALKPHOS 167* 169*   * 85*   * 109*   ANIONGAP 14 15   EGFRNONAA 34.5* 25.5*     PTINR:  No results for input(s): INR in the last 48 hours.    Significant Procedures:   Dobutamine Stress Test with Color Flow: No results found. However, due to the size of the patient record, not all encounters were searched. Please check Results Review for a complete set of results.      2D Echo: No results found. However, due to the size of the patient record, not all encounters were searched. Please check Results Review for a complete set of results.

## 2018-07-03 NOTE — ASSESSMENT & PLAN NOTE
"- R foot redness per daughter "she kicked a door yesterday". No obvious tenderness on eval  - ?cellulitis, Xray negative  - on Augmentin     "

## 2018-07-03 NOTE — ASSESSMENT & PLAN NOTE
- CKD stage 3, suspect Cr is baseline given no changes with diuretics; baseline 1.4 - 1.6   - diuresis as above - hold today

## 2018-07-03 NOTE — ASSESSMENT & PLAN NOTE
- +UA  - UCx: showed ESBL Ecoli (contact precaution placed) and enterococcus faecalis  - Change to PO Augmentin 7/2/2018 (renally dosed) given new cultures finding  - plan to treat for 7-10 days

## 2018-07-03 NOTE — PLAN OF CARE
REZA confirmed with Jhoana from [facility] that pt can return to [facility]'s memory care unit either tomorrow or Thursday. Jhoana can be reached at [phone]. Pt will return to a regular NH bed with hospice. NH orders need to be sent to [facility] and Jhoana will need to be called when pt is ready for d/c. Pt's hospice services will resume upon her return to [facility].    REZA informed CM of above info.    REZA left message for pts dtg Kinza with above info as well.    Jennifer Pereira, [facility] [phone]

## 2018-07-03 NOTE — PLAN OF CARE
Covering anibal spoke with pts Kinza PITTMAN. She confirmed that pt has been living at [facility] in the memory Care Unit. Prior to admit pt was receiving hospice services through [facility]. Kinza has spoken to  and was told pt may be ready for d/c tomorrow or Thursday. Kinza is concerned that pt is weaker than she was before admit, and is unsure if the memory care unit will be able to accept pt back; she is inquiring about the possibility of SNF at [facility]. ANIBAL agreed to speak to Jhoana at [facility] about options for d/c.    ANIBAL left message for Jhoana at [facility] ([phone]).    ANIBAL will call Kinza after getting more info from Jhoana at [facility].    Jennifer Pereira, [facility] [phone]

## 2018-07-03 NOTE — PLAN OF CARE
Problem: Occupational Therapy Goal  Goal: Occupational Therapy Goal  Goals to be met by: 7/22    Patient will increase functional independence with ADLs by performing:    Feeding with Set-up Assistance.  UE Dressing with Contact Guard Assistance.  LE Dressing with Minimal Assistance.  Grooming while seated with Contact Guard Assistance.  Toileting from toilet with Minimal Assistance for hygiene and clothing management.   Bathing from  edge of bed with Minimal Assistance.    Outcome: Ongoing (interventions implemented as appropriate)  Evaluation complete and goals set.  Cont with POC  Sarah Siu OT  7/3/2018
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
## 2018-07-03 NOTE — ASSESSMENT & PLAN NOTE
Cardiomyopathy, ischemic  Pulmonary HTN  Per report of hospice due to end stage HF found to have ADHF, with an episode of chest pain while in ED. Per Cards, Cardiac biomarkers are elevated. EKG with LBBB, last EKG is from 2015 and it was paced rhythm at the time. Currently no chest pain, given that she is hypervolemic recommend one dose of lasix 80mg IV once, thereafter increase her home bumex 1mg daily to bumex 1mg BID. She is hospice care therefore would not favor any additional ischemia work up.  - On Dig, level  WNL  - BNP elevation, LE edema Strict I/Os, Cardiac diet (per daughter was not follow at NH)  - Bumex 1mg QD now as pt is euvolemic, drastic improvement LE edema but will hold dose for today given MARIBEL   - Monitor renal function, stable

## 2018-07-03 NOTE — PT/OT/SLP EVAL
Physical Therapy Evaluation    Patient Name:  Laurita Stokes   MRN:  [id number]    Recommendations:     Discharge Recommendations:  home with home health   Discharge Equipment Recommendations: walker, rolling   Barriers to discharge: None    Assessment:     Laurita Stokes is a 84 y.o. female admitted with a medical diagnosis of Delirium due to multiple etiologies, acute, hyperactive.  She presents with the following impairments/functional limitations:  impaired endurance, weakness, gait instability, impaired cardiopulmonary response to activity, impaired balance, impaired functional mobilty, impaired self care skills, impaired cognition. Patient is a pleasant older lady who presents w/ decreased functional mobility, gait instability, impaired balance, and generalized weakness. Patient was living in an assisted living facility where they were assisting her w/ all ADLs prior to admission. Patient participates well but has potential to benefit from skilled therapy to improve her functional mobility and increase independence. Patient's balance is impaired and requires CGA at all times when out of bed.    Rehab Prognosis:  Fair; patient would benefit from acute skilled PT services to address these deficits and reach maximum level of function.      Recent Surgery: * No surgery found *      Plan:     During this hospitalization, patient to be seen 3 x/week to address the above listed problems via gait training, therapeutic activities, therapeutic exercises  · Plan of Care Expires:  08/02/18   Plan of Care Reviewed with: patient, daughter    Subjective     Communicated with nurse prior to session.  Patient found supine upon PT entry to room, agreeable to evaluation.      Chief Complaint: impaired cognition, decreased functional mobility, impaired balance  Patient comments/goals: none formally stated; to get better  Pain/Comfort:  · Pain Rating 1: 0/10  · Pain Rating Post-Intervention 2: 0/10    Patients  cultural, spiritual, Anabaptist conflicts given the current situation: none stated    Living Environment:  Patient lives in the [facility] (independent living)    Prior to admission, patients level of function was receiving assistance for ADLs and mobility.  Patient has the following equipment: bedside commode, wheelchair, walker, rolling, hospital bed, grab bar.    Upon discharge, patient will have assistance from facility staff.    Objective:     Patient found with: telemetry     General Precautions: Standard, fall   Orthopedic Precautions:N/A   Braces: N/A     Exams:  · Cognitive Exam:  Patient is oriented to Person and follows 85% of 1 step commands   · Gross Motor Coordination:  WFL  · Postural Exam:  Patient presented with the following abnormalities:    · -       Rounded shoulders  · Sensation:    · -       Intact  · RLE ROM: WFL  · RLE Strength: WFL  · LLE ROM: WFL  · LLE Strength: WFL  · BLE grossly 3+/5    Functional Mobility:  · Bed Mobility:  Rolling Right: maximal assistance  · Scooting: Moderate Assistance  · Supine to Sit: maximal assistance  · Bed mobility limited 2/2 decreased attention to task, requiring increased verbal/tactile cues to re-orient  · Transfers:  Sit to Stand:  minnimal assistance with hand-held assist  · Gait: ~16' in room, Min A w/ HHA; impaired balance and decreased step length  · Balance: Patient unable to take perturbations in standing w/o losing balance    AM-PAC 6 CLICK MOBILITY  Total Score:17       Therapeutic Activities and Exercises:   PT EVAL completed    Patient left up in chair with all lines intact, call button in reach, nurse notified and daughter and AVASYS present.    GOALS:    Physical Therapy Goals        Problem: Physical Therapy Goal    Goal Priority Disciplines Outcome Goal Variances Interventions   Physical Therapy Goal     PT/OT, PT Ongoing (interventions implemented as appropriate)     Description:  Goals to be met by: 07/13/18    Patient will increase  functional independence with mobility by performin. Supine to sit with Contact Guard Assistance  2. Sit to supine with Contact Guard Assistance  3. Rolling to Left and Right with Contact Guard Assistance.  4. Sit to stand transfer with Minimal Assistance  5. Bed to chair transfer with Contact Guard Assistance using Rolling Walker  6. Gait  x 50 feet with Contact Guard Assistance using Rolling Walker.   7. Lower extremity exercise program x25 reps per handout, with assistance as needed                      History:     Past Medical History:   Diagnosis Date    *Atrial fibrillation     on warfarin    Acquired hypothyroidism 2016    Allergy     seasonal    Anticoagulant long-term use     ASA 81 mg, Coumadin    Blood clotting tendency     Blood transfusion     Cataract     CHF (congestive heart failure)     CKD (chronic kidney disease) stage 4, GFR 15-29 ml/min 2017    Closed displaced fracture of neck of right femur with routine healing s/p hemiarthroplasty on 2017    Coronary artery disease     PET stress in : lesions not amenable to PCI.    Coronary artery disease involving native coronary artery of native heart without angina pectoris 2017    DDD (degenerative disc disease), lumbar 2015    GERD (gastroesophageal reflux disease)     HEARING LOSS     wears Hearing aide    Hx of Ischemic colitis s/p colectomy 2014    Hyperlipidemia     Hypertension     Hypothyroid     MCI (mild cognitive impairment)     Myocardial infarct     Pacemaker     S/P CABG x 3     SAH (subarachnoid hemorrhage) 2017    Ulcerative colitis     Ventricular tachycardia     VT storm , mexilitine added       Past Surgical History:   Procedure Laterality Date    CARDIAC CATHETERIZATION      [facility], Dr Wallace; LIMA and SVG occluded. Proximal LAD 40%, ostial Diagonal 60%, LCX 30-40% in-stent restenosis, RCA totally occluded  proximally    CATARACT EXTRACTION W/  INTRAOCULAR LENS IMPLANT Right 3/19/96    COLON SURGERY      ischemic colitis    COLONOSCOPY N/A 6/21/2016    Procedure: COLONOSCOPY;  Surgeon: Rob Cartagena MD;  Location: [facility] END[facility];  Service: Endoscopy;  Laterality: N/A;    COLONOSCOPY N/A 7/18/2016    Procedure: COLONOSCOPY;  Surgeon: Rob Cartagena MD;  Location: [facility] ENDO;  Service: Endoscopy;  Laterality: N/A;    CORONARY ARTERY BYPASS GRAFT  3/2001    3 vessel    CORONARY STENT PLACEMENT  12/2015    Dr. Freire    EYE SURGERY      HIP FRACTURE SURGERY Right 07/12/2017    Hemiarthroplasty for femoral neck fracture     HYSTERECTOMY      total    INSERT / REPLACE / REMOVE PACEMAKER  12/2009    [facility] CRT-D generator changeout    ORIF WRIST FRACTURE Right     TONSILLECTOMY         Clinical Decision Making:     History  Co-morbidities and personal factors that may impact the plan of care Examination  Body Structures and Functions, activity limitations and participation restrictions that may impact the plan of care Clinical Presentation   Decision Making/ Complexity Score   Co-morbidities:   [] Time since onset of injury / illness / exacerbation  [] Status of current condition  []Patient's cognitive status and safety concerns    [] Multiple Medical Problems (see med hx)  Personal Factors:   [] Patient's age  [] Prior Level of function   [] Patient's home situation (environment and family support)  [] Patient's level of motivation  [] Expected progression of patient      HISTORY:(criteria)    [] 28301 - no personal factors/history    [] 40520 - has 1-2 personal factor/comorbidity     [] 70416 - has >3 personal factor/comorbidity     Body Regions:  [] Objective examination findings  [] Head     []  Neck  [] Trunk   [] Upper Extremity  [] Lower Extremity    Body Systems:  [] For communication ability, affect, cognition, language, and learning style: the assessment of the ability to make needs known, consciousness,  orientation (person, place, and time), expected emotional /behavioral responses, and learning preferences (eg, learning barriers, education  needs)  [] For the neuromuscular system: a general assessment of gross coordinated movement (eg, balance, gait, locomotion, transfers, and transitions) and motor function  (motor control and motor learning)  [] For the musculoskeletal system: the assessment of gross symmetry, gross range of motion, gross strength, height, and weight  [] For the integumentary system: the assessment of pliability(texture), presence of scar formation, skin color, and skin integrity  [] For cardiovascular/pulmonary system: the assessment of heart rate, respiratory rate, blood pressure, and edema     Activity limitations:    [] Patient's cognitive status and saf ety concerns          [] Status of current condition      [] Weight bearing restriction  [] Cardiopulmunary Restriction    Participation Restrictions:   [] Goals and goal agreement with the patient     [] Rehab potential (prognosis) and probable outcome      Examination of Body System: (criteria)    [] 50013 - addressing 1-2 elements    [] 52955 - addressing a total of 3 or more elements     [] 19209 -  Addressing a total of 4 or more elements         Clinical Presentation: (criteria)  Choose one     On examination of body system using standardized tests and measures patient presents with (CHOOSE ONE) elements from any of the following: body structures and functions, activity limitations, and/or participation restrictions.  Leading to a clinical presentation that is considered (CHOOSE ONE)                              Clinical Decision Making  (Eval Complexity):  Low- 60743     Time Tracking:     PT Received On: 07/03/18  PT Start Time: 0803     PT Stop Time: 0818  PT Total Time (min): 15 min     Billable Minutes: Evaluation 15 Min      Herve Nunn, PT  07/03/2018

## 2018-07-04 PROBLEM — N18.9 ACUTE-ON-CHRONIC KIDNEY INJURY: Status: RESOLVED | Noted: 2018-06-28 | Resolved: 2018-07-02

## 2018-07-04 PROBLEM — N18.9 ACUTE-ON-CHRONIC KIDNEY INJURY: Status: ACTIVE | Noted: 2018-06-28

## 2018-07-04 LAB
ALBUMIN SERPL BCP-MCNC: 3.4 G/DL
ALP SERPL-CCNC: 181 U/L
ALT SERPL W/O P-5'-P-CCNC: 88 U/L
AMORPH CRY UR QL COMP ASSIST: ABNORMAL
ANION GAP SERPL CALC-SCNC: 12 MMOL/L
ANION GAP SERPL CALC-SCNC: 12 MMOL/L
ANION GAP SERPL CALC-SCNC: 17 MMOL/L
AST SERPL-CCNC: 48 U/L
BACTERIA #/AREA URNS AUTO: ABNORMAL /HPF
BASOPHILS # BLD AUTO: 0.07 K/UL
BASOPHILS NFR BLD: 0.8 %
BILIRUB SERPL-MCNC: 1.2 MG/DL
BILIRUB UR QL STRIP: NEGATIVE
BNP SERPL-MCNC: >4900 PG/ML
BUN SERPL-MCNC: 70 MG/DL
BUN SERPL-MCNC: 71 MG/DL
BUN SERPL-MCNC: 71 MG/DL
CALCIUM SERPL-MCNC: 8.4 MG/DL
CALCIUM SERPL-MCNC: 8.4 MG/DL
CALCIUM SERPL-MCNC: 8.9 MG/DL
CHLORIDE SERPL-SCNC: 93 MMOL/L
CLARITY UR REFRACT.AUTO: ABNORMAL
CO2 SERPL-SCNC: 25 MMOL/L
CO2 SERPL-SCNC: 28 MMOL/L
CO2 SERPL-SCNC: 28 MMOL/L
COLOR UR AUTO: YELLOW
CREAT SERPL-MCNC: 2.1 MG/DL
CREAT SERPL-MCNC: 2.1 MG/DL
CREAT SERPL-MCNC: 2.3 MG/DL
CREAT UR-MCNC: 112 MG/DL
DIFFERENTIAL METHOD: ABNORMAL
EOSINOPHIL # BLD AUTO: 0.2 K/UL
EOSINOPHIL NFR BLD: 1.7 %
ERYTHROCYTE [DISTWIDTH] IN BLOOD BY AUTOMATED COUNT: 17.2 %
EST. GFR  (AFRICAN AMERICAN): 21.8 ML/MIN/1.73 M^2
EST. GFR  (AFRICAN AMERICAN): 24.4 ML/MIN/1.73 M^2
EST. GFR  (AFRICAN AMERICAN): 24.4 ML/MIN/1.73 M^2
EST. GFR  (NON AFRICAN AMERICAN): 18.9 ML/MIN/1.73 M^2
EST. GFR  (NON AFRICAN AMERICAN): 21.1 ML/MIN/1.73 M^2
EST. GFR  (NON AFRICAN AMERICAN): 21.1 ML/MIN/1.73 M^2
GLUCOSE SERPL-MCNC: 131 MG/DL
GLUCOSE SERPL-MCNC: 142 MG/DL
GLUCOSE SERPL-MCNC: 142 MG/DL
GLUCOSE UR QL STRIP: NEGATIVE
HCT VFR BLD AUTO: 35.5 %
HGB BLD-MCNC: 10.9 G/DL
HGB UR QL STRIP: NEGATIVE
HYALINE CASTS UR QL AUTO: 3 /LPF
IMM GRANULOCYTES # BLD AUTO: 0.03 K/UL
IMM GRANULOCYTES NFR BLD AUTO: 0.3 %
KETONES UR QL STRIP: NEGATIVE
LEUKOCYTE ESTERASE UR QL STRIP: ABNORMAL
LYMPHOCYTES # BLD AUTO: 1.7 K/UL
LYMPHOCYTES NFR BLD: 19 %
MAGNESIUM SERPL-MCNC: 2.5 MG/DL
MCH RBC QN AUTO: 29.3 PG
MCHC RBC AUTO-ENTMCNC: 30.7 G/DL
MCV RBC AUTO: 95 FL
MICROSCOPIC COMMENT: ABNORMAL
MONOCYTES # BLD AUTO: 1.1 K/UL
MONOCYTES NFR BLD: 12.6 %
NEUTROPHILS # BLD AUTO: 5.9 K/UL
NEUTROPHILS NFR BLD: 65.6 %
NITRITE UR QL STRIP: NEGATIVE
NON-SQ EPI CELLS #/AREA URNS AUTO: 1 /HPF
NRBC BLD-RTO: 0 /100 WBC
PH UR STRIP: 5 [PH] (ref 5–8)
PHOSPHATE SERPL-MCNC: 5.3 MG/DL
PLATELET # BLD AUTO: 271 K/UL
PMV BLD AUTO: 10.6 FL
POTASSIUM SERPL-SCNC: 3.7 MMOL/L
POTASSIUM SERPL-SCNC: 3.7 MMOL/L
POTASSIUM SERPL-SCNC: 4.2 MMOL/L
PROT SERPL-MCNC: 7 G/DL
PROT UR QL STRIP: NEGATIVE
RBC # BLD AUTO: 3.72 M/UL
RBC #/AREA URNS AUTO: 0 /HPF (ref 0–4)
SODIUM SERPL-SCNC: 133 MMOL/L
SODIUM SERPL-SCNC: 133 MMOL/L
SODIUM SERPL-SCNC: 135 MMOL/L
SODIUM UR-SCNC: <20 MMOL/L
SP GR UR STRIP: 1.01 (ref 1–1.03)
SQUAMOUS #/AREA URNS AUTO: 2 /HPF
URN SPEC COLLECT METH UR: ABNORMAL
UROBILINOGEN UR STRIP-ACNC: NEGATIVE EU/DL
UUN UR-MCNC: 834 MG/DL
WBC # BLD AUTO: 8.99 K/UL
WBC #/AREA URNS AUTO: 6 /HPF (ref 0–5)
WBC CLUMPS UR QL AUTO: ABNORMAL

## 2018-07-04 PROCEDURE — 83880 ASSAY OF NATRIURETIC PEPTIDE: CPT

## 2018-07-04 PROCEDURE — 85025 COMPLETE CBC W/AUTO DIFF WBC: CPT

## 2018-07-04 PROCEDURE — 82570 ASSAY OF URINE CREATININE: CPT

## 2018-07-04 PROCEDURE — 84100 ASSAY OF PHOSPHORUS: CPT

## 2018-07-04 PROCEDURE — 80048 BASIC METABOLIC PNL TOTAL CA: CPT

## 2018-07-04 PROCEDURE — 63600175 PHARM REV CODE 636 W HCPCS: Performed by: HOSPITALIST

## 2018-07-04 PROCEDURE — 83735 ASSAY OF MAGNESIUM: CPT

## 2018-07-04 PROCEDURE — 25000003 PHARM REV CODE 250: Performed by: HOSPITALIST

## 2018-07-04 PROCEDURE — 84540 ASSAY OF URINE/UREA-N: CPT

## 2018-07-04 PROCEDURE — 99232 SBSQ HOSP IP/OBS MODERATE 35: CPT | Mod: GW,HPC,, | Performed by: HOSPITALIST

## 2018-07-04 PROCEDURE — 81001 URINALYSIS AUTO W/SCOPE: CPT

## 2018-07-04 PROCEDURE — 80053 COMPREHEN METABOLIC PANEL: CPT

## 2018-07-04 PROCEDURE — 11000001 HC ACUTE MED/SURG PRIVATE ROOM

## 2018-07-04 PROCEDURE — 84300 ASSAY OF URINE SODIUM: CPT

## 2018-07-04 PROCEDURE — 36415 COLL VENOUS BLD VENIPUNCTURE: CPT

## 2018-07-04 RX ORDER — BUMETANIDE 1 MG/1
1 TABLET ORAL DAILY
Status: DISCONTINUED | OUTPATIENT
Start: 2018-07-05 | End: 2018-07-05

## 2018-07-04 RX ORDER — BUMETANIDE 1 MG/1
1 TABLET ORAL 2 TIMES DAILY
Status: DISCONTINUED | OUTPATIENT
Start: 2018-07-04 | End: 2018-07-04

## 2018-07-04 RX ADMIN — METOPROLOL TARTRATE 25 MG: 25 TABLET ORAL at 10:07

## 2018-07-04 RX ADMIN — METOPROLOL TARTRATE 25 MG: 25 TABLET ORAL at 04:07

## 2018-07-04 RX ADMIN — RAMELTEON 8 MG: 8 TABLET, FILM COATED ORAL at 08:07

## 2018-07-04 RX ADMIN — HEPARIN SODIUM 5000 UNITS: 5000 INJECTION, SOLUTION INTRAVENOUS; SUBCUTANEOUS at 08:07

## 2018-07-04 RX ADMIN — AMOXICILLIN AND CLAVULANATE POTASSIUM 1 TABLET: 250; 125 TABLET, FILM COATED ORAL at 08:07

## 2018-07-04 RX ADMIN — SENNOSIDES AND DOCUSATE SODIUM 1 TABLET: 8.6; 5 TABLET ORAL at 10:07

## 2018-07-04 RX ADMIN — FOLIC ACID 1 MG: 1 TABLET ORAL at 10:07

## 2018-07-04 RX ADMIN — CARBAMAZEPINE 100 MG: 100 TABLET, CHEWABLE ORAL at 08:07

## 2018-07-04 RX ADMIN — PANTOPRAZOLE SODIUM 20 MG: 20 TABLET, DELAYED RELEASE ORAL at 10:07

## 2018-07-04 RX ADMIN — AMOXICILLIN AND CLAVULANATE POTASSIUM 1 TABLET: 250; 125 TABLET, FILM COATED ORAL at 10:07

## 2018-07-04 RX ADMIN — CARBAMAZEPINE 100 MG: 100 TABLET, CHEWABLE ORAL at 10:07

## 2018-07-04 RX ADMIN — BUMETANIDE 1 MG: 1 TABLET ORAL at 11:07

## 2018-07-04 RX ADMIN — DIGOXIN 125 MCG: 0.12 TABLET ORAL at 04:07

## 2018-07-04 RX ADMIN — HEPARIN SODIUM 5000 UNITS: 5000 INJECTION, SOLUTION INTRAVENOUS; SUBCUTANEOUS at 02:07

## 2018-07-04 RX ADMIN — RISPERIDONE 1 MG: 1 TABLET ORAL at 08:07

## 2018-07-04 NOTE — PLAN OF CARE
Problem: Patient Care Overview  Goal: Plan of Care Review  Patient remains on isolation for ESBL in urine. No falls or occurrences. Fluids offered frequently . Voids spontaneously and assisted to bathroom with one man assist. Tolerated all medications orally without concerns. Skin intact and barrier paste used as preventive measure with each bathroom occurrence. Sitter this shift at the  Bedside. Thyroid medication initiated this pm during rounds and hand off with on coming nurse. bumex discontinued. Had a small sad episode and redirected and did fine . Vital signs stable. Assessment on going.

## 2018-07-04 NOTE — SUBJECTIVE & OBJECTIVE
Interval History: No acute events overnight. Pt more alert this AM. Cr worsening.      Review of Systems   Unable to perform ROS: Dementia     Objective:     Vital Signs (Most Recent):  Temp: 97.3 °F (36.3 °C) (07/04/18 1138)  Pulse: 67 (07/04/18 1138)  Resp: 18 (07/04/18 1138)  BP: 122/60 (07/04/18 1138)  SpO2: 100 % (07/04/18 1138) Vital Signs (24h Range):  Temp:  [97.2 °F (36.2 °C)-98.7 °F (37.1 °C)] 97.3 °F (36.3 °C)  Pulse:  [60-71] 67  Resp:  [16-18] 18  SpO2:  [94 %-100 %] 100 %  BP: (109-122)/(56-60) 122/60     Weight: 36.6 kg (80 lb 11.2 oz)  Body mass index is 13.85 kg/m².    Intake/Output Summary (Last 24 hours) at 07/04/18 1410  Last data filed at 07/03/18 1830   Gross per 24 hour   Intake              250 ml   Output                0 ml   Net              250 ml      Physical Exam   Constitutional: She appears well-developed and well-nourished.   Cardiovascular: Normal rate and regular rhythm.  Exam reveals no gallop and no friction rub.    Murmur heard.  Pulmonary/Chest: Effort normal and breath sounds normal. No respiratory distress. She has no wheezes. She has no rales.   Abdominal: Soft. Bowel sounds are normal. She exhibits no distension. There is no tenderness.   Musculoskeletal: Normal range of motion.   Neurological:   lethargic    Skin: Skin is warm and dry.       MELD-Na score: 9 at 7/14/2017  3:00 AM  MELD score: 9 at 7/14/2017  3:00 AM  Calculated from:  Serum Creatinine: 1.1 mg/dL at 7/14/2017  3:00 AM  Serum Sodium: 133 mmol/L at 7/14/2017  3:00 AM  Total Bilirubin: 1 mg/dL at 7/14/2017  3:00 AM  INR(ratio): 1.2 at 7/12/2017  4:53 AM  Age: 83 years    Significant Labs:  CBC:    Recent Labs  Lab 07/03/18  0326 07/04/18  0445   WBC 8.88 8.99   HGB 10.9* 10.9*   HCT 34.7* 35.5*    271     CMP:    Recent Labs  Lab 07/03/18  0326 07/03/18 2008 07/04/18  0445    135* 135*   K 4.3 3.8 4.2   CL 93* 93* 93*   CO2 28 28 25    142* 131*   BUN 50* 61* 70*   CREATININE 1.8* 2.4*  2.3*   CALCIUM 8.5* 8.6* 8.9   PROT 6.9  --  7.0   ALBUMIN 3.4*  --  3.4*   BILITOT 1.3*  --  1.2*   ALKPHOS 169*  --  181*   AST 85*  --  48*   *  --  88*   ANIONGAP 15 14 17*   EGFRNONAA 25.5* 18.0* 18.9*     PTINR:  No results for input(s): INR in the last 48 hours.    Significant Procedures:   Dobutamine Stress Test with Color Flow: No results found. However, due to the size of the patient record, not all encounters were searched. Please check Results Review for a complete set of results.      2D Echo: No results found. However, due to the size of the patient record, not all encounters were searched. Please check Results Review for a complete set of results.
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
## 2018-07-04 NOTE — ASSESSMENT & PLAN NOTE
Cardiomyopathy, ischemic  Pulmonary HTN  Per report of hospice due to end stage HF found to have ADHF, with an episode of chest pain while in ED. Per Cards, Cardiac biomarkers are elevated. EKG with LBBB, last EKG is from 2015 and it was paced rhythm at the time. Currently no chest pain, given that she is hypervolemic recommend one dose of lasix 80mg IV once, thereafter increase her home bumex 1mg daily to bumex 1mg BID. She is hospice care therefore would not favor any additional ischemia work up.  - On Dig, level  WNL  - BNP elevation, LE edema Strict I/Os, Cardiac diet (per daughter was not follow at NH). Edema improved.   - Restart Bumex 1mg QD as pt appears slightly SOB. Held yesterday given MARIBEL.   - Monitor renal function, stable
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
## 2018-07-04 NOTE — ASSESSMENT & PLAN NOTE
- baseline Cr is 1.4-1.6. Unclear etiology - pre-renal vs. Cardiorenal. Worsened with holding diuretics. Pt only received 1 dose of bumex in the last 3 days. Suspect cardio renal >> pre-renal. DDx includes AIN given amoxicillin.   - urine studies   - UA   - will consider renal us

## 2018-07-04 NOTE — PLAN OF CARE
Problem: Fall Risk (Adult)  Goal: Absence of Falls  Patient will demonstrate the desired outcomes by discharge/transition of care.   Outcome: Ongoing (interventions implemented as appropriate)   07/04/18 0323   Fall Risk (Adult)   Absence of Falls making progress toward outcome       Problem: Patient Care Overview  Goal: Plan of Care Review  Outcome: Ongoing (interventions implemented as appropriate)   07/04/18 0323   Coping/Psychosocial   Plan Of Care Reviewed With patient       Problem: Infection, Risk/Actual (Adult)  Goal: Infection Prevention/Resolution  Patient will demonstrate the desired outcomes by discharge/transition of care.   Outcome: Ongoing (interventions implemented as appropriate)   07/04/18 0323   Infection, Risk/Actual (Adult)   Infection Prevention/Resolution making progress toward outcome

## 2018-07-04 NOTE — ASSESSMENT & PLAN NOTE
- +UA  - UCx: showed ESBL Ecoli (contact precaution placed) and enterococcus faecalis  - Change to PO Augmentin 7/2/2018 (renally dosed) given new cultures finding  - plan to treat for 5-7 days

## 2018-07-04 NOTE — ASSESSMENT & PLAN NOTE
- CKD stage 3, suspect Cr is baseline given no changes with diuretics; baseline 1.4 - 1.6   - diuresis as above

## 2018-07-04 NOTE — PROGRESS NOTES
"Pt would not let me take her VS. Stated, "get out of here you idiot". Will continue to monitor.   "
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
## 2018-07-05 LAB
ALBUMIN SERPL BCP-MCNC: 3.3 G/DL
ALP SERPL-CCNC: 161 U/L
ALT SERPL W/O P-5'-P-CCNC: 66 U/L
ANION GAP SERPL CALC-SCNC: 12 MMOL/L
AST SERPL-CCNC: 37 U/L
BASOPHILS # BLD AUTO: 0.04 K/UL
BASOPHILS NFR BLD: 0.6 %
BILIRUB SERPL-MCNC: 1.2 MG/DL
BUN SERPL-MCNC: 66 MG/DL
CALCIUM SERPL-MCNC: 8.3 MG/DL
CHLORIDE SERPL-SCNC: 93 MMOL/L
CO2 SERPL-SCNC: 29 MMOL/L
CREAT SERPL-MCNC: 1.8 MG/DL
DIFFERENTIAL METHOD: ABNORMAL
EOSINOPHIL # BLD AUTO: 0.1 K/UL
EOSINOPHIL NFR BLD: 1.6 %
ERYTHROCYTE [DISTWIDTH] IN BLOOD BY AUTOMATED COUNT: 17.1 %
EST. GFR  (AFRICAN AMERICAN): 29.4 ML/MIN/1.73 M^2
EST. GFR  (NON AFRICAN AMERICAN): 25.5 ML/MIN/1.73 M^2
GLUCOSE SERPL-MCNC: 112 MG/DL
HCT VFR BLD AUTO: 31.9 %
HGB BLD-MCNC: 10 G/DL
IMM GRANULOCYTES # BLD AUTO: 0.01 K/UL
IMM GRANULOCYTES NFR BLD AUTO: 0.2 %
LYMPHOCYTES # BLD AUTO: 1.5 K/UL
LYMPHOCYTES NFR BLD: 23.8 %
MAGNESIUM SERPL-MCNC: 2.3 MG/DL
MCH RBC QN AUTO: 29.2 PG
MCHC RBC AUTO-ENTMCNC: 31.3 G/DL
MCV RBC AUTO: 93 FL
MONOCYTES # BLD AUTO: 0.8 K/UL
MONOCYTES NFR BLD: 12.1 %
NEUTROPHILS # BLD AUTO: 4 K/UL
NEUTROPHILS NFR BLD: 61.7 %
NRBC BLD-RTO: 0 /100 WBC
PHOSPHATE SERPL-MCNC: 4.1 MG/DL
PLATELET # BLD AUTO: 207 K/UL
PMV BLD AUTO: 10.6 FL
POTASSIUM SERPL-SCNC: 3.4 MMOL/L
PROT SERPL-MCNC: 6.4 G/DL
RBC # BLD AUTO: 3.43 M/UL
SODIUM SERPL-SCNC: 134 MMOL/L
WBC # BLD AUTO: 6.39 K/UL

## 2018-07-05 PROCEDURE — 97530 THERAPEUTIC ACTIVITIES: CPT

## 2018-07-05 PROCEDURE — 25000003 PHARM REV CODE 250: Performed by: HOSPITALIST

## 2018-07-05 PROCEDURE — 85025 COMPLETE CBC W/AUTO DIFF WBC: CPT

## 2018-07-05 PROCEDURE — 84100 ASSAY OF PHOSPHORUS: CPT

## 2018-07-05 PROCEDURE — 97535 SELF CARE MNGMENT TRAINING: CPT

## 2018-07-05 PROCEDURE — 83735 ASSAY OF MAGNESIUM: CPT

## 2018-07-05 PROCEDURE — 99232 SBSQ HOSP IP/OBS MODERATE 35: CPT | Mod: GW,HPC,, | Performed by: HOSPITALIST

## 2018-07-05 PROCEDURE — 97110 THERAPEUTIC EXERCISES: CPT

## 2018-07-05 PROCEDURE — 94640 AIRWAY INHALATION TREATMENT: CPT

## 2018-07-05 PROCEDURE — 25000242 PHARM REV CODE 250 ALT 637 W/ HCPCS: Performed by: NURSE PRACTITIONER

## 2018-07-05 PROCEDURE — 11000001 HC ACUTE MED/SURG PRIVATE ROOM

## 2018-07-05 PROCEDURE — 63600175 PHARM REV CODE 636 W HCPCS: Performed by: HOSPITALIST

## 2018-07-05 PROCEDURE — 80053 COMPREHEN METABOLIC PANEL: CPT

## 2018-07-05 RX ORDER — BUMETANIDE 1 MG/1
1 TABLET ORAL 2 TIMES DAILY
Status: DISCONTINUED | OUTPATIENT
Start: 2018-07-05 | End: 2018-07-06 | Stop reason: HOSPADM

## 2018-07-05 RX ORDER — CARBAMAZEPINE 100 MG/1
100 TABLET, CHEWABLE ORAL DAILY
Status: DISCONTINUED | OUTPATIENT
Start: 2018-07-05 | End: 2018-07-06 | Stop reason: HOSPADM

## 2018-07-05 RX ADMIN — METOPROLOL TARTRATE 25 MG: 25 TABLET ORAL at 09:07

## 2018-07-05 RX ADMIN — METOPROLOL TARTRATE 25 MG: 25 TABLET ORAL at 10:07

## 2018-07-05 RX ADMIN — SENNOSIDES AND DOCUSATE SODIUM 1 TABLET: 8.6; 5 TABLET ORAL at 10:07

## 2018-07-05 RX ADMIN — HEPARIN SODIUM 5000 UNITS: 5000 INJECTION, SOLUTION INTRAVENOUS; SUBCUTANEOUS at 03:07

## 2018-07-05 RX ADMIN — HEPARIN SODIUM 5000 UNITS: 5000 INJECTION, SOLUTION INTRAVENOUS; SUBCUTANEOUS at 09:07

## 2018-07-05 RX ADMIN — LEVOTHYROXINE SODIUM 100 MCG: 100 TABLET ORAL at 05:07

## 2018-07-05 RX ADMIN — CARBAMAZEPINE 100 MG: 100 TABLET, CHEWABLE ORAL at 10:07

## 2018-07-05 RX ADMIN — FOLIC ACID 1 MG: 1 TABLET ORAL at 10:07

## 2018-07-05 RX ADMIN — HEPARIN SODIUM 5000 UNITS: 5000 INJECTION, SOLUTION INTRAVENOUS; SUBCUTANEOUS at 05:07

## 2018-07-05 RX ADMIN — PANTOPRAZOLE SODIUM 20 MG: 20 TABLET, DELAYED RELEASE ORAL at 10:07

## 2018-07-05 RX ADMIN — BUMETANIDE 1 MG: 1 TABLET ORAL at 09:07

## 2018-07-05 RX ADMIN — METOPROLOL TARTRATE 25 MG: 25 TABLET ORAL at 03:07

## 2018-07-05 RX ADMIN — BUMETANIDE 1 MG: 1 TABLET ORAL at 10:07

## 2018-07-05 RX ADMIN — AMOXICILLIN AND CLAVULANATE POTASSIUM 1 TABLET: 250; 125 TABLET, FILM COATED ORAL at 09:07

## 2018-07-05 RX ADMIN — IPRATROPIUM BROMIDE AND ALBUTEROL SULFATE 3 ML: .5; 3 SOLUTION RESPIRATORY (INHALATION) at 05:07

## 2018-07-05 RX ADMIN — AMOXICILLIN AND CLAVULANATE POTASSIUM 1 TABLET: 250; 125 TABLET, FILM COATED ORAL at 10:07

## 2018-07-05 NOTE — ASSESSMENT & PLAN NOTE
Cardiomyopathy, ischemic  Pulmonary HTN  Per report of hospice due to end stage HF found to have ADHF, with an episode of chest pain while in ED. Per Cards, Cardiac biomarkers are elevated. EKG with LBBB, last EKG is from 2015 and it was paced rhythm at the time. Currently no chest pain, given that she is hypervolemic recommend one dose of lasix 80mg IV once, thereafter increase her home bumex 1mg daily to bumex 1mg BID. She is hospice care therefore would not favor any additional ischemia work up.  - On Dig, level  WNL  - BNP elevation, LE edema Strict I/Os, Cardiac diet (per daughter was not follow at NH). Edema improved.   - Increase to Bumex 1mg BID   - Monitor renal function, stable

## 2018-07-05 NOTE — ASSESSMENT & PLAN NOTE
- baseline Cr is 1.4-1.6. Unclear etiology - pre-renal vs. Cardiorenal. Worsened with holding diuretics. Pt only received 1 dose of bumex in the last 3 days. Suspect cardio renal >> pre-renal. DDx includes AIN given amoxicillin. Improved today with re-initiation of bumex.   - trend cr

## 2018-07-05 NOTE — PROGRESS NOTES
Attempted Med Team A call previously at 2101. Reported to Med Team A, MD Norma., that pt refused her HS dose of Metaprolol. Also obtained Delerium Precautions order set, per patient's daughter requested. Will continue to monitor pt.

## 2018-07-05 NOTE — PLAN OF CARE
Problem: Fall Risk (Adult)  Goal: Identify Related Risk Factors and Signs and Symptoms  Related risk factors and signs and symptoms are identified upon initiation of Human Response Clinical Practice Guideline (CPG)   Outcome: Ongoing (interventions implemented as appropriate)   07/04/18 2020   Fall Risk   Related Risk Factors (Fall Risk) age-related changes;confusion/agitation;culprit medication(s);fatigue/slow reaction;gait/mobility problems;polypharmacy;sensory deficits;sleep pattern alteration;slipper/uneven surfaces;environment unfamiliar   Signs and Symptoms (Fall Risk) presence of risk factors       Problem: Patient Care Overview  Goal: Plan of Care Review  Outcome: Ongoing (interventions implemented as appropriate)  Pt is alert and oriented x person only.  She continues to be confused.  Pt was very combative towards staff while performing in and out catheterization for urine sample. After an hour, pt forgot what had happened and was compliant with medications.  Pt was lethargic starting around 2-3pm and it could be due to the fact that pt does not sleep at night.  Pt daughter stated that she believes the metoprolol that pt is taking may have negative side effect on pt.  Pt started taking Metoprolol a few days ago and it did not cause these symptoms.  Pt's vitals have been stable and nothing significant to report other than the increased confusion at times throughout the day and increased combativeness depending on what is being done with pt.

## 2018-07-05 NOTE — PT/OT/SLP PROGRESS
Physical Therapy Treatment    Patient Name:  Laurita Stokes   MRN:  [id number]    Recommendations:     Discharge Recommendations:  home with home health   Discharge Equipment Recommendations: walker, rolling   Barriers to discharge: None    Assessment:     Laurita Stokes is a 84 y.o. female admitted with a medical diagnosis of Delirium due to multiple etiologies, acute, hyperactive.  She presents with the following impairments/functional limitations:  weakness, impaired endurance, impaired self care skills, impaired cognition, decreased safety awareness. Pt t/f min A with RW. Pt declined gait activity on this date despite encouragement. Pt will continue to benefit from PT services at this time. Continue with PT POC as indicated.    Rehab Prognosis:  fair; patient would benefit from acute skilled PT services to address these deficits and reach maximum level of function.      Recent Surgery: * No surgery found *      Plan:     During this hospitalization, patient to be seen 3 x/week to address the above listed problems via gait training, therapeutic exercises, therapeutic activities  · Plan of Care Expires:  08/02/18   Plan of Care Reviewed with: patient (and sitter)    Subjective     Communicated with nursing prior to session.  Patient found seated bedside chair with OT present upon PT entry to room, agreeable to treatment.      Chief Complaint: none stated  Patient comments/goals: none stated  Pain/Comfort:  · Pain Rating 1: 0/10  · Pain Rating Post-Intervention 1: 0/10    Patients cultural, spiritual, Baptist conflicts given the current situation: none stated    Objective:     Patient found with:  (none)     General Precautions: Standard, fall   Orthopedic Precautions:N/A   Braces: N/A     Functional Mobility:  · Transfers:  Sit to Stand:  minimum assistance with rolling walker      AM-PAC 6 CLICK MOBILITY  Turning over in bed (including adjusting bedclothes, sheets and blankets)?: 3  Sitting down  on and standing up from a chair with arms (e.g., wheelchair, bedside commode, etc.): 3  Moving from lying on back to sitting on the side of the bed?: 3  Moving to and from a bed to a chair (including a wheelchair)?: 3  Need to walk in hospital room?: 3  Climbing 3-5 steps with a railing?: 2  Basic Mobility Total Score: 17       Therapeutic Activities and Exercises:   -B LE therex x20 reps with assistance as needed: AP, LAQ, and Hip Flexion    Patient left up in chair with call button in reach, sitter present and telesitter on..    GOALS:    Physical Therapy Goals        Problem: Physical Therapy Goal    Goal Priority Disciplines Outcome Goal Variances Interventions   Physical Therapy Goal     PT/OT, PT Ongoing (interventions implemented as appropriate)     Description:  Goals to be met by: 18    Patient will increase functional independence with mobility by performin. Supine to sit with Contact Guard Assistance  2. Sit to supine with Contact Guard Assistance  3. Rolling to Left and Right with Contact Guard Assistance.  4. Sit to stand transfer with Minimal Assistance. Met  5. Bed to chair transfer with Contact Guard Assistance using Rolling Walker  6. Gait  x 50 feet with Contact Guard Assistance using Rolling Walker.   7. Lower extremity exercise program x25 reps per handout, with assistance as needed                       Time Tracking:     PT Received On: 18  PT Start Time: 1323     PT Stop Time: 1337  PT Total Time (min): 14 min     Billable Minutes: Therapeutic Exercise 14    Treatment Type: Treatment  PT/PTA: PTA     PTA Visit Number: 1     Jhoana Deal PTA  2018

## 2018-07-05 NOTE — PT/OT/SLP PROGRESS
Occupational Therapy   Treatment    Name: Laurita Stokes  MRN: [id number]  Admitting Diagnosis:  Delirium due to multiple etiologies, acute, hyperactive       Recommendations:     Discharge Recommendations: home with home health  Discharge Equipment Recommendations:  walker, rolling  Barriers to discharge:  None    Subjective     Communicated with: RN prior to session.  Pain/Comfort:  · Pain Rating 1: 0/10  · Pain Rating Post-Intervention 1: 0/10    Patients cultural, spiritual, Jain conflicts given the current situation: none stated    Objective:     Patient found with:  (none)    General Precautions: Standard, fall   Orthopedic Precautions:N/A   Braces: N/A     Occupational Performance:    Bed Mobility:    · NT, found and left UIC    Functional Mobility/Transfers:  · Patient completed Sit <> Stand Transfer with contact guard assistance  with  no assistive device   · Functional Mobility: CGA with no AD, quickly fatigued after ~6 feet, and required Min A to turn and walk back to the chair.    Activities of Daily Living:  · Feeding:  Setup A    · Lower Body Dressing: stand by assistance doff/donned socks while UIC; L sock - bent forward; R sock, crossed legs and struggled using 1 hand, but with a 2nd attempt and increased time, was able to don    Patient left up in chair with call button in reach and sitter present    AMPAC 6 Click:  AMPA Total Score: 15    Treatment & Education:  Pt ed re OT role and POC. Pt ed re safety. Pt tolerated t/f training and ambulation fair. Pt requiring increased time for self care tasks, and demo decreased motivation to complete tasks.  Education:    Assessment:     Laurita Stokes is a 84 y.o. female with a medical diagnosis of Delirium due to multiple etiologies, acute, hyperactive.  She presents with the following performance deficits affecting function: weakness, impaired endurance, impaired self care skills, decreased safety awareness, impaired cognition.  Pt  participates with encouragement, but quickly demo decreased motivation, including statements of wanting to die. Sitter present and acknowledges pt's emotional state. Pt was able to doff/don socks with increased time and SBA, but throughout the task, would talk about wanting to go home; when OT ed pt re importance of therapy for quality of life, pt stated again she wants to die. Pt would benefit from cont OT to ensure improvements in function, to improve quality of life and to decrease suicidal thoughts by demo to patient her improved abilities.    Rehab Prognosis:  Good; patient would benefit from acute skilled OT services to address these deficits and reach maximum level of function.       Plan:     Patient to be seen 3 x/week to address the above listed problems via self-care/home management, therapeutic activities, therapeutic exercises, cognitive retraining  · Plan of Care Expires: 08/03/18  · Plan of Care Reviewed with: patient (and sitter)    This Plan of care has been discussed with the patient who was involved in its development and understands and is in agreement with the identified goals and treatment plan    GOALS:    Occupational Therapy Goals        Problem: Occupational Therapy Goal    Goal Priority Disciplines Outcome Interventions   Occupational Therapy Goal     OT, PT/OT Ongoing (interventions implemented as appropriate)    Description:  Goals to be met by: 7/22    Patient will increase functional independence with ADLs by performing:    Feeding with Set-up Assistance.  UE Dressing with Contact Guard Assistance.  LE Dressing with Minimal Assistance.  Grooming while seated with Contact Guard Assistance.  Toileting from toilet with Minimal Assistance for hygiene and clothing management.   Bathing from  edge of bed with Minimal Assistance.                      Time Tracking:     OT Date of Treatment: 07/05/18  OT Start Time: 1305  OT Stop Time: 1328  OT Total Time (min): 23 min    Billable Minutes:Self  Care/Home Management 8 minutes  Therapeutic Activity 15 minutes    SRAVANI Alex  7/5/2018  Pager: [phone]

## 2018-07-05 NOTE — SUBJECTIVE & OBJECTIVE
Interval History: No acute events overnight. Pt much more alert this AM. Cr improved.      Review of Systems   Unable to perform ROS: Dementia     Objective:     Vital Signs (Most Recent):  Temp: 97.5 °F (36.4 °C) (07/05/18 1343)  Pulse: 63 (07/05/18 1343)  Resp: 19 (07/05/18 1343)  BP: (!) 114/59 (07/05/18 1343)  SpO2: (!) 94 % (07/05/18 1343) Vital Signs (24h Range):  Temp:  [97.4 °F (36.3 °C)-97.8 °F (36.6 °C)] 97.5 °F (36.4 °C)  Pulse:  [56-69] 63  Resp:  [18-19] 19  SpO2:  [94 %-99 %] 94 %  BP: (114-121)/(54-65) 114/59     Weight: 43.4 kg (95 lb 10.9 oz)  Body mass index is 16.42 kg/m².    Intake/Output Summary (Last 24 hours) at 07/05/18 1422  Last data filed at 07/04/18 2100   Gross per 24 hour   Intake              340 ml   Output                0 ml   Net              340 ml      Physical Exam   Constitutional: She appears well-developed and well-nourished.   Cardiovascular: Normal rate and regular rhythm.  Exam reveals no gallop and no friction rub.    Murmur heard.  Pulmonary/Chest: Effort normal and breath sounds normal. No respiratory distress. She has no wheezes. She has no rales.   Abdominal: Soft. Bowel sounds are normal. She exhibits no distension. There is no tenderness.   Musculoskeletal: Normal range of motion.   Neurological:   More alert today    Skin: Skin is warm and dry.       MELD-Na score: 9 at 7/14/2017  3:00 AM  MELD score: 9 at 7/14/2017  3:00 AM  Calculated from:  Serum Creatinine: 1.1 mg/dL at 7/14/2017  3:00 AM  Serum Sodium: 133 mmol/L at 7/14/2017  3:00 AM  Total Bilirubin: 1 mg/dL at 7/14/2017  3:00 AM  INR(ratio): 1.2 at 7/12/2017  4:53 AM  Age: 83 years    Significant Labs:  CBC:    Recent Labs  Lab 07/04/18  0445 07/05/18  0458   WBC 8.99 6.39   HGB 10.9* 10.0*   HCT 35.5* 31.9*    207     CMP:    Recent Labs  Lab 07/04/18  0445 07/04/18  1709 07/05/18  0458   * 133*  133* 134*   K 4.2 3.7  3.7 3.4*   CL 93* 93*  93* 93*   CO2 25 28  28 29   * 142*   142* 112*   BUN 70* 71*  71* 66*   CREATININE 2.3* 2.1*  2.1* 1.8*   CALCIUM 8.9 8.4*  8.4* 8.3*   PROT 7.0  --  6.4   ALBUMIN 3.4*  --  3.3*   BILITOT 1.2*  --  1.2*   ALKPHOS 181*  --  161*   AST 48*  --  37   ALT 88*  --  66*   ANIONGAP 17* 12  12 12   EGFRNONAA 18.9* 21.1*  21.1* 25.5*     PTINR:  No results for input(s): INR in the last 48 hours.    Significant Procedures:   Dobutamine Stress Test with Color Flow: No results found. However, due to the size of the patient record, not all encounters were searched. Please check Results Review for a complete set of results.      2D Echo: No results found. However, due to the size of the patient record, not all encounters were searched. Please check Results Review for a complete set of results.

## 2018-07-05 NOTE — PLAN OF CARE
Problem: Physical Therapy Goal  Goal: Physical Therapy Goal  Goals to be met by: 18    Patient will increase functional independence with mobility by performin. Supine to sit with Contact Guard Assistance  2. Sit to supine with Contact Guard Assistance  3. Rolling to Left and Right with Contact Guard Assistance.  4. Sit to stand transfer with Minimal Assistance. Met  5. Bed to chair transfer with Contact Guard Assistance using Rolling Walker  6. Gait  x 50 feet with Contact Guard Assistance using Rolling Walker.   7. Lower extremity exercise program x25 reps per handout, with assistance as needed     Goals remain appropriate. Continue with PT POC as indicated.
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
## 2018-07-05 NOTE — PLAN OF CARE
Problem: Patient Care Overview  Goal: Plan of Care Review  Pt had an uneventful night. Pt's daughter requesting a sitter. Bed alarm on. Telesitter in progress. VS stable. Will continue to monitor patient.

## 2018-07-05 NOTE — PLAN OF CARE
Problem: Occupational Therapy Goal  Goal: Occupational Therapy Goal  Goals to be met by: 7/22    Patient will increase functional independence with ADLs by performing:    Feeding with Set-up Assistance.  UE Dressing with Contact Guard Assistance.  LE Dressing with Minimal Assistance.  Grooming while seated with Contact Guard Assistance.  Toileting from toilet with Minimal Assistance for hygiene and clothing management.   Bathing from  edge of bed with Minimal Assistance.     Outcome: Ongoing (interventions implemented as appropriate)  Goals remain appropriate. Cont POC.    SRAVANI Alex  7/5/2018  Pager: [phone]

## 2018-07-06 VITALS
SYSTOLIC BLOOD PRESSURE: 116 MMHG | OXYGEN SATURATION: 91 % | WEIGHT: 92.38 LBS | HEART RATE: 66 BPM | BODY MASS INDEX: 15.77 KG/M2 | DIASTOLIC BLOOD PRESSURE: 63 MMHG | RESPIRATION RATE: 16 BRPM | TEMPERATURE: 98 F | HEIGHT: 64 IN

## 2018-07-06 LAB
ALBUMIN SERPL BCP-MCNC: 3.2 G/DL
ALP SERPL-CCNC: 149 U/L
ALT SERPL W/O P-5'-P-CCNC: 55 U/L
ANION GAP SERPL CALC-SCNC: 13 MMOL/L
AST SERPL-CCNC: 29 U/L
BASOPHILS # BLD AUTO: 0.04 K/UL
BASOPHILS NFR BLD: 0.7 %
BILIRUB SERPL-MCNC: 1.2 MG/DL
BUN SERPL-MCNC: 49 MG/DL
CALCIUM SERPL-MCNC: 8.4 MG/DL
CHLORIDE SERPL-SCNC: 97 MMOL/L
CO2 SERPL-SCNC: 29 MMOL/L
CREAT SERPL-MCNC: 1.3 MG/DL
DIFFERENTIAL METHOD: ABNORMAL
EOSINOPHIL # BLD AUTO: 0 K/UL
EOSINOPHIL NFR BLD: 0.7 %
ERYTHROCYTE [DISTWIDTH] IN BLOOD BY AUTOMATED COUNT: 17.6 %
EST. GFR  (AFRICAN AMERICAN): 43.5 ML/MIN/1.73 M^2
EST. GFR  (NON AFRICAN AMERICAN): 37.8 ML/MIN/1.73 M^2
GLUCOSE SERPL-MCNC: 152 MG/DL
HCT VFR BLD AUTO: 31.5 %
HGB BLD-MCNC: 9.6 G/DL
IMM GRANULOCYTES # BLD AUTO: 0.02 K/UL
IMM GRANULOCYTES NFR BLD AUTO: 0.4 %
LYMPHOCYTES # BLD AUTO: 0.8 K/UL
LYMPHOCYTES NFR BLD: 14.7 %
MAGNESIUM SERPL-MCNC: 1.9 MG/DL
MCH RBC QN AUTO: 29 PG
MCHC RBC AUTO-ENTMCNC: 30.5 G/DL
MCV RBC AUTO: 95 FL
MONOCYTES # BLD AUTO: 0.6 K/UL
MONOCYTES NFR BLD: 11.3 %
NEUTROPHILS # BLD AUTO: 4 K/UL
NEUTROPHILS NFR BLD: 72.2 %
NRBC BLD-RTO: 0 /100 WBC
PHOSPHATE SERPL-MCNC: 3 MG/DL
PLATELET # BLD AUTO: 218 K/UL
PMV BLD AUTO: 11.2 FL
POTASSIUM SERPL-SCNC: 2.7 MMOL/L
PROT SERPL-MCNC: 6.3 G/DL
RBC # BLD AUTO: 3.31 M/UL
SODIUM SERPL-SCNC: 139 MMOL/L
WBC # BLD AUTO: 5.56 K/UL

## 2018-07-06 PROCEDURE — 80053 COMPREHEN METABOLIC PANEL: CPT

## 2018-07-06 PROCEDURE — 83735 ASSAY OF MAGNESIUM: CPT

## 2018-07-06 PROCEDURE — 25000003 PHARM REV CODE 250: Performed by: HOSPITALIST

## 2018-07-06 PROCEDURE — 63600175 PHARM REV CODE 636 W HCPCS: Performed by: HOSPITALIST

## 2018-07-06 PROCEDURE — 85025 COMPLETE CBC W/AUTO DIFF WBC: CPT

## 2018-07-06 PROCEDURE — 84100 ASSAY OF PHOSPHORUS: CPT

## 2018-07-06 PROCEDURE — 99239 HOSP IP/OBS DSCHRG MGMT >30: CPT | Mod: GW,HPC,, | Performed by: HOSPITALIST

## 2018-07-06 RX ORDER — POTASSIUM CHLORIDE 20 MEQ/1
40 TABLET, EXTENDED RELEASE ORAL EVERY 4 HOURS
Status: COMPLETED | OUTPATIENT
Start: 2018-07-06 | End: 2018-07-06

## 2018-07-06 RX ORDER — POTASSIUM CHLORIDE 750 MG/1
20 TABLET, EXTENDED RELEASE ORAL DAILY
Start: 2018-07-06

## 2018-07-06 RX ORDER — HEPARIN 100 UNIT/ML
5 SYRINGE INTRAVENOUS
Status: DISCONTINUED | OUTPATIENT
Start: 2018-07-06 | End: 2018-07-06 | Stop reason: HOSPADM

## 2018-07-06 RX ORDER — METOPROLOL TARTRATE 25 MG/1
25 TABLET, FILM COATED ORAL 3 TIMES DAILY
Qty: 90 TABLET | Refills: 11
Start: 2018-07-06 | End: 2019-07-06

## 2018-07-06 RX ORDER — RISPERIDONE 1 MG/1
1 TABLET ORAL 2 TIMES DAILY PRN
Start: 2018-07-06 | End: 2019-07-06

## 2018-07-06 RX ORDER — BUMETANIDE 1 MG/1
1 TABLET ORAL DAILY
Qty: 14 TABLET | Refills: 0
Start: 2018-07-06 | End: 2019-07-06

## 2018-07-06 RX ORDER — AMOXICILLIN AND CLAVULANATE POTASSIUM 250; 125 MG/1; MG/1
1 TABLET, FILM COATED ORAL EVERY 12 HOURS
Qty: 5 TABLET | Refills: 0
Start: 2018-07-06 | End: 2018-07-08

## 2018-07-06 RX ADMIN — SENNOSIDES AND DOCUSATE SODIUM 1 TABLET: 8.6; 5 TABLET ORAL at 09:07

## 2018-07-06 RX ADMIN — HEPARIN SODIUM 5000 UNITS: 5000 INJECTION, SOLUTION INTRAVENOUS; SUBCUTANEOUS at 06:07

## 2018-07-06 RX ADMIN — CARBAMAZEPINE 100 MG: 100 TABLET, CHEWABLE ORAL at 09:07

## 2018-07-06 RX ADMIN — HEPARIN SODIUM 5000 UNITS: 5000 INJECTION, SOLUTION INTRAVENOUS; SUBCUTANEOUS at 01:07

## 2018-07-06 RX ADMIN — POTASSIUM CHLORIDE 40 MEQ: 1500 TABLET, EXTENDED RELEASE ORAL at 01:07

## 2018-07-06 RX ADMIN — FOLIC ACID 1 MG: 1 TABLET ORAL at 09:07

## 2018-07-06 RX ADMIN — BUMETANIDE 1 MG: 1 TABLET ORAL at 09:07

## 2018-07-06 RX ADMIN — PANTOPRAZOLE SODIUM 20 MG: 20 TABLET, DELAYED RELEASE ORAL at 09:07

## 2018-07-06 RX ADMIN — METOPROLOL TARTRATE 25 MG: 25 TABLET ORAL at 03:07

## 2018-07-06 RX ADMIN — POTASSIUM CHLORIDE 40 MEQ: 1500 TABLET, EXTENDED RELEASE ORAL at 09:07

## 2018-07-06 RX ADMIN — AMOXICILLIN AND CLAVULANATE POTASSIUM 1 TABLET: 250; 125 TABLET, FILM COATED ORAL at 09:07

## 2018-07-06 RX ADMIN — METOPROLOL TARTRATE 25 MG: 25 TABLET ORAL at 09:07

## 2018-07-06 RX ADMIN — HEPARIN 500 UNITS: 100 SYRINGE at 04:07

## 2018-07-06 RX ADMIN — LEVOTHYROXINE SODIUM 100 MCG: 100 TABLET ORAL at 06:07

## 2018-07-06 NOTE — PLAN OF CARE
Problem: Fall Risk (Adult)  Goal: Identify Related Risk Factors and Signs and Symptoms  Related risk factors and signs and symptoms are identified upon initiation of Human Response Clinical Practice Guideline (CPG)   Outcome: Ongoing (interventions implemented as appropriate)   07/05/18 2032   Fall Risk   Related Risk Factors (Fall Risk) culprit medication(s);confusion/agitation;fatigue/slow reaction;fear of falling;gait/mobility problems;homeostatic imbalance;polypharmacy;sensory deficits;sleep pattern alteration;slipper/uneven surfaces;environment unfamiliar   Signs and Symptoms (Fall Risk) presence of risk factors       Problem: Patient Care Overview  Goal: Plan of Care Review  Outcome: Ongoing (interventions implemented as appropriate)   07/06/18 0259   Coping/Psychosocial   Plan Of Care Reviewed With patient       Problem: Infection, Risk/Actual (Adult)  Goal: Identify Related Risk Factors and Signs and Symptoms  Related risk factors and signs and symptoms are identified upon initiation of Human Response Clinical Practice Guideline (CPG)   Outcome: Ongoing (interventions implemented as appropriate)   06/29/18 0548   Infection, Risk/Actual   Related Risk Factors (Infection, Risk/Actual) age extremes;chronic illness/condition;medication effects;skin integrity impairment;sleep disturbance   Signs and Symptoms (Infection, Risk/Actual) mental/behavioral changes       Problem: Pressure Ulcer Risk (Gage Scale) (Adult,Obstetrics,Pediatric)  Goal: Identify Related Risk Factors and Signs and Symptoms  Related risk factors and signs and symptoms are identified upon initiation of Human Response Clinical Practice Guideline (CPG)   Outcome: Ongoing (interventions implemented as appropriate)   06/29/18 0548   Pressure Ulcer Risk (Gage Scale)   Related Risk Factors (Pressure Ulcer Risk (Gage Scale)) age extremes;cognitive impairment;fluid intake inadequate;medication;mobility impaired

## 2018-07-06 NOTE — PLAN OF CARE
Problem: Patient Care Overview  Goal: Plan of Care Review  Outcome: Ongoing (interventions implemented as appropriate)  Plan of care reviewed. Pt to return to [facility] via family vehicle. [place]. Pt sitting in chair AAO awaiting hosp transport

## 2018-07-06 NOTE — PLAN OF CARE
REZA assigned to case today 7/6/2018. REZA will assist team with DC needs. REZA in communication with CM.    REZA spoke to Henna at [facility] ([phone]) in regards to the patient returning to [facility]. Jhoana with [facility] spoke to the family and the patient will have sitters at night upon returning to [facility].     Henna stated that she needs ORDERS, H&P, PROGRESS NOTES, LABS, MEDS, and THERAPY NOTES.    REZA will send the requested notes once orders are written.     UPDATE 1:24 PM  REZA sent orders and clincals to [facility] at [phone].    UPDATE 2:56 PM  REZA resent notes to [phone].    UPDATE 3:24 PM  REZA notified Dr. Yepez that therapy orders needed to be removed from [facility] orders.     UPDATE 4:09 PM   REZA faxed updated orders to [facility].     REZA notified nurse to call report to [phone]. The nurse is Pallavi.     REZA spoke to the patient's daughter Marlene. REZA explained that REZA called our transport companies and they are 4-5 hours out for transport. Marlene stated that she will transport the patient to [facility].     REZA notified nurse of the above.      Aissatou Choudhury, LMSW [facility]  [id number]

## 2018-07-06 NOTE — PLAN OF CARE
[facility]    FACILITY TRANSFER ORDERS      Patient Name: Laurita Stokes  YOB: 1933    PCP: Alex Capellan MD   PCP Address: [address] / [place] [address]  PCP Phone Number: [phone]  PCP Fax: [phone]    Encounter Date: 07/06/2018    Admit to: [facility]; resume hospice     Vital Signs:  Routine    Diagnoses:   Active Hospital Problems    Diagnosis  POA    *Delirium due to multiple etiologies, acute, hyperactive [F05]  Yes    Acute cystitis without hematuria [N30.00]  Yes    Cellulitis of right lower extremity [L03.115]  Yes    Acute-on-chronic kidney injury [N17.9, N18.9]  Yes    Slow transit constipation [K59.01]  Yes    Debility [R53.81]  Yes    CKD (chronic kidney disease), stage III [N18.3]  Yes    Pulmonary HTN [I27.20]  Yes    Hypothyroidism due to acquired atrophy of thyroid [E03.4]  Yes    Malnutrition of mild degree [E44.1]  Yes     Chronic    Persistent atrial fibrillation [I48.1]  Yes    Acute on chronic combined systolic and diastolic congestive heart failure, NYHA class 4 [I50.43]  Yes    Essential hypertension [I10]  Yes    Dementia with behavioral disturbance [F03.91]  Yes    Cardiomyopathy, ischemic [I25.5]  Yes      Resolved Hospital Problems    Diagnosis Date Resolved POA   No resolved problems to display.       Allergies:  Review of patient's allergies indicates:   Allergen Reactions    Codeine Itching    Hydrocodone      GOT VERY VIOLANT    Azithromycin      History of VT    Lorazepam        Diet: regular diet    Activities: Activity as tolerated    Nursing: Per protocol      Labs: CMP q72 hrs x 1 - to check K and Cr      CONSULTS:    PhSpeech Therapy to evaluate and treat for Language, Swallowing and Cognition. and  to evaluate for community resources/long-range planning.    MISCELLANEOUS CARE:  Routine Skin for Bedridden Patients: Apply moisture barrier cream to all skin folds and wet areas in  perineal area daily and after baths and all bowel movements.    WOUND CARE ORDERS  None    Medications: Review discharge medications with patient and family and provide education.      Current Discharge Medication List      START taking these medications    Details   amoxicillin-clavulanate 250-125mg (AUGMENTIN) 250-125 mg Tab Take 1 tablet by mouth every 12 (twelve) hours. for 2 days  Qty: 5 tablet, Refills: 0      metoprolol tartrate (LOPRESSOR) 25 MG tablet Take 1 tablet (25 mg total) by mouth 3 (three) times daily.  Qty: 90 tablet, Refills: 11      risperiDONE (RISPERDAL) 1 MG tablet Take 1 tablet (1 mg total) by mouth 2 (two) times daily as needed (Agitation).         CONTINUE these medications which have CHANGED    Details   bumetanide (BUMEX) 1 MG tablet Take 1 tablet (1 mg total) by mouth once daily. Take .5mg in the evening. Can give 1mg in evening for SOB  Qty: 14 tablet, Refills: 0      potassium chloride (KLOR-CON) 10 MEQ TbSR Take 2 tablets (20 mEq total) by mouth once daily.         CONTINUE these medications which have NOT CHANGED    Details   acetaminophen (TYLENOL) 500 MG tablet Take 1,000 mg by mouth Daily.      albuterol-ipratropium (DUO-NEB) 2.5 mg-0.5 mg/3 mL nebulizer solution Take 3 mLs by nebulization every 4 (four) hours as needed for Wheezing or Shortness of Breath. Rescue      b complex vitamins capsule Take 1 capsule by mouth once daily.      digoxin (DIGOX) 125 mcg tablet Take 125 mcg by mouth every Mon, Wed, Fri.      folic acid (FOLVITE) 1 MG tablet Take 1 mg by mouth once daily.      levothyroxine (SYNTHROID) 100 MCG tablet Take 100 mcg by mouth before breakfast.      magnesium oxide (MAG-OX) 400 mg tablet TAKE 2 TABLETS BY MOUTH TWICE DAILY  Qty: 120 tablet, Refills: 0      pantoprazole (PROTONIX) 20 MG tablet Take 20 mg by mouth once daily.      polyethylene glycol (GLYCOLAX) 17 gram PwPk Take 17 g by mouth every Mon, Wed, Fri.      senna-docusate 8.6-50 mg (PERICOLACE) 8.6-50 mg  per tablet Take 1 tablet by mouth daily as needed for Constipation.      nitroGLYCERIN (NITROSTAT) 0.4 MG SL tablet Place 1 tablet (0.4 mg total) under the tongue every 5 (five) minutes as needed for Chest pain.  Qty: 25 tablet, Refills: 6    Comments: Maximum of 3 doses, if no relief after third dose call 911.         STOP taking these medications       ARIPiprazole (ABILIFY) 5 MG Tab Comments:   Reason for Stopping:         carBAMazepine (TEGRETOL) 100 mg chewable tablet Comments:   Reason for Stopping:         enalapril (VASOTEC) 2.5 MG tablet Comments:   Reason for Stopping:         isosorbide mononitrate (IMDUR) 30 MG 24 hr tablet Comments:   Reason for Stopping:         levETIRAcetam (KEPPRA) 250 MG Tab Comments:   Reason for Stopping:         mirtazapine (REMERON) 15 MG tablet Comments:   Reason for Stopping:         spironolactone (ALDACTONE) 25 MG tablet Comments:   Reason for Stopping:         vilazodone (VIIBRYD) 10 mg Tab tablet Comments:   Reason for Stopping:         acetaminophen (TYLENOL) 650 MG Supp Comments:   Reason for Stopping:         alprazolam (XANAX) 0.25 MG tablet Comments:   Reason for Stopping:         amiodarone (PACERONE) 200 MG Tab Comments:   Reason for Stopping:         atropine 1% (ISOPTO ATROPINE) 1 % Drop Comments:   Reason for Stopping:         carvedilol (COREG) 3.125 MG tablet Comments:   Reason for Stopping:         cyanocobalamin, vitamin B-12, 1,000 mcg TbSR Comments:   Reason for Stopping:         haloperidol (HALDOL) 2 mg/mL solution Comments:   Reason for Stopping:         hyoscyamine (ANASPAZ,LEVSIN) 0.125 mg Tab Comments:   Reason for Stopping:         lactulose 10 gram/15 ml (CHRONULAC) 10 gram/15 mL (15 mL) solution Comments:   Reason for Stopping:         levetiracetam (KEPPRA) 500 MG Tab Comments:   Reason for Stopping:         morphine 20 mg/mL concentrated syringe Comments:   Reason for Stopping:         morphine 20 mg/mL concentrated syringe Comments:   Reason  for Stopping:         ondansetron (ZOFRAN) 4 MG tablet Comments:   Reason for Stopping:         sertraline (ZOLOFT) 50 MG tablet Comments:   Reason for Stopping:         tamsulosin (FLOMAX) 0.4 mg Cp24 Comments:   Reason for Stopping:         temazepam (RESTORIL) 15 mg Cap Comments:   Reason for Stopping:         tramadol (ULTRAM) 50 mg tablet Comments:   Reason for Stopping:                    _________________________________  Elsie Yepez MD  07/06/2018

## 2018-07-06 NOTE — PLAN OF CARE
Problem: Fall Risk (Adult)  Goal: Identify Related Risk Factors and Signs and Symptoms  Related risk factors and signs and symptoms are identified upon initiation of Human Response Clinical Practice Guideline (CPG)   Outcome: Ongoing (interventions implemented as appropriate)   07/05/18 2032   Fall Risk   Related Risk Factors (Fall Risk) culprit medication(s);confusion/agitation;fatigue/slow reaction;fear of falling;gait/mobility problems;homeostatic imbalance;polypharmacy;sensory deficits;sleep pattern alteration;slipper/uneven surfaces;environment unfamiliar   Signs and Symptoms (Fall Risk) presence of risk factors       Problem: Patient Care Overview  Goal: Plan of Care Review  Outcome: Ongoing (interventions implemented as appropriate)  Pt is alert and oriented to self only. Daughter remained at bedside for the morning before a sitter came to sit with pt.  Pt continues to be confused and is combative and aggressive at times.  She continues to have poor appetite and refuse to eat at times.  She eats very little which may be inadequate.  Pt's vitals were stable and did not notice shortness of breath, she was stable with Saturation of Oxygen in the mid to upper 90s.  Pt was able to ambulate to the restroom with assistance of the sitter for the entire day.  She was able to still state that she needed to use the restroom.  She continually states that she is ready to get out here and wishes to leave and stated she could walk out. Otherwise, no other significant events during this shift.  Pt remains on contact precautions due to ESBL.
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
## 2018-07-14 ENCOUNTER — HOSPITAL ENCOUNTER (EMERGENCY)
Facility: HOSPITAL | Age: 83
Discharge: HOME | End: 2018-07-15
Attending: EMERGENCY MEDICINE
Payer: MEDICARE

## 2018-07-14 DIAGNOSIS — I50.42 CHRONIC COMBINED SYSTOLIC AND DIASTOLIC CONGESTIVE HEART FAILURE: Primary | ICD-10-CM

## 2018-07-14 DIAGNOSIS — N18.4 STAGE 4 CHRONIC KIDNEY DISEASE: ICD-10-CM
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
## 2018-07-14 DIAGNOSIS — M43.10 ANTEROLISTHESIS: ICD-10-CM

## 2018-07-14 LAB
ALBUMIN SERPL BCP-MCNC: 3.2 G/DL
ALP SERPL-CCNC: 131 U/L
ALT SERPL W/O P-5'-P-CCNC: 34 U/L
AMMONIA PLAS-SCNC: 33 UMOL/L
ANION GAP SERPL CALC-SCNC: 8 MMOL/L
AST SERPL-CCNC: 41 U/L
BACTERIA #/AREA URNS AUTO: ABNORMAL /HPF
BASOPHILS # BLD AUTO: 0.04 K/UL
BASOPHILS NFR BLD: 0.6 %
BILIRUB SERPL-MCNC: 0.8 MG/DL
BILIRUB UR QL STRIP: NEGATIVE
BNP SERPL-MCNC: >4900 PG/ML
BUN SERPL-MCNC: 48 MG/DL
CALCIUM SERPL-MCNC: 8.6 MG/DL
CHLORIDE SERPL-SCNC: 105 MMOL/L
CK SERPL-CCNC: 159 U/L
CLARITY UR REFRACT.AUTO: CLEAR
CO2 SERPL-SCNC: 26 MMOL/L
COLOR UR AUTO: YELLOW
CREAT SERPL-MCNC: 1.6 MG/DL
DIFFERENTIAL METHOD: ABNORMAL
EOSINOPHIL # BLD AUTO: 0.2 K/UL
EOSINOPHIL NFR BLD: 2.5 %
ERYTHROCYTE [DISTWIDTH] IN BLOOD BY AUTOMATED COUNT: 17.3 %
EST. GFR  (AFRICAN AMERICAN): 33.9 ML/MIN/1.73 M^2
EST. GFR  (NON AFRICAN AMERICAN): 29.4 ML/MIN/1.73 M^2
GLUCOSE SERPL-MCNC: 130 MG/DL
GLUCOSE UR QL STRIP: NEGATIVE
HCT VFR BLD AUTO: 31.9 %
HGB BLD-MCNC: 9.7 G/DL
HGB UR QL STRIP: ABNORMAL
HYALINE CASTS UR QL AUTO: 12 /LPF
IMM GRANULOCYTES # BLD AUTO: 0.02 K/UL
IMM GRANULOCYTES NFR BLD AUTO: 0.3 %
INR PPP: 1.2
KETONES UR QL STRIP: NEGATIVE
LACTATE SERPL-SCNC: 1.4 MMOL/L
LEUKOCYTE ESTERASE UR QL STRIP: ABNORMAL
LIPASE SERPL-CCNC: 55 U/L
LYMPHOCYTES # BLD AUTO: 0.9 K/UL
LYMPHOCYTES NFR BLD: 14.6 %
MAGNESIUM SERPL-MCNC: 2.5 MG/DL
MCH RBC QN AUTO: 28.8 PG
MCHC RBC AUTO-ENTMCNC: 30.4 G/DL
MCV RBC AUTO: 95 FL
MICROSCOPIC COMMENT: ABNORMAL
MONOCYTES # BLD AUTO: 0.6 K/UL
MONOCYTES NFR BLD: 9.1 %
NEUTROPHILS # BLD AUTO: 4.6 K/UL
NEUTROPHILS NFR BLD: 72.9 %
NITRITE UR QL STRIP: NEGATIVE
NRBC BLD-RTO: 0 /100 WBC
PH UR STRIP: 6 [PH] (ref 5–8)
PHOSPHATE SERPL-MCNC: 3.8 MG/DL
PLATELET # BLD AUTO: 218 K/UL
PMV BLD AUTO: 11.2 FL
POTASSIUM SERPL-SCNC: 4 MMOL/L
PROT SERPL-MCNC: 6.4 G/DL
PROT UR QL STRIP: NEGATIVE
PROTHROMBIN TIME: 12.4 SEC
RBC # BLD AUTO: 3.37 M/UL
RBC #/AREA URNS AUTO: 9 /HPF (ref 0–4)
SODIUM SERPL-SCNC: 139 MMOL/L
SP GR UR STRIP: 1.01 (ref 1–1.03)
SQUAMOUS #/AREA URNS AUTO: 1 /HPF
TROPONIN I SERPL DL<=0.01 NG/ML-MCNC: 0.09 NG/ML
URN SPEC COLLECT METH UR: ABNORMAL
UROBILINOGEN UR STRIP-ACNC: NEGATIVE EU/DL
WBC # BLD AUTO: 6.29 K/UL
WBC #/AREA URNS AUTO: 8 /HPF (ref 0–5)

## 2018-07-14 PROCEDURE — 82550 ASSAY OF CK (CPK): CPT

## 2018-07-14 PROCEDURE — 83880 ASSAY OF NATRIURETIC PEPTIDE: CPT

## 2018-07-14 PROCEDURE — 81001 URINALYSIS AUTO W/SCOPE: CPT

## 2018-07-14 PROCEDURE — 83690 ASSAY OF LIPASE: CPT

## 2018-07-14 PROCEDURE — 85025 COMPLETE CBC W/AUTO DIFF WBC: CPT

## 2018-07-14 PROCEDURE — 80053 COMPREHEN METABOLIC PANEL: CPT

## 2018-07-14 PROCEDURE — 85610 PROTHROMBIN TIME: CPT

## 2018-07-14 PROCEDURE — 84484 ASSAY OF TROPONIN QUANT: CPT

## 2018-07-14 PROCEDURE — 82140 ASSAY OF AMMONIA: CPT

## 2018-07-14 PROCEDURE — 84100 ASSAY OF PHOSPHORUS: CPT

## 2018-07-14 PROCEDURE — 99285 EMERGENCY DEPT VISIT HI MDM: CPT | Mod: GV,,, | Performed by: PHYSICIAN ASSISTANT

## 2018-07-14 PROCEDURE — 83735 ASSAY OF MAGNESIUM: CPT

## 2018-07-14 PROCEDURE — 93010 ELECTROCARDIOGRAM REPORT: CPT | Mod: GW,,, | Performed by: INTERNAL MEDICINE

## 2018-07-14 PROCEDURE — 83605 ASSAY OF LACTIC ACID: CPT

## 2018-07-14 PROCEDURE — 99285 EMERGENCY DEPT VISIT HI MDM: CPT | Mod: 25

## 2018-07-15 VITALS
OXYGEN SATURATION: 97 % | HEART RATE: 81 BPM | DIASTOLIC BLOOD PRESSURE: 62 MMHG | BODY MASS INDEX: 23.62 KG/M2 | WEIGHT: 105 LBS | RESPIRATION RATE: 30 BRPM | HEIGHT: 56 IN | SYSTOLIC BLOOD PRESSURE: 104 MMHG | TEMPERATURE: 98 F

## 2018-07-15 NOTE — ED PROVIDER NOTES
Encounter Date: 7/14/2018       History     Chief Complaint   Patient presents with    Fall     slipped out of w/c at [facility]  at 1300p- pt has dementia, here for xrays of neck and rt hip.  Pt awake and alert.      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 She presents to the ED for fall. History is provided by daughter at the bedside. Daughter reports patient having unwitnessed fall today at 1:00PM today at [facility] assisted living facility. Patient was found on the side of bed on the ground sitting upright. Unknown length of time patient on ground. Daughter reports patient complaining of neck pain. Daughter reports patient with increased agitation and violence today towards nursing staff. Reports similar presentations with UTI.       The history is provided by a relative. The history is limited by the condition of the patient. No  was used.     Review of patient's allergies indicates:   Allergen Reactions    Codeine Itching    Hydrocodone      GOT VERY VIOLANT    Azithromycin      History of VT    Lorazepam      Past Medical History:   Diagnosis Date    *Atrial fibrillation     on warfarin    Acquired hypothyroidism 4/29/2016    Allergy     seasonal    Anticoagulant long-term use     ASA 81 mg, Coumadin    Blood clotting tendency     Blood transfusion     Cataract     CHF (congestive heart failure)     CKD (chronic kidney disease) stage 4, GFR 15-29 ml/min 2/21/2017    Closed displaced fracture of neck of right femur with routine healing s/p hemiarthroplasty on 7/12/2017 7/11/2017    Coronary artery disease     PET stress in 1/14: lesions not amenable to PCI.    Coronary artery disease involving native coronary artery of native heart without angina  pectoris 2/20/2017    DDD (degenerative disc disease), lumbar 8/20/2015    GERD (gastroesophageal reflux disease)     HEARING LOSS     wears Hearing aide    Hx of Ischemic colitis s/p colectomy 1/13/2014    Hyperlipidemia     Hypertension     Hypothyroid     MCI (mild cognitive impairment)     Myocardial infarct 1999    Pacemaker 2000    S/P CABG x 3 2000    SAH (subarachnoid hemorrhage) 5/24/2017    Ulcerative colitis     Ventricular tachycardia 1/14    VT storm 1/14, mexilitine added     Past Surgical History:   Procedure Laterality Date    CARDIAC CATHETERIZATION  8/13    Heart care center [facility], Dr Wallace; LIMA and SVG occluded. Proximal LAD 40%, ostial Diagonal 60%, LCX 30-40% in-stent restenosis, RCA totally occluded proximally    CATARACT EXTRACTION W/  INTRAOCULAR LENS IMPLANT Right 3/19/96    COLON SURGERY      ischemic colitis    COLONOSCOPY N/A 6/21/2016    Procedure: COLONOSCOPY;  Surgeon: Rob Cartagena MD;  [facility];  Service: Endoscopy;  Laterality: N/A;    COLONOSCOPY N/A 7/18/2016    Procedure: COLONOSCOPY;  Surgeon: Rob Cartagena MD;  [facility];  Service: Endoscopy;  Laterality: N/A;    CORONARY ARTERY BYPASS GRAFT  3/2001    3 vessel    CORONARY STENT PLACEMENT  12/2015    Dr. Freire    EYE SURGERY      HIP FRACTURE SURGERY Right 07/12/2017    Hemiarthroplasty for femoral neck fracture     HYSTERECTOMY      total    INSERT / REPLACE / REMOVE PACEMAKER  12/2009    [facility] CRT-D generator changeout    ORIF WRIST FRACTURE Right     TONSILLECTOMY       Family History   Problem Relation Age of Onset    Heart disease Father 65    Heart disease Sister 55    Cancer Sister         thyroid     Social History   Substance Use Topics    Smoking status: Former Smoker     Packs/day: 1.00     Years: 40.00     Types: Cigarettes     Start date: 1/1/1953     Quit date: 1/1/2013    Smokeless tobacco: Never Used    Alcohol use No     Review of Systems    Unable to perform ROS: Dementia       Physical Exam     Initial Vitals [07/14/18 1820]   BP Pulse Resp Temp SpO2   (!) 115/52 70 16 98 °F (36.7 °C) 97 %      MAP       --         Physical Exam    Vitals reviewed.  Constitutional: She appears well-developed and well-nourished. She is not diaphoretic. No distress.   Patient is elderly female resting comfortably in NAD on RA.    HENT:   Head: Normocephalic.   Nose: Nose normal.   Eyes: Conjunctivae and EOM are normal. Pupils are equal, round, and reactive to light.   Neck: Normal range of motion and full passive range of motion without pain. No spinous process tenderness and no muscular tenderness present. Normal range of motion present.   Cardiovascular: Normal rate and regular rhythm. Exam reveals no friction rub.    No murmur heard.The patient has a device (subcutaneous AICD) in the left chest.   mediport noted to right chest   Pulmonary/Chest: Breath sounds normal. No respiratory distress. She has no wheezes. She has no rales.   Abdominal: Soft. Bowel sounds are normal. She exhibits no distension. There is no tenderness. There is no rebound.   Musculoskeletal: Normal range of motion. She exhibits edema.        Right hip: She exhibits normal range of motion, normal strength, no tenderness, no bony tenderness and no deformity.        Left hip: She exhibits normal range of motion, normal strength, no tenderness, no bony tenderness and no deformity.   No midline spinal tenderness.    Neurological: She is alert. She has normal strength. No sensory deficit.   Patient disoriented to place and time. (daughter reports patient baseline)    Skin: Skin is warm and dry. No erythema.         ED Course   Procedures  Labs Reviewed   CBC W/ AUTO DIFFERENTIAL - Abnormal; Notable for the following:        Result Value    RBC 3.37 (*)     Hemoglobin 9.7 (*)     Hematocrit 31.9 (*)     MCHC 30.4 (*)     RDW 17.3 (*)     Lymph # 0.9 (*)     Lymph% 14.6 (*)     All other components  within normal limits   COMPREHENSIVE METABOLIC PANEL - Abnormal; Notable for the following:     Glucose 130 (*)     BUN, Bld 48 (*)     Creatinine 1.6 (*)     Calcium 8.6 (*)     Albumin 3.2 (*)     AST 41 (*)     eGFR if  33.9 (*)     eGFR if non  29.4 (*)     All other components within normal limits   B-TYPE NATRIURETIC PEPTIDE - Abnormal; Notable for the following:     BNP >4,900 (*)     All other components within normal limits   TROPONIN I - Abnormal; Notable for the following:     Troponin I 0.086 (*)     All other components within normal limits   URINALYSIS, REFLEX TO URINE CULTURE - Abnormal; Notable for the following:     Occult Blood UA 1+ (*)     Leukocytes, UA 1+ (*)     All other components within normal limits    Narrative:     rec'd 1 cup  Preferred Collection Type->Urine, Catheterized   URINALYSIS MICROSCOPIC - Abnormal; Notable for the following:     RBC, UA 9 (*)     WBC, UA 8 (*)     Hyaline Casts, UA 12 (*)     All other components within normal limits    Narrative:     rec'd 1 cup  Preferred Collection Type->Urine, Catheterized   PROTIME-INR   MAGNESIUM   PHOSPHORUS   LIPASE   AMMONIA   CK   LACTIC ACID, PLASMA          Imaging Results          CT Head Without Contrast (Final result)  Result time 07/14/18 23:00:14    Final result by Joel Up MD (07/14/18 23:00:14)                 Impression:      No acute abnormality.    Generalized cerebral volume loss.    Chronic microvascular ischemic changes.    Stable remote left occipital infarction.    Electronically signed by resident: Best Ramon  Date:    07/14/2018  Time:    22:42    Electronically signed by: Joel Up MD  Date:    07/14/2018  Time:    23:00             Narrative:    EXAMINATION:  CT HEAD WITHOUT CONTRAST    CLINICAL HISTORY:  Head trauma, mental status change;    TECHNIQUE:  Low dose axial CT images obtained throughout the head without intravenous contrast. Sagittal and coronal  reconstructions were performed.    COMPARISON:  CT head 06/28/2018 and CT head 08/13/2017.    FINDINGS:  Intracranial compartment:    Generalized cerebral volume loss with compensatory prominence of the cerebral sulci and ventricular system that is relatively stable when compared to prior CT.  No extra-axial blood or fluid collections.    Confluent hypoattenuation throughout the periventricular and supratentorial white matter that is stable and likely represents chronic microvascular ischemic changes.  Stable region of encephalomalacia in the left occipital lobe consistent with remote infarct.  No parenchymal mass, hemorrhage, edema or acute major vascular distribution infarct.    Skull/extracranial contents (limited evaluation): No evidence of a calvarial fracture.  There is stable appearance of a calcified 8 mm lesion in the involving the inner table of the right frontal calvarium.  Mastoid air cells are clear.  Minimal mucosal thickening in the right maxillary sinus and bilateral ethmoidal air cells.  There are postoperative changes in the orbits.                               CT Cervical Spine Without Contrast (Final result)  Result time 07/14/18 23:26:06    Final result by Joel Up MD (07/14/18 23:26:06)                 Impression:      No evidence of an acute fracture or new listhesis of the cervical spine.  Stable 3 mm anterolisthesis of C4 on C5.  Overall patient rotation and motion limits the examination.    Multiple levels of facet arthropathy and neural foraminal narrowing, worst at the C3-C4 and C5-C6 levels.    Left-sided pleural fluid with associated compressive atelectasis.  Follow-up to resolution is suggested.    Electronically signed by resident: Best Ramon  Date:    07/14/2018  Time:    22:55    Electronically signed by: Joel pU MD  Date:    07/14/2018  Time:    23:26             Narrative:    EXAMINATION:  CT CERVICAL SPINE WITHOUT CONTRAST    CLINICAL HISTORY:  Neck pain, first  study;.  Trauma complaining of neck pain.    TECHNIQUE:  Low dose axial images, sagittal and coronal reformations were performed though the cervical spine.  Contrast was not administered.  The examination is limited secondary to patient rotation and motion.    COMPARISON:  CT cervical spine 08/13/2017.    FINDINGS:  The craniocervical junction is stable in appearance with marked arthropathy.  No prevertebral soft tissue swelling is identified, allowing for the marked rotation of the examination.    There is a stable 3 mm 1 anterolisthesis of C4 on C5.  Significant disc space height loss throughout the cervical spine most prominent at C3-C4, C5-C6, and C6-C7.  There is no evidence of fracture or dislocation.  Odontoid is intact.    Stable appearance of a hemangioma in the C7 vertebral body.  The vertebral body heights are maintained.    Significant degenerative changes throughout the cervical spine with the worst level at the C3-C4 level with significant left facet arthropathy and severe left-sided neural foraminal narrowing.  There is significant bilateral facet arthropathy and severe left-sided neural foraminal narrowing at the C5-C6 level.    There are calcifications of the neck vessels.  Left-sided pleural fluid with associated compressive atelectasis.  Visualized airway is patent.  The visualized portions of the brain demonstrate no significant abnormality.                               X-Ray Chest AP Portable (Final result)  Result time 07/14/18 20:57:38    Final result by Cesar Eng MD (07/14/18 20:57:38)                 Impression:      Position limited exam, without detrimental change when compared with 06/29/2018.      Electronically signed by: Cesar Eng MD  Date:    07/14/2018  Time:    20:57             Narrative:    EXAMINATION:  XR CHEST AP PORTABLE    CLINICAL HISTORY:  fall;    TECHNIQUE:  One view of the chest.    COMPARISON:  06/29/2018    FINDINGS:  Evaluation is significantly limited  by patient rotation.  The patient's chin obscures the left apex.  Cardiac silhouette is enlarged.  Postoperative changes of prior median sternotomy.  Right-sided port is present with the tip overlying the SVC.  Left chest wall AICD is grossly stable.  Lungs demonstrate coarsened interstitial markings and stable opacification of the left lung base.  No detrimental change in lung aeration when compared with the prior exam.  Remote fracture of the right proximal humerus.                               X-Ray Pelvis Routine AP (Final result)  Result time 07/14/18 21:00:14    Final result by Cesar Eng MD (07/14/18 21:00:14)                 Impression:      Technically limited study, without obvious displaced fracture or detrimental change when compared with 09/19/2017.      Electronically signed by: Cesar Eng MD  Date:    07/14/2018  Time:    21:00             Narrative:    EXAMINATION:  XR PELVIS ROUTINE AP    CLINICAL HISTORY:  Unspecified fall, initial encounter.    TECHNIQUE:  AP view of the pelvis was performed.    COMPARISON:  09/19/2017.    FINDINGS:  Examination is limited by patient positioning, bony demineralization, and over penetration of the x-ray beam.  No obvious displaced fracture is identified within these limitations.  Right femoral prosthesis is in place.  There are advanced degenerative changes involving the left hip with linear areas of sclerosis in the left femoral head suggestive of avascular necrosis.  Vascular calcifications are present.                                       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 She presents to the ED for fall.    Will order labs and imaging. Will continue to monitor.     Differential diagnoses include, but  are not limited to: SDH, ICH, urosepsis, CHF exacerbation, rhabdomyolysis,  fracture, dislocation, or electrolyte imbalance.     Informed by patient pulmonologist, patient is enrolled in hospice for end stage CHF.  Advanced workup was ordered for further evaluation of etiology of fall prior to this information disclosure. Spoke with hospice physician, Dr. Emmanuel Vargas, whom manages patient.  Dr. Vargas instructs if work up unremarkable for patient to be discharge home to hospice care for further management of CHF.     No leukocytosis. Hemodynamically stable. Elevated glucose 130. Elevated BUN 48 and Cr 1.6 within patient's baseline. Elevated AST 41. PT-INR 12.4-1.2. Ammonia WNL. CPK WNL. Lactate WNL. Elevated BNP >4900. Minimally elevated troponin 0.086. Patient with known end stage CHF managed on hospice. Will not evaluate further during ED visit per family and hospice physician's plan of care. Lipase WNL. UA found to have 1+ occult blood and 1+ leukocytes. On microscopic analysis, found to have 8 WBCs, 9 RBCs, and 12 hyaline casts. Xray pelvis found to have no obvious displaced fracture. CXR found to have cardiomegaly. Postoperative changes of prior median sternotomy.  Right-sided port is present with the tip overlying the SVC.  Left chest wall AICD is grossly stable.  Lungs demonstrate coarsened interstitial markings and stable opacification of the left lung base. Remote fracture of the right proximal humerus.     Patient reassessed, patient resting comfortably in NAD on RA. Patient denies pain at this time. Will continue to monitor.     CT head found to have No acute abnormality. CT cervical spine found to have No evidence of an acute fracture or new listhesis of the cervical spine.     Patient reassessed, reports symptoms improved with ED management. I have discussed emergency department findings, and plan with the patient and daughter. Will discharge home with to hospice. Daughter and patient verbalizes  understanding of plan and agrees. Return precautions given.     I have discussed and reviewed with my supervising physician.            Clinical Impression:   The primary encounter diagnosis was Chronic combined systolic and diastolic congestive heart failure. Diagnoses of Fall, Stage 4 chronic kidney disease, and Anterolisthesis were also pertinent to this visit.      Disposition:   Disposition: Discharged  Condition: Federica Ryan PA-C  07/15/18 0450

## 2018-07-15 NOTE — ED NOTES
[facility] called in regards to ETA.  Dispatch states Medical transport should be in the parking lot of the facility.  Family made aware of transport arrival.

## 2018-07-15 NOTE — ED NOTES
[facility] called with ETA.  Dispatch states medical transport is 4 minutes out from arrival from the wrong facility.  Patient and patient's daughter informed of the occurrence.  [facility] will be en route to [facility] within the hour.

## 2018-12-21 ENCOUNTER — TELEPHONE (OUTPATIENT)
Dept: FAMILY MEDICINE | Facility: CLINIC | Age: 83
End: 2018-12-21

## 2019-08-19 ENCOUNTER — PES CALL (OUTPATIENT)
Dept: ADMINISTRATIVE | Facility: CLINIC | Age: 84
End: 2019-08-19

## 2020-06-23 NOTE — DISCHARGE INSTRUCTIONS
Our goal in the emergency department is to always give you outstanding care and exceptional service. You may receive a survey by mail or e-mail in the next week regarding your experience in our ED. We would greatly appreciate your completing and returning the survey. Your feedback provides us with a way to recognize our staff who give very good care and it helps us learn how to improve when your experience was below our aspiration of excellence.      RHEUMATOLOGY INITIAL VISIT    Name: Destiny Marc  : 1973  Date: 2020    VIDEO VISIT  The video visit was not recorded    Chaperone:  Unaccompanied  Referring Physician:  Mark Taylor MD    SUBJECTIVE  47 year old female previously followed by me for seropositive rheumatoid arthritis, Sjogren's syndrome, type I renal tubular acidosis with severe hypokalemia and periodic muscle paralysis, and iron deficiency anemia.   She has not returned for follow-up with me since Dec. 15, 2015.  She was treated with methotrexate, low dose prednisone, and Enbrel at that time which resulted in improvement in her joint symptoms.  She also had multiple parenteral iron infusions over several years for iron deficiency anemia. There was no evidence of GI bleeding.  She was responding well to Enbrel and methotrexate at that time which were well-tolerated.  However, she stopped all of her medications including Enbrel, methotrexate, and prednisone at some point on her own after her last visit with me as she felt well.  She had only mild pains for the next few years and she took only Advil as needed.  She had one more series of iron infusions during that time and she then took oral iron supplements.  She was doing well until early 2020 when she developed pain in her ankles, toes, and knees with swelling of her lower legs.  She also noted difficulty with hand swelling and difficulty with her hand . She had lab tests 2020 which revealed severe anemia with hemoglobin 4.8 g.  She was not taking iron supplements at that time. Serum iron was 16 mcg/dl and iron saturation 4%. She denied any bleeding including epistaxis or rectal bleeding. She was admitted to [facility] for 2 days and she was transfused with 2 units of blood.  Her hemoglobin the next day rogelio to 7.8 g.     She now c/o pain in her shoulders, left side of her neck, hands, ankles, and toes.  The previous swelling of her legs has resolved. She also c/o  fatigue and anorexia.  She had an episode of fever and cough 2 weeks ago but COVID 19 RNA was negative. She was treated with azithromycin which has been completed and she is now finishing a course of Augmentin. She has dry eyes treated with Refresh Tears and dry mouth on no treatment.  She denied skin rash on her face or trunk.  She noted bluish color of her fingertips in the cold weather.  She denied skin tightening or digital ulcers. digital ulcers.      Past Medical History:   Diagnosis Date   • Hypokalemic periodic paralysis    • Intussusception (CMS/HCC)    • Iron deficiency anemia    • Noncompliance    • rheumatoid arthritis    • Sjogren's syndrome (CMS/HCC)       Past Surgical History:   Procedure Laterality Date   • Tubal ligation        Social History     Socioeconomic History   • Marital status: /Civil Union     Spouse name: Not on file   • Number of children: Not on file   • Years of education: Not on file   • Highest education level: Not on file   Occupational History   • Not on file   Social Needs   • Financial resource strain: Not on file   • Food insecurity:     Worry: Not on file     Inability: Not on file   • Transportation needs:     Medical: Not on file     Non-medical: Not on file   Tobacco Use   • Smoking status: Light Tobacco Smoker   • Smokeless tobacco: Never Used   Substance and Sexual Activity   • Alcohol use: Not Currently   • Drug use: Not on file   • Sexual activity: Not on file   Lifestyle   • Physical activity:     Days per week: Not on file     Minutes per session: Not on file   • Stress: Not on file   Relationships   • Social connections:     Talks on phone: Not on file     Gets together: Not on file     Attends Denominational service: Not on file     Active member of club or organization: Not on file     Attends meetings of clubs or organizations: Not on file     Relationship status: Not on file   • Intimate partner violence:     Fear of current or ex partner: Not on file      Emotionally abused: Not on file     Physically abused: Not on file     Forced sexual activity: Not on file   Other Topics Concern   • Not on file   Social History Narrative   • Not on file   .  Family History   Problem Relation Age of Onset   • Other Neg Hx         periodic paralysis          Allergies:  ALLERGIES:  Bee sting; Seafood   (food); Alcohol   (food or med); and Fish   (food or med)    Current Outpatient Medications   Medication Sig Last Dose   • amoxicillin-clavulanate (AUGMENTIN) 875-125 MG per tablet Take 1 tablet by mouth 2 times daily for 10 days. Taking at Unknown time   • albuterol 108 (90 Base) MCG/ACT inhaler INHALE 2-4 PUFFS EVERY 4-6 HOURS AS NEEDED FOR SHORTNESS OF BREATH Taking at Unknown time   • CARBOXYMethylcellulose (REFRESH PLUS) 0.5 % ophthalmic solution Place 1 drop into both eyes 3 times daily. Taking at Unknown time   • potassium CHLORIDE (KLOR-CON M) 20 MEQ sherry ER tablet Take 1 tablet by mouth 2 times daily (with meals). Taking at Unknown time   • azithromycin (ZITHROMAX) 250 MG tablet 2 tbts today 1 daily for next 4 days Not Taking at Unknown time     No current facility-administered medications for this visit.        ROS:  Review of Systems   Constitutional: Positive for fatigue. Negative for appetite change and diaphoresis.   HENT: Negative for mouth sores, nosebleeds and sinus pain.         Dry mouth   Eyes: Negative for pain, redness and visual disturbance.        Dry eyes.   Respiratory: Negative for cough and shortness of breath.    Cardiovascular: Positive for leg swelling. Negative for chest pain.   Gastrointestinal: Negative for abdominal pain, diarrhea, nausea and vomiting.   Genitourinary: Negative for dysuria and frequency.   Musculoskeletal: Positive for arthralgias, back pain, joint swelling, neck pain and neck stiffness.   Skin: Negative for rash.   Neurological: Negative for tremors, weakness, numbness and headaches.   Hematological: Negative for adenopathy.        OBJECTIVE  Visit Vitals  [facility] 05/18/2020     .  Physical exam   Physical Exam      LAB RESULTS    Component      Latest Ref Rng & Units 6/17/2020   2019 NCOV SOURCE       NASOPHARYNGEAL SWAB   SARS CoV 2 Qualitative RT PCR      NOT DETECTED NOT DETECTED     Component      Latest Ref Rng & Units 6/9/2020           9:45 AM   Protein Electrophoresis Interpretation          WBC      4.2 - 11.0 K/mcL 5.8   RBC      4.00 - 5.20 mil/mcL 3.54 (L)   HGB      12.0 - 15.5 g/dL 4.8 (LL)   HCT      36.0 - 46.5 % 19.9 (L)   MCV      78.0 - 100.0 fl 56.2 (L)   MCH      26.0 - 34.0 pg 13.6 (L)   MCHC      32.0 - 36.5 g/dL 24.1 (L)   RDW-CV      11.0 - 15.0 % 23.5 (H)   PLT      140 - 450 K/mcL 311   NRBC      0 /100 WBC 0   DIFFERENTIAL TYPE       AUTOMATED DIFFERENTIAL   Neutrophil      % 72   LYMPH      % 16   MONO      % 9   EOSIN      % 1   BASO      % 1   Percent Immature Granuloctyes      % 1   Absolute Neutrophil      1.8 - 7.7 K/mcL 4.2   Absolute Lymph      1.0 - 4.8 K/mcL 0.9 (L)   Absolute Mono      0.3 - 0.9 K/mcL 0.5   Absolute Eos      0.1 - 0.5 K/mcL 0.1   Absolute Baso      0.0 - 0.3 K/mcL 0.0   Absolute Immature Granulocytes      0 - 0.2 K/mcl 0.0   Sodium      135 - 145 mmol/L 138   Potassium      3.4 - 5.1 mmol/L 3.1 (L)   Chloride      98 - 107 mmol/L 111 (H)   CO2      21 - 32 mmol/L 24   ANION GAP      10 - 20 mmol/L 6 (L)   Glucose      65 - 99 mg/dL 90   BUN      6 - 20 mg/dL 7   Creatinine      0.51 - 0.95 mg/dL 0.54   GFR Estimate,        >90   GFR Estimate, Non        >90   BUN/CREATININE RATIO      7 - 25 13   CALCIUM      8.4 - 10.2 mg/dL 7.7 (L)   TOTAL BILIRUBIN      0.2 - 1.0 mg/dL 0.4   AST/SGOT      <38 Units/L 13   ALT/SGPT      <64 Units/L 11   ALK PHOSPHATASE      45 - 117 Units/L 74   TOTAL PROTEIN      6.4 - 8.2 g/dL 9.0 (H)   Albumin      3.6 - 5.1 g/dL 2.8 (L)   GLOBULIN      2.0 - 4.0 g/dL 6.2 (H)   A/G Ratio, Serum      1.0 - 2.4 0.5 (L)   IRON      50  - 170 mcg/dL 16 (L)   Total Iron Binding Capacity      250 - 450 mcg/dL 395   PERCENT IRON SATURATION      15 - 45 % 4 (L)   TOTAL PROTEIN      6.4 - 8.2 g/dL    PATH REPORT REFERENCE          C-REACTIVE PROTEIN      <1.0 mg/dL 0.7   VITAMIND, 25 HYDROXY      30.0 - 100.0 ng/mL        Component      Latest Ref Rng & Units 6/8/2020   WHITE BLOOD CELL COUNT      3.8 - 10.8 Thousand/uL 5.6   RED BLOOD CELL COUNT      3.80 - 5.10 Million/uL 3.63 (L)   HEMOGLOBIN      11.7 - 15.5 g/dL 4.8 (LL)   HEMATOCRIT      35.0 - 45.0 % 20.3 (L)   MCV      80.0 - 100.0 fL 55.9 (L)   MCH      27.0 - 33.0 pg <15.0 (L)   MCHC      32.0 - 36.0 g/dL 23.6 (L)   RDW      11.0 - 15.0 % 22.6 (H)   PLATELET COUNT      140 - 400 Thousand/uL 317   MPV      7.5 - 12.5 fL 8.6   ABSOLUTE NEUTROPHILS      1,500 - 7,800 cells/uL 4,267   ABSOLUTE LYMPHOCYTES      850 - 3,900 cells/uL 857   ABSOLUTE MONOCYTES      200 - 950 cells/uL 426   ABSOLUTE EOSINOPHILS      15 - 500 cells/uL 28   ABSOLUTE BASOPHILS      0 - 200 cells/uL 22   NEUTROPHILS      % 76.2   LYMPHOCYTES      % 15.3   MONOCYTES      % 7.6   EOSINOPHILS      % 0.5   BASOPHILS      % 0.4   CBC MORPHOLOGY      NORMAL    GLUCOSE      65 - 139 mg/dL 88   UREA NITROGEN (BUN)      7 - 25 mg/dL 5 (L)   CREATININE      0.50 - 1.10 mg/dL 0.48 (L)   EGFR NON-AFR. AMERICAN      > OR = 60 mL/min/1.73m2 117   EGFR AFRICAN AMERICAN      > OR = 60 mL/min/1.73m2 135   BUN/CREATININE RATIO      6 - 22 (calc) 10   SODIUM      135 - 146 mmol/L 135   POTASSIUM      3.5 - 5.3 mmol/L 3.3 (L)   CHLORIDE      98 - 110 mmol/L 108   CARBON DIOXIDE      20 - 32 mmol/L 22   CALCIUM      8.6 - 10.2 mg/dL 8.3 (L)   PROTEIN, TOTAL      6.1 - 8.1 g/dL 8.8 (H)   ALBUMIN      3.6 - 5.1 g/dL 3.5 (L)   GLOBULIN      1.9 - 3.7 g/dL (calc) 5.3 (H)   ALBUMIN/GLOBULIN RATIO      1.0 - 2.5 (calc) 0.7 (L)   BILIRUBIN, TOTAL      0.2 - 1.2 mg/dL 0.4   ALKALINE PHOSPHATASE      31 - 125 U/L 70   AST      10 - 35 U/L 16   ALT       6 - 29 U/L 8   TSH W/REFLEX TO FT4      mIU/L 1.56   SED RATE BY MODIFIED WESTERGREN      < OR = 20 mm/h 80 (H)   C-REACTIVE PROTEIN      <8.0 mg/L 8.9 (H)       ASSESSMENT  Seropositive rheumatoid arthritis  Sjogren's syndrome  Type I renal tubular acidosis  Hypokalemia  Periodic muscle paralysis  Iron deficiency anemia        PLAN  I discussed the diagnostic issues and treatment options with the patient.  She has seropositive rheumatoid arthritis which is now flaring.  She had been in remission on Enbrel, methotrexate, and low dose prednisone 5 years ago.  However, she was noncompliant with follow-up and treatment and she stopped her medictions on her own 5 years ago.  She has been on no disease modifying therapies since then.      I felt that she will have to be restarted on treatment for her rheumatoid arthritis.  She refused methotrexate therapy as this was previously associated with severe nausea.  However, she agreed to another course of biologic therapy with Enbrel which was well-tolerated in the past.  The risks and benefits of Enbrel were discussed with her. She was given an opportunity to ask questions that were answered to her satisfaction and she agreed to proceed.  She will be sent for baseline lab tests.  If there are no contraindications she will be restarted on Enbrel 50 mg by SC injection once per week. She was now prescribed low dose prednisone 10 mg daily for bridge therapy.    She will follow-up with me 4 weeks after starting Enbrel therapy.      CC:  Mark Taylor MD     URINALYSIS WITH MICRO & CULTURE IF INDICATED Routine, Lab Collect, Specimen Sources - Urine clean catch;, New collection, Resulting Agency - [facility]         PROTEIN/CREATININE RATIO, URINE Routine, Lab Collect, Specimen Sources - Urine clean catch;, New collection, Resulting Agency - [facility]        QUANTIFERON TB PLUS Future, Expected: 6/30/2020, Expires: 9/23/2020, Routine, Lab Collect, Specimen Sources - Blood,  Venous;, New collection, Resulting Agency - [facility]        RHEUMATOID FACTOR IGA, IGG, IGM Future, Expected: 6/24/2020, Expires: 9/22/2020, Routine, Lab Collect, Specimen Sources - Blood, Venous;, New collection, Resulting Agency - [facility]        CYCLIC CITRULLINATED PEPTIDE ANTIBODY IGG & IGA Future, Expected: 6/24/2020, Expires: 9/22/2020, Routine, Lab Collect, Specimen Sources - Blood, Venous;, New collection, Resulting Agency - [facility]        GINA SCREEN WITH ANTIBODY AND IFA REFLEX Future, Expected: 6/24/2020, Expires: 9/22/2020, Routine, Lab Collect, Specimen Sources - Blood, Venous;, New collection, Resulting Agency - [facility]        SEDIMENTATION RATE WESTERGREN Future, Expected: 6/23/2020, Expires: 9/21/2020, Routine, Lab Collect, Specimen Sources - Blood, Venous;, New collection, Resulting Agency - [facility]        HEPATITIS PANEL CHRONIC WITH REFLEX Future, Expected: 6/23/2020, Expires: 9/21/2020, Routine, Lab Collect, Specimen Sources - Blood, Venous;, New collection, Resulting Agency - [facility]        CREATINE KINASE Future, Expected: 6/23/2020, Expires: 9/21/2020, Routine, Lab Collect, Specimen Sources - Blood, Venous;, New collection, Resulting Agency - [facility]        COMPREHENSIVE METABOLIC PANEL Future, Expected: 6/23/2020, Expires: 9/21/2020, Routine, Lab Collect, Specimen Sources - Blood, Venous;, New collection, Resulting Agency - [facility]        CBC WITH DIFFERENTIAL Future, Expected: 6/23/2020, Expires: 9/21/2020, Routine, Lab Collect, Specimen Sources - Blood, Venous;, New collection, Resulting Agency - [facility]        C REACTIVE PROTEIN Future, Expected: 6/23/2020, Expires: 9/21/2020, Routine, Lab Collect, Specimen Sources - Blood, Venous;, New collection, Resulting Agency - [facility]              Electronically Signed by: Aubree Hu MD

## 2021-08-10 NOTE — ASSESSMENT & PLAN NOTE
- CKD stage 3, suspect Cr isbaseline given no changes with diuretics  - diuresis as above     Drysol Counseling:  I discussed with the patient the risks of drysol/aluminum chloride including but not limited to skin rash, itching, irritation, burning.

## 2022-11-24 NOTE — CONSULTS
[facility]  Orthopedics  Consult Note    Patient Name: Laurita Stokes  MRN: [id number]  Admission Date: 7/11/2017  Hospital Length of Stay: 1 days  Attending Provider: Anshul Guerrero MD  Primary Care Provider: Alex Capellan MD    Patient information was obtained from relative(s), past medical records and ER records.     Consults  Subjective:     Principal Problem:Closed displaced fracture of right femoral neck    Chief Complaint: No chief complaint on file.       HPI: Pt is an 84 yo female with right shoulder and right hip pain after she fell while ambulating with a walker in the middle of the night on 7/11, attempting to go to the bathroom.  She had immediate right hip and shoulder pain.  She denies headstrike or LOC.  Of note, she was recently admitted on 5/24-5/26 for subarachnoid hemorrhage, at which time she was discharged on hospice with plans to stop her anticoagulation for her atrial fibrillation.  Patient lives with her daughter and ambulates with a walker. She was diagnosed with a proximal humerus fracture and right femoral neck fracture. She has a history of heart failure with an AICD in place. Denies numbness or tingling.    Transferred to [facility] for definitve treatment after evaluation by cardiology and orthopedics at [facility].     Past Medical History:   Diagnosis Date    *Atrial fibrillation     on warfarin    Allergy     seasonal    Anticoagulant long-term use     ASA 81 mg, Coumadin    Blood clotting tendency     Blood transfusion     Cataract     CHF (congestive heart failure)     CKD (chronic kidney disease) stage 4, GFR 15-29 ml/min 2/21/2017    Coronary artery disease     PET stress in 1/14: lesions not amenable to PCI.    DDD (degenerative disc disease), lumbar 8/20/2015    GERD (gastroesophageal reflux disease)     HEARING LOSS     wears Hearing aide    Hyperlipidemia     Hypertension     Hypothyroid     MCI (mild cognitive impairment)      Myocardial infarct 1999    Pacemaker 2000    S/P CABG x 3 2000    Ulcerative colitis     Ventricular tachycardia 1/14    VT storm 1/14, mexilitine added       Past Surgical History:   Procedure Laterality Date    CARDIAC CATHETERIZATION  8/13    [facility], Dr Wallace; LIMA and SVG occluded. Proximal LAD 40%, ostial Diagonal 60%, LCX 30-40% in-stent restenosis, RCA totally occluded proximally    CATARACT EXTRACTION W/  INTRAOCULAR LENS IMPLANT Right 3/19/96    COLON SURGERY      ischemic colitis    COLONOSCOPY      COLONOSCOPY N/A 6/21/2016    Procedure: COLONOSCOPY;  Surgeon: Rob Cartagena MD;  [facility];  Service: Endoscopy;  Laterality: N/A;    COLONOSCOPY N/A 7/18/2016    Procedure: COLONOSCOPY;  Surgeon: Rob Cartagena MD;  [facility];  Service: Endoscopy;  Laterality: N/A;    CORONARY ARTERY BYPASS GRAFT  3/2001    3 vessel    CORONARY STENT PLACEMENT  12/2015    Dr. Freire    EYE SURGERY      HYSTERECTOMY      total    INSERT / REPLACE / REMOVE PACEMAKER  12/2009    c CRT-D generator changeout    ORIF WRIST FRACTURE Right     TONSILLECTOMY         Review of patient's allergies indicates:   Allergen Reactions    Codeine Itching    Azithromycin      History of VT       Current Facility-Administered Medications   Medication    CEFAZOLIN 2G/20 ML SYRINGE (PYXIS) IV syringe 2 g 20 mL    diphenhydrAMINE injection 25 mg    hydrocodone-acetaminophen 10-325mg per tablet 1 tablet    hydrocodone-acetaminophen 5-325mg per tablet 1 tablet    morphine injection 4 mg    mupirocin 2 % ointment 1 g    ondansetron injection 4 mg    senna-docusate 8.6-50 mg per tablet 1 tablet    vancomycin 1 g in dextrose 5 % 250 mL IVPB (ready to mix system)     Family History     Problem Relation (Age of Onset)    Cancer Sister    Heart disease Father (65), Sister (55)        Social History Main Topics    Smoking status: Former Smoker     Packs/day: 1.00     Years:  "40.00     Types: Cigarettes     Start date: 1/1/1953     Quit date: 1/1/2013    Smokeless tobacco: Never Used    Alcohol use No    Drug use: No    Sexual activity: No     ROS   Constitution: Negative for chills and fever.   HENT: Negative for congestion and headaches.    Eyes: Negative for photophobia and redness.   Respiratory: Negative for cough and shortness of breath.    Skin: Negative for color change and rash.   Musculoskeletal: Positive for falls and joint pain.   Gastrointestinal: Negative for abdominal pain, nausea and vomiting.   Genitourinary: Negative for flank pain and frequency.   Neurological: Negative for dizziness and light-headedness.   Psychiatric/Behavioral: Negative for altered mental status and depression.     Objective:     Vital Signs (Most Recent):  Temp: 98.9 °F (37.2 °C) (07/11/17 2300)  Pulse: 81 (07/11/17 2300)  Resp: 18 (07/11/17 2300)  BP: 127/60 (07/11/17 2300)  SpO2: 95 % (07/11/17 2300) Vital Signs (24h Range):  Temp:  [98.1 °F (36.7 °C)-98.9 °F (37.2 °C)] 98.9 °F (37.2 °C)  Pulse:  [80-88] 81  Resp:  [16-20] 18  SpO2:  [77 %-96 %] 95 %  BP: (100-132)/(45-74) 127/60     Weight: 57.8 kg (127 lb 6.8 oz)  Height: 5' 3" (160 cm)  Body mass index is 22.57 kg/m².      Intake/Output Summary (Last 24 hours) at 07/12/17 0113  Last data filed at 07/11/17 2300   Gross per 24 hour   Intake                0 ml   Output              350 ml   Net             -350 ml       Ortho/SPM Exam   HEENT: normocephalic, atraumatic  Resp: no increased work of breathing  CV: regular rate and rhythm    RUE:  - Skin intact, arm in sling  - TTP around shoulder  - SILT M/U/R nerves  - Motor intact AIN/PIN/U/M nerves  - 2+ DR pulse    RLE:   - Skin intact, no swelling or ecchymoses  - TTP around hip  - SILT distally  - Motor intact EHL/FHL/gastroc/TA  - Palpable DP/PT pulses    Significant Labs: All pertinent labs within the past 24 hours have been reviewed.    Significant Imaging: I have reviewed all " pertinent imaging results/findings. Displaced right femoral neck fracture. Displaced right humeral neck fracture.    Assessment/Plan:     Closed fracture of proximal end of right humerus    - Non-operative treatment in ing  - NW RUE        * Closed displaced fracture of right femoral neck    83 y.o. female with closed right femoral neck fracture, closed right humeral neck fx  --Transfer to medicine hip fracture service for pre-operative clearance and medical evaluation  --Cardiology consult for pre-op cardiac evaluation  --To OR tomorrow for operative fixation of left femoral neck fracture with jamil arthroplasty  --Pt marked and consented, case booked  --Pre-operative labs and imaging obtained (UA, Type and Cross, PT-INR, CBC, BMP, PreAlbumin, CXR, EKG)   --Bed rest, adkins, NPO at midnight     Risks and complications were discussed including but not limited to the risks of anesthetic complications, infection, wound healing complications, non-union, mal-union, hardware failure, pain, stiffness, DVT, pulmonary embolism, perioperative medical risks (cardiac, pulmonary, renal, neurologic), and death among others were discussed. No guarantees were made and the patient and family elect to proceed. They fully understand the reported 30% mortality risk within the first year of surgery.              Thank you for your consult. I will follow-up with patient. Please contact us if you have any additional questions.    Anai Shell MD  Orthopedics  [facility]       Resulted

## 2023-04-11 NOTE — PT/OT/SLP PROGRESS
"Occupational Therapy  Treatment    Laurita Stokes   MRN: [id number]   Admitting Diagnosis: Closed displaced fracture of neck of right femur with routine healing    OT Date of Treatment: 07/21/17   OT Start Time: 1400  OT Stop Time: 1418  OT Total Time (min): 18 min    Billable Minutes:  Self Care/Home Management 18    General Precautions: Standard, fall  Orthopedic Precautions: RUE non weight bearing, RLE weight bearing as tolerated, RLE posterior precautions  Braces: UE Sling    Subjective:  Communicated with RN prior to session.    Pt agreeable to treatment     Pain/Comfort  Pain Rating 1:  (did not rate)  Location - Side 1: Right  Location 1: arm  Pain Addressed 1: Distraction, Reposition, Nurse notified    Objective:   Functional Mobility:  Bed Mobility:  Scooting/Bridging: Maximum Assistance  Supine to Sit: Maximum Assistance    Transfers:   Bed <> Chair Technique: Stand Pivot  Bed <> Chair Transfer Assistance: Maximum Assistance  Bed <> Chair Assistive Device: No Assistive Device    Activities of Daily Living:  Feeding Level of Assistance: Set-up Assistance (pt eating potato wedges)    UE Dressing Level of Assistance: Total assistance    LE Dressing Level of Assistance: Total assistance    Therapeutic Activities and Exercises:  Pt unable to recall any posterior hip precautions    AM-PAC 6 CLICK ADL   How much help from another person does this patient currently need?   1 = Unable, Total/Dependent Assistance  2 = A lot, Maximum/Moderate Assistance  3 = A little, Minimum/Contact Guard/Supervision  4 = None, Modified [place]/Independent    Putting on and taking off regular lower body clothing? : 1  Bathing (including washing, rinsing, drying)?: 1  Toileting, which includes using toilet, bedpan, or urinal? : 1  Putting on and taking off regular upper body clothing?: 1  Taking care of personal grooming such as brushing teeth?: 2  Eating meals?: 3  Total Score: 9     AM-PAC Raw Score CMS "G-Code Modifier " The patient location is:  Home in [place]  The patient location [place] is:  [place]  The patient phone number is:  [phone]   Visit type: Virtual visit with synchronous audio and video  Each patient to whom he or she provides medical services by telemedicine is:  (1) informed of the relationship between the physician and patient and the respective role of any other health care provider with respect to management of the patient; and (2) notified that he or she may decline to receive medical services by telemedicine and may withdraw from such care at any time.  Crisis Disclaimer: Patient was informed that due to the virtual nature of the visit, that if a crisis develops, protocols will be implemented to ensure patient safety, including but not limited to: 1) Initiating a welfare check with local Law Enforcement, 2) Calling 1/National Crisis Hotline, and/or 3) Initiating PEC/CEC procedures.    Individual Psychotherapy (PhD/LCSW)    4/11/2023    Site:  [facility]         Therapeutic Intervention: Met with patient.  Outpatient - Insight oriented psychotherapy 60 min - CPT code 11430, Outpatient - Behavior modifying psychotherapy 60 min - CPT code 81188, and Outpatient - Supportive psychotherapy 60 min - CPT Code 60322    Chief complaint/reason for encounter: depression, anxiety, and mood d/o             Interval history and content of current session:  Ct was referred to tx by Javier BRAVO to address depression, anxiety and mood d/o, work stress. Ct arrived to session and was fully engaged. This session was completed virtually. Ct shared that things have been going well. She reported that her mood has been stable. 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 SW and ct discussed the importance of ct taking her time going back to work full time. Subbing appears to be a good fit for ct now due to it's flexibility and ct having more autonomy and control over he schedule. Ct shared how she was in school  Level of Impairment Assistance   6 % Total / Unable   7 - 8 CM 80 - 100% Maximal Assist   9-13 CL 60 - 80% Moderate Assist   14 - 19 CK 40 - 60% Moderate Assist   20 - 22 CJ 20 - 40% Minimal Assist   23 CI 1-20% SBA / CGA   24 CH 0% Independent/ Mod I       Patient left up in chair with all lines intact, call button in reach and RN notified    ASSESSMENT:  Laurita Stokes is a 83 y.o. female with a medical diagnosis of Closed displaced fracture of neck of right femur with routine healing and tolerated session well with good participation and would continue to benefit from OT services to maximize functional (I) and safety.    Rehab identified problem list/impairments: Rehab identified problem list/impairments: weakness, impaired endurance, impaired self care skills, impaired functional mobilty, gait instability, impaired balance, decreased lower extremity function, decreased safety awareness, pain, decreased ROM, decreased upper extremity function, impaired skin, edema, orthopedic precautions    Rehab potential is good.    Activity tolerance: Good    Discharge recommendations: Discharge Facility/Level Of Care Needs: nursing facility, skilled     Barriers to discharge: Barriers to Discharge: None    Equipment recommendations:  (TBD)     GOALS:    Occupational Therapy Goals        Problem: Occupational Therapy Goal    Goal Priority Disciplines Outcome Interventions   Occupational Therapy Goal     OT, PT/OT Ongoing (interventions implemented as appropriate)    Description:  Patient will increase functional independence with ADLs by performing:    UE Dressing with Supervision.  LE Dressing with Moderate Assistance.  Grooming while seated with Supervision.  Toileting from bedside commode with Supervision for hygiene and clothing management.   Sitting at edge of bed x20 minutes with Supervision.  Supine to sit with Minimal Assistance.  Stand pivot transfers with Minimal Assistance.  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 Ct is considering going back to college in the future. Ct is stable and working. Ct to continue in 1:1 sessions.     Treatment plan:  Target symptoms: depression, anxiety , mood disorder  Why chosen therapy is appropriate versus another modality: relevant to diagnosis, patient responds to this modality, evidence based practice  Outcome monitoring methods: self-report  Therapeutic intervention type: insight oriented psychotherapy, behavior modifying psychotherapy, supportive psychotherapy    Risk parameters:  Patient reports no suicidal ideation  Patient reports no homicidal ideation  Patient reports no self-injurious behavior  Patient reports no violent behavior      Mental Status Exam:  General Appearance:  unremarkable, age appropriate   Speech: normal tone, normal rate, normal pitch, normal volume      Level of Cooperation: cooperative      Thought Processes: normal and logical   Mood: steady      Thought Content: normal, no suicidality, no homicidality, delusions, or paranoia   Affect: congruent and appropriate   Orientation: Oriented x3   Memory: recent >  intact   Attention Span & Concentration: intact   Fund of General Knowledge: intact and appropriate to age and level of education   Abstract Reasoning: interpretation of similarities was abstract   Judgment & Insight: fair, intact     Language intact       Verbal deficits: None    Patient's response to intervention:  The patient's response to intervention is accepting.    Progress toward goals and other mental status changes:  The patient's progress toward goals is  Improved .    Diagnosis:     ICD-10-CM ICD-9-CM   1. Bipolar 1 disorder  F31.9 296.7   2. Mild episode of recurrent major depressive disorder  F33.0 296.31   3. MICHEL (generalized anxiety disorder)  F41.1 300.02       Plan:  individual psychotherapy and Ct to follow up with [facility]  Pt to go  commode with Minimal Assistance.  Pt will verbalize hip precautions and incorporate them during ADL performance with Minimal verbal cues                    Plan:  Patient to be seen 6 x/week to address the above listed problems via self-care/home management, therapeutic activities, therapeutic exercises  Plan of Care expires: 07/27/17  Plan of Care reviewed with: patient         SRAVANI Calderon  07/22/2017   to ED or call 911 if symptoms worsen or if she has thoughts of harming self and/or others. Pt verbalized understanding.    Return to clinic: as scheduled    Length of Service (minutes): 60      Each patient to whom he or she provides medical services by telemedicine is: (1) informed of the relationship between the physician and patient and the respective role of any other health care provider with respect to management of the patient; and (2) notified that he or she may decline to receive medical services by telemedicine and may withdraw from such care at any time.

## 2024-07-11 NOTE — ASSESSMENT & PLAN NOTE
Encounter for aftercare for healing closed traumatic fracture of right hip  Patient is POD # 9. Patient reports minimal pain in right hip. Plan is to continue PT/OT for gait training and strengthening and restoration of ADL's. Patient is FWB/WBAT: right lower extremity, posterior hip precautions as per Orthopedic recommendations. Plan to continue Lovenox 40 mg subcutaneous daily for total of 4 weeks post-op for DVT prophylaxis after hip fracture surgery. Orthopedics is following and managing surgical site. Pain is well controlled with Tramadol as needed. PT/OT recommending skilled nursing facility placement and  working on choice of skilled facility with patient and family but so far unable to get an accepting facility until today and patient has been accepted at [facility] [place] for 7/24.    Yes

## (undated) DEVICE — SEE L#156916

## (undated) DEVICE — SUT VICRYL BR 1 GEN 27 CT-1

## (undated) DEVICE — KIT IRR SUCTION HND PIECE

## (undated) DEVICE — NDL 18GA X1 1/2 REG BEVEL

## (undated) DEVICE — SUT ETHILON 3/0 18IN PS-1

## (undated) DEVICE — ELECTRODE REM PLYHSV RETURN 9

## (undated) DEVICE — SET CEMENT (SCULP)

## (undated) DEVICE — SOL IRR NACL .9% 3000ML

## (undated) DEVICE — SUT VICRYL PLUS 0 CT1 18IN

## (undated) DEVICE — APPLICATOR CHLORAPREP ORN 26ML

## (undated) DEVICE — SUT ETHIBOND XTRA 1 OS-6

## (undated) DEVICE — SUT VICRYL+ 1 CT1 18IN

## (undated) DEVICE — SUT VICRYL PLUS 2-0

## (undated) DEVICE — TUBE SUCTION KAMVAC MINI 20/BX

## (undated) DEVICE — HOOD T-5 TEAR AWAY STERILE

## (undated) DEVICE — SEE MEDLINE ITEM 152487

## (undated) DEVICE — GAUZE SPONGE 4X4 12PLY

## (undated) DEVICE — ADHESIVE DERMABOND ADVANCED

## (undated) DEVICE — DRESSING AQUACEL AG 3.5X10IN

## (undated) DEVICE — TRAY CATH UM FOLEY SIL W 16FR

## (undated) DEVICE — PRESSURIZER HI VAC HIP KIT

## (undated) DEVICE — Device

## (undated) DEVICE — SUT 0 VICRYL / CT-1

## (undated) DEVICE — BLADE SAGITTAL 18 X 1.27 X 90M